# Patient Record
Sex: FEMALE | Race: WHITE | NOT HISPANIC OR LATINO | Employment: OTHER | URBAN - METROPOLITAN AREA
[De-identification: names, ages, dates, MRNs, and addresses within clinical notes are randomized per-mention and may not be internally consistent; named-entity substitution may affect disease eponyms.]

---

## 2018-01-04 ENCOUNTER — HOSPITAL ENCOUNTER (INPATIENT)
Facility: HOSPITAL | Age: 63
LOS: 1 days | Discharge: HOME | DRG: 375 | End: 2018-01-05
Attending: EMERGENCY MEDICINE | Admitting: FAMILY MEDICINE
Payer: COMMERCIAL

## 2018-01-04 ENCOUNTER — APPOINTMENT (EMERGENCY)
Dept: CT IMAGING | Facility: HOSPITAL | Age: 63
DRG: 375 | End: 2018-01-04
Payer: COMMERCIAL

## 2018-01-04 ENCOUNTER — APPOINTMENT (INPATIENT)
Dept: CT IMAGING | Facility: HOSPITAL | Age: 63
DRG: 375 | End: 2018-01-04
Payer: COMMERCIAL

## 2018-01-04 ENCOUNTER — APPOINTMENT (EMERGENCY)
Dept: INTERVENTIONAL RADIOLOGY/VASCULAR | Facility: HOSPITAL | Age: 63
DRG: 375 | End: 2018-01-04
Payer: COMMERCIAL

## 2018-01-04 DIAGNOSIS — C79.9 METASTATIC DISEASE (HCC): ICD-10-CM

## 2018-01-04 DIAGNOSIS — R18.8 ASCITES: Primary | ICD-10-CM

## 2018-01-04 DIAGNOSIS — R10.84 GENERALIZED ABDOMINAL PAIN: ICD-10-CM

## 2018-01-04 PROBLEM — C80.1 MALIGNANCY (HCC): Status: ACTIVE | Noted: 2018-01-04

## 2018-01-04 PROBLEM — E87.1 HYPONATREMIA: Status: ACTIVE | Noted: 2018-01-04

## 2018-01-04 PROBLEM — R10.9 ABDOMINAL PAIN: Status: ACTIVE | Noted: 2018-01-04

## 2018-01-04 PROBLEM — I10 HYPERTENSION: Status: ACTIVE | Noted: 2018-01-04

## 2018-01-04 PROBLEM — E87.6 HYPOKALEMIA: Status: ACTIVE | Noted: 2018-01-04

## 2018-01-04 LAB
AFP-TM SERPL-MCNC: 1.4 NG/ML (ref 0.5–8)
ALBUMIN FLD-MCNC: 2.3 G/DL
ALBUMIN SERPL BCP-MCNC: 3 G/DL (ref 3.5–5)
ALP SERPL-CCNC: 53 U/L (ref 46–116)
ALT SERPL W P-5'-P-CCNC: 25 U/L (ref 12–78)
ANION GAP SERPL CALCULATED.3IONS-SCNC: 14 MMOL/L (ref 4–13)
ANION GAP SERPL CALCULATED.3IONS-SCNC: 9 MMOL/L (ref 4–13)
APPEARANCE FLD: NORMAL
APTT PPP: 30 SECONDS (ref 23–35)
AST SERPL W P-5'-P-CCNC: 44 U/L (ref 5–45)
ATRIAL RATE: 105 BPM
BASOPHILS # BLD AUTO: 0.06 THOUSANDS/ΜL (ref 0–0.1)
BASOPHILS NFR BLD AUTO: 1 % (ref 0–1)
BILIRUB SERPL-MCNC: 0.4 MG/DL (ref 0.2–1)
BUN SERPL-MCNC: 7 MG/DL (ref 5–25)
BUN SERPL-MCNC: 7 MG/DL (ref 5–25)
CALCIUM SERPL-MCNC: 8.4 MG/DL (ref 8.3–10.1)
CALCIUM SERPL-MCNC: 9.3 MG/DL (ref 8.3–10.1)
CHLORIDE SERPL-SCNC: 86 MMOL/L (ref 100–108)
CHLORIDE SERPL-SCNC: 90 MMOL/L (ref 100–108)
CO2 SERPL-SCNC: 32 MMOL/L (ref 21–32)
CO2 SERPL-SCNC: 33 MMOL/L (ref 21–32)
COLOR FLD: YELLOW
CREAT SERPL-MCNC: 0.77 MG/DL (ref 0.6–1.3)
CREAT SERPL-MCNC: 0.8 MG/DL (ref 0.6–1.3)
EOSINOPHIL # BLD AUTO: 0.06 THOUSAND/ΜL (ref 0–0.61)
EOSINOPHIL NFR BLD AUTO: 1 % (ref 0–6)
ERYTHROCYTE [DISTWIDTH] IN BLOOD BY AUTOMATED COUNT: 11.9 % (ref 11.6–15.1)
EXT BILIRUBIN, UA: ABNORMAL
EXT BLOOD URINE: NEGATIVE
EXT GLUCOSE, UA: NEGATIVE
EXT KETONES: ABNORMAL
EXT NITRITE, UA: NEGATIVE
EXT PH, UA: 8.5
EXT PROTEIN, UA: ABNORMAL
EXT SPECIFIC GRAVITY, UA: 1.01
EXT UROBILINOGEN: 0.2
GFR SERPL CREATININE-BSD FRML MDRD: 78 ML/MIN/1.73SQ M
GFR SERPL CREATININE-BSD FRML MDRD: 82 ML/MIN/1.73SQ M
GLUCOSE SERPL-MCNC: 80 MG/DL (ref 65–140)
GLUCOSE SERPL-MCNC: 84 MG/DL (ref 65–140)
HCT VFR BLD AUTO: 41.4 % (ref 34.8–46.1)
HGB BLD-MCNC: 13.7 G/DL (ref 11.5–15.4)
INR PPP: 0.94 (ref 0.86–1.16)
LACTATE SERPL-SCNC: 1.5 MMOL/L (ref 0.5–2)
LDH FLD L TO P-CCNC: 555 U/L
LIPASE SERPL-CCNC: 72 U/L (ref 73–393)
LYMPHOCYTES # BLD AUTO: 2.16 THOUSANDS/ΜL (ref 0.6–4.47)
LYMPHOCYTES NFR BLD AUTO: 22 % (ref 14–44)
LYMPHOCYTES NFR BLD AUTO: 76 %
MCH RBC QN AUTO: 29.7 PG (ref 26.8–34.3)
MCHC RBC AUTO-ENTMCNC: 33.1 G/DL (ref 31.4–37.4)
MCV RBC AUTO: 90 FL (ref 82–98)
MONOCYTES # BLD AUTO: 0.8 THOUSAND/ΜL (ref 0.17–1.22)
MONOCYTES NFR BLD AUTO: 15 %
MONOCYTES NFR BLD AUTO: 8 % (ref 4–12)
NEUTROPHILS # BLD AUTO: 6.62 THOUSANDS/ΜL (ref 1.85–7.62)
NEUTS SEG NFR BLD AUTO: 68 % (ref 43–75)
NEUTS SEG NFR BLD AUTO: 9 %
NRBC BLD AUTO-RTO: 0 /100 WBCS
P AXIS: -79 DEGREES
PLATELET # BLD AUTO: 468 THOUSANDS/UL (ref 149–390)
PLATELET # BLD AUTO: 604 THOUSANDS/UL (ref 149–390)
PMV BLD AUTO: 8.5 FL (ref 8.9–12.7)
PMV BLD AUTO: 9.3 FL (ref 8.9–12.7)
POTASSIUM SERPL-SCNC: 3 MMOL/L (ref 3.5–5.3)
POTASSIUM SERPL-SCNC: 3.2 MMOL/L (ref 3.5–5.3)
PR INTERVAL: 136 MS
PROT FLD-MCNC: 5.2 G/DL
PROT SERPL-MCNC: 8.2 G/DL (ref 6.4–8.2)
PROTHROMBIN TIME: 12.8 SECONDS (ref 12.1–14.4)
QRS AXIS: 70 DEGREES
QRSD INTERVAL: 76 MS
QT INTERVAL: 314 MS
QTC INTERVAL: 415 MS
RBC # BLD AUTO: 4.61 MILLION/UL (ref 3.81–5.12)
SITE: NORMAL
SODIUM SERPL-SCNC: 132 MMOL/L (ref 136–145)
SODIUM SERPL-SCNC: 132 MMOL/L (ref 136–145)
SPECIMEN SOURCE: NORMAL
T WAVE AXIS: -48 DEGREES
TOTAL CELLS COUNTED SPEC: 100
TROPONIN I BLD-MCNC: 0 NG/ML (ref 0–0.08)
TSH SERPL DL<=0.05 MIU/L-ACNC: 3.04 UIU/ML (ref 0.36–3.74)
VENTRICULAR RATE: 105 BPM
WBC # BLD AUTO: 9.75 THOUSAND/UL (ref 4.31–10.16)
WBC # BLD EST: NEGATIVE 10*3/UL
WBC # FLD MANUAL: 1257 /UL

## 2018-01-04 PROCEDURE — 88112 CYTOPATH CELL ENHANCE TECH: CPT | Performed by: EMERGENCY MEDICINE

## 2018-01-04 PROCEDURE — 85025 COMPLETE CBC W/AUTO DIFF WBC: CPT | Performed by: EMERGENCY MEDICINE

## 2018-01-04 PROCEDURE — 0W9G3ZZ DRAINAGE OF PERITONEAL CAVITY, PERCUTANEOUS APPROACH: ICD-10-PCS | Performed by: RADIOLOGY

## 2018-01-04 PROCEDURE — 96374 THER/PROPH/DIAG INJ IV PUSH: CPT

## 2018-01-04 PROCEDURE — 80053 COMPREHEN METABOLIC PANEL: CPT | Performed by: EMERGENCY MEDICINE

## 2018-01-04 PROCEDURE — 83690 ASSAY OF LIPASE: CPT | Performed by: EMERGENCY MEDICINE

## 2018-01-04 PROCEDURE — 74177 CT ABD & PELVIS W/CONTRAST: CPT

## 2018-01-04 PROCEDURE — 82105 ALPHA-FETOPROTEIN SERUM: CPT | Performed by: PHYSICIAN ASSISTANT

## 2018-01-04 PROCEDURE — 81002 URINALYSIS NONAUTO W/O SCOPE: CPT | Performed by: EMERGENCY MEDICINE

## 2018-01-04 PROCEDURE — 89051 BODY FLUID CELL COUNT: CPT | Performed by: EMERGENCY MEDICINE

## 2018-01-04 PROCEDURE — 87040 BLOOD CULTURE FOR BACTERIA: CPT | Performed by: EMERGENCY MEDICINE

## 2018-01-04 PROCEDURE — 83615 LACTATE (LD) (LDH) ENZYME: CPT | Performed by: EMERGENCY MEDICINE

## 2018-01-04 PROCEDURE — 88342 IMHCHEM/IMCYTCHM 1ST ANTB: CPT | Performed by: EMERGENCY MEDICINE

## 2018-01-04 PROCEDURE — 99285 EMERGENCY DEPT VISIT HI MDM: CPT

## 2018-01-04 PROCEDURE — 93005 ELECTROCARDIOGRAM TRACING: CPT | Performed by: EMERGENCY MEDICINE

## 2018-01-04 PROCEDURE — 83605 ASSAY OF LACTIC ACID: CPT | Performed by: EMERGENCY MEDICINE

## 2018-01-04 PROCEDURE — 84157 ASSAY OF PROTEIN OTHER: CPT | Performed by: EMERGENCY MEDICINE

## 2018-01-04 PROCEDURE — 82378 CARCINOEMBRYONIC ANTIGEN: CPT | Performed by: PHYSICIAN ASSISTANT

## 2018-01-04 PROCEDURE — 85610 PROTHROMBIN TIME: CPT | Performed by: EMERGENCY MEDICINE

## 2018-01-04 PROCEDURE — 96375 TX/PRO/DX INJ NEW DRUG ADDON: CPT

## 2018-01-04 PROCEDURE — 85730 THROMBOPLASTIN TIME PARTIAL: CPT | Performed by: EMERGENCY MEDICINE

## 2018-01-04 PROCEDURE — 49083 ABD PARACENTESIS W/IMAGING: CPT

## 2018-01-04 PROCEDURE — 84443 ASSAY THYROID STIM HORMONE: CPT | Performed by: PHYSICIAN ASSISTANT

## 2018-01-04 PROCEDURE — 88341 IMHCHEM/IMCYTCHM EA ADD ANTB: CPT | Performed by: EMERGENCY MEDICINE

## 2018-01-04 PROCEDURE — 80048 BASIC METABOLIC PNL TOTAL CA: CPT | Performed by: PHYSICIAN ASSISTANT

## 2018-01-04 PROCEDURE — 36415 COLL VENOUS BLD VENIPUNCTURE: CPT | Performed by: EMERGENCY MEDICINE

## 2018-01-04 PROCEDURE — 85049 AUTOMATED PLATELET COUNT: CPT | Performed by: PHYSICIAN ASSISTANT

## 2018-01-04 PROCEDURE — 88305 TISSUE EXAM BY PATHOLOGIST: CPT | Performed by: EMERGENCY MEDICINE

## 2018-01-04 PROCEDURE — 82042 OTHER SOURCE ALBUMIN QUAN EA: CPT | Performed by: EMERGENCY MEDICINE

## 2018-01-04 PROCEDURE — 86304 IMMUNOASSAY TUMOR CA 125: CPT | Performed by: PHYSICIAN ASSISTANT

## 2018-01-04 PROCEDURE — 84484 ASSAY OF TROPONIN QUANT: CPT

## 2018-01-04 PROCEDURE — 87070 CULTURE OTHR SPECIMN AEROBIC: CPT | Performed by: EMERGENCY MEDICINE

## 2018-01-04 PROCEDURE — 87205 SMEAR GRAM STAIN: CPT | Performed by: EMERGENCY MEDICINE

## 2018-01-04 PROCEDURE — 93005 ELECTROCARDIOGRAM TRACING: CPT

## 2018-01-04 PROCEDURE — 86301 IMMUNOASSAY TUMOR CA 19-9: CPT | Performed by: PHYSICIAN ASSISTANT

## 2018-01-04 PROCEDURE — 71260 CT THORAX DX C+: CPT

## 2018-01-04 RX ORDER — POTASSIUM CHLORIDE 29.8 MG/ML
40 INJECTION INTRAVENOUS ONCE
Status: DISCONTINUED | OUTPATIENT
Start: 2018-01-04 | End: 2018-01-04

## 2018-01-04 RX ORDER — ACETAMINOPHEN 325 MG/1
650 TABLET ORAL EVERY 6 HOURS PRN
Status: DISCONTINUED | OUTPATIENT
Start: 2018-01-04 | End: 2018-01-05 | Stop reason: HOSPADM

## 2018-01-04 RX ORDER — MORPHINE SULFATE 2 MG/ML
0.5 INJECTION, SOLUTION INTRAMUSCULAR; INTRAVENOUS EVERY 4 HOURS PRN
Status: DISCONTINUED | OUTPATIENT
Start: 2018-01-04 | End: 2018-01-05 | Stop reason: HOSPADM

## 2018-01-04 RX ORDER — LIDOCAINE HYDROCHLORIDE 10 MG/ML
INJECTION, SOLUTION INFILTRATION; PERINEURAL CODE/TRAUMA/SEDATION MEDICATION
Status: COMPLETED | OUTPATIENT
Start: 2018-01-04 | End: 2018-01-04

## 2018-01-04 RX ORDER — PANTOPRAZOLE SODIUM 40 MG/1
40 TABLET, DELAYED RELEASE ORAL
Status: DISCONTINUED | OUTPATIENT
Start: 2018-01-05 | End: 2018-01-05 | Stop reason: HOSPADM

## 2018-01-04 RX ORDER — HYDRALAZINE HYDROCHLORIDE 20 MG/ML
5 INJECTION INTRAMUSCULAR; INTRAVENOUS EVERY 6 HOURS PRN
Status: DISCONTINUED | OUTPATIENT
Start: 2018-01-04 | End: 2018-01-05 | Stop reason: HOSPADM

## 2018-01-04 RX ORDER — ONDANSETRON 2 MG/ML
4 INJECTION INTRAMUSCULAR; INTRAVENOUS ONCE
Status: COMPLETED | OUTPATIENT
Start: 2018-01-04 | End: 2018-01-04

## 2018-01-04 RX ORDER — MORPHINE SULFATE 4 MG/ML
4 INJECTION, SOLUTION INTRAMUSCULAR; INTRAVENOUS ONCE
Status: COMPLETED | OUTPATIENT
Start: 2018-01-04 | End: 2018-01-04

## 2018-01-04 RX ADMIN — FAMOTIDINE 20 MG: 10 INJECTION, SOLUTION INTRAVENOUS at 13:24

## 2018-01-04 RX ADMIN — IOHEXOL 80 ML: 350 INJECTION, SOLUTION INTRAVENOUS at 20:40

## 2018-01-04 RX ADMIN — ACETAMINOPHEN 650 MG: 325 TABLET ORAL at 20:56

## 2018-01-04 RX ADMIN — IOHEXOL 100 ML: 350 INJECTION, SOLUTION INTRAVENOUS at 14:02

## 2018-01-04 RX ADMIN — LIDOCAINE HYDROCHLORIDE 5 ML: 10 INJECTION, SOLUTION INFILTRATION; PERINEURAL at 15:38

## 2018-01-04 RX ADMIN — Medication 20 MEQ: at 18:22

## 2018-01-04 RX ADMIN — Medication 20 MEQ: at 16:17

## 2018-01-04 RX ADMIN — ONDANSETRON 4 MG: 2 INJECTION INTRAMUSCULAR; INTRAVENOUS at 13:24

## 2018-01-04 RX ADMIN — MORPHINE SULFATE 4 MG: 4 INJECTION, SOLUTION INTRAMUSCULAR; INTRAVENOUS at 13:24

## 2018-01-04 NOTE — H&P
H&P- Bijan Ours 1955, 57 y o  female MRN: [id number]    Unit/Bed#: -[facility] Encounter: [id number]    Primary Care Provider: No primary care provider on file  Date and time admitted to hospital: 1/4/2018 12:30 PM       DOS: 1/4/2018      * Abdominal pain   Assessment & Plan    · With distension and ascites   · Pt had vomiting this AM and abdominal pain, likely due to ascites, pain improved with paracentesis, last episode of vomiting this morning, likely related to increased abdominal pressure, will start patient on regular diet, if not able to tolerate will decrease to clear liquids   · IR paracentesis performed in Ed, removed 4,400 cc taj fluid, sent for additional testing   · Blood cultures pending  · CT scan revealing Extensive abdominal ascites and diffuse peritoneal/omental caking has a result of diffuse metastatic disease  Findings most likely represent either GYN or gastrointestinal primary malignancy with discrete primary not clearly visualized    · Oncology/GI consult  · Tumor markers CA-125, AFP, CEA, CA 19-9    · Pt with strong family history of GYN cancers   · TSH to evaluate for tachycardia  · PT/OT eval  · Lovenox DVT prophylaxis   · No previous history of alcohol abuse or cirrhosis on CT scan, LFTs within normal limits   · Start protonix 40 mg daily due to patient complaining of reflux symptoms at home        Hypertension   Assessment & Plan    · Pt with elevations of BP on admission  · Does not take any anti hypertensives at home   · PRN hydralazine, consider addition of medication if continued elevations  · Telemetry monitoring  · Pt with tachycardia, improved after paracentesis, monitor, pt may benefit from low dose beta blocker if continued hypertension and increased HR  · It is likely patient has not followed up with a doctor in some time           Hyponatremia   Assessment & Plan    · Pts Na+ 132 on admission  · 4400 cc fluid removed by paracentesis, re-evaluate BMP in the AM Hypokalemia   Assessment & Plan    · K+ 3 0 on admission  · Pt received potassium chloride 20 mEq IV in the ED  · Will re evaluate BMP and replete if necessary   · Will check mag        Malignancy [facility])   Assessment & Plan    · CT revealing metastatic disease to the omentum   · Strong family history of GYN cancers, no previous genetic testing   · Lovenox DVT prophylaxis  · Morphine PRN for pain management  · Telemetry monitoring   · Patient also with pleural effusions on Ct, encourage incentive spirometry and hold off on IVF for now, patient on regular diet, assess toleration and nutrition consult           VTE Prophylaxis: Enoxaparin (Lovenox)  / sequential compression device   Code Status: Level I - Full code, discussed with patient and family members at bedside  POLST: There is no POLST form on file for this patient (pre-hospital)  Discussion with family: Discussed plan with patient and patient's daughter and son-in-law at bedside  Discussed with family that CT findings indicative of underlying cancer, likely GI or GYN in origin  Explained that the cancer had metastasized to the omentum  Discussed that we will be getting oncology and GI to see the patient for treatment and prognosis recommendations  Will be obtaining cancer markers  Patient and family in agreement and verbalized understanding  Patient does not seem to fully grasp the severity of her findings, is in a very positive mood upon entire discussion  Anticipated Length of Stay:  Patient will be admitted on an Inpatient basis with an anticipated length of stay of  > 2 midnights  Justification for Hospital Stay: Gi/oncology recommendations and improvement of electrolytes     Total Time for Visit, including Counseling / Coordination of Care: 45 minutes  Greater than 50% of this total time spent on direct patient counseling and coordination of care      Chief Complaint:   "I had a big belly, like I was pregnant for the last 2 and a half weeks"    History of Present Illness:    Enrike Gomez is a 57 y o  female with a significant past medical history of stroke over 20 years ago with no deficits who presents with abdominal pain, vomiting and abdominal distension x 2 and a half weeks  No previous episodes of this in the past  Patient states that her appetite has been non-existent  She reports that the abdominal distension became noticeable about 2 and a half weeks ago  She states that over the last 3 days she has not had much to eat at all  She reports that she was having some mild diarrhea as well, however her last bowel movement was yesterday and it was more firm  She takes herbal supplements for this  She states that she has been drinking plenty of water  She states that she was experiencing abdominal pain due to the distension and had taken ibuprofen without relief and a pain medication she had previously from a surgery that provided some relief  She states that she has been belching often and the last 2 days has had 2 episodes of vomiting  Once yesterday in the morning and today in the morning  That was her last episode of vomiting today  Patient states that she feels much better since the paracentesis and has minimal abdominal discomfort now  She denies nausea currently and states that she is hungry  Patient states she has a family history of breast and ovarian cancer  She has never had genetic testing done prior  Patient reports that she does not take any medications at home, she uses some herbal medications  Review of Systems:    Review of Systems   Constitutional: Negative for chills, diaphoresis and fever  HENT: Negative for congestion, ear pain, hearing loss, rhinorrhea, sinus pressure, sore throat and tinnitus  Eyes: Negative for pain, redness and visual disturbance  Respiratory: Negative for cough, chest tightness and shortness of breath      Cardiovascular: Positive for palpitations (felt rapid heart rate prior to paracentesis)  Negative for chest pain and leg swelling  Gastrointestinal: Positive for abdominal distention, abdominal pain and vomiting (twice, once yesterday and once this AM)  Negative for blood in stool, constipation, diarrhea and nausea  Genitourinary: Negative for difficulty urinating, dysuria and hematuria  Musculoskeletal: Positive for myalgias (muscle cramping of the lower extremities x 1 month)  Negative for joint swelling  Skin: Negative for color change, pallor and rash  Neurological: Negative for dizziness, light-headedness and headaches  Past Medical and Surgical History:     Past Medical History:   Diagnosis Date    Arthritis     Hypertension 1/4/2018    Stroke ([facility] [address] ) 1995       Past Surgical History:   Procedure Laterality Date    JOINT REPLACEMENT      bilateral knee replacement        Meds/Allergies:    Prior to Admission medications    Not on File     I have reviewed home medications with patient personally  Allergies:    Allergies   Allergen Reactions    Black Pepper [Piper] Rash       Social History:     Marital Status: /Civil Union   Occupation:   Patient Pre-hospital Living Situation: Patient lives at home with her  and son   Patient Pre-hospital Level of Mobility: Full mobility   Patient Pre-hospital Diet Restrictions: None   Substance Use History:   History   Alcohol Use No     History   Smoking Status    Never Smoker   Smokeless Tobacco    Never Used     History   Drug Use No       Family History:    patient's mother with history of breast and ovarian cancer, no genetic testing done    Physical Exam:     Vitals:   Blood Pressure: 166/77 (01/04/18 1703)  Pulse: 99 (01/04/18 1703)  Temperature: 97 9 °F (36 6 °C) (01/04/18 1703)  Temp Source: Oral (01/04/18 1703)  Respirations: 18 (01/04/18 1703)  Height: 5' 4" (162 6 cm) (01/04/18 1703)  Weight - Scale: 59 4 kg (130 lb 15 3 oz) (01/04/18 1703)  SpO2: 95 % (01/04/18 1703)    Physical Exam Constitutional: No distress  Pt is a thin female who is in no acute distress sitting up in her hospital bed accompanied by her daughter, son-in-law and grandson    HENT:   Head: Normocephalic and atraumatic  Eyes: Conjunctivae are normal  No scleral icterus  Cardiovascular: Normal rate, regular rhythm and intact distal pulses  No murmur heard  Pulmonary/Chest: Effort normal and breath sounds normal  No respiratory distress  She has no wheezes  She has no rales  Abdominal: Soft  Bowel sounds are normal  She exhibits no distension  There is tenderness (mild generalized tenderness )  There is no rebound  Musculoskeletal: She exhibits no edema  Neurological: She is alert  Skin: Skin is warm and dry  She is not diaphoretic  No erythema  Psychiatric:   Pt appears to be very happy and bright despite being told of her metastatic malignancy    Vitals reviewed  Additional Data:     Lab Results: I have personally reviewed pertinent reports  Results from last 7 days  Lab Units 01/04/18  1322   WBC Thousand/uL 9 75   HEMOGLOBIN g/dL 13 7   HEMATOCRIT % 41 4   PLATELETS Thousands/uL 604*   NEUTROS PCT % 68   LYMPHS PCT % 22   MONOS PCT % 8   EOS PCT % 1       Results from last 7 days  Lab Units 01/04/18  1322   SODIUM mmol/L 132*   POTASSIUM mmol/L 3 0*   CHLORIDE mmol/L 86*   CO2 mmol/L 32   BUN mg/dL 7   CREATININE mg/dL 0 80   CALCIUM mg/dL 9 3   TOTAL PROTEIN g/dL 8 2   BILIRUBIN TOTAL mg/dL 0 40   ALK PHOS U/L 53   ALT U/L 25   AST U/L 44   GLUCOSE RANDOM mg/dL 84       Results from last 7 days  Lab Units 01/04/18  1322   INR  0 94       Imaging: I have personally reviewed pertinent reports  CT abdomen pelvis with contrast   Final Result by Bj Rebollar DO (01/04 5937)      Extensive abdominal ascites and diffuse peritoneal/omental caking has a result of diffuse metastatic disease   Findings most likely represent either GYN or gastrointestinal primary malignancy with discrete primary not clearly visualized  Trace effusions within the lung bases, right slightly greater than left with mild adjacent atelectatic change  Workstation performed: [id number]         IR paracentesis    (Results Pending)       EKG, Pathology, and Other Studies Reviewed on Admission:   · CT abdomen pelvis results reviewed     Allscripts / Epic Records Reviewed: Yes     ** Please Note: This note has been constructed using a voice recognition system   **

## 2018-01-04 NOTE — ASSESSMENT & PLAN NOTE
· K+ 3 0 on admission  · Pt received potassium chloride 20 mEq IV in the ED  · Will re evaluate BMP and replete if necessary   · Will check mag

## 2018-01-04 NOTE — ED PROVIDER NOTES
History  Chief Complaint   Patient presents with    Abdominal Pain     Pt with abdominal pain and distention for about 2 weeks  Pt states she vomits first thing in the morning then not again throughout the day  Pt still having some normal bowel movements and passing gas  C/o abd  Pain distention for 2 & 1/2 weeks  Pt  Gained 5 lbs  In that time and has had no appetite  Her pants aren't fitting anymore  +nausea, vomiting in the morning for 2 days  No fevers  No constipation, +intermittent diarrhea but has been taking herbal laxatives when needed  No abd  Surgeries in the past   Pt  Drinks alcohol on the weekends - but hasn't in awhile  +heartburn - pt  Is taking tums            None       Past Medical History:   Diagnosis Date    Arthritis     Hypertension 1/4/2018    Stroke [facility]) 1995       Past Surgical History:   Procedure Laterality Date    JOINT REPLACEMENT      bilateral knee replacement        Family History   Problem Relation Age of Onset    Cancer Mother      I have reviewed and agree with the history as documented  Social History   Substance Use Topics    Smoking status: Never Smoker    Smokeless tobacco: Never Used    Alcohol use No        Review of Systems   Constitutional: Negative for appetite change, fatigue and fever  HENT: Negative for rhinorrhea and sore throat  Respiratory: Negative for cough, shortness of breath and wheezing  Cardiovascular: Negative for chest pain and leg swelling  Gastrointestinal: Positive for abdominal pain, diarrhea, nausea and vomiting  Genitourinary: Negative for dysuria and flank pain  Musculoskeletal: Negative for back pain and neck pain  Skin: Negative for rash  Neurological: Negative for syncope and headaches     Psychiatric/Behavioral:        Mood normal       Physical Exam  ED Triage Vitals [01/04/18 1237]   Temperature Pulse Respirations Blood Pressure SpO2   98 7 °F (37 1 °C) (!) 107 22 146/90 94 %      Temp Source Heart Rate Source Patient Position - Orthostatic VS BP Location FiO2 (%)   Oral Monitor Sitting Right arm --      Pain Score       6           Orthostatic Vital Signs  Vitals:    01/04/18 1237 01/04/18 1353 01/04/18 1703   BP: 146/90 (!) 178/85 166/77   Pulse: (!) 107 104 99   Patient Position - Orthostatic VS: Sitting         Physical Exam   Constitutional: She is oriented to person, place, and time  She appears well-developed and well-nourished  HENT:   Head: Normocephalic and atraumatic  Neck: Normal range of motion  Neck supple  Cardiovascular: Normal rate and regular rhythm  Pulmonary/Chest: Effort normal and breath sounds normal    Abdominal: Soft  She exhibits distension  Mid abd  Tenderness, no r/g       Musculoskeletal: Normal range of motion  Neurological: She is alert and oriented to person, place, and time  Skin: Skin is warm and dry  Nursing note and vitals reviewed  ED Medications  Medications   potassium chloride 20 mEq in D5W 100 mL (20 mEq Intravenous Given 1/4/18 1617)   enoxaparin (LOVENOX) subcutaneous injection 40 mg (not administered)   acetaminophen (TYLENOL) tablet 650 mg (not administered)   hydrALAZINE (APRESOLINE) injection 5 mg (not administered)   morphine injection 0 5 mg (not administered)   pantoprazole (PROTONIX) EC tablet 40 mg (not administered)   ondansetron (ZOFRAN) injection 4 mg (4 mg Intravenous Given 1/4/18 1324)   morphine (PF) 4 mg/mL injection 4 mg (4 mg Intravenous Given 1/4/18 1324)   famotidine (PEPCID) injection 20 mg (20 mg Intravenous Given 1/4/18 1324)   iohexol (OMNIPAQUE) 350 MG/ML injection (MULTI-DOSE) 100 mL (100 mL Intravenous Given 1/4/18 1402)   lidocaine (XYLOCAINE) 1 % injection (5 mL Infiltration Given 1/4/18 1538)       Diagnostic Studies  Results Reviewed     Procedure Component Value Units Date/Time    Body Fluid Diff [[id number]] Collected:  01/04/18 1542    Lab Status:  Final result Specimen:   Body Fluid from Paracentesis Updated: 01/04/18 1653     Total Counted 100     Neutrophils % (Fluid) 9 %      Lymphs % (Fluid) 76 %      Monocytes % (Fluid) 15 %     Body fluid white cell count with differential [[id number]] Collected:  01/04/18 1542    Lab Status:  Final result Specimen: Body Fluid from Paracentesis Updated:  01/04/18 1620     Site Paracentesis     Color, Fluid Yellow     Clarity, Fluid Slightly Cloudy     WBC, Fluid 1,257 /ul     Albumin, fluid [[id number]] Collected:  01/04/18 1542    Lab Status: In process Specimen: Body Fluid from Paracentesis Updated:  01/04/18 1558    Total Protein, Fluid [[id number]] Collected:  01/04/18 1542    Lab Status: In process Specimen: Body Fluid from Paracentesis Updated:  01/04/18 1558    LD (LDH), Body Fluid [[id number]] Collected:  01/04/18 1542    Lab Status: In process Specimen: Body Fluid from Paracentesis Updated:  01/04/18 1558    Body fluid culture and Gram stain [[id number]] Collected:  01/04/18 1542    Lab Status: In process Specimen: Body Fluid from Paracentesis Updated:  01/04/18 1557    POCT urinalysis dipstick [[id number]]  (Abnormal) Resulted:  01/04/18 1357    Lab Status:  Final result Specimen:  Urine Updated:  01/04/18 1357     EXT Glucose, UA negative     EXT Bilirubin, UA (Ref: Negative) moderate     EXT Ketones, UA (Ref: Negative) large     EXT Spec Grav, UA 1 010     EXT Blood, UA (Ref: Negative) negative     EXT pH, UA 8 5     EXT Protein, UA (Ref: Negative) 100++     EXT Urobilinogen, UA (Ref: 0 2- 1 0) 0 2     EXT Leukocytes, UA (Ref: Negative) negative     EXT Nitrite, UA (Ref: Negative) negative    Lactic acid, plasma [[id number]]  (Normal) Collected:  01/04/18 1322    Lab Status:  Final result Specimen:  Blood from Arm, Right Updated:  01/04/18 1349     LACTIC ACID 1 5 mmol/L     Narrative:         Result may be elevated if tourniquet was used during collection      POCT troponin [[id number]]  (Normal) Collected:  01/04/18 1331    Lab Status:  Final result Updated:  01/04/18 1344 POC Troponin I 0 00 ng/ml      Specimen Type VENOUS    Narrative:         [employer] i-Stat handheld analyzer 99% cutoff is > 0 08ng/mL in network Emergency Departments    o cTnI 99% cutoff is useful only when applied to patients in the clinical setting of myocardial ischemia  o cTnI 99% cutoff should be interpreted in the context of clinical history, ECG findings and possibly cardiac imaging to establish correct diagnosis  o cTnI 99% cutoff may be suggestive but clearly not indicative of a coronary event without the clinical setting of myocardial ischemia  Comprehensive metabolic panel [[id number]]  (Abnormal) Collected:  01/04/18 1322    Lab Status:  Final result Specimen:  Blood from Arm, Right Updated:  01/04/18 1344     Sodium 132 (L) mmol/L      Potassium 3 0 (L) mmol/L      Chloride 86 (L) mmol/L      CO2 32 mmol/L      Anion Gap 14 (H) mmol/L      BUN 7 mg/dL      Creatinine 0 80 mg/dL      Glucose 84 mg/dL      Calcium 9 3 mg/dL      AST 44 U/L      ALT 25 U/L      Alkaline Phosphatase 53 U/L      Total Protein 8 2 g/dL      Albumin 3 0 (L) g/dL      Total Bilirubin 0 40 mg/dL      eGFR 78 ml/min/1 73sq m     Narrative:         National Kidney Disease Education Program recommendations are as follows:  GFR calculation is accurate only with a steady state creatinine  Chronic Kidney disease less than 60 ml/min/1 73 sq  meters  Kidney failure less than 15 ml/min/1 73 sq  meters  Lipase [[id number]]  (Abnormal) Collected:  01/04/18 1322    Lab Status:  Final result Specimen:  Blood from Arm, Right Updated:  01/04/18 1344     Lipase 72 (L) u/L     Blood culture #1 [[id number]] Collected:  01/04/18 1338    Lab Status:   In process Specimen:  Blood from Hand, Right Updated:  01/04/18 1341    Protime-INR [[id number]]  (Normal) Collected:  01/04/18 1322    Lab Status:  Final result Specimen:  Blood from Arm, Right Updated:  01/04/18 1339     Protime 12 8 seconds      INR 0 94    APTT [[id number]]  (Normal) Collected: 01/04/18 1322    Lab Status:  Final result Specimen:  Blood from Arm, Right Updated:  01/04/18 1339     PTT 30 seconds     Narrative: Therapeutic Heparin Range = 60-90 seconds    CBC and differential [[id number]]  (Abnormal) Collected:  01/04/18 1322    Lab Status:  Final result Specimen:  Blood from Arm, Right Updated:  01/04/18 1328     WBC 9 75 Thousand/uL      RBC 4 61 Million/uL      Hemoglobin 13 7 g/dL      Hematocrit 41 4 %      MCV 90 fL      MCH 29 7 pg      MCHC 33 1 g/dL      RDW 11 9 %      MPV 9 3 fL      Platelets 097 (H) Thousands/uL      nRBC 0 /100 WBCs      Neutrophils Relative 68 %      Lymphocytes Relative 22 %      Monocytes Relative 8 %      Eosinophils Relative 1 %      Basophils Relative 1 %      Neutrophils Absolute 6 62 Thousands/µL      Lymphocytes Absolute 2 16 Thousands/µL      Monocytes Absolute 0 80 Thousand/µL      Eosinophils Absolute 0 06 Thousand/µL      Basophils Absolute 0 06 Thousands/µL     Blood culture #2 [[id number]] Collected:  01/04/18 1322    Lab Status: In process Specimen:  Blood from Arm, Right Updated:  01/04/18 1325                 CT abdomen pelvis with contrast   Final Result by Georgina Cordon DO (01/04 1449)      Extensive abdominal ascites and diffuse peritoneal/omental caking has a result of diffuse metastatic disease  Findings most likely represent either GYN or gastrointestinal primary malignancy with discrete primary not clearly visualized  Trace effusions within the lung bases, right slightly greater than left with mild adjacent atelectatic change           Workstation performed: [id number]         IR paracentesis    (Results Pending)              Procedures  ECG 12 Lead Documentation  Date/Time: 1/4/2018 1:34 PM  Performed by: KATHE Woods  Authorized by: KATHE Woods     Rate:     ECG rate:  105    ECG rate assessment: tachycardic    Rhythm:     Rhythm: sinus tachycardia    ST segments:     ST segments: Non-specific           Phone Contacts  ED Phone Contact    ED Course  ED Course                                MDM  Number of Diagnoses or Management Options  Ascites:   Metastatic disease ([facility] [address] ):      Amount and/or Complexity of Data Reviewed  Clinical lab tests: ordered and reviewed  Tests in the radiology section of CPT®: ordered and reviewed    Risk of Complications, Morbidity, and/or Mortality  Presenting problems: moderate  General comments: I told pt  And her family of the CT results  I called IR to try and get paracentesis today  She is admitted for further work up      [facility] Time    Disposition  Final diagnoses:   Ascites   [facility]     Time reflects when diagnosis was documented in both MDM as applicable and the Disposition within this note     Time User Action Codes Description Comment    1/4/2018  3:19 PM Jasso Milks R Add [R18 8] Ascites     1/4/2018  3:19 PM Lizy Tanner Sera R Add [C79 9] Metastatic disease ([facility] [address] )     1/4/2018  4:51 PM Lovena Caras Modify [R18 8] Ascites     1/4/2018  4:51 PM Irena Mei Add [R10 84] Generalized abdominal pain     1/4/2018  4:51 PM Lovena Caras Modify [R10 84] Generalized abdominal pain       ED Disposition     ED Disposition Condition Comment    Admit  Case was discussed with ELIGIO and the patient's admission status was agreed to be inpt /med/surg      Follow-up Information    None       There are no discharge medications for this patient  No discharge procedures on file      ED Provider  Electronically Signed by           Barbra Litten, MD  01/04/18 9957

## 2018-01-04 NOTE — ASSESSMENT & PLAN NOTE
· Pt with elevations of BP on admission  · Does not take any anti hypertensives at home   · PRN hydralazine, consider addition of medication if continued elevations  · Telemetry monitoring  · Pt with tachycardia, improved after paracentesis, monitor, pt may benefit from low dose beta blocker if continued hypertension and increased HR  · It is likely patient has not followed up with a doctor in some time

## 2018-01-04 NOTE — CONSULTS
Patient: Kaykay Burton  Patient MRN: [id number]  Service date: 1/4/2018  Attending Physician:       CHIEF COMPLAIN  Chief Complaint   Patient presents with    Abdominal Pain     Pt with abdominal pain and distention for about 2 weeks  Pt states she vomits first thing in the morning then not again throughout the day  Pt still having some normal bowel movements and passing gas  HISTORY OF PRESENT ILLNESS:  Kaykay Burton is a 57 y o  female who has below mentioned medical history, no cancer history, strong family history of oncology cancers, presented with abdominal distention, abdominal pain  Status post paracentesis, with 1 6 L fluid removed  Oncology was consulted for comanagement  Patient was seen at bedside with family  She reported having a big relief after the paracentesis  She has no headache, vision changes, chest pain, cough, hemoptysis, abdominal pain, nausea vomiting, bleeding, easy bruise, leg swelling  Blood smoker, confirmed mother had ovarian cancer in the past in 62s  PAST MEDICAL HISTORY:   has a past medical history of Arthritis; Hypertension (1/4/2018); and Stroke ([facility] [address] ) (1995)  PAST SURGICAL HISTORY:   has a past surgical history that includes Joint replacement  CURRENT MEDICATIONS  Scheduled Meds:  [START ON 1/5/2018] enoxaparin 40 mg Subcutaneous Daily   [START ON 1/5/2018] pantoprazole 40 mg Oral Early Morning   potassium chloride 20 mEq Intravenous Q2H While awake     Continuous Infusions:   PRN Meds:   acetaminophen    hydrALAZINE    morphine injection    SOCIAL HISTORY:   reports that she has never smoked  She has never used smokeless tobacco  She reports that she does not drink alcohol or use drugs  FAMILY HISTORY:  family history includes Cancer in her mother  ALLERGIES:  is allergic to black pepper [piper]      REVIEW OF SYSTEMS:  Please note that a 14-point review of systems was performed to include Constitutional, HEENT, Respiratory, CVS, GI, , Musculoskeletal, Integumentary, Neurologic, Rheumatologic, Endocrinologic, Psychiatric, Lymphatic, and Hematologic/Oncologic systems were reviewed and are negative unless otherwise stated in HPI  Positive and negative findings pertinent to this evaluation are incorporated into the history of present illness  PHYSICAL EXAMINATION:  Vital Signs: Temp:  [97 9 °F (36 6 °C)-98 7 °F (37 1 °C)] 97 9 °F (36 6 °C)  HR:  [] 99  Resp:  [18-22] 18  BP: (146-178)/(77-90) 166/77  Body mass index is 22 48 kg/m²  Body surface area is 1 63 meters squared  Constitutional: Alert and oriented  HEENT: Anicteric, PERRLA  Chest: Decreased breathing sound bilaterally, No wheezes/rales/rhonchi  CVS: Regular rhythm  Normal rate  Abdomen: Soft, nontender, nondistended  Bowel sounds positive X 4  No palpable organomegaly  Extremities: No cyanosis/clubbing/edema  Integumentary: No obvious rashes or bruises  Musculoskeletal: No obvious bony or joint deformities  Psychiatric: Appropriate affect and mood  Lymph Node Survey: No palpable preauricular, submandibular, cervical, supraclavicular, axillary, epitrochlear or inguinal lymphadenopathy      LABS:    Results from last 7 days  Lab Units 01/04/18  1759 01/04/18  1322   WBC Thousand/uL  --  9 75   HEMATOCRIT %  --  41 4   PLATELETS Thousands/uL 468* 604*   NEUTROS PCT %  --  68   MONOS PCT %  --  8       Results from last 7 days  Lab Units 01/04/18  1759 01/04/18  1322   SODIUM mmol/L 132* 132*   POTASSIUM mmol/L 3 2* 3 0*   CHLORIDE mmol/L 90* 86*   CO2 mmol/L 33* 32   BUN mg/dL 7 7       Results from last 7 days  Lab Units 01/04/18  1322   BILIRUBIN TOTAL mg/dL 0 40   ALK PHOS U/L 53   ALT U/L 25   AST U/L 44       Results from last 7 days  Lab Units 01/04/18  1322   INR  0 94   PTT seconds 30           Invalid input(s): TNI,  PCT              IMAGING:  CT abdomen pelvis with contrast   Final Result      Extensive abdominal ascites and diffuse peritoneal/omental caking has a result of diffuse metastatic disease  Findings most likely represent either GYN or gastrointestinal primary malignancy with discrete primary not clearly visualized  Trace effusions within the lung bases, right slightly greater than left with mild adjacent atelectatic change  Workstation performed: [id number]         IR paracentesis    (Results Pending)       PROBLEM LIST:  Patient Active Problem List   Diagnosis    Abdominal pain    Malignancy ([address] )    Hypokalemia    Hyponatremia    Hypertension       ASSESSMENT/PLAN:  Maged Mejia is a 57 y o  female with:    peritoneum mass:  With the presentation of ascites, peritoneal mass / cancer seeding, highly suspicious for GYN cancer, GI cancer is also possible  -- agree with checking tumor markers  --  Cytology is pending  Will follow the final result  -- if this is a GYN cancer, will recommend to consult GYN Oncology  -- if the final path suggesting a GI primary, this is then a stage IV disease  Patient will need chemotherapy  We will follow patient as outpatient  Recommendation  -- check CT chest to complete staging   -- consult GYN Oncology if pathology suggest so                 Azalia Mohs MD PhD  Hematology / Oncology

## 2018-01-04 NOTE — RESPIRATORY THERAPY NOTE
RT Protocol Note  Sandra Ramirez 57 y o  female MRN: [id number]  Unit/Bed#: -01 Encounter: [id number]    Assessment    Principal Problem:    Abdominal pain  Active Problems:    Malignancy ([address] )    Hypokalemia    Hyponatremia    Hypertension      Home Pulmonary Medications:  none    Past Medical History:   Diagnosis Date    Arthritis     Hypertension 1/4/2018    Stroke [facility]) 1995     Social History     Social History    Marital status: /Civil Union     Spouse name: N/A    Number of children: N/A    Years of education: N/A     Social History Main Topics    Smoking status: Never Smoker    Smokeless tobacco: Never Used    Alcohol use No    Drug use: No    Sexual activity: Not Asked     Other Topics Concern    None     Social History Narrative    None       Subjective         Objective    Physical Exam:   Assessment Type: Assess only  General Appearance: Alert, Awake  Respiratory Pattern: Normal  Chest Assessment: Chest expansion symmetrical  Bilateral Breath Sounds: Clear    Vitals:  Blood pressure 166/77, pulse 99, temperature 97 9 °F (36 6 °C), temperature source Oral, resp  rate 18, height 5' 4" (1 626 m), weight 59 4 kg (130 lb 15 3 oz), SpO2 95 %  Imaging and other studies: I have personally reviewed pertinent reports  Plan    Respiratory Plan: Discontinue Protocol        Resp Comments: no resp hx, no home meds, no distress, non smoker, does not wear O2 @ home  Had sob due to abdominal pain which has since subsided    Protocol discontinued

## 2018-01-04 NOTE — PLAN OF CARE
DISCHARGE PLANNING     Discharge to home or other facility with appropriate resources Progressing        GASTROINTESTINAL - ADULT     Maintains or returns to baseline bowel function Progressing        INFECTION - ADULT     Absence or prevention of progression during hospitalization Progressing     Absence of fever/infection during neutropenic period Progressing        Knowledge Deficit     Patient/family/caregiver demonstrates understanding of disease process, treatment plan, medications, and discharge instructions Progressing        PAIN - ADULT     Verbalizes/displays adequate comfort level or baseline comfort level Progressing        SAFETY ADULT     Patient will remain free of falls Progressing     Maintain or return to baseline ADL function Progressing     Maintain or return mobility status to optimal level Progressing

## 2018-01-04 NOTE — ASSESSMENT & PLAN NOTE
· With distension and ascites   · Pt had vomiting this AM and abdominal pain, likely due to ascites, pain improved with paracentesis, last episode of vomiting this morning, likely related to increased abdominal pressure, will start patient on regular diet, if not able to tolerate will decrease to clear liquids   · IR paracentesis performed in Ed, removed 4,400 cc taj fluid, sent for additional testing   · Blood cultures pending  · CT scan revealing Extensive abdominal ascites and diffuse peritoneal/omental caking has a result of diffuse metastatic disease  Findings most likely represent either GYN or gastrointestinal primary malignancy with discrete primary not clearly visualized    · Oncology/GI consult  · Tumor markers CA-125, AFP, CEA, CA 19-9    · Pt with strong family history of GYN cancers   · TSH to evaluate for tachycardia  · PT/OT eval  · Lovenox DVT prophylaxis   · No previous history of alcohol abuse or cirrhosis on CT scan, LFTs within normal limits

## 2018-01-04 NOTE — ASSESSMENT & PLAN NOTE
· CT revealing metastatic disease to the omentum   · Strong family history of GYN cancers, no previous genetic testing   · Lovenox DVT prophylaxis  · Morphine PRN for pain management  · Telemetry monitoring   · Patient also with pleural effusions on Ct, encourage incentive spirometry and hold off on IVF for now, patient on regular diet, assess toleration and nutrition consult

## 2018-01-05 ENCOUNTER — APPOINTMENT (INPATIENT)
Dept: RADIOLOGY | Facility: HOSPITAL | Age: 63
DRG: 375 | End: 2018-01-05
Payer: COMMERCIAL

## 2018-01-05 VITALS
RESPIRATION RATE: 18 BRPM | HEART RATE: 96 BPM | DIASTOLIC BLOOD PRESSURE: 87 MMHG | WEIGHT: 130.95 LBS | TEMPERATURE: 97.8 F | HEIGHT: 64 IN | OXYGEN SATURATION: 95 % | BODY MASS INDEX: 22.36 KG/M2 | SYSTOLIC BLOOD PRESSURE: 137 MMHG

## 2018-01-05 LAB
ALBUMIN SERPL BCP-MCNC: 2 G/DL (ref 3.5–5)
ALP SERPL-CCNC: 36 U/L (ref 46–116)
ALT SERPL W P-5'-P-CCNC: 16 U/L (ref 12–78)
ANION GAP SERPL CALCULATED.3IONS-SCNC: 3 MMOL/L (ref 4–13)
AST SERPL W P-5'-P-CCNC: 33 U/L (ref 5–45)
BILIRUB SERPL-MCNC: 0.3 MG/DL (ref 0.2–1)
BUN SERPL-MCNC: 6 MG/DL (ref 5–25)
CALCIUM SERPL-MCNC: 7.6 MG/DL (ref 8.3–10.1)
CANCER AG125 SERPL-ACNC: 534.6 U/ML (ref 0–30)
CEA SERPL-MCNC: 1.2 NG/ML (ref 0–3)
CHLORIDE SERPL-SCNC: 96 MMOL/L (ref 100–108)
CO2 SERPL-SCNC: 35 MMOL/L (ref 21–32)
CREAT SERPL-MCNC: 0.71 MG/DL (ref 0.6–1.3)
ERYTHROCYTE [DISTWIDTH] IN BLOOD BY AUTOMATED COUNT: 11.9 % (ref 11.6–15.1)
GFR SERPL CREATININE-BSD FRML MDRD: 90 ML/MIN/1.73SQ M
GLUCOSE SERPL-MCNC: 94 MG/DL (ref 65–140)
HCT VFR BLD AUTO: 34.2 % (ref 34.8–46.1)
HGB BLD-MCNC: 11.5 G/DL (ref 11.5–15.4)
MAGNESIUM SERPL-MCNC: 1.6 MG/DL (ref 1.6–2.6)
MCH RBC QN AUTO: 30.3 PG (ref 26.8–34.3)
MCHC RBC AUTO-ENTMCNC: 33.6 G/DL (ref 31.4–37.4)
MCV RBC AUTO: 90 FL (ref 82–98)
PLATELET # BLD AUTO: 465 THOUSANDS/UL (ref 149–390)
PMV BLD AUTO: 9 FL (ref 8.9–12.7)
POTASSIUM SERPL-SCNC: 3.2 MMOL/L (ref 3.5–5.3)
PROT SERPL-MCNC: 6 G/DL (ref 6.4–8.2)
RBC # BLD AUTO: 3.8 MILLION/UL (ref 3.81–5.12)
SODIUM SERPL-SCNC: 134 MMOL/L (ref 136–145)
WBC # BLD AUTO: 7.69 THOUSAND/UL (ref 4.31–10.16)

## 2018-01-05 PROCEDURE — 83735 ASSAY OF MAGNESIUM: CPT | Performed by: PHYSICIAN ASSISTANT

## 2018-01-05 PROCEDURE — 74018 RADEX ABDOMEN 1 VIEW: CPT

## 2018-01-05 PROCEDURE — 85027 COMPLETE CBC AUTOMATED: CPT | Performed by: PHYSICIAN ASSISTANT

## 2018-01-05 PROCEDURE — 80053 COMPREHEN METABOLIC PANEL: CPT | Performed by: PHYSICIAN ASSISTANT

## 2018-01-05 RX ORDER — MAGNESIUM SULFATE HEPTAHYDRATE 40 MG/ML
2 INJECTION, SOLUTION INTRAVENOUS ONCE
Status: COMPLETED | OUTPATIENT
Start: 2018-01-05 | End: 2018-01-05

## 2018-01-05 RX ORDER — POTASSIUM CHLORIDE 20 MEQ/1
40 TABLET, EXTENDED RELEASE ORAL ONCE
Status: COMPLETED | OUTPATIENT
Start: 2018-01-05 | End: 2018-01-05

## 2018-01-05 RX ORDER — PANTOPRAZOLE SODIUM 40 MG/1
40 TABLET, DELAYED RELEASE ORAL
Qty: 30 TABLET | Refills: 0 | Status: SHIPPED | OUTPATIENT
Start: 2018-01-06 | End: 2018-10-23 | Stop reason: ALTCHOICE

## 2018-01-05 RX ADMIN — MAGNESIUM SULFATE HEPTAHYDRATE 2 G: 40 INJECTION, SOLUTION INTRAVENOUS at 10:38

## 2018-01-05 RX ADMIN — PANTOPRAZOLE SODIUM 40 MG: 40 TABLET, DELAYED RELEASE ORAL at 05:44

## 2018-01-05 RX ADMIN — ENOXAPARIN SODIUM 40 MG: 40 INJECTION SUBCUTANEOUS at 08:43

## 2018-01-05 RX ADMIN — POTASSIUM CHLORIDE 40 MEQ: 1500 TABLET, EXTENDED RELEASE ORAL at 10:38

## 2018-01-05 NOTE — CASE MANAGEMENT
Initial Clinical Review    Admission: Date/Time/Statement: 1/4/18 @ 1525     Orders Placed This Encounter   Procedures    Inpatient Admission (expected length of stay for this patient is greater than two midnights)     Standing Status:   Standing     Number of Occurrences:   1     Order Specific Question:   Admitting Physician     Answer:   Kailey Escobar [[id number]]     Order Specific Question:   Level of Care     Answer:   Med Surg [[id number]]     Order Specific Question:   Estimated length of stay     Answer:   More than 2 Midnights     Order Specific Question:   Certification     Answer:   I certify that inpatient services are medically necessary for this patient for a duration of greater than two midnights  See H&P and MD Progress Notes for additional information about the patient's course of treatment  ED: Date/Time/Mode of Arrival:   ED Arrival Information     Expected Arrival Acuity Means of Arrival Escorted By Service Admission Type    - 1/4/2018 12:22 Emergent Walk-In Family Member General Medicine Emergency    Arrival Complaint    ABDOMINAL PAIN          Chief Complaint:   Chief Complaint   Patient presents with    Abdominal Pain     Pt with abdominal pain and distention for about 2 weeks  Pt states she vomits first thing in the morning then not again throughout the day  Pt still having some normal bowel movements and passing gas  History of Illness: Bijan Wakefield is a 57 y o  female with a significant past medical history of stroke over 20 years ago with no deficits who presents with abdominal pain, vomiting and abdominal distension x 2 and a half weeks  No previous episodes of this in the past  Patient states that her appetite has been non-existent  She reports that the abdominal distension became noticeable about 2 and a half weeks ago  She states that over the last 3 days she has not had much to eat at all   She reports that she was having some mild diarrhea as well, however her last bowel movement was yesterday and it was more firm  She takes herbal supplements for this  She states that she has been drinking plenty of water  She states that she was experiencing abdominal pain due to the distension and had taken ibuprofen without relief and a pain medication she had previously from a surgery that provided some relief  She states that she has been belching often and the last 2 days has had 2 episodes of vomiting  Once yesterday in the morning and today in the morning  That was her last episode of vomiting today  Patient states that she feels much better since the paracentesis and has minimal abdominal discomfort now  She denies nausea currently and states that she is hungry  Patient states she has a family history of breast and ovarian cancer  She has never had genetic testing done prior  Patient reports that she does not take any medications at home, she uses some herbal medications  ED Vital Signs:   ED Triage Vitals [01/04/18 1237]   Temperature Pulse Respirations Blood Pressure SpO2   98 7 °F (37 1 °C) (!) 107 22 146/90 94 %      Temp Source Heart Rate Source Patient Position - Orthostatic VS BP Location FiO2 (%)   Oral Monitor Sitting Right arm --      Pain Score       6        Wt Readings from Last 1 Encounters:   01/04/18 59 4 kg (130 lb 15 3 oz)       Vital Signs (abnormal):   01/04/18 1703  97 9 °F (36 6 °C)  99  18  166/77  --  95 %  None (Room air)  --   01/04/18 1353  --  104  18   178/85  --  94 %  --  --   01/04/18 1237  98 7 °F (37 1 °C)   107  22  146/90  --  94 %  None (Room air)  Sitting         Abnormal Labs/Diagnostic Test Results:    Sodium 132 (L) 136 - 145 mmol/L     Potassium 3 0 (L) 3 5 - 5 3 mmol/L     Chloride 86 (L) 100 - 108 mmol/L     CO2 32 21 - 32 mmol/L     Anion Gap 14 (H) 4 - 13 mmol/L    Alb 3 0, [id number]  plt  604  CT abd - Extensive abdominal ascites and diffuse peritoneal/omental caking has a result of diffuse metastatic disease   Findings most likely represent either GYN or gastrointestinal primary malignancy with discrete primary not clearly visualized  Trace effusions within the lung bases, right slightly greater than left with mild adjacent atelectatic change  EKG- Unusual P axis and short GA, probable junctional tachycardia    ED Treatment:   Medication Administration from 01/04/2018 1222 to 01/04/2018 1650       Date/Time Order Dose Route Action Action by Comments     01/04/2018 1324 ondansetron (ZOFRAN) injection 4 mg 4 mg Intravenous Given Anamika Knight RN      01/04/2018 1324 morphine (PF) 4 mg/mL injection 4 mg 4 mg Intravenous Given Anamika Knight RN      01/04/2018 1324 famotidine (PEPCID) injection 20 mg 20 mg Intravenous Given Anamika Knight RN      01/04/2018 1402 iohexol (OMNIPAQUE) 350 MG/ML injection (MULTI-DOSE) 100 mL 100 mL Intravenous Given Aniyah Hughes      01/04/2018 1616 potassium chloride 40 mEq IVPB (premix)   Intravenous Canceled Entry Leonette Libman, RN      01/04/2018 1617 potassium chloride 20 mEq in D5W 100 mL 20 mEq Intravenous Given Leonette Libman, RN      01/04/2018 1538 lidocaine (XYLOCAINE) 1 % injection 5 mL Infiltration Given Gabbi Yoder MD           Past Medical/Surgical History: Active Ambulatory Problems     Diagnosis Date Noted    No Active Ambulatory Problems     Resolved Ambulatory Problems     Diagnosis Date Noted    No Resolved Ambulatory Problems     Past Medical History:   Diagnosis Date    Arthritis     Hypertension 1/4/2018    [facility]roke [facility]) 1995       Admitting Diagnosis: Abdominal pain [R10 9]  Metastatic disease ([facility] [address] ) [C79 9]  Ascites [R18 8]    Age/Sex: 57 y o  female    Assessment/Plan:   * Abdominal pain   Assessment & Plan     · With distension and ascites   ?  Pt had vomiting this AM and abdominal pain, likely due to ascites, pain improved with paracentesis, last episode of vomiting this morning, likely related to increased abdominal pressure, will start patient on regular diet, if not able to tolerate will decrease to clear liquids   · IR paracentesis performed in Ed, removed 4,400 cc taj fluid, sent for additional testing   · Blood cultures pending  · CT scan revealing Extensive abdominal ascites and diffuse peritoneal/omental caking has a result of diffuse metastatic disease  Findings most likely represent either GYN or gastrointestinal primary malignancy with discrete primary not clearly visualized    · Oncology/GI consult  · Tumor markers CA-125, AFP, CEA, CA 19-9    · Pt with strong family history of GYN cancers   · TSH to evaluate for tachycardia  · PT/OT eval  · Lovenox DVT prophylaxis   · No previous history of alcohol abuse or cirrhosis on CT scan, LFTs within normal limits   · Start protonix 40 mg daily due to patient complaining of reflux symptoms at home       Hypertension   Assessment & Plan     · Pt with elevations of BP on admission  · Does not take any anti hypertensives at home   · PRN hydralazine, consider addition of medication if continued elevations  · Telemetry monitoring  · Pt with tachycardia, improved after paracentesis, monitor, pt may benefit from low dose beta blocker if continued hypertension and increased HR  · It is likely patient has not followed up with a doctor in some time           Hyponatremia   Assessment & Plan     · Pts Na+ 132 on admission  · 4400 cc fluid removed by paracentesis, re-evaluate BMP in the AM          Hypokalemia   Assessment & Plan     · K+ 3 0 on admission  · Pt received potassium chloride 20 mEq IV in the ED  · Will re evaluate BMP and replete if necessary   · Will check mag       Malignancy ([facility] [address] )   Assessment & Plan     · CT revealing metastatic disease to the omentum   · Strong family history of GYN cancers, no previous genetic testing   · Lovenox DVT prophylaxis  · Morphine PRN for pain management  · Telemetry monitoring   · Patient also with pleural effusions on Ct, encourage incentive spirometry and hold off on IVF for now, patient on regular diet, assess toleration and nutrition consult           VTE Prophylaxis: Enoxaparin (Lovenox)  / sequential compression device   Code Status: Level I - Full code, discussed with patient and family members at bedside  POLST: There is no POLST form on file for this patient (pre-hospital)  Discussion with family: Discussed plan with patient and patient's daughter and son-in-law at bedside  Discussed with family that CT findings indicative of underlying cancer, likely GI or GYN in origin  Explained that the cancer had metastasized to the omentum  Discussed that we will be getting oncology and GI to see the patient for treatment and prognosis recommendations  Will be obtaining cancer markers  Patient and family in agreement and verbalized understanding  Patient does not seem to fully grasp the severity of her findings, is in a very positive mood upon entire discussion       Anticipated Length of Stay:  Patient will be admitted on an Inpatient basis with an anticipated length of stay of  > 2 midnights  Justification for Hospital Stay: Gi/oncology recommendations and improvement of electrolytes          Admission Orders:  Scheduled Meds:   enoxaparin 40 mg Subcutaneous Daily   pantoprazole 40 mg Oral Early Morning     Continuous Infusions:    PRN Meds:   acetaminophen    hydrALAZINE    morphine injection    Reg diet   Act as hayde   Inc spirom   Up and oOB as hayde   Tele   Daily weight   OT PT eval   GI consult   SCD  Oncology consult  iR paracentesis       Oncology consult 1/4  ASSESSMENT/PLAN:  Roxana Monahan is a 57 y o  female with:  peritoneum mass:  With the presentation of ascites, peritoneal mass / cancer seeding, highly suspicious for GYN cancer, GI cancer is also possible  -- agree with checking tumor markers  --  Cytology is pending  Will follow the final result  -- if this is a GYN cancer, will recommend to consult GYN Oncology    -- if the final path suggesting a GI primary, this is then a stage IV disease  Patient will need chemotherapy  We will follow patient as outpatient      Recommendation  -- check CT chest to complete staging   -- consult GYN Oncology if pathology suggest so  Internal med progress note 1/5  * Abdominal pain   Assessment & Plan     · With distension and ascites, status post paracentesis 1/4 draining 4400 cc taj fluid, await further testing  · Blood cultures 1/4 pending   · CT scan 1/4 revealing extensive abdominal ascites and diffuse peritoneal/omental caking has a result of diffuse metastatic disease  Findings most likely represent either GYN or gastrointestinal primary malignancy with discrete primary not clearly visualized  · Oncology/GI consult  · Tumor markers CA-125, AFP (normal), CEA, CA 19-9:  Pending  · Pt with strong family history of GYN cancers   · TSH normal  · PT/OT evals pending  · Lovenox DVT prophylaxis   · No previous history of alcohol abuse or cirrhosis on CT scan, LFTs within normal limits        Malignancy ([facility] [address] )   Assessment & Plan     · CT revealing metastatic disease to the omentum   · Strong family history of GYN cancers, no previous genetic testing   · Lovenox DVT prophylaxis - high risk  · Morphine PRN for pain management  · Telemetry monitoring   · Patient also with pleural effusions on CT, encourage incentive spirometry and agree with holding off on IVF for now  · Patient on regular diet       Hypertension   Assessment & Plan     · Elevations of BP on admission, hypotensive post paracentesis  Pressures in the 756 systolic at this time  Will continue to monitor, agree with considering low-dose beta-blocker given the tachycardia on admission  · Does not take any anti hypertensives at home   · PRN hydralazine >  · Telemetry monitoring       Hypokalemia   Assessment & Plan     · K+ 3 0 on admission - currently 3 2, magnesium low 1 6    Will give 2 mg IV Mag sulfate, 40 mEq oral potassium  · Repeat BMP tomorrow if remains in the hospital, otherwise can have repeat BMP in 3-5 days through PCP  · Telemetry without acute events, no muscle cramps       Hyponatremia   Assessment & Plan     · Na+ 132 on admission, sodium improved to 134 with no intervention  Can monitor with next BMP             VTE Pharmacologic Prophylaxis:   Pharmacologic: Enoxaparin (Lovenox)  Mechanical VTE Prophylaxis in Place: Yes     Patient Centered Rounds: I have performed bedside rounds with nursing staff today  Cathryn Nation     Discussions with Specialists or Other Care Team Provider:  Oncology note reviewed     Education and Discussions with Family / Patient:  Discussed with the patient, as well as over the phone with her daughter while in the room regarding likelihood of intra-abdominal malignancy     Time Spent for Care: 30 minutes  More than 50% of total time spent on counseling and coordination of care as described above      Current Length of Stay: 1 day(s)     Current Patient Status: Inpatient   Certification Statement: The patient will continue to require additional inpatient hospital stay due to Need to monitor patient for oral intake and bowel function, await abdominal x-ray     Discharge Plan:   To home, likely today versus tomorrow     GI consult 1/5  ASSESSMENT and PLAN:    Principal Problem:    Abdominal pain  Active Problems:    Malignancy ([address] )    Hypokalemia    Hyponatremia    Hypertension     Ascites  - Suspect malignancy as etiology due to findings of diffuse peritoneal/omental caking, omental mass, and multiple other enhancing masses in the abdomen  - Agree with oncology that this could be 2/2 GYN cancer given her family history of possible GI related  - Fluid analysis is negative for SBP  - Cytology pending  - SAAG is <1 1 which is consistent with no portal HTN and likely related to peritoneal mets  - Due to change in bowel habits over the past 6 months as well as unknown primary malignancy, patient/family would prefer to do EGD/colonoscopy to evaluate for colon cancer though this is less likely given her CEA is normal and CA-125 is elevated  - Plan for outpatient EGD/colonoscopy next Wednesday with Dr Ruben Lynn; we will arrange this with our office  - Pt stable for discharge from GI standpoint     Nodular Liver  - She has no history of liver disease; suspect this could be from possible metastatic disease and not cirrhosis  - LFTs normal on admission with normal platelets and INR  - Consider checking hepatitis panel  - AFP normal     Mild Pancreatic Duct Dilatation  - Unclear significance; this could again be related to metastatic disease  - No pancreatic mass noted  - Can consider MRI/MRCP to evaluate liver and pancreatic duct further     Esophagitis  - Noted on dedicated CT chest  - Recommend protonix daily for 8 weeks  - Plan for outpatient EGD next Wednesday     Thank you,  [address] [facility] in the [facility] by Luis Antonio Lozada for 2017  Network Utilization Review Department  Phone: [phone]; Fax [phone]  ATTENTION: The Network Utilization Review Department is now centralized for our 7 Facilities  Make a note that we have a new phone and fax numbers for our Department  Please call with any questions or concerns to [phone] and carefully follow the prompts so that you are directed to the right person  All voicemails are confidential  Fax any determinations, approvals, denials, and requests for initial or continue stay review clinical to [phone]  Due to HIGH CALL volume, it would be easier if you could please send faxed requests to expedite your requests and in part, help us provide discharge notifications faster

## 2018-01-05 NOTE — ASSESSMENT & PLAN NOTE
· Na+ 132 on admission, sodium improved to 134 with no intervention    · Follow up on BMP in 3 days outpatient

## 2018-01-05 NOTE — ASSESSMENT & PLAN NOTE
· Elevations of BP on admission, hypotensive post paracentesis  Pressures in the 343 systolic at this time    Will continue to monitor, agree with considering low-dose beta-blocker given the tachycardia on admission  · Does not take any anti hypertensives at home   · PRN hydralazine >  · Telemetry monitoring

## 2018-01-05 NOTE — PROGRESS NOTES
Progress Note - Oneil Nicolas 1955, 57 y o  female MRN: [id number]    Unit/Bed#: -01 Encounter: [id number]    Primary Care Provider: No primary care provider on file  Date and time admitted to hospital: 1/4/2018 12:30 PM        * Abdominal pain   Assessment & Plan    · With distension and ascites, status post paracentesis 1/4 draining 4400 cc taj fluid, await further testing  · Blood cultures 1/4 pending   · CT scan 1/4 revealing extensive abdominal ascites and diffuse peritoneal/omental caking has a result of diffuse metastatic disease  Findings most likely represent either GYN or gastrointestinal primary malignancy with discrete primary not clearly visualized  · Oncology/GI consult  · Tumor markers CA-125, AFP (normal), CEA, CA 19-9:  Pending  · Pt with strong family history of GYN cancers   · TSH normal  · PT/OT evals pending  · Lovenox DVT prophylaxis   · No previous history of alcohol abuse or cirrhosis on CT scan, LFTs within normal limits         Malignancy ([facility] [address] )   Assessment & Plan    · CT revealing metastatic disease to the omentum   · Strong family history of GYN cancers, no previous genetic testing   · Lovenox DVT prophylaxis - high risk  · Morphine PRN for pain management  · Telemetry monitoring   · Patient also with pleural effusions on CT, encourage incentive spirometry and agree with holding off on IVF for now  · Patient on regular diet        Hypertension   Assessment & Plan    · Elevations of BP on admission, hypotensive post paracentesis  Pressures in the 792 systolic at this time  Will continue to monitor, agree with considering low-dose beta-blocker given the tachycardia on admission  · Does not take any anti hypertensives at home   · PRN hydralazine >  · Telemetry monitoring        Hypokalemia   Assessment & Plan    · K+ 3 0 on admission - currently 3 2, magnesium low 1 6    Will give 2 mg IV Mag sulfate, 40 mEq oral potassium  · Repeat BMP tomorrow if remains in the hospital, otherwise can have repeat BMP in 3-5 days through PCP  · Telemetry without acute events, no muscle cramps        Hyponatremia   Assessment & Plan    · Na+ 132 on admission, sodium improved to 134 with no intervention  Can monitor with next BMP            VTE Pharmacologic Prophylaxis:   Pharmacologic: Enoxaparin (Lovenox)  Mechanical VTE Prophylaxis in Place: Yes    Patient Centered Rounds: I have performed bedside rounds with nursing staff today  Gonzalez Simmering    Discussions with Specialists or Other Care Team Provider:  Oncology note reviewed    Education and Discussions with Family / Patient:  Discussed with the patient, as well as over the phone with her daughter while in the room regarding likelihood of intra-abdominal malignancy    Time Spent for Care: 30 minutes  More than 50% of total time spent on counseling and coordination of care as described above  Current Length of Stay: 1 day(s)    Current Patient Status: Inpatient   Certification Statement: The patient will continue to require additional inpatient hospital stay due to Need to monitor patient for oral intake and bowel function, await abdominal x-ray    Discharge Plan: To home, likely today versus tomorrow    Code Status: Level 1 - Full Code    CC:  Nausea  Subjective:   Patient seen sitting up in bed, since the paracentesis yesterday she has less abdominal distension and fullness  However today she feels that she needs to burp, but can't  She is passing minimal gas, has not had bowel movement yet since admission  Denies subjective fevers or chills, had been having night sweats since the development of the ascites, no significant weight loss though  Patient was going to the gym and lost 5 lb intentionally  Denies chest pain or shortness of breath  No palpitations    Has not yet eaten breakfast     Objective:     Vitals:   Temp (24hrs), Av 2 °F (36 8 °C), Min:97 9 °F (36 6 °C), Max:98 7 °F (37 1 °C)    HR:  [] 89  Resp:  [16-22] 18  BP: (106-178)/(69-90) 148/82  SpO2:  [92 %-96 %] 92 %  Body mass index is 22 48 kg/m²  Input and Output Summary (last 24 hours):     No intake or output data in the 24 hours ending 01/05/18 1046    Physical Exam:     Physical Exam   Constitutional: She appears well-developed and well-nourished  No distress  Cardiovascular: Normal rate, regular rhythm, S1 normal, S2 normal, normal heart sounds and intact distal pulses  No murmur heard  Pulmonary/Chest: Effort normal and breath sounds normal  No respiratory distress  She has no wheezes  She has no rales  Abdominal: Soft  She exhibits no distension  Bowel sounds are decreased  There is no tenderness  Very minimal BS in the RUQ, remainder of quadrants NABS   Musculoskeletal: She exhibits no edema  Nursing note and vitals reviewed  Additional Data:     Labs:      Results from last 7 days  Lab Units 01/05/18  0436  01/04/18  1322   WBC Thousand/uL 7 69  --  9 75   HEMOGLOBIN g/dL 11 5  --  13 7   HEMATOCRIT % 34 2*  --  41 4   PLATELETS Thousands/uL 465*  < > 604*   NEUTROS PCT %  --   --  68   LYMPHS PCT %  --   --  22   MONOS PCT %  --   --  8   EOS PCT %  --   --  1   < > = values in this interval not displayed  Results from last 7 days  Lab Units 01/05/18  0436   SODIUM mmol/L 134*   POTASSIUM mmol/L 3 2*   CHLORIDE mmol/L 96*   CO2 mmol/L 35*   BUN mg/dL 6   CREATININE mg/dL 0 71   CALCIUM mg/dL 7 6*   TOTAL PROTEIN g/dL 6 0*   BILIRUBIN TOTAL mg/dL 0 30   ALK PHOS U/L 36*   ALT U/L 16   AST U/L 33   GLUCOSE RANDOM mg/dL 94       Results from last 7 days  Lab Units 01/04/18  1322   INR  0 94       * I Have Reviewed All Lab Data Listed Above  * Additional Pertinent Lab Tests Reviewed:  Ethan 66 Admission Reviewed    Imaging:    Imaging Reports Reviewed Today Include:  CT abdomen/pelvis and chest  Imaging Personally Reviewed by Myself Includes:  None    Recent Cultures (last 7 days):       Results from last 7 days  Lab Units 01/04/18  1542   GRAM STAIN RESULT  1+ Polys  No bacteria seen       Last 24 Hours Medication List:     enoxaparin 40 mg Subcutaneous Daily   magnesium sulfate 2 g Intravenous Once   pantoprazole 40 mg Oral Early Morning   potassium chloride 40 mEq Oral Once        Today, Patient Was Seen By: Jose Rao PA-C    ** Please Note: Dictation voice to text software may have been used in the creation of this document   **

## 2018-01-05 NOTE — ASSESSMENT & PLAN NOTE
· With distension and ascites on presentation which accumulated over approximately 2 weeks  · Status post paracentesis 1/4 draining 4400 cc taj fluid, cytology is pending, prelim not consistent with SBP  · Tumor markers Ca-125 (markedly elevated >550), AFP (normal), CEA (normal), CA 19-9 (Pending)  · Blood cultures 1/4 pending   · CT scan 1/4 revealing extensive abdominal ascites and diffuse peritoneal/omental caking has a result of diffuse metastatic disease  Findings most likely represent either GYN or gastrointestinal primary malignancy with discrete primary not clearly visualized      · Oncology consulted - referral to GYN onc due to elevated Ca- 125, follow up on cytology results  · Gastroenterology consulted - plan for outpatient EGD next week with Dr Elene Soulier  · Pt with strong family history of GYN cancers   · TSH normal  · CBC with elevated plt likely in the setting of active cancer

## 2018-01-05 NOTE — ASSESSMENT & PLAN NOTE
· Elevations of BP on admission, hypotensive post paracentesis  Slightly better in the 130s at this time, will hold off on medication and follow up BP next week at follow up    Consider initiation of medication if remains elevated

## 2018-01-05 NOTE — ASSESSMENT & PLAN NOTE
· CT revealing metastatic disease to the omentum   · Strong family history of GYN cancers, no previous genetic testing   · Lovenox DVT prophylaxis - high risk  · Morphine PRN for pain management  · Telemetry monitoring   · Patient also with pleural effusions on CT, encourage incentive spirometry and agree with holding off on IVF for now  · Patient on regular diet

## 2018-01-05 NOTE — ASSESSMENT & PLAN NOTE
· K+ 3 0 on admission - improved to 3 2 with magnesium low 1 6  · Received 2 mg IV Mag sulfate, 40 mEq oral potassium 1/5  · Repeat BMP in 3 days to be followed up at PCP appointment next week

## 2018-01-05 NOTE — PLAN OF CARE
Problem: Nutrition/Hydration-ADULT  Goal: Nutrient/Hydration intake appropriate for improving, restoring or maintaining nutritional needs  Monitor and assess patient's nutrition/hydration status for malnutrition (ex- brittle hair, bruises, dry skin, pale skin and conjunctiva, muscle wasting, smooth red tongue, and disorientation)  Collaborate with interdisciplinary team and initiate plan and interventions as ordered  Monitor patient's weight and dietary intake as ordered or per policy  Utilize nutrition screening tool and intervene per policy  Determine patient's food preferences and provide high-protein, high-caloric foods as appropriate       INTERVENTIONS:  - Monitor oral intake, urinary output, labs, and treatment plans  - Assess nutrition and hydration status and recommend course of action  - Evaluate amount of meals eaten  - Assist patient with eating if necessary   - Allow adequate time for meals  - Recommend/ encourage appropriate diets, oral nutritional supplements, and vitamin/mineral supplements  - Order, calculate, and assess calorie counts as needed  - Assess need for intravenous fluids  - Provide specific nutrition/hydration education as appropriate  - Include patient/family/caregiver in decisions related to nutrition  Outcome: Progressing

## 2018-01-05 NOTE — ASSESSMENT & PLAN NOTE
· K+ 3 0 on admission - currently 3 2, magnesium low 1 6    Will give 2 mg IV Mag sulfate, 40 mEq oral potassium  · Repeat BMP tomorrow if remains in the hospital, otherwise can have repeat BMP in 3-5 days through PCP  · Telemetry without acute events, no muscle cramps

## 2018-01-05 NOTE — DISCHARGE SUMMARY
Discharge- Daniel Alford 1955, 57 y o  female MRN: [id number]    Unit/Bed#: -01 Encounter: [id number]    Primary Care Provider: No primary care provider on file  Date and time admitted to hospital: 1/4/2018 12:30 PM    * Abdominal pain   Assessment & Plan    · With distension and ascites on presentation which accumulated over approximately 2 5 weeks  · Status post paracentesis 1/4 draining 4400 cc taj fluid, cytology is pending, prelim not consistent with SBP  · Tumor markers Ca-125 (markedly elevated >530), AFP (normal), CEA (normal), CA 19-9 (Pending)  · Blood cultures 1/4 pending   · CT scan 1/4 revealing extensive abdominal ascites and diffuse peritoneal/omental caking has a result of diffuse metastatic disease  Findings most likely represent either GYN or gastrointestinal primary malignancy with discrete primary not clearly visualized  · Oncology consulted - referral to GYN onc due to elevated Ca- 125, follow up on cytology results  · Gastroenterology consulted - plan for outpatient EGD/cscope next week with Dr Zach Pate, continue Protonix daily for reflux symptoms  · Pt with strong family history of GYN cancers   · TSH normal  · CBC with elevated plt likely in the setting of active cancer        Malignancy [facility])   Assessment & Plan    · CT revealing metastatic disease to the omentum   · Strong family history of GYN cancers, no previous genetic testing   · Plan as above        Hypertension   Assessment & Plan    · Elevations of BP on admission, hypotensive post paracentesis  Slightly better in the 130s at this time, will hold off on medication and follow up BP next week at follow up    Consider initiation of medication if remains elevated        Hypokalemia   Assessment & Plan    · K+ 3 0 on admission - improved to 3 2 with magnesium low 1 6  · Received 2 mg IV Mag sulfate, 40 mEq oral potassium 1/5  · Repeat BMP in 3 days to be followed up at PCP appointment next week        Hyponatremia Assessment & Plan    · Na+ 132 on admission, sodium improved to 134 with no intervention  · Follow up on BMP in 3 days outpatient            Consultations During Hospital Stay:  · Oncology:  Dr Fermin Edwards  · Gastroenterology: Dr John Gray    Procedures Performed:     · CT abdomen and pelvis with contrast 1/4:  Extensive abdominal ascites and diffuse peritoneal/omental caking as a result of diffuse metastatic disease  Findings most likely represent either gyn her GI primary malignancy with discrete primary not clearly visualized  Trace effusions within the lung bases, right slightly > left with mild adjacent atelectatic changes  · CT chest with contrast 1/4:  Residual small right pleural effusion, bibasilar atelectasis, no acute cardiopulmonary process  Stable large amount of ascites in the upper abdomen  · XR abdomen 1/5:  No evidence of obstruction  · Paracentesis 1/4:  4 4 L taj fluid, albumin 2 3, , protein 5 2    Significant Findings / Test Results:     · CBC with elevated platelets, otherwise within normal limits  · Hyponatremia, mild improved from 132-134 with no intervention  · Hypokalemia, 3 0 on admission improved to 3 2 will need to follow up with BMP in 3 days  · Hypo magnesemia, 1 6 will need to follow up with BMP in 3 days with magnesium level  · Troponin (-)  · TSH normal 3 036  · AFP normal 1 4  · CE a normal 1 2  · CA-125 elevated 534 6  · CA 19-9 pending  · Body fluid culture pending  · Blood culture pending  · Cytology pending    Incidental Findings:   · As above     Test Results Pending at Discharge (will require follow up):    · Body fluid, blood cultures  · Cytology  · CA 19-9     Outpatient Tests Requested:  · Repeat BMP with magnesium in 3 days  · Establish with a new PCP ASAP - Dr Christina Goncalves 1/8  · Follow-up Gyn-Onc - Dr Cecil Villatoro 1/9  · EGD/cscope is planned for next Wednesday - Dr John Gray 9/81    Complications:  None    Reason for Admission:  Ascites    Hospital Course:     Elida Spencer Grace Pickens is a 57 y o  female patient who originally presented to the hospital on 1/4/2018 due to ascites and abdominal pain times 2 to 2 5 weeks  Patient's past medical history significant for stroke with no deficits approximately 20 years ago, and strong family history of ovarian cancer  She presented with the above complaints, stating that she felt pregnant for the last 2 and half weeks  Patient was admitted for further workup, paracentesis as above  CT scan as above, very concerning for malignancy with metastasis to the omentum  Patient will need very close follow-up with gynecology oncology, will need to establish with a primary care provider she has not followed with doctors in quite some time, and will need to follow up with Gastroenterology for outpatient EGD secondary to reflux symptoms  Please see above list of diagnoses and related plan for additional information  Patient initially admitted as inpatient status, discharge sooner than expected due to resolution of symptoms status post paracentesis with close follow-up outpatient  Condition at Discharge: fair     Discharge Day Visit / Exam:     * Please refer to separate progress note for these details *    Discussion with Family:  Multiple family members at the bedside, including her  and daughter  Another daughter also available by phone  We discussed the need for genetic screening for other family members, close follow-up with gynecology oncology, and need to establish with a primary care provider  We also discussed ability to obtain outpatient paracentesis if fluid re accumulates  Discharge instructions/Information to patient and family:   See after visit summary for information provided to patient and family  Provisions for Follow-Up Care:  See after visit summary for information related to follow-up care and any pertinent home health orders        Disposition:     Home    For Discharges to [facility] SNF:   · Not Applicable to this Patient - Not Applicable to this Patient    Planned Readmission:  None, though highly likely given malignant process     Discharge Statement:  I spent 45 minutes discharging the patient  This time was spent on the day of discharge  I had direct contact with the patient on the day of discharge  Greater than 50% of the total time was spent examining patient, answering all patient questions, arranging and discussing plan of care with patient as well as directly providing post-discharge instructions  Additional time then spent on discharge activities  Discharge Medications:  See after visit summary for reconciled discharge medications provided to patient and family        ** Please Note: This note has been constructed using a voice recognition system **

## 2018-01-05 NOTE — ASSESSMENT & PLAN NOTE
· With distension and ascites, status post paracentesis 1/4 draining 4400 cc taj fluid, await further testing  · Blood cultures 1/4 pending   · CT scan 1/4 revealing extensive abdominal ascites and diffuse peritoneal/omental caking has a result of diffuse metastatic disease  Findings most likely represent either GYN or gastrointestinal primary malignancy with discrete primary not clearly visualized    · Oncology/GI consult  · Tumor markers CA-125, AFP (normal), CEA, CA 19-9:  Pending  · Pt with strong family history of GYN cancers   · TSH normal  · PT/OT evals pending  · Lovenox DVT prophylaxis   · No previous history of alcohol abuse or cirrhosis on CT scan, LFTs within normal limits

## 2018-01-06 LAB — CANCER AG19-9 SERPL-ACNC: 7 U/ML (ref 0–35)

## 2018-01-07 LAB
BACTERIA SPEC BFLD CULT: NO GROWTH
GRAM STN SPEC: NORMAL
GRAM STN SPEC: NORMAL

## 2018-01-08 ENCOUNTER — GENERIC CONVERSION - ENCOUNTER (OUTPATIENT)
Dept: OTHER | Facility: OTHER | Age: 63
End: 2018-01-08

## 2018-01-08 ENCOUNTER — APPOINTMENT (OUTPATIENT)
Dept: LAB | Facility: HOSPITAL | Age: 63
End: 2018-01-08
Attending: INTERNAL MEDICINE
Payer: OTHER GOVERNMENT

## 2018-01-08 ENCOUNTER — HOSPITAL ENCOUNTER (EMERGENCY)
Facility: HOSPITAL | Age: 63
Discharge: HOME | End: 2018-01-08
Attending: EMERGENCY MEDICINE | Admitting: EMERGENCY MEDICINE
Payer: COMMERCIAL

## 2018-01-08 ENCOUNTER — TRANSCRIBE ORDERS (OUTPATIENT)
Dept: ADMINISTRATIVE | Facility: HOSPITAL | Age: 63
End: 2018-01-08

## 2018-01-08 VITALS
SYSTOLIC BLOOD PRESSURE: 164 MMHG | HEART RATE: 103 BPM | TEMPERATURE: 98.1 F | RESPIRATION RATE: 18 BRPM | DIASTOLIC BLOOD PRESSURE: 85 MMHG | BODY MASS INDEX: 22.2 KG/M2 | WEIGHT: 130 LBS | HEIGHT: 64 IN | OXYGEN SATURATION: 100 %

## 2018-01-08 DIAGNOSIS — E83.42 HYPOMAGNESEMIA: ICD-10-CM

## 2018-01-08 DIAGNOSIS — E83.42 HYPOMAGNESEMIA: Primary | ICD-10-CM

## 2018-01-08 DIAGNOSIS — C56.9 MALIGNANT NEOPLASM OF OVARY (HCC): ICD-10-CM

## 2018-01-08 DIAGNOSIS — R18.8 ASCITES: Primary | ICD-10-CM

## 2018-01-08 LAB
ALBUMIN SERPL BCP-MCNC: 2.4 G/DL (ref 3.5–5)
ALP SERPL-CCNC: 55 U/L (ref 46–116)
ALT SERPL W P-5'-P-CCNC: 48 U/L (ref 12–78)
AMMONIA PLAS-SCNC: <10 UMOL/L (ref 11–35)
ANION GAP SERPL CALCULATED.3IONS-SCNC: 5 MMOL/L (ref 4–13)
ANION GAP SERPL CALCULATED.3IONS-SCNC: 8 MMOL/L (ref 4–13)
AST SERPL W P-5'-P-CCNC: 68 U/L (ref 5–45)
BASOPHILS # BLD AUTO: 0.05 THOUSANDS/ΜL (ref 0–0.1)
BASOPHILS NFR BLD AUTO: 1 % (ref 0–1)
BILIRUB SERPL-MCNC: 0.2 MG/DL (ref 0.2–1)
BUN SERPL-MCNC: 11 MG/DL (ref 5–25)
BUN SERPL-MCNC: 13 MG/DL (ref 5–25)
CALCIUM SERPL-MCNC: 8.1 MG/DL (ref 8.3–10.1)
CALCIUM SERPL-MCNC: 8.4 MG/DL (ref 8.3–10.1)
CHLORIDE SERPL-SCNC: 98 MMOL/L (ref 100–108)
CHLORIDE SERPL-SCNC: 99 MMOL/L (ref 100–108)
CO2 SERPL-SCNC: 28 MMOL/L (ref 21–32)
CO2 SERPL-SCNC: 30 MMOL/L (ref 21–32)
CREAT SERPL-MCNC: 0.74 MG/DL (ref 0.6–1.3)
CREAT SERPL-MCNC: 0.74 MG/DL (ref 0.6–1.3)
EOSINOPHIL # BLD AUTO: 0.1 THOUSAND/ΜL (ref 0–0.61)
EOSINOPHIL NFR BLD AUTO: 1 % (ref 0–6)
ERYTHROCYTE [DISTWIDTH] IN BLOOD BY AUTOMATED COUNT: 12.1 % (ref 11.6–15.1)
GFR SERPL CREATININE-BSD FRML MDRD: 86 ML/MIN/1.73SQ M
GFR SERPL CREATININE-BSD FRML MDRD: 86 ML/MIN/1.73SQ M
GLUCOSE SERPL-MCNC: 88 MG/DL (ref 65–140)
GLUCOSE SERPL-MCNC: 99 MG/DL (ref 65–140)
HCT VFR BLD AUTO: 38.1 % (ref 34.8–46.1)
HGB BLD-MCNC: 12.4 G/DL (ref 11.5–15.4)
INR PPP: 0.97 (ref 0.86–1.16)
LIPASE SERPL-CCNC: 155 U/L (ref 73–393)
LYMPHOCYTES # BLD AUTO: 1.84 THOUSANDS/ΜL (ref 0.6–4.47)
LYMPHOCYTES NFR BLD AUTO: 24 % (ref 14–44)
MAGNESIUM SERPL-MCNC: 2 MG/DL (ref 1.6–2.6)
MCH RBC QN AUTO: 29.3 PG (ref 26.8–34.3)
MCHC RBC AUTO-ENTMCNC: 32.5 G/DL (ref 31.4–37.4)
MCV RBC AUTO: 90 FL (ref 82–98)
MONOCYTES # BLD AUTO: 0.75 THOUSAND/ΜL (ref 0.17–1.22)
MONOCYTES NFR BLD AUTO: 10 % (ref 4–12)
NEUTROPHILS # BLD AUTO: 4.84 THOUSANDS/ΜL (ref 1.85–7.62)
NEUTS SEG NFR BLD AUTO: 64 % (ref 43–75)
NRBC BLD AUTO-RTO: 0 /100 WBCS
PLATELET # BLD AUTO: 566 THOUSANDS/UL (ref 149–390)
PMV BLD AUTO: 8.4 FL (ref 8.9–12.7)
POTASSIUM SERPL-SCNC: 3.6 MMOL/L (ref 3.5–5.3)
POTASSIUM SERPL-SCNC: 3.7 MMOL/L (ref 3.5–5.3)
PROT SERPL-MCNC: 7.1 G/DL (ref 6.4–8.2)
PROTHROMBIN TIME: 13 SECONDS (ref 12.1–14.4)
RBC # BLD AUTO: 4.23 MILLION/UL (ref 3.81–5.12)
SODIUM SERPL-SCNC: 134 MMOL/L (ref 136–145)
SODIUM SERPL-SCNC: 134 MMOL/L (ref 136–145)
WBC # BLD AUTO: 7.62 THOUSAND/UL (ref 4.31–10.16)

## 2018-01-08 PROCEDURE — 80053 COMPREHEN METABOLIC PANEL: CPT | Performed by: EMERGENCY MEDICINE

## 2018-01-08 PROCEDURE — 85610 PROTHROMBIN TIME: CPT | Performed by: EMERGENCY MEDICINE

## 2018-01-08 PROCEDURE — 83735 ASSAY OF MAGNESIUM: CPT

## 2018-01-08 PROCEDURE — 80048 BASIC METABOLIC PNL TOTAL CA: CPT

## 2018-01-08 PROCEDURE — 99284 EMERGENCY DEPT VISIT MOD MDM: CPT

## 2018-01-08 PROCEDURE — 36415 COLL VENOUS BLD VENIPUNCTURE: CPT

## 2018-01-08 PROCEDURE — 85025 COMPLETE CBC W/AUTO DIFF WBC: CPT | Performed by: EMERGENCY MEDICINE

## 2018-01-08 PROCEDURE — 83690 ASSAY OF LIPASE: CPT | Performed by: EMERGENCY MEDICINE

## 2018-01-08 PROCEDURE — 82140 ASSAY OF AMMONIA: CPT | Performed by: EMERGENCY MEDICINE

## 2018-01-08 PROCEDURE — 96374 THER/PROPH/DIAG INJ IV PUSH: CPT

## 2018-01-08 RX ORDER — ALBUMIN (HUMAN) 12.5 G/50ML
25 SOLUTION INTRAVENOUS AS NEEDED
Qty: 50 ML | Refills: 0
Start: 2018-01-08 | End: 2018-01-08 | Stop reason: CLARIF

## 2018-01-08 RX ORDER — MORPHINE SULFATE 4 MG/ML
4 INJECTION, SOLUTION INTRAMUSCULAR; INTRAVENOUS ONCE
Status: COMPLETED | OUTPATIENT
Start: 2018-01-08 | End: 2018-01-08

## 2018-01-08 RX ORDER — MORPHINE SULFATE 15 MG/1
7.5 TABLET ORAL EVERY 6 HOURS PRN
Qty: 6 TABLET | Refills: 0 | Status: SHIPPED | OUTPATIENT
Start: 2018-01-08 | End: 2018-01-18

## 2018-01-08 RX ADMIN — MORPHINE SULFATE 4 MG: 4 INJECTION, SOLUTION INTRAMUSCULAR; INTRAVENOUS at 19:59

## 2018-01-09 ENCOUNTER — GENERIC CONVERSION - ENCOUNTER (OUTPATIENT)
Dept: OTHER | Facility: OTHER | Age: 63
End: 2018-01-09

## 2018-01-09 ENCOUNTER — HOSPITAL ENCOUNTER (OUTPATIENT)
Dept: INTERVENTIONAL RADIOLOGY/VASCULAR | Facility: HOSPITAL | Age: 63
Discharge: HOME | End: 2018-01-12
Attending: INTERNAL MEDICINE
Payer: OTHER GOVERNMENT

## 2018-01-09 ENCOUNTER — ALLSCRIPTS OFFICE VISIT (OUTPATIENT)
Dept: OTHER | Facility: OTHER | Age: 63
End: 2018-01-09

## 2018-01-09 VITALS
RESPIRATION RATE: 20 BRPM | OXYGEN SATURATION: 98 % | DIASTOLIC BLOOD PRESSURE: 84 MMHG | SYSTOLIC BLOOD PRESSURE: 142 MMHG | HEART RATE: 79 BPM

## 2018-01-09 DIAGNOSIS — R18.8 ASCITES: ICD-10-CM

## 2018-01-09 LAB
BACTERIA BLD CULT: NORMAL
BACTERIA BLD CULT: NORMAL

## 2018-01-09 PROCEDURE — 49083 ABD PARACENTESIS W/IMAGING: CPT

## 2018-01-09 RX ORDER — LIDOCAINE HYDROCHLORIDE 10 MG/ML
INJECTION, SOLUTION INFILTRATION; PERINEURAL CODE/TRAUMA/SEDATION MEDICATION
Status: COMPLETED | OUTPATIENT
Start: 2018-01-09 | End: 2018-01-09

## 2018-01-09 RX ADMIN — LIDOCAINE HYDROCHLORIDE 10 ML: 10 INJECTION, SOLUTION INFILTRATION; PERINEURAL at 09:22

## 2018-01-09 NOTE — DISCHARGE INSTRUCTIONS
Ascites   WHAT YOU NEED TO KNOW:   Ascites is excess fluid in the lower abdomen  The fluid causes swelling in the abdomen  DISCHARGE INSTRUCTIONS:   Medicines:   · Diuretics: This medicine helps decrease the fluid in your abdomen  You may urinate more often when you take diuretics  You will lose weight as the fluid decreases  · Take your medicine as directed  Contact your healthcare provider if you think your medicine is not helping or if you have side effects  Tell him of her if you are allergic to any medicine  Keep a list of the medicines, vitamins, and herbs you take  Include the amounts, and when and why you take them  Bring the list or the pill bottles to follow-up visits  Carry your medicine list with you in case of an emergency  Follow up with your healthcare provider as directed: Your healthcare provider will want to see you again in 1 to 2 weeks to measure your weight and electrolyte (body chemical) levels  He will check how your body has responded to treatment  Write down your questions so you remember to ask them during your visits  Self-care:   · Do not drink alcohol:  Alcohol worsens the damage to your liver  Your ascites may improve or even go away after you stop drinking  You must also avoid medicines that contain alcohol  Ask your healthcare provider for information if you need help to stop drinking  · Follow your low-sodium diet:  A dietitian can help you create a low-sodium diet  He may suggest lemon juice or herbs to flavor your food  Avoid salted butter or margarine, milk, cheese, and canned or frozen foods that are not made for low-salt diets  Ask your healthcare provider before you use salt substitutes  · Write down your daily weight:  Your healthcare provider will need you to track your weight at home to see if the diet changes and medicines are working  Weigh yourself each day  Ask how much weight you should be losing   He will want to know if you are losing too much or too little weight  · Ask about NSAIDs:  NSAIDs are over-the-counter pain and fever medicines  You may not urinate enough to take NSAIDs safely  Ask your healthcare provider if NSAIDs are safe for you  Follow directions carefully  These medicines can cause stomach bleeding or kidney problems if they are not taken correctly  · Limit activity as directed: Too much physical activity may make your ascites worse  Ask your healthcare provider if you have any limits to your normal daily activities  Rarely, bedrest is needed if other health problems develop with ascites  For more information:   · [employer] [facility]  [address] [place] , [address]  Phone: [phone]  Web Address: Sp[employer]  Contact your healthcare provider if:   · You are losing more or less weight than expected  · You are urinating less than usual      · You feel dizzy or lightheaded  · You develop tiredness, dry mouth, nausea, or vomiting  · You have muscle cramps or twitches  · You have questions or concerns about your condition or care  Return to the emergency department if:   · You have a fever  · You feel pain in your abdomen  · You have trouble breathing  · You feel confused, faint, or lose consciousness  · You vomit blood or see blood in your bowel movement  © 2017 [address] Information is for End User's use only and may not be sold, redistributed or otherwise used for commercial purposes  All illustrations and images included in CareNotes® are the copyrighted property of [employer] D [employer] M [employer] Inc  or Lui sAntonio Lozada  The above information is an  only  It is not intended as medical advice for individual conditions or treatments  Talk to your doctor, nurse or pharmacist before following any medical regimen to see if it is safe and effective for you    Abdominal Paracentesis     WHAT YOU NEED TO KNOW:   Abdominal paracentesis is a procedure to remove abnormal fluid buildup in your abdomen  Fluid builds up because of liver problems, such as swelling and scarring  Heart failure, kidney disease, a mass, or problems with your pancreas may also cause fluid buildup  DISCHARGE INSTRUCTIONS:     Follow up with your healthcare provider as directed: Write down your questions so you remember to ask them during your visits  Wound care: Remove dressing after 24 hours  Leave glue in place  Return to your normal activities    Contact Interventional Radiology at [phone] Himanshu PATIENTS: Contact Interventional Radiology at [phone] Dumont PATIENTS: Contact Interventional Radiology at [phone]) if:  · You have a fever and your wound is red and swollen  · You have yellow, green, or bad-smelling discharge coming from your wound  · You have pain or swelling in your abdomen  · You have an upset stomach or you vomit  · You have sudden, sharp pain in your abdomen  · You urinate very little or not at all  · You feel confused and more tired than usual    · Your arm or leg feels warm, tender, and painful  It may look swollen and red  · You suddenly feel lightheaded and have trouble breathing

## 2018-01-09 NOTE — DISCHARGE INSTRUCTIONS
Ascites   WHAT YOU NEED TO KNOW:   Ascites is excess fluid in the lower abdomen  The fluid causes swelling in the abdomen  DISCHARGE INSTRUCTIONS:   Medicines:   · Diuretics: This medicine helps decrease the fluid in your abdomen  You may urinate more often when you take diuretics  You will lose weight as the fluid decreases  · Take your medicine as directed  Contact your healthcare provider if you think your medicine is not helping or if you have side effects  Tell him of her if you are allergic to any medicine  Keep a list of the medicines, vitamins, and herbs you take  Include the amounts, and when and why you take them  Bring the list or the pill bottles to follow-up visits  Carry your medicine list with you in case of an emergency  Follow up with your healthcare provider as directed: Your healthcare provider will want to see you again in 1 to 2 weeks to measure your weight and electrolyte (body chemical) levels  He will check how your body has responded to treatment  Write down your questions so you remember to ask them during your visits  Self-care:   · Do not drink alcohol:  Alcohol worsens the damage to your liver  Your ascites may improve or even go away after you stop drinking  You must also avoid medicines that contain alcohol  Ask your healthcare provider for information if you need help to stop drinking  · Follow your low-sodium diet:  A dietitian can help you create a low-sodium diet  He may suggest lemon juice or herbs to flavor your food  Avoid salted butter or margarine, milk, cheese, and canned or frozen foods that are not made for low-salt diets  Ask your healthcare provider before you use salt substitutes  · Write down your daily weight:  Your healthcare provider will need you to track your weight at home to see if the diet changes and medicines are working  Weigh yourself each day  Ask how much weight you should be losing   He will want to know if you are losing too much or too little weight  · Ask about NSAIDs:  NSAIDs are over-the-counter pain and fever medicines  You may not urinate enough to take NSAIDs safely  Ask your healthcare provider if NSAIDs are safe for you  Follow directions carefully  These medicines can cause stomach bleeding or kidney problems if they are not taken correctly  · Limit activity as directed: Too much physical activity may make your ascites worse  Ask your healthcare provider if you have any limits to your normal daily activities  Rarely, bedrest is needed if other health problems develop with ascites  For more information:   · [employer] [facility]  [address] , [address]  Phone: [phone]  Web Address: [email or web address]  Contact your healthcare provider if:   · You are losing more or less weight than expected  · You are urinating less than usual      · You feel dizzy or lightheaded  · You develop tiredness, dry mouth, nausea, or vomiting  · You have muscle cramps or twitches  · You have questions or concerns about your condition or care  Return to the emergency department if:   · You have a fever  · You feel pain in your abdomen  · You have trouble breathing  · You feel confused, faint, or lose consciousness  · You vomit blood or see blood in your bowel movement  © 2017 [address] Information is for End User's use only and may not be sold, redistributed or otherwise used for commercial purposes  All illustrations and images included in CareNotes® are the copyrighted property of A D A M , Inc  or Luis Antonio Lozada  The above information is an  only  It is not intended as medical advice for individual conditions or treatments  Talk to your doctor, nurse or pharmacist before following any medical regimen to see if it is safe and effective for you

## 2018-01-10 NOTE — CONSULTS
Assessment   1  Ovarian cancer (183 0) (C649)      57year-old with stage IIIC ovarian versus fallopian tube versus primary peritoneal cancer  Plan   Ovarian cancer    · Follow Up As Per Chemo Calendar Evaluation and Treatment  Follow-up  Status: Hold    For - Scheduling  Requested for: [id number]   Ordered; For: Ovarian cancer; Ordered By: Kelsey Gomes Performed:  Due: [id number]   · IR PORT PLACEMENT; Status:Active; Requested HFX:[id number]; Perform:[facility]; ZBO:[id number];DZGUXUK;UVP:[facility] cancer; Ordered By:Hugo Bonilla; The patient came in today accompanied by her   They had many intelligent questions all of which were answered  The patient will undergo at least 3 cycles of neoadjuvant chemotherapy  She will undergo carboplatin AUC 6 and paclitaxel 175 milligrams/meters squared Q 21 days  As patient's pretreatment  is elevated this will be monitored during her course of chemotherapy  If patient has a good biochemical response, we will check a CT scan after the 3rd cycle in schedule her interval debulking surgery  The patient will have a MediPort placed to make chemotherapy access easier  Chief Complaint   Chief Complaint Free Text Note Form: I have ovarian cancer  History of Present Illness   Problem [facility]:    1) stage IIIC ovarian versus primary peritoneal versus fallopian tube cancer elevated  (534 6) malignant ascites  Previous Therapy:    Paracentesis diagnostic for malignant adenocarcinoma   initiate neoadjuvant chemotherapy: AUC 6 175mg/m2    Free Text HPI: The patient is a delightful 49-year-old  with presumptive stage IIIC primary peritoneal versus ovary versus fallopian tube cancer  She presents for evaluation and treatment  The patient presented to the hospital on  complaining of abdominal distention and ascites  Tumor markers were drawn showing a  elevated at 534 6   CEA and CA 19 9 were both normal  A CT scan of the chest abdomen and pelvis performed on January 4, 2018 showed large volume ascites as well as omental caking  A paracentesis was performed which took off 4 4 L of fluid  The cytology was positive for adenocarcinoma likely coming from a gyn primary  Review of Systems   Complete-Female Gyn Onc:      Constitutional: no fever,-- not feeling poorly,-- no recent weight gain,-- no chills,-- not feeling tired-- and-- no recent weight loss  Eyes: no eye pain,-- no eyesight problems,-- no dryness of the eyes,-- eyes not red,-- no purulent discharge from the eyes-- and-- no itching of the eyes  ENT: no earache,-- no nosebleeds,-- no sore throat,-- no hearing loss,-- no nasal discharge-- and-- no hoarseness      Cardiovascular: the heart rate was not slow,-- no chest pain,-- no intermittent leg claudication,-- the heart rate was not fast,-- no palpitations-- and-- no lower extremity edema  Respiratory: no shortness of breath,-- no cough,-- no orthopnea,-- no wheezing,-- no shortness of breath during exertion-- and-- no PND  Gastrointestinal: no abdominal pain,-- no nausea,-- no vomiting,-- no constipation,-- no diarrhea-- and-- no blood in stools  Genitourinary: no dysuria,-- no pelvic pain,-- no vaginal discharge,-- no incontinence,-- no dysmenorrhea,-- no unexplained vaginal bleeding,-- no pelvic pressure,-- no vulvar itching-- and-- no hematuria  Musculoskeletal: no arthralgias,-- no joint swelling,-- no limb pain,-- no myalgias,-- no joint stiffness-- and-- no limb swelling  Integumentary: no rashes,-- no itching,-- no breast pain,-- no skin lesions,-- no skin wound-- and-- no breast lump  Neurological: no headache,-- no numbness,-- no tingling,-- no confusion,-- no dizziness,-- no convulsions,-- no fainting-- and-- no difficulty walking        Psychiatric: not suicidal,-- no anxiety,-- no personality change,-- no sleep disturbances,-- no depression-- and-- no emotional problems  Endocrine: no proptosis,-- no muscle weakness,-- no hot flashes-- and-- no deepening of the voice  no feelings of weakness      Hematologic/Lymphatic: no swollen glands,-- no tendency for easy bleeding,-- no tendency for easy bruising-- and-- no swollen glands in the neck  Active Problems   1  Arthritis (716 90) (M19 90)   2  Ascites (789 59) (R18 8)   3  H/O: CVA (cerebrovascular accident) (V12 54) (Z86 73)   4  Heartburn (787 1) (R12)   5  Hypokalemia (276 8) (E87 6)   6  Metastasis of unknown primary (199 1) (C80 1)   7  Protein calorie malnutrition (263 9) (E46)   8  Thrombocytosis (238 71) (D47 3)    Past Medical History   1  Denied: History of mental disorder   2  Denied: History of substance abuse  GYN Onc Past GYN History [facility]:      Patient GYN History: First period was age 13,--  1,-- Para 1-- and-- menopause at age 48, but-- no abnormal pap smears in the past-- and-- no history of STD(s)  Surgical History   1  History of Total Knee Replacement Left   2  History of Total Knee Replacement Right  Surgical History Reviewed: The surgical history was reviewed and updated today  Family History   Mother    1  Family history of malignant neoplasm (V16 9) (Z80 9)   2  Family history of malignant neoplasm of uterus (V16 49) (Z80 49)   3  Denied: Family history of mental disorder   4  Denied: Family history of substance abuse  Father    5  Denied: Family history of mental disorder   6  Denied: Family history of substance abuse  Family History Reviewed: The family history was reviewed and updated today  Social History    · Denied: History of Alcohol use   · Denied: History of Drug use   · Never a smoker   · Non-tobacco user (V49 89) (Z78 9)   · Occasional alcohol use  Social History Reviewed: The social history was reviewed and is unchanged  Current Meds    1  Calcium 250 MG Oral Capsule; Therapy: [id number] to Recorded   2   [employer] Probiotic Acidophilus 10 MG Oral Capsule; Therapy: 48OUZ9211 to Recorded   3  Magnesium 200 MG Oral Tablet; Therapy: 19PAA7799 to Recorded   4  Pantoprazole Sodium 40 MG Oral Tablet Delayed Release; Therapy: (97 [id number]) to Recorded  Medication List Reviewed: The medication list was reviewed and updated today  Allergies   1  No Known Drug Allergies  2  Other    Vitals   Vital Signs    Recorded: 65AHD2270 02:34PM   Temperature 99 F, Oral   Heart Rate 90, L Radial   Pulse Quality Normal, L Radial   Systolic 714, LUE, Sitting   Diastolic 64, LUE, Sitting   Height 5 ft 3 in   Weight 113 lb    BMI Calculated 20 02   BSA Calculated 1 52   O2 Saturation 99     Physical Exam        Constitutional      General appearance: No acute distress, well appearing and well nourished  Neck      Neck: Normal, supple, trachea midline, no masses  Thyroid: Normal, no thyromegaly  Pulmonary      Respiratory effort: No increased work of breathing or signs of respiratory distress  Auscultation of lungs: Clear to auscultation  Cardiovascular      Auscultation of heart: Normal rate and rhythm, normal S1 and S2, no murmurs  Peripheral vascular exam: Normal pulses throughout  No edema noted in lower extremities  Chest      Breasts: Normal and no dimpling or skin changes noted  Abdomen      Abdomen: Normal, soft, non-tender, and no organomegaly noted  Liver and spleen: No hepatomegaly or splenomegaly  Examination for hernias: No hernias appreciated  Lymphatic      Palpation of lymph nodes in neck, axillae, groin and/or other locations: No lymphadenopathy or masses noted  Skin      Skin and subcutaneous tissue: Normal skin turgor and no rashes         Palpation of skin and subcutaneous tissue: Normal        Psychiatric      Orientation to person, place, and time: Normal        Mood and affect: Normal        GOG performance status is: 0      Results/Data   IR PARACENTESIS 68TST1530 09:00AM Rossana Syriac      Test Name Result Flag Reference   IR PARACENTESIS (Report)     Ultrasound-guided paracentesis           Clinical History: [Recurrent Ascites]           Procedure: After explaining the risks and benefits of the procedure to the patient, informed consent was obtained  Ultrasound used to localize the left lower quadrant ascites  The overlying skin was prepped and draped in usual sterile fashion and local       anesthesia was obtained with the 1% lidocaine solution  A 5 [place] Rosanna Yueh needle was advanced until fluid was aspirated  Approximately 3500 cc' s of taj fluid fluid was aspirated  No labs were sent  The patient tolerated the procedure well and left the department in stable condition  IMPRESSION:      Impression:           Successful ultrasound-guided paracentesis                  Workstation performed: [id number]           Signed by:      Gracy Ferreira MD      1/9/18      (1)  [id number] 12:00AM Stephen Santillan      Test Name Result Flag Reference    534 6 H 0 0-30 0 U/mL      Signatures    Electronically signed by : Rosalind Sotelo MD; Jan 9 2018  3:44PM EST                       (Author)

## 2018-01-15 DIAGNOSIS — R18.0 MALIGNANT ASCITES: Primary | ICD-10-CM

## 2018-01-16 ENCOUNTER — HOSPITAL ENCOUNTER (OUTPATIENT)
Dept: INTERVENTIONAL RADIOLOGY/VASCULAR | Facility: HOSPITAL | Age: 63
Discharge: HOME | End: 2018-01-16
Attending: INTERNAL MEDICINE | Admitting: RADIOLOGY
Payer: COMMERCIAL

## 2018-01-16 ENCOUNTER — GENERIC CONVERSION - ENCOUNTER (OUTPATIENT)
Dept: OTHER | Facility: OTHER | Age: 63
End: 2018-01-16

## 2018-01-16 DIAGNOSIS — R18.0 MALIGNANT ASCITES: ICD-10-CM

## 2018-01-16 PROCEDURE — 49083 ABD PARACENTESIS W/IMAGING: CPT

## 2018-01-16 RX ORDER — LIDOCAINE HYDROCHLORIDE 10 MG/ML
INJECTION, SOLUTION INFILTRATION; PERINEURAL CODE/TRAUMA/SEDATION MEDICATION
Status: COMPLETED | OUTPATIENT
Start: 2018-01-16 | End: 2018-01-16

## 2018-01-16 RX ADMIN — LIDOCAINE HYDROCHLORIDE 10 ML: 10 INJECTION, SOLUTION INFILTRATION; PERINEURAL at 11:24

## 2018-01-18 ENCOUNTER — TELEPHONE (OUTPATIENT)
Dept: NON INVASIVE DIAGNOSTICS | Facility: HOSPITAL | Age: 63
End: 2018-01-18

## 2018-01-18 DIAGNOSIS — R18.8 OTHER ASCITES: ICD-10-CM

## 2018-01-18 DIAGNOSIS — C56.9 MALIGNANT NEOPLASM OF OVARY (HCC): ICD-10-CM

## 2018-01-18 RX ORDER — SODIUM CHLORIDE 9 MG/ML
75 INJECTION, SOLUTION INTRAVENOUS CONTINUOUS
Status: CANCELLED | OUTPATIENT
Start: 2018-01-18

## 2018-01-19 ENCOUNTER — GENERIC CONVERSION - ENCOUNTER (OUTPATIENT)
Dept: OTHER | Facility: OTHER | Age: 63
End: 2018-01-19

## 2018-01-19 ENCOUNTER — HOSPITAL ENCOUNTER (OUTPATIENT)
Dept: RADIOLOGY | Facility: HOSPITAL | Age: 63
Discharge: HOME | End: 2018-01-19
Attending: OBSTETRICS & GYNECOLOGY | Admitting: RADIOLOGY
Payer: COMMERCIAL

## 2018-01-19 VITALS — OXYGEN SATURATION: 97 % | SYSTOLIC BLOOD PRESSURE: 134 MMHG | HEART RATE: 84 BPM | DIASTOLIC BLOOD PRESSURE: 84 MMHG

## 2018-01-19 DIAGNOSIS — R18.8 ASCITES: ICD-10-CM

## 2018-01-19 PROCEDURE — 49083 ABD PARACENTESIS W/IMAGING: CPT

## 2018-01-19 NOTE — SEDATION DOCUMENTATION
2,000ml dark taj ascites removed from RLQ  Patient tolerated procedure and left department without any complications

## 2018-01-22 ENCOUNTER — GENERIC CONVERSION - ENCOUNTER (OUTPATIENT)
Dept: OTHER | Facility: OTHER | Age: 63
End: 2018-01-22

## 2018-01-22 ENCOUNTER — HOSPITAL ENCOUNTER (OUTPATIENT)
Dept: INTERVENTIONAL RADIOLOGY/VASCULAR | Facility: HOSPITAL | Age: 63
Discharge: HOME | End: 2018-01-22
Payer: COMMERCIAL

## 2018-01-22 ENCOUNTER — HOSPITAL ENCOUNTER (OUTPATIENT)
Dept: INTERVENTIONAL RADIOLOGY/VASCULAR | Facility: HOSPITAL | Age: 63
Discharge: HOME | End: 2018-01-22
Attending: OBSTETRICS & GYNECOLOGY
Payer: COMMERCIAL

## 2018-01-22 VITALS
DIASTOLIC BLOOD PRESSURE: 64 MMHG | BODY MASS INDEX: 20.02 KG/M2 | SYSTOLIC BLOOD PRESSURE: 112 MMHG | HEART RATE: 90 BPM | WEIGHT: 113 LBS | HEIGHT: 63 IN | TEMPERATURE: 99 F | OXYGEN SATURATION: 99 %

## 2018-01-22 VITALS
HEIGHT: 63 IN | DIASTOLIC BLOOD PRESSURE: 87 MMHG | BODY MASS INDEX: 19.49 KG/M2 | TEMPERATURE: 97.8 F | HEART RATE: 102 BPM | SYSTOLIC BLOOD PRESSURE: 138 MMHG | RESPIRATION RATE: 20 BRPM | OXYGEN SATURATION: 96 % | WEIGHT: 110 LBS

## 2018-01-22 DIAGNOSIS — C56.9 MALIGNANT NEOPLASM OF OVARY (HCC): ICD-10-CM

## 2018-01-22 DIAGNOSIS — R18.0 MALIGNANT ASCITES: ICD-10-CM

## 2018-01-22 LAB
ALBUMIN SERPL BCP-MCNC: 1.5 G/DL (ref 3.5–5)
ALP SERPL-CCNC: 51 U/L (ref 46–116)
ALT SERPL W P-5'-P-CCNC: 24 U/L (ref 12–78)
ANION GAP SERPL CALCULATED.3IONS-SCNC: 8 MMOL/L (ref 4–13)
AST SERPL W P-5'-P-CCNC: 43 U/L (ref 5–45)
BASOPHILS # BLD AUTO: 0.05 THOUSANDS/ΜL (ref 0–0.1)
BASOPHILS NFR BLD AUTO: 0 % (ref 0–1)
BILIRUB SERPL-MCNC: 0.3 MG/DL (ref 0.2–1)
BUN SERPL-MCNC: 8 MG/DL (ref 5–25)
CALCIUM SERPL-MCNC: 8.5 MG/DL (ref 8.3–10.1)
CHLORIDE SERPL-SCNC: 97 MMOL/L (ref 100–108)
CO2 SERPL-SCNC: 30 MMOL/L (ref 21–32)
CREAT SERPL-MCNC: 0.86 MG/DL (ref 0.6–1.3)
EOSINOPHIL # BLD AUTO: 0.02 THOUSAND/ΜL (ref 0–0.61)
EOSINOPHIL NFR BLD AUTO: 0 % (ref 0–6)
ERYTHROCYTE [DISTWIDTH] IN BLOOD BY AUTOMATED COUNT: 12.9 % (ref 11.6–15.1)
GFR SERPL CREATININE-BSD FRML MDRD: 72 ML/MIN/1.73SQ M
GLUCOSE P FAST SERPL-MCNC: 119 MG/DL (ref 65–99)
GLUCOSE SERPL-MCNC: 119 MG/DL (ref 65–140)
HCT VFR BLD AUTO: 34.8 % (ref 34.8–46.1)
HGB BLD-MCNC: 11.7 G/DL (ref 11.5–15.4)
LYMPHOCYTES # BLD AUTO: 3.38 THOUSANDS/ΜL (ref 0.6–4.47)
LYMPHOCYTES NFR BLD AUTO: 20 % (ref 14–44)
MAGNESIUM SERPL-MCNC: 1.7 MG/DL (ref 1.6–2.6)
MCH RBC QN AUTO: 29.2 PG (ref 26.8–34.3)
MCHC RBC AUTO-ENTMCNC: 33.6 G/DL (ref 31.4–37.4)
MCV RBC AUTO: 87 FL (ref 82–98)
MONOCYTES # BLD AUTO: 1.26 THOUSAND/ΜL (ref 0.17–1.22)
MONOCYTES NFR BLD AUTO: 8 % (ref 4–12)
NEUTROPHILS # BLD AUTO: 11.78 THOUSANDS/ΜL (ref 1.85–7.62)
NEUTS SEG NFR BLD AUTO: 70 % (ref 43–75)
NRBC BLD AUTO-RTO: 0 /100 WBCS
PLATELET # BLD AUTO: 575 THOUSANDS/UL (ref 149–390)
PMV BLD AUTO: 8.1 FL (ref 8.9–12.7)
POTASSIUM SERPL-SCNC: 3.1 MMOL/L (ref 3.5–5.3)
PROT SERPL-MCNC: 6 G/DL (ref 6.4–8.2)
RBC # BLD AUTO: 4.01 MILLION/UL (ref 3.81–5.12)
SODIUM SERPL-SCNC: 135 MMOL/L (ref 136–145)
WBC # BLD AUTO: 16.75 THOUSAND/UL (ref 4.31–10.16)

## 2018-01-22 PROCEDURE — 76937 US GUIDE VASCULAR ACCESS: CPT

## 2018-01-22 PROCEDURE — 77001 FLUOROGUIDE FOR VEIN DEVICE: CPT

## 2018-01-22 PROCEDURE — 99153 MOD SED SAME PHYS/QHP EA: CPT

## 2018-01-22 PROCEDURE — C1788 PORT, INDWELLING, IMP: HCPCS

## 2018-01-22 PROCEDURE — 85025 COMPLETE CBC W/AUTO DIFF WBC: CPT | Performed by: PHYSICIAN ASSISTANT

## 2018-01-22 PROCEDURE — 49083 ABD PARACENTESIS W/IMAGING: CPT

## 2018-01-22 PROCEDURE — 83735 ASSAY OF MAGNESIUM: CPT | Performed by: PHYSICIAN ASSISTANT

## 2018-01-22 PROCEDURE — 36561 INSERT TUNNELED CV CATH: CPT

## 2018-01-22 PROCEDURE — 80053 COMPREHEN METABOLIC PANEL: CPT | Performed by: PHYSICIAN ASSISTANT

## 2018-01-22 PROCEDURE — 99152 MOD SED SAME PHYS/QHP 5/>YRS: CPT

## 2018-01-22 RX ORDER — MIDAZOLAM HYDROCHLORIDE 1 MG/ML
INJECTION INTRAMUSCULAR; INTRAVENOUS CODE/TRAUMA/SEDATION MEDICATION
Status: DISCONTINUED | OUTPATIENT
Start: 2018-01-22 | End: 2018-01-26 | Stop reason: HOSPADM

## 2018-01-22 RX ORDER — LIDOCAINE HYDROCHLORIDE AND EPINEPHRINE 10; 10 MG/ML; UG/ML
INJECTION, SOLUTION INFILTRATION; PERINEURAL CODE/TRAUMA/SEDATION MEDICATION
Status: DISCONTINUED | OUTPATIENT
Start: 2018-01-22 | End: 2018-01-26 | Stop reason: HOSPADM

## 2018-01-22 RX ORDER — SODIUM CHLORIDE 9 MG/ML
75 INJECTION, SOLUTION INTRAVENOUS CONTINUOUS
Status: DISCONTINUED | OUTPATIENT
Start: 2018-01-22 | End: 2018-01-26 | Stop reason: HOSPADM

## 2018-01-22 RX ORDER — ONDANSETRON HYDROCHLORIDE 8 MG/1
TABLET, FILM COATED ORAL EVERY 8 HOURS PRN
COMMUNITY
End: 2018-10-23 | Stop reason: ALTCHOICE

## 2018-01-22 RX ORDER — LIDOCAINE HYDROCHLORIDE 10 MG/ML
INJECTION, SOLUTION INFILTRATION; PERINEURAL CODE/TRAUMA/SEDATION MEDICATION
Status: COMPLETED | OUTPATIENT
Start: 2018-01-22 | End: 2018-01-22

## 2018-01-22 RX ORDER — FENTANYL CITRATE 50 UG/ML
INJECTION, SOLUTION INTRAMUSCULAR; INTRAVENOUS CODE/TRAUMA/SEDATION MEDICATION
Status: DISCONTINUED | OUTPATIENT
Start: 2018-01-22 | End: 2018-01-26 | Stop reason: HOSPADM

## 2018-01-22 RX ADMIN — SODIUM CHLORIDE 75 ML/HR: 0.9 INJECTION, SOLUTION INTRAVENOUS at 12:21

## 2018-01-22 RX ADMIN — LIDOCAINE HYDROCHLORIDE 10 ML: 10 INJECTION, SOLUTION INFILTRATION; PERINEURAL at 13:01

## 2018-01-22 RX ADMIN — LIDOCAINE HYDROCHLORIDE,EPINEPHRINE BITARTRATE 10 ML: 10; .01 INJECTION, SOLUTION INFILTRATION; PERINEURAL at 13:33

## 2018-01-22 RX ADMIN — MIDAZOLAM HYDROCHLORIDE 1 MG: 1 INJECTION, SOLUTION INTRAMUSCULAR; INTRAVENOUS at 13:33

## 2018-01-22 RX ADMIN — FENTANYL CITRATE 50 MCG: 50 INJECTION, SOLUTION INTRAMUSCULAR; INTRAVENOUS at 13:19

## 2018-01-22 RX ADMIN — MIDAZOLAM HYDROCHLORIDE 1 MG: 1 INJECTION, SOLUTION INTRAMUSCULAR; INTRAVENOUS at 13:19

## 2018-01-22 RX ADMIN — FENTANYL CITRATE 50 MCG: 50 INJECTION, SOLUTION INTRAMUSCULAR; INTRAVENOUS at 13:33

## 2018-01-22 NOTE — DISCHARGE INSTRUCTIONS
Implanted Venous Access Port     WHAT YOU NEED TO KNOW:   An implanted venous access port is a device used to give treatments and take blood  It may also be called a central venous access device (CVAD)  The port is a small container that is placed under your skin, usually in your upper chest  The port is attached to a catheter that enters a large vein  DISCHARGE INSTRUCTIONS:   Resume your normal diet  Small sips of flat soda will help with mild nausea  Prevent an infection:   · Wash your hands often  Use soap and water  Clean your hands before and after you care for your port  Remind everyone who cares for your port to wash their hands  · Check your skin for infection every day  Look for redness, swelling, or fluid oozing from the port site  Care for your port:   1  You may shower beginning 48 hours after procedure  2  Change dressing if it becomes wet  3  Remove dressing after 24 hours  Leave glue in place  4  It is normal for some bruising to occur  5  Use Tylenol for pain  6  Limit use of arm on the side that your port was placed  Lift nothing heavier than 5 pounds for 1 week, and then gradually increase activity as tolerated  7  DO NOT apply ointment, lotion or cream to port site until incision is healed  Allow glue to fall off  DO NOT attempt to peel glue from skin even it it begins to flake  8, After the port incision is healed you may swim, bathe  Notify the Interventional Radiologist if you have any of the followin  Fever above 101 F    2  Increased redness or swelling after 1st day  3  Increased pain after 1st day  4  Any sign of infection (drainage from port site, skin separation, hot to touch)  5  Persistent nausea or vomiting  Contact Interventional Radiology at [phone] [facility] PATIENTS: Contact Interventional Radiology at [phone]) ([address] [facility] [facility] [address]: Contact Interventional Radiology at [phone])

## 2018-01-22 NOTE — BRIEF OP NOTE (RAD/CATH)
IR PORT PLACEMENT  Procedure Note    PATIENT NAME: Andreina Richardson  : 1955  MRN: [id number]     Pre-op Diagnosis:   1  Malignant neoplasm of ovary (HCC)      Post-op Diagnosis:   1  Malignant neoplasm of ovary [facility])        Surgeon:   Margi Diallo MD    Estimated Blood Loss:  Minimal  Findings:  Left chest port placed with image guidance  Paracentesis performed with 1700 cc of fluid aspirated      Specimens:  None    Complications:  None    Anesthesia: Conscious sedation    Margi Diallo MD     Date: 2018  Time: 2:41 PM

## 2018-01-24 VITALS
WEIGHT: 121.38 LBS | BODY MASS INDEX: 20.72 KG/M2 | SYSTOLIC BLOOD PRESSURE: 100 MMHG | DIASTOLIC BLOOD PRESSURE: 58 MMHG | OXYGEN SATURATION: 95 % | HEART RATE: 94 BPM | HEIGHT: 64 IN

## 2018-01-24 DIAGNOSIS — C56.9 MALIGNANT NEOPLASM OF OVARY, UNSPECIFIED LATERALITY (HCC): Primary | ICD-10-CM

## 2018-01-24 RX ORDER — PALONOSETRON 0.05 MG/ML
0.25 INJECTION, SOLUTION INTRAVENOUS ONCE
Status: COMPLETED | OUTPATIENT
Start: 2018-01-25 | End: 2018-01-25

## 2018-01-24 RX ORDER — SODIUM CHLORIDE 9 MG/ML
20 INJECTION, SOLUTION INTRAVENOUS CONTINUOUS
Status: DISCONTINUED | OUTPATIENT
Start: 2018-01-25 | End: 2018-01-28 | Stop reason: HOSPADM

## 2018-01-25 ENCOUNTER — HOSPITAL ENCOUNTER (OUTPATIENT)
Dept: INTERVENTIONAL RADIOLOGY/VASCULAR | Facility: HOSPITAL | Age: 63
Discharge: HOME | End: 2018-01-25
Admitting: RADIOLOGY
Payer: COMMERCIAL

## 2018-01-25 ENCOUNTER — HOSPITAL ENCOUNTER (OUTPATIENT)
Dept: INFUSION CENTER | Facility: CLINIC | Age: 63
Discharge: HOME | End: 2018-01-25
Payer: COMMERCIAL

## 2018-01-25 VITALS
SYSTOLIC BLOOD PRESSURE: 112 MMHG | DIASTOLIC BLOOD PRESSURE: 77 MMHG | BODY MASS INDEX: 19.04 KG/M2 | HEIGHT: 64 IN | HEART RATE: 109 BPM | TEMPERATURE: 98.3 F | RESPIRATION RATE: 18 BRPM | WEIGHT: 111.55 LBS

## 2018-01-25 DIAGNOSIS — C56.9 MALIGNANT NEOPLASM OF OVARY, UNSPECIFIED LATERALITY (HCC): ICD-10-CM

## 2018-01-25 PROCEDURE — 96367 TX/PROPH/DG ADDL SEQ IV INF: CPT

## 2018-01-25 PROCEDURE — 96375 TX/PRO/DX INJ NEW DRUG ADDON: CPT

## 2018-01-25 PROCEDURE — 96413 CHEMO IV INFUSION 1 HR: CPT

## 2018-01-25 PROCEDURE — 96417 CHEMO IV INFUS EACH ADDL SEQ: CPT

## 2018-01-25 PROCEDURE — 49083 ABD PARACENTESIS W/IMAGING: CPT

## 2018-01-25 PROCEDURE — 96415 CHEMO IV INFUSION ADDL HR: CPT

## 2018-01-25 PROCEDURE — 49083 ABD PARACENTESIS W/IMAGING: CPT | Performed by: RADIOLOGY

## 2018-01-25 RX ORDER — LIDOCAINE HYDROCHLORIDE 10 MG/ML
INJECTION, SOLUTION INFILTRATION; PERINEURAL CODE/TRAUMA/SEDATION MEDICATION
Status: COMPLETED | OUTPATIENT
Start: 2018-01-25 | End: 2018-01-25

## 2018-01-25 RX ADMIN — FAMOTIDINE 20 MG: 10 INJECTION, SOLUTION INTRAVENOUS at 10:23

## 2018-01-25 RX ADMIN — HEPARIN 300 UNITS: 100 SYRINGE at 16:04

## 2018-01-25 RX ADMIN — PALONOSETRON HYDROCHLORIDE 0.25 MG: 0.25 INJECTION INTRAVENOUS at 11:46

## 2018-01-25 RX ADMIN — PACLITAXEL 266 MG: 6 INJECTION, SOLUTION, CONCENTRATE INTRAVENOUS at 11:52

## 2018-01-25 RX ADMIN — SODIUM CHLORIDE 150 MG: 0.9 INJECTION, SOLUTION INTRAVENOUS at 10:47

## 2018-01-25 RX ADMIN — LIDOCAINE HYDROCHLORIDE 10 ML: 10 INJECTION, SOLUTION INFILTRATION; PERINEURAL at 16:32

## 2018-01-25 RX ADMIN — SODIUM CHLORIDE 20 ML/HR: 0.9 INJECTION, SOLUTION INTRAVENOUS at 09:22

## 2018-01-25 RX ADMIN — DIPHENHYDRAMINE HYDROCHLORIDE 25 MG: 50 INJECTION, SOLUTION INTRAMUSCULAR; INTRAVENOUS at 09:56

## 2018-01-25 RX ADMIN — DEXAMETHASONE SODIUM PHOSPHATE 20 MG: 10 INJECTION, SOLUTION INTRAMUSCULAR; INTRAVENOUS at 09:31

## 2018-01-25 RX ADMIN — CARBOPLATIN 469 MG: 10 INJECTION, SOLUTION INTRAVENOUS at 14:56

## 2018-01-25 NOTE — PROGRESS NOTES
Pt tolerated first infusion well, offered no complaints  All upcoming appts and chemo schedule reviewed with patient  IR notified that pt was on her way over

## 2018-01-25 NOTE — PROGRESS NOTES
Port accessed per protocol, brisk blood return noted  Pt still sore from port placement, pt instructed on how to use lido cream prior to next appt

## 2018-01-25 NOTE — PLAN OF CARE
Problem: Potential for Falls  Goal: Patient will remain free of falls  INTERVENTIONS:  - Assess patient frequently for physical needs  -  Identify cognitive and physical deficits and behaviors that affect risk of falls    -  [place] fall precautions as indicated by assessment   - Educate patient/family on patient safety including physical limitations  - Instruct patient to call for assistance with activity based on assessment  - Modify environment to reduce risk of injury  - Consider OT/PT consult to assist with strengthening/mobility   Outcome: Progressing      Problem: PAIN - ADULT  Goal: Verbalizes/displays adequate comfort level or baseline comfort level  Interventions:  - Encourage patient to monitor pain and request assistance  - Assess pain using appropriate pain scale  - Administer analgesics based on type and severity of pain and evaluate response  - Implement non-pharmacological measures as appropriate and evaluate response  - Consider cultural and social influences on pain and pain management  - Notify physician/advanced practitioner if interventions unsuccessful or patient reports new pain  Outcome: Progressing      Problem: INFECTION - ADULT  Goal: Absence or prevention of progression during hospitalization  INTERVENTIONS:  - Assess and monitor for signs and symptoms of infection  - Monitor lab/diagnostic results  - Monitor all insertion sites, i e  indwelling lines, tubes, and drains  - Monitor endotracheal (as able) and nasal secretions for changes in amount and color  - [place] appropriate cooling/warming therapies per order  - Administer medications as ordered  - Instruct and encourage patient and family to use good hand hygiene technique  - Identify and instruct in appropriate isolation precautions for identified infection/condition  Outcome: Progressing    Goal: Absence of fever/infection during neutropenic period  INTERVENTIONS:  - Monitor WBC  - Implement neutropenic guidelines  Outcome: Progressing

## 2018-01-26 DIAGNOSIS — R18.0 MALIGNANT ASCITES: Primary | ICD-10-CM

## 2018-01-29 ENCOUNTER — HOSPITAL ENCOUNTER (OUTPATIENT)
Dept: INFUSION CENTER | Facility: CLINIC | Age: 63
Discharge: HOME | End: 2018-01-29
Payer: COMMERCIAL

## 2018-01-29 VITALS
SYSTOLIC BLOOD PRESSURE: 118 MMHG | DIASTOLIC BLOOD PRESSURE: 83 MMHG | TEMPERATURE: 98.1 F | HEART RATE: 105 BPM | RESPIRATION RATE: 18 BRPM

## 2018-01-29 DIAGNOSIS — R18.0 MALIGNANT ASCITES: Primary | ICD-10-CM

## 2018-01-29 LAB
ALBUMIN SERPL BCP-MCNC: 1.3 G/DL (ref 3.5–5)
ALP SERPL-CCNC: 49 U/L (ref 46–116)
ALT SERPL W P-5'-P-CCNC: 45 U/L (ref 12–78)
ANION GAP SERPL CALCULATED.3IONS-SCNC: 6 MMOL/L (ref 4–13)
AST SERPL W P-5'-P-CCNC: 68 U/L (ref 5–45)
BASOPHILS # BLD MANUAL: 0 THOUSAND/UL (ref 0–0.1)
BASOPHILS NFR MAR MANUAL: 0 % (ref 0–1)
BILIRUB SERPL-MCNC: 0.1 MG/DL (ref 0.2–1)
BUN SERPL-MCNC: 10 MG/DL (ref 5–25)
CALCIUM SERPL-MCNC: 6.7 MG/DL (ref 8.3–10.1)
CHLORIDE SERPL-SCNC: 103 MMOL/L (ref 100–108)
CO2 SERPL-SCNC: 25 MMOL/L (ref 21–32)
CREAT SERPL-MCNC: 0.6 MG/DL (ref 0.6–1.3)
EOSINOPHIL # BLD MANUAL: 0 THOUSAND/UL (ref 0–0.4)
EOSINOPHIL NFR BLD MANUAL: 0 % (ref 0–6)
ERYTHROCYTE [DISTWIDTH] IN BLOOD BY AUTOMATED COUNT: 13.1 % (ref 11.6–15.1)
GFR SERPL CREATININE-BSD FRML MDRD: 97 ML/MIN/1.73SQ M
GLUCOSE SERPL-MCNC: 102 MG/DL (ref 65–140)
HCT VFR BLD AUTO: 32.2 % (ref 34.8–46.1)
HGB BLD-MCNC: 10.5 G/DL (ref 11.5–15.4)
LYMPHOCYTES # BLD AUTO: 1.19 THOUSAND/UL (ref 0.6–4.47)
LYMPHOCYTES # BLD AUTO: 16 % (ref 14–44)
MAGNESIUM SERPL-MCNC: 1.6 MG/DL (ref 1.6–2.6)
MCH RBC QN AUTO: 29.1 PG (ref 26.8–34.3)
MCHC RBC AUTO-ENTMCNC: 32.6 G/DL (ref 31.4–37.4)
MCV RBC AUTO: 89 FL (ref 82–98)
MONOCYTES # BLD AUTO: 0.07 THOUSAND/UL (ref 0–1.22)
MONOCYTES NFR BLD: 1 % (ref 4–12)
NEUTROPHILS # BLD MANUAL: 6.16 THOUSAND/UL (ref 1.85–7.62)
NEUTS SEG NFR BLD AUTO: 83 % (ref 43–75)
NRBC BLD AUTO-RTO: 0 /100 WBCS
PLATELET # BLD AUTO: 332 THOUSANDS/UL (ref 149–390)
PLATELET BLD QL SMEAR: ADEQUATE
PMV BLD AUTO: 8.7 FL (ref 8.9–12.7)
POTASSIUM SERPL-SCNC: 4.2 MMOL/L (ref 3.5–5.3)
PROT SERPL-MCNC: 5 G/DL (ref 6.4–8.2)
RBC # BLD AUTO: 3.61 MILLION/UL (ref 3.81–5.12)
SODIUM SERPL-SCNC: 134 MMOL/L (ref 136–145)
TOTAL CELLS COUNTED SPEC: 100
WBC # BLD AUTO: 7.42 THOUSAND/UL (ref 4.31–10.16)

## 2018-01-29 PROCEDURE — 83735 ASSAY OF MAGNESIUM: CPT | Performed by: OBSTETRICS & GYNECOLOGY

## 2018-01-29 PROCEDURE — 85007 BL SMEAR W/DIFF WBC COUNT: CPT | Performed by: OBSTETRICS & GYNECOLOGY

## 2018-01-29 PROCEDURE — 80053 COMPREHEN METABOLIC PANEL: CPT | Performed by: OBSTETRICS & GYNECOLOGY

## 2018-01-29 PROCEDURE — 96360 HYDRATION IV INFUSION INIT: CPT

## 2018-01-29 PROCEDURE — 36593 DECLOT VASCULAR DEVICE: CPT

## 2018-01-29 PROCEDURE — 85027 COMPLETE CBC AUTOMATED: CPT | Performed by: OBSTETRICS & GYNECOLOGY

## 2018-01-29 RX ORDER — OXYCODONE AND ACETAMINOPHEN 10; 325 MG/1; MG/1
1 TABLET ORAL AS NEEDED
COMMUNITY
End: 2018-04-25

## 2018-01-29 RX ADMIN — SODIUM CHLORIDE 1000 ML: 0.9 INJECTION, SOLUTION INTRAVENOUS at 11:27

## 2018-01-29 RX ADMIN — HEPARIN 300 UNITS: 100 SYRINGE at 12:35

## 2018-01-29 RX ADMIN — ALTEPLASE 2 MG: 2.2 INJECTION, POWDER, LYOPHILIZED, FOR SOLUTION INTRAVENOUS at 10:49

## 2018-01-29 RX ADMIN — WATER 10 ML: 1 INJECTION INTRAMUSCULAR; INTRAVENOUS; SUBCUTANEOUS at 10:49

## 2018-01-29 NOTE — PROGRESS NOTES
Pt arrived to infusion center for hydration and central labs  Pt states that her first chemotherapy was last Thursday, and she felt "lousy" over the weekend  Pt due for paracentesis tomorrow and offers no additional complaints  Port accessed with no blood return  TPA instilled

## 2018-01-29 NOTE — PLAN OF CARE
Problem: Potential for Falls  Goal: Patient will remain free of falls  INTERVENTIONS:  - Assess patient frequently for physical needs  -  Identify cognitive and physical deficits and behaviors that affect risk of falls    -  [place] fall precautions as indicated by assessment   - Educate patient/family on patient safety including physical limitations  - Instruct patient to call for assistance with activity based on assessment  - Modify environment to reduce risk of injury  - Consider OT/PT consult to assist with strengthening/mobility   Outcome: Progressing

## 2018-01-29 NOTE — PROGRESS NOTES
Central labs drawn via port  Pt received 1L NSS per order  Pt discharged in stable condition and aware to return next Monday for bloodwork   Declined AVS

## 2018-01-30 ENCOUNTER — HOSPITAL ENCOUNTER (OUTPATIENT)
Dept: INTERVENTIONAL RADIOLOGY/VASCULAR | Facility: HOSPITAL | Age: 63
Discharge: HOME | End: 2018-01-30
Admitting: RADIOLOGY
Payer: COMMERCIAL

## 2018-01-30 VITALS
SYSTOLIC BLOOD PRESSURE: 125 MMHG | HEART RATE: 88 BPM | OXYGEN SATURATION: 97 % | RESPIRATION RATE: 20 BRPM | DIASTOLIC BLOOD PRESSURE: 76 MMHG

## 2018-01-30 PROCEDURE — 49083 ABD PARACENTESIS W/IMAGING: CPT | Performed by: RADIOLOGY

## 2018-01-30 PROCEDURE — 49083 ABD PARACENTESIS W/IMAGING: CPT

## 2018-01-30 RX ORDER — LIDOCAINE HYDROCHLORIDE 10 MG/ML
INJECTION, SOLUTION INFILTRATION; PERINEURAL CODE/TRAUMA/SEDATION MEDICATION
Status: COMPLETED | OUTPATIENT
Start: 2018-01-30 | End: 2018-01-30

## 2018-01-30 RX ADMIN — LIDOCAINE HYDROCHLORIDE 10 ML: 10 INJECTION, SOLUTION INFILTRATION; PERINEURAL at 10:46

## 2018-02-02 ENCOUNTER — HOSPITAL ENCOUNTER (OUTPATIENT)
Dept: RADIOLOGY | Facility: HOSPITAL | Age: 63
Discharge: HOME | End: 2018-02-02
Attending: OBSTETRICS & GYNECOLOGY
Payer: MEDICARE

## 2018-02-02 VITALS
HEART RATE: 82 BPM | SYSTOLIC BLOOD PRESSURE: 133 MMHG | OXYGEN SATURATION: 97 % | DIASTOLIC BLOOD PRESSURE: 74 MMHG | RESPIRATION RATE: 12 BRPM

## 2018-02-02 DIAGNOSIS — R18.0 MALIGNANT ASCITES: ICD-10-CM

## 2018-02-02 PROCEDURE — 49083 ABD PARACENTESIS W/IMAGING: CPT

## 2018-02-02 PROCEDURE — 49083 ABD PARACENTESIS W/IMAGING: CPT | Performed by: RADIOLOGY

## 2018-02-02 NOTE — DISCHARGE INSTRUCTIONS
Abdominal Paracentesis     WHAT YOU NEED TO KNOW:   Abdominal paracentesis is a procedure to remove abnormal fluid buildup in your abdomen  Fluid builds up because of liver problems, such as swelling and scarring  Heart failure, kidney disease, a mass, or problems with your pancreas may also cause fluid buildup  DISCHARGE INSTRUCTIONS:     Follow up with your healthcare provider as directed: Write down your questions so you remember to ask them during your visits  Wound care: Remove dressing after 24 hours  Leave glue in place  Return to your normal activities    Contact Interventional Radiology at [phone] [facility] PATIENTS: Contact Interventional Radiology at [phone]) [facility] PATIENTS: Contact Interventional Radiology at [phone]) if:  · You have a fever and your wound is red and swollen  · You have yellow, green, or bad-smelling discharge coming from your wound  · You have pain or swelling in your abdomen  · You have an upset stomach or you vomit  · You have sudden, sharp pain in your abdomen  · You urinate very little or not at all  · You feel confused and more tired than usual    · Your arm or leg feels warm, tender, and painful  It may look swollen and red  · You suddenly feel lightheaded and have trouble breathing

## 2018-02-05 ENCOUNTER — HOSPITAL ENCOUNTER (OUTPATIENT)
Dept: INFUSION CENTER | Facility: CLINIC | Age: 63
Discharge: HOME | End: 2018-02-05
Payer: MEDICARE

## 2018-02-05 PROBLEM — C56.9 OVARIAN CANCER (HCC): Status: ACTIVE | Noted: 2018-02-05

## 2018-02-05 PROBLEM — C80.1 MALIGNANCY (HCC): Status: RESOLVED | Noted: 2018-01-04 | Resolved: 2018-02-05

## 2018-02-05 LAB
ALBUMIN SERPL BCP-MCNC: 1.8 G/DL (ref 3.5–5)
ALP SERPL-CCNC: 53 U/L (ref 46–116)
ALT SERPL W P-5'-P-CCNC: 31 U/L (ref 12–78)
ANION GAP SERPL CALCULATED.3IONS-SCNC: 7 MMOL/L (ref 4–13)
AST SERPL W P-5'-P-CCNC: 37 U/L (ref 5–45)
BASOPHILS # BLD MANUAL: 0 THOUSAND/UL (ref 0–0.1)
BASOPHILS NFR MAR MANUAL: 0 % (ref 0–1)
BILIRUB SERPL-MCNC: 0.1 MG/DL (ref 0.2–1)
BUN SERPL-MCNC: 8 MG/DL (ref 5–25)
CALCIUM SERPL-MCNC: 7.9 MG/DL (ref 8.3–10.1)
CHLORIDE SERPL-SCNC: 105 MMOL/L (ref 100–108)
CO2 SERPL-SCNC: 27 MMOL/L (ref 21–32)
CREAT SERPL-MCNC: 0.51 MG/DL (ref 0.6–1.3)
EOSINOPHIL # BLD MANUAL: 0.06 THOUSAND/UL (ref 0–0.4)
EOSINOPHIL NFR BLD MANUAL: 2 % (ref 0–6)
ERYTHROCYTE [DISTWIDTH] IN BLOOD BY AUTOMATED COUNT: 14 % (ref 11.6–15.1)
GFR SERPL CREATININE-BSD FRML MDRD: 102 ML/MIN/1.73SQ M
GLUCOSE SERPL-MCNC: 84 MG/DL (ref 65–140)
HCT VFR BLD AUTO: 34.9 % (ref 34.8–46.1)
HGB BLD-MCNC: 11.1 G/DL (ref 11.5–15.4)
LYMPHOCYTES # BLD AUTO: 1.01 THOUSAND/UL (ref 0.6–4.47)
LYMPHOCYTES # BLD AUTO: 34 % (ref 14–44)
MAGNESIUM SERPL-MCNC: 1.8 MG/DL (ref 1.6–2.6)
MCH RBC QN AUTO: 28.8 PG (ref 26.8–34.3)
MCHC RBC AUTO-ENTMCNC: 31.8 G/DL (ref 31.4–37.4)
MCV RBC AUTO: 90 FL (ref 82–98)
MONOCYTES # BLD AUTO: 0.24 THOUSAND/UL (ref 0–1.22)
MONOCYTES NFR BLD: 8 % (ref 4–12)
NEUTROPHILS # BLD MANUAL: 1.57 THOUSAND/UL (ref 1.85–7.62)
NEUTS BAND NFR BLD MANUAL: 1 % (ref 0–8)
NEUTS SEG NFR BLD AUTO: 52 % (ref 43–75)
NRBC BLD AUTO-RTO: 0 /100 WBCS
PLATELET # BLD AUTO: 326 THOUSANDS/UL (ref 149–390)
PLATELET BLD QL SMEAR: ADEQUATE
PMV BLD AUTO: 8.1 FL (ref 8.9–12.7)
POTASSIUM SERPL-SCNC: 3.9 MMOL/L (ref 3.5–5.3)
PROT SERPL-MCNC: 6.2 G/DL (ref 6.4–8.2)
RBC # BLD AUTO: 3.86 MILLION/UL (ref 3.81–5.12)
SODIUM SERPL-SCNC: 139 MMOL/L (ref 136–145)
TOTAL CELLS COUNTED SPEC: 100
VARIANT LYMPHS # BLD AUTO: 3 %
WBC # BLD AUTO: 2.96 THOUSAND/UL (ref 4.31–10.16)

## 2018-02-05 PROCEDURE — 83735 ASSAY OF MAGNESIUM: CPT | Performed by: PHYSICIAN ASSISTANT

## 2018-02-05 PROCEDURE — 80053 COMPREHEN METABOLIC PANEL: CPT | Performed by: PHYSICIAN ASSISTANT

## 2018-02-05 PROCEDURE — 85027 COMPLETE CBC AUTOMATED: CPT | Performed by: PHYSICIAN ASSISTANT

## 2018-02-05 PROCEDURE — 85007 BL SMEAR W/DIFF WBC COUNT: CPT | Performed by: PHYSICIAN ASSISTANT

## 2018-02-05 RX ADMIN — HEPARIN 300 UNITS: 100 SYRINGE at 10:53

## 2018-02-06 ENCOUNTER — TELEPHONE (OUTPATIENT)
Dept: GYNECOLOGIC ONCOLOGY | Facility: CLINIC | Age: 63
End: 2018-02-06

## 2018-02-06 NOTE — TELEPHONE ENCOUNTER
Due to inclement weather, pre-chemo visit performed via telephone call  Patient tolerated her first cycle well  She notes minimal neuropathy in her fingertips and toes  This is not affecting her ADLs  The patient denies nausea or vomiting  Her ascites accumulation has decreased  Her most recent weight is 109-110 lbs  Ok to received cycle 2 of treatment after evaluate of pre-chemo labs

## 2018-02-08 ENCOUNTER — HOSPITAL ENCOUNTER (OUTPATIENT)
Dept: INTERVENTIONAL RADIOLOGY/VASCULAR | Facility: HOSPITAL | Age: 63
Discharge: HOME | End: 2018-02-08
Attending: OBSTETRICS & GYNECOLOGY | Admitting: RADIOLOGY
Payer: MEDICARE

## 2018-02-08 DIAGNOSIS — R18.0 MALIGNANT ASCITES: ICD-10-CM

## 2018-02-08 PROCEDURE — 49083 ABD PARACENTESIS W/IMAGING: CPT

## 2018-02-08 PROCEDURE — 49083 ABD PARACENTESIS W/IMAGING: CPT | Performed by: RADIOLOGY

## 2018-02-08 NOTE — SEDATION DOCUMENTATION
Pt's abdomen scanned small amt of ascites fluid noted  No paracentesis needed as per Dr Linda Luciano  Pt d/c home stable AAOx3

## 2018-02-12 ENCOUNTER — HOSPITAL ENCOUNTER (OUTPATIENT)
Dept: INFUSION CENTER | Facility: CLINIC | Age: 63
Discharge: HOME | End: 2018-02-12
Payer: MEDICARE

## 2018-02-12 LAB
ALBUMIN SERPL BCP-MCNC: 2.5 G/DL (ref 3.5–5)
ALP SERPL-CCNC: 58 U/L (ref 46–116)
ALT SERPL W P-5'-P-CCNC: 30 U/L (ref 12–78)
ANION GAP SERPL CALCULATED.3IONS-SCNC: 7 MMOL/L (ref 4–13)
AST SERPL W P-5'-P-CCNC: 34 U/L (ref 5–45)
BASOPHILS # BLD MANUAL: 0 THOUSAND/UL (ref 0–0.1)
BASOPHILS NFR MAR MANUAL: 0 % (ref 0–1)
BILIRUB SERPL-MCNC: 0.2 MG/DL (ref 0.2–1)
BUN SERPL-MCNC: 6 MG/DL (ref 5–25)
CALCIUM SERPL-MCNC: 9.1 MG/DL (ref 8.3–10.1)
CANCER AG125 SERPL-ACNC: 367.7 U/ML (ref 0–30)
CHLORIDE SERPL-SCNC: 104 MMOL/L (ref 100–108)
CO2 SERPL-SCNC: 27 MMOL/L (ref 21–32)
CREAT SERPL-MCNC: 0.61 MG/DL (ref 0.6–1.3)
EOSINOPHIL # BLD MANUAL: 0.04 THOUSAND/UL (ref 0–0.4)
EOSINOPHIL NFR BLD MANUAL: 1 % (ref 0–6)
ERYTHROCYTE [DISTWIDTH] IN BLOOD BY AUTOMATED COUNT: 15.2 % (ref 11.6–15.1)
GFR SERPL CREATININE-BSD FRML MDRD: 96 ML/MIN/1.73SQ M
GLUCOSE SERPL-MCNC: 92 MG/DL (ref 65–140)
HCT VFR BLD AUTO: 36.3 % (ref 34.8–46.1)
HGB BLD-MCNC: 11.6 G/DL (ref 11.5–15.4)
LYMPHOCYTES # BLD AUTO: 1.74 THOUSAND/UL (ref 0.6–4.47)
LYMPHOCYTES # BLD AUTO: 44 % (ref 14–44)
MAGNESIUM SERPL-MCNC: 1.9 MG/DL (ref 1.6–2.6)
MCH RBC QN AUTO: 28.8 PG (ref 26.8–34.3)
MCHC RBC AUTO-ENTMCNC: 32 G/DL (ref 31.4–37.4)
MCV RBC AUTO: 90 FL (ref 82–98)
MONOCYTES # BLD AUTO: 0.4 THOUSAND/UL (ref 0–1.22)
MONOCYTES NFR BLD: 10 % (ref 4–12)
NEUTROPHILS # BLD MANUAL: 1.74 THOUSAND/UL (ref 1.85–7.62)
NEUTS BAND NFR BLD MANUAL: 2 % (ref 0–8)
NEUTS SEG NFR BLD AUTO: 42 % (ref 43–75)
NRBC BLD AUTO-RTO: 0 /100 WBCS
PLATELET # BLD AUTO: 273 THOUSANDS/UL (ref 149–390)
PLATELET BLD QL SMEAR: ADEQUATE
PMV BLD AUTO: 8 FL (ref 8.9–12.7)
POTASSIUM SERPL-SCNC: 3.9 MMOL/L (ref 3.5–5.3)
PROT SERPL-MCNC: 8 G/DL (ref 6.4–8.2)
RBC # BLD AUTO: 4.03 MILLION/UL (ref 3.81–5.12)
SODIUM SERPL-SCNC: 138 MMOL/L (ref 136–145)
TOTAL CELLS COUNTED SPEC: 100
VARIANT LYMPHS # BLD AUTO: 1 %
WBC # BLD AUTO: 3.95 THOUSAND/UL (ref 4.31–10.16)

## 2018-02-12 PROCEDURE — 80053 COMPREHEN METABOLIC PANEL: CPT | Performed by: PHYSICIAN ASSISTANT

## 2018-02-12 PROCEDURE — 85007 BL SMEAR W/DIFF WBC COUNT: CPT | Performed by: PHYSICIAN ASSISTANT

## 2018-02-12 PROCEDURE — 85027 COMPLETE CBC AUTOMATED: CPT | Performed by: PHYSICIAN ASSISTANT

## 2018-02-12 PROCEDURE — 83735 ASSAY OF MAGNESIUM: CPT | Performed by: PHYSICIAN ASSISTANT

## 2018-02-12 PROCEDURE — 86304 IMMUNOASSAY TUMOR CA 125: CPT | Performed by: PHYSICIAN ASSISTANT

## 2018-02-12 RX ADMIN — HEPARIN 300 UNITS: 100 SYRINGE at 10:51

## 2018-02-14 RX ORDER — PALONOSETRON 0.05 MG/ML
0.25 INJECTION, SOLUTION INTRAVENOUS ONCE
Status: COMPLETED | OUTPATIENT
Start: 2018-02-15 | End: 2018-02-15

## 2018-02-14 RX ORDER — SODIUM CHLORIDE 9 MG/ML
20 INJECTION, SOLUTION INTRAVENOUS CONTINUOUS
Status: DISCONTINUED | OUTPATIENT
Start: 2018-02-15 | End: 2018-02-18 | Stop reason: HOSPADM

## 2018-02-15 ENCOUNTER — HOSPITAL ENCOUNTER (OUTPATIENT)
Dept: INFUSION CENTER | Facility: CLINIC | Age: 63
Discharge: HOME | End: 2018-02-15
Payer: MEDICARE

## 2018-02-15 VITALS
HEART RATE: 71 BPM | RESPIRATION RATE: 18 BRPM | SYSTOLIC BLOOD PRESSURE: 137 MMHG | HEIGHT: 63 IN | BODY MASS INDEX: 18.83 KG/M2 | TEMPERATURE: 97.6 F | DIASTOLIC BLOOD PRESSURE: 74 MMHG | WEIGHT: 106.26 LBS

## 2018-02-15 PROCEDURE — 96367 TX/PROPH/DG ADDL SEQ IV INF: CPT

## 2018-02-15 PROCEDURE — 96375 TX/PRO/DX INJ NEW DRUG ADDON: CPT

## 2018-02-15 PROCEDURE — 96417 CHEMO IV INFUS EACH ADDL SEQ: CPT

## 2018-02-15 PROCEDURE — 96415 CHEMO IV INFUSION ADDL HR: CPT

## 2018-02-15 PROCEDURE — 96413 CHEMO IV INFUSION 1 HR: CPT

## 2018-02-15 RX ADMIN — DIPHENHYDRAMINE HYDROCHLORIDE 25 MG: 50 INJECTION, SOLUTION INTRAMUSCULAR; INTRAVENOUS at 09:25

## 2018-02-15 RX ADMIN — PACLITAXEL 262 MG: 6 INJECTION, SOLUTION, CONCENTRATE INTRAVENOUS at 10:42

## 2018-02-15 RX ADMIN — PALONOSETRON HYDROCHLORIDE 0.25 MG: 0.25 INJECTION INTRAVENOUS at 10:37

## 2018-02-15 RX ADMIN — CARBOPLATIN 519 MG: 10 INJECTION, SOLUTION INTRAVENOUS at 13:41

## 2018-02-15 RX ADMIN — SODIUM CHLORIDE 150 MG: 0.9 INJECTION, SOLUTION INTRAVENOUS at 10:05

## 2018-02-15 RX ADMIN — DEXAMETHASONE SODIUM PHOSPHATE 20 MG: 10 INJECTION, SOLUTION INTRAMUSCULAR; INTRAVENOUS at 09:04

## 2018-02-15 RX ADMIN — SODIUM CHLORIDE 20 ML/HR: 0.9 INJECTION, SOLUTION INTRAVENOUS at 08:55

## 2018-02-15 RX ADMIN — FAMOTIDINE 20 MG: 10 INJECTION, SOLUTION INTRAVENOUS at 09:45

## 2018-02-15 RX ADMIN — HEPARIN 300 UNITS: 100 SYRINGE at 14:42

## 2018-02-15 NOTE — PLAN OF CARE
Problem: Potential for Falls  Goal: Patient will remain free of falls  INTERVENTIONS:  - Assess patient frequently for physical needs  -  Identify cognitive and physical deficits and behaviors that affect risk of falls    -  [place] fall precautions as indicated by assessment   - Educate patient/family on patient safety including physical limitations  - Instruct patient to call for assistance with activity based on assessment  - Modify environment to reduce risk of injury  - Consider OT/PT consult to assist with strengthening/mobility   Outcome: Progressing

## 2018-02-15 NOTE — PROGRESS NOTES
Pt tolerated treatment without complications  Discharged in stable condition and aware of next appointment  AVS provided

## 2018-02-19 ENCOUNTER — HOSPITAL ENCOUNTER (OUTPATIENT)
Dept: INFUSION CENTER | Facility: CLINIC | Age: 63
Discharge: HOME | End: 2018-02-19
Payer: MEDICARE

## 2018-02-19 LAB
ALBUMIN SERPL BCP-MCNC: 3 G/DL (ref 3.5–5)
ALP SERPL-CCNC: 57 U/L (ref 46–116)
ALT SERPL W P-5'-P-CCNC: 38 U/L (ref 12–78)
ANION GAP SERPL CALCULATED.3IONS-SCNC: 9 MMOL/L (ref 4–13)
AST SERPL W P-5'-P-CCNC: 34 U/L (ref 5–45)
BASOPHILS # BLD AUTO: 0.01 THOUSANDS/ΜL (ref 0–0.1)
BASOPHILS NFR BLD AUTO: 0 % (ref 0–1)
BILIRUB SERPL-MCNC: 0.3 MG/DL (ref 0.2–1)
BUN SERPL-MCNC: 12 MG/DL (ref 5–25)
CALCIUM SERPL-MCNC: 9 MG/DL (ref 8.3–10.1)
CHLORIDE SERPL-SCNC: 102 MMOL/L (ref 100–108)
CO2 SERPL-SCNC: 27 MMOL/L (ref 21–32)
CREAT SERPL-MCNC: 0.55 MG/DL (ref 0.6–1.3)
EOSINOPHIL # BLD AUTO: 0.05 THOUSAND/ΜL (ref 0–0.61)
EOSINOPHIL NFR BLD AUTO: 1 % (ref 0–6)
ERYTHROCYTE [DISTWIDTH] IN BLOOD BY AUTOMATED COUNT: 15.9 % (ref 11.6–15.1)
GFR SERPL CREATININE-BSD FRML MDRD: 99 ML/MIN/1.73SQ M
GLUCOSE SERPL-MCNC: 94 MG/DL (ref 65–140)
HCT VFR BLD AUTO: 36.7 % (ref 34.8–46.1)
HGB BLD-MCNC: 12 G/DL (ref 11.5–15.4)
LYMPHOCYTES # BLD AUTO: 1.79 THOUSANDS/ΜL (ref 0.6–4.47)
LYMPHOCYTES NFR BLD AUTO: 38 % (ref 14–44)
MAGNESIUM SERPL-MCNC: 1.6 MG/DL (ref 1.6–2.6)
MCH RBC QN AUTO: 29.3 PG (ref 26.8–34.3)
MCHC RBC AUTO-ENTMCNC: 32.7 G/DL (ref 31.4–37.4)
MCV RBC AUTO: 90 FL (ref 82–98)
MONOCYTES # BLD AUTO: 0.16 THOUSAND/ΜL (ref 0.17–1.22)
MONOCYTES NFR BLD AUTO: 3 % (ref 4–12)
NEUTROPHILS # BLD AUTO: 2.65 THOUSANDS/ΜL (ref 1.85–7.62)
NEUTS SEG NFR BLD AUTO: 57 % (ref 43–75)
NRBC BLD AUTO-RTO: 0 /100 WBCS
PLATELET # BLD AUTO: 251 THOUSANDS/UL (ref 149–390)
PMV BLD AUTO: 9 FL (ref 8.9–12.7)
POTASSIUM SERPL-SCNC: 3.7 MMOL/L (ref 3.5–5.3)
PROT SERPL-MCNC: 8.7 G/DL (ref 6.4–8.2)
RBC # BLD AUTO: 4.09 MILLION/UL (ref 3.81–5.12)
SODIUM SERPL-SCNC: 138 MMOL/L (ref 136–145)
WBC # BLD AUTO: 4.68 THOUSAND/UL (ref 4.31–10.16)

## 2018-02-19 PROCEDURE — 85025 COMPLETE CBC W/AUTO DIFF WBC: CPT | Performed by: OBSTETRICS & GYNECOLOGY

## 2018-02-19 PROCEDURE — 83735 ASSAY OF MAGNESIUM: CPT | Performed by: OBSTETRICS & GYNECOLOGY

## 2018-02-19 PROCEDURE — 80053 COMPREHEN METABOLIC PANEL: CPT | Performed by: OBSTETRICS & GYNECOLOGY

## 2018-02-19 RX ADMIN — HEPARIN 300 UNITS: 100 SYRINGE at 13:23

## 2018-02-19 NOTE — PLAN OF CARE
Problem: Potential for Falls  Goal: Patient will remain free of falls  INTERVENTIONS:  - Assess patient frequently for physical needs  -  Identify cognitive and physical deficits and behaviors that affect risk of falls    -  [place] fall precautions as indicated by assessment   - Educate patient/family on patient safety including physical limitations  - Instruct patient to call for assistance with activity based on assessment  - Modify environment to reduce risk of injury  - Consider OT/PT consult to assist with strengthening/mobility   Outcome: Progressing

## 2018-02-19 NOTE — PROGRESS NOTES
Patient comes to infusion center for central labs with port flush  Patient tolerated port access without difficulty or complaint  Patient aware of future appointments  Pt d/twyla in stable condition

## 2018-02-26 ENCOUNTER — HOSPITAL ENCOUNTER (OUTPATIENT)
Dept: INFUSION CENTER | Facility: CLINIC | Age: 63
Discharge: HOME | End: 2018-02-26
Payer: MEDICARE

## 2018-02-26 LAB
ALBUMIN SERPL BCP-MCNC: 3.2 G/DL (ref 3.5–5)
ALP SERPL-CCNC: 52 U/L (ref 46–116)
ALT SERPL W P-5'-P-CCNC: 27 U/L (ref 12–78)
ANION GAP SERPL CALCULATED.3IONS-SCNC: 9 MMOL/L (ref 4–13)
AST SERPL W P-5'-P-CCNC: 20 U/L (ref 5–45)
BASOPHILS # BLD MANUAL: 0 THOUSAND/UL (ref 0–0.1)
BASOPHILS NFR MAR MANUAL: 0 % (ref 0–1)
BILIRUB SERPL-MCNC: 0.3 MG/DL (ref 0.2–1)
BUN SERPL-MCNC: 11 MG/DL (ref 5–25)
CALCIUM SERPL-MCNC: 9.2 MG/DL (ref 8.3–10.1)
CHLORIDE SERPL-SCNC: 103 MMOL/L (ref 100–108)
CO2 SERPL-SCNC: 26 MMOL/L (ref 21–32)
CREAT SERPL-MCNC: 0.54 MG/DL (ref 0.6–1.3)
EOSINOPHIL # BLD MANUAL: 0.21 THOUSAND/UL (ref 0–0.4)
EOSINOPHIL NFR BLD MANUAL: 6 % (ref 0–6)
ERYTHROCYTE [DISTWIDTH] IN BLOOD BY AUTOMATED COUNT: 16 % (ref 11.6–15.1)
GFR SERPL CREATININE-BSD FRML MDRD: 99 ML/MIN/1.73SQ M
GLUCOSE SERPL-MCNC: 84 MG/DL (ref 65–140)
HCT VFR BLD AUTO: 33.3 % (ref 34.8–46.1)
HGB BLD-MCNC: 10.9 G/DL (ref 11.5–15.4)
LYMPHOCYTES # BLD AUTO: 1.7 THOUSAND/UL (ref 0.6–4.47)
LYMPHOCYTES # BLD AUTO: 49 % (ref 14–44)
MAGNESIUM SERPL-MCNC: 1.8 MG/DL (ref 1.6–2.6)
MCH RBC QN AUTO: 29.4 PG (ref 26.8–34.3)
MCHC RBC AUTO-ENTMCNC: 32.7 G/DL (ref 31.4–37.4)
MCV RBC AUTO: 90 FL (ref 82–98)
MONOCYTES # BLD AUTO: 0.1 THOUSAND/UL (ref 0–1.22)
MONOCYTES NFR BLD: 3 % (ref 4–12)
NEUTROPHILS # BLD MANUAL: 0.87 THOUSAND/UL (ref 1.85–7.62)
NEUTS SEG NFR BLD AUTO: 25 % (ref 43–75)
NRBC BLD AUTO-RTO: 0 /100 WBCS
PLATELET # BLD AUTO: 245 THOUSANDS/UL (ref 149–390)
PLATELET BLD QL SMEAR: ADEQUATE
PMV BLD AUTO: 8.5 FL (ref 8.9–12.7)
POTASSIUM SERPL-SCNC: 3.8 MMOL/L (ref 3.5–5.3)
PROT SERPL-MCNC: 8.4 G/DL (ref 6.4–8.2)
RBC # BLD AUTO: 3.71 MILLION/UL (ref 3.81–5.12)
SODIUM SERPL-SCNC: 138 MMOL/L (ref 136–145)
TOTAL CELLS COUNTED SPEC: 100
VARIANT LYMPHS # BLD AUTO: 17 %
WBC # BLD AUTO: 3.46 THOUSAND/UL (ref 4.31–10.16)

## 2018-02-26 PROCEDURE — 85027 COMPLETE CBC AUTOMATED: CPT | Performed by: OBSTETRICS & GYNECOLOGY

## 2018-02-26 PROCEDURE — 80053 COMPREHEN METABOLIC PANEL: CPT | Performed by: OBSTETRICS & GYNECOLOGY

## 2018-02-26 PROCEDURE — 85007 BL SMEAR W/DIFF WBC COUNT: CPT | Performed by: OBSTETRICS & GYNECOLOGY

## 2018-02-26 PROCEDURE — 83735 ASSAY OF MAGNESIUM: CPT | Performed by: OBSTETRICS & GYNECOLOGY

## 2018-02-26 RX ADMIN — HEPARIN 300 UNITS: 100 SYRINGE at 13:32

## 2018-02-26 NOTE — PROGRESS NOTES
Pt comes to infusion center for central labs and port flush  Pt port acces without difficulty or complaint  Pt aware of future appointments  Pt d/twyla in stable condition

## 2018-02-26 NOTE — PLAN OF CARE
Problem: Potential for Falls  Goal: Patient will remain free of falls  INTERVENTIONS:  - Assess patient frequently for physical needs  -  Identify cognitive and physical deficits and behaviors that affect risk of falls    -  [place] fall precautions as indicated by assessment   - Educate patient/family on patient safety including physical limitations  - Instruct patient to call for assistance with activity based on assessment  - Modify environment to reduce risk of injury  - Consider OT/PT consult to assist with strengthening/mobility   Outcome: Progressing

## 2018-02-28 ENCOUNTER — OFFICE VISIT (OUTPATIENT)
Dept: GYNECOLOGIC ONCOLOGY | Facility: CLINIC | Age: 63
End: 2018-02-28
Payer: MEDICARE

## 2018-02-28 VITALS
RESPIRATION RATE: 14 BRPM | HEIGHT: 63 IN | HEART RATE: 72 BPM | SYSTOLIC BLOOD PRESSURE: 116 MMHG | WEIGHT: 106 LBS | DIASTOLIC BLOOD PRESSURE: 68 MMHG | BODY MASS INDEX: 18.78 KG/M2 | TEMPERATURE: 97.9 F

## 2018-02-28 DIAGNOSIS — C56.9 MALIGNANT NEOPLASM OF OVARY, UNSPECIFIED LATERALITY (HCC): Primary | ICD-10-CM

## 2018-02-28 PROCEDURE — 99214 OFFICE O/P EST MOD 30 MIN: CPT | Performed by: OBSTETRICS & GYNECOLOGY

## 2018-02-28 NOTE — PROGRESS NOTES
Sam Zamora  1955  [address] [place]  [address] [address]    Chief Complaint   Patient presents with    Chemotherapy     Pre-chemo #3    Ovarian cancer [facility])    Staging form: Ovary, Fallopian Tube, Primary Peritoneal, AJCC 8th Edition    - Clinical: FIGO Stage IIIC - Signed by Rachael Veliz MD on 2/5/2018  Previous Therapy: see below  History of Present Illness:  68-year-old with presumptive stage IIIC ovarian cancer currently undergoing neoadjuvant chemotherapy  Patient presents today for pre chemo clearance for cycle 3  The patient states that she is doing quite well and has noticed that the ascites has disappeared  The patient no longer has reflux symptoms  Patient also believes she has more energy  Patient states that she is eating everything in sight and has a great appetite  Patient denies any neuropathy associated side effects on chemotherapy  Patient's pretreatment  was elevated at 534 6  Patient's  is decreasing  The last 1 we have on record was 367 7 on February 12, 2018  Ovarian cancer ([facility] [address] )    1/4/2018 Initial Diagnosis     Paracentesis revealing adenocarcinoma of gynecologic origin         1/25/2018 -  Chemotherapy     Carboplatin AUC 6 and Taxol 175 mg/m2 squared Q 3 weeks  Pretreatment Ca 125: 534 6  Review of Systems   Constitutional: Negative for activity change, appetite change, chills, diaphoresis, fatigue, fever and unexpected weight change  HENT: Negative for congestion, dental problem, drooling, ear discharge, ear pain, facial swelling, hearing loss, mouth sores, nosebleeds, postnasal drip, rhinorrhea, sinus pain, sinus pressure, sneezing, sore throat, tinnitus, trouble swallowing and voice change  Eyes: Negative for photophobia, pain, discharge, redness, itching and visual disturbance  Respiratory: Negative for apnea, cough, choking, chest tightness, shortness of breath, wheezing and stridor  Cardiovascular: Negative for chest pain, palpitations and leg swelling  Gastrointestinal: Negative for abdominal distention, abdominal pain, anal bleeding, blood in stool, constipation, diarrhea, nausea, rectal pain and vomiting  Endocrine: Negative for cold intolerance, heat intolerance, polydipsia, polyphagia and polyuria  Genitourinary: Negative for decreased urine volume, difficulty urinating, dyspareunia, dysuria, enuresis, flank pain, frequency, genital sores, hematuria, menstrual problem, pelvic pain, urgency, vaginal bleeding, vaginal discharge and vaginal pain  Musculoskeletal: Negative for arthralgias, back pain, gait problem, joint swelling, myalgias, neck pain and neck stiffness  Skin: Negative for color change, pallor, rash and wound  Allergic/Immunologic: Negative for environmental allergies, food allergies and immunocompromised state  Neurological: Negative for dizziness, tremors, seizures, syncope, facial asymmetry, speech difficulty, weakness, light-headedness, numbness and headaches  Hematological: Negative for adenopathy  Does not bruise/bleed easily  Psychiatric/Behavioral: Negative for agitation, behavioral problems, confusion, decreased concentration, dysphoric mood, hallucinations, self-injury, sleep disturbance and suicidal ideas  The patient is not nervous/anxious and is not hyperactive  Patient Active Problem List   Diagnosis    Abdominal pain    Hypokalemia    Hyponatremia    Hypertension    Malignant ascites    Ovarian cancer [facility])     Social History     Social History    Marital status: /Civil Union     Spouse name: N/A    Number of children: N/A    Years of education: N/A     Occupational History    Not on file       Social History Main Topics    Smoking status: Never Smoker    Smokeless tobacco: Never Used    Alcohol use No    Drug use: No    Sexual activity: Not on file     Other Topics Concern    Not on file     Social History Narrative    No narrative on file     Past Medical History:   Diagnosis Date    Arthritis     Ascites     Cancer ([facility] [place] [address] )     Hypertension 1/4/2018    [facility]       Current Outpatient Prescriptions:     ondansetron (ZOFRAN) 8 mg tablet, Take by mouth every 8 (eight) hours as needed for nausea or vomiting, Disp: , Rfl:     pantoprazole (PROTONIX) 40 mg tablet, Take 1 tablet by mouth daily in the early morning, Disp: 30 tablet, Rfl: 0    oxyCODONE-acetaminophen (PERCOCET)  mg per tablet, Take 1 tablet by mouth as needed for moderate pain, Disp: , Rfl:   No current facility-administered medications for this visit       Facility-Administered Medications Ordered in Other Visits:     alteplase (CATHFLO) injection 2 mg, 2 mg, Intracatheter, PRN, Aristides Goldstein MD    heparin lock flush 100 units/mL injection 300 Units, 300 Units, Intracatheter, PRN, Aristides Goldstein MD, 300 Units at 02/26/18 1332  Allergies   Allergen Reactions    Black Pepper [[place]] Rash     Vitals:    02/28/18 1304   BP: 116/68   Pulse: 72   Resp: 14   Temp: 97 9 °F (36 6 °C)       Labs:  CMP  Lab Results   Component Value Date     02/26/2018    K 3 8 02/26/2018     02/26/2018    CO2 26 02/26/2018    ANIONGAP 9 02/26/2018    BUN 11 02/26/2018    CREATININE 0 54 (L) 02/26/2018    GLUCOSE 84 02/26/2018    GLUF 119 (H) 01/22/2018    CALCIUM 9 2 02/26/2018    AST 20 02/26/2018    ALT 27 02/26/2018    ALKPHOS 52 02/26/2018    PROT 8 4 (H) 02/26/2018    BILITOT 0 30 02/26/2018    EGFR 99 02/26/2018       BMP  Lab Results   Component Value Date    GLUCOSE 84 02/26/2018    CALCIUM 9 2 02/26/2018     02/26/2018    K 3 8 02/26/2018    CO2 26 02/26/2018     02/26/2018    BUN 11 02/26/2018    CREATININE 0 54 (L) 02/26/2018       Lipids  No results found for: CHOL  No results found for: HDL  No results found for: LDLCALC  No results found for: TRIG  No components found for: CHOLHDL    Hemoglobin A1C  No results found for: HGBA1C    Fasting Glucose  Lab Results   Component Value Date    GLUF 119 (H) 01/22/2018       Insulin     Thyroid  No results found for: TSH, O0RDQCL, X0DWWQB, THYROIDAB    Hepatic Function Panel  Lab Results   Component Value Date    ALT 27 02/26/2018    AST 20 02/26/2018    ALKPHOS 52 02/26/2018    BILITOT 0 30 02/26/2018       Celiac Disease Antibody Panel  No results found for: ENDOMYSIAL IGA, GLIADIN IGA, GLIADIN IGG, IGA, TISSUE TRANSGLUT AB, TTG IGA   Iron  No results found for: IRON, TIBC, FERRITIN        Physical Exam   Constitutional: She is oriented to person, place, and time  She appears well-developed and well-nourished  No distress  HENT:   Head: Normocephalic and atraumatic  Right Ear: External ear normal    Left Ear: External ear normal    Nose: Nose normal    Mouth/Throat: No oropharyngeal exudate  Eyes: Pupils are equal, round, and reactive to light  Right eye exhibits no discharge  Left eye exhibits no discharge  No scleral icterus  Neck: Normal range of motion  Neck supple  No JVD present  No tracheal deviation present  No thyromegaly present  Cardiovascular: Normal rate, regular rhythm, normal heart sounds and intact distal pulses  Exam reveals no gallop and no friction rub  No murmur heard  Pulmonary/Chest: Effort normal and breath sounds normal  No stridor  No respiratory distress  She has no wheezes  She has no rales  She exhibits no tenderness  Abdominal: Soft  Bowel sounds are normal  She exhibits no distension and no mass  There is no tenderness  There is no rebound and no guarding  Genitourinary:   Genitourinary Comments: Pelvic exam deferred    Musculoskeletal: Normal range of motion  She exhibits no edema, tenderness or deformity  Lymphadenopathy:     She has no cervical adenopathy  Neurological: She is alert and oriented to person, place, and time  She has normal reflexes  No cranial nerve deficit  She exhibits normal muscle tone   Coordination normal  Skin: Skin is warm and dry  No rash noted  She is not diaphoretic  No erythema  No pallor  Psychiatric: She has a normal mood and affect  Her behavior is normal  Judgment and thought content normal        Assessment  70year-old with stage IIIC ovarian cancer undergoing neoadjuvant chemotherapy with a response  Plan  we will proceed with cycle 3 of chemotherapy on March 18th  Depending upon her upcoming , she will be scheduled for interval cytoreductive surgery after the 3rd or 4th cycle  Patient will need a CT scan of the chest, abdomen and pelvis 2 weeks after cycle 3 or 4

## 2018-03-05 ENCOUNTER — HOSPITAL ENCOUNTER (OUTPATIENT)
Dept: INFUSION CENTER | Facility: CLINIC | Age: 63
Discharge: HOME | End: 2018-03-05
Payer: MEDICARE

## 2018-03-05 LAB
ALBUMIN SERPL BCP-MCNC: 3.3 G/DL (ref 3.5–5)
ALP SERPL-CCNC: 54 U/L (ref 46–116)
ALT SERPL W P-5'-P-CCNC: 26 U/L (ref 12–78)
ANION GAP SERPL CALCULATED.3IONS-SCNC: 9 MMOL/L (ref 4–13)
AST SERPL W P-5'-P-CCNC: 23 U/L (ref 5–45)
BASOPHILS # BLD AUTO: 0.02 THOUSANDS/ΜL (ref 0–0.1)
BASOPHILS NFR BLD AUTO: 1 % (ref 0–1)
BILIRUB SERPL-MCNC: 0.3 MG/DL (ref 0.2–1)
BUN SERPL-MCNC: 9 MG/DL (ref 5–25)
CALCIUM SERPL-MCNC: 8.6 MG/DL (ref 8.3–10.1)
CANCER AG125 SERPL-ACNC: 67.2 U/ML (ref 0–30)
CHLORIDE SERPL-SCNC: 103 MMOL/L (ref 100–108)
CO2 SERPL-SCNC: 26 MMOL/L (ref 21–32)
CREAT SERPL-MCNC: 0.61 MG/DL (ref 0.6–1.3)
EOSINOPHIL # BLD AUTO: 0.08 THOUSAND/ΜL (ref 0–0.61)
EOSINOPHIL NFR BLD AUTO: 2 % (ref 0–6)
ERYTHROCYTE [DISTWIDTH] IN BLOOD BY AUTOMATED COUNT: 17.3 % (ref 11.6–15.1)
GFR SERPL CREATININE-BSD FRML MDRD: 95 ML/MIN/1.73SQ M
GLUCOSE SERPL-MCNC: 87 MG/DL (ref 65–140)
HCT VFR BLD AUTO: 33.9 % (ref 34.8–46.1)
HGB BLD-MCNC: 11 G/DL (ref 11.5–15.4)
LYMPHOCYTES # BLD AUTO: 2.25 THOUSANDS/ΜL (ref 0.6–4.47)
LYMPHOCYTES NFR BLD AUTO: 58 % (ref 14–44)
MAGNESIUM SERPL-MCNC: 1.8 MG/DL (ref 1.6–2.6)
MCH RBC QN AUTO: 29.4 PG (ref 26.8–34.3)
MCHC RBC AUTO-ENTMCNC: 32.4 G/DL (ref 31.4–37.4)
MCV RBC AUTO: 91 FL (ref 82–98)
MONOCYTES # BLD AUTO: 0.45 THOUSAND/ΜL (ref 0.17–1.22)
MONOCYTES NFR BLD AUTO: 12 % (ref 4–12)
NEUTROPHILS # BLD AUTO: 1.04 THOUSANDS/ΜL (ref 1.85–7.62)
NEUTS SEG NFR BLD AUTO: 27 % (ref 43–75)
NRBC BLD AUTO-RTO: 0 /100 WBCS
PLATELET # BLD AUTO: 216 THOUSANDS/UL (ref 149–390)
PMV BLD AUTO: 8.5 FL (ref 8.9–12.7)
POTASSIUM SERPL-SCNC: 3.8 MMOL/L (ref 3.5–5.3)
PROT SERPL-MCNC: 8.3 G/DL (ref 6.4–8.2)
RBC # BLD AUTO: 3.74 MILLION/UL (ref 3.81–5.12)
SODIUM SERPL-SCNC: 138 MMOL/L (ref 136–145)
WBC # BLD AUTO: 3.85 THOUSAND/UL (ref 4.31–10.16)

## 2018-03-05 PROCEDURE — 83735 ASSAY OF MAGNESIUM: CPT | Performed by: PHYSICIAN ASSISTANT

## 2018-03-05 PROCEDURE — 80053 COMPREHEN METABOLIC PANEL: CPT | Performed by: PHYSICIAN ASSISTANT

## 2018-03-05 PROCEDURE — 85025 COMPLETE CBC W/AUTO DIFF WBC: CPT | Performed by: PHYSICIAN ASSISTANT

## 2018-03-05 PROCEDURE — 86304 IMMUNOASSAY TUMOR CA 125: CPT | Performed by: PHYSICIAN ASSISTANT

## 2018-03-05 RX ADMIN — HEPARIN 300 UNITS: 100 SYRINGE at 13:23

## 2018-03-05 NOTE — PROGRESS NOTES
Pt offers no complaints  Labs drawn and sent to the lab  Port flushed and hep-locked  Aware of next appointments   Declines AVS

## 2018-03-06 DIAGNOSIS — C56.9 MALIGNANT NEOPLASM OF OVARY, UNSPECIFIED LATERALITY (HCC): Primary | ICD-10-CM

## 2018-03-06 RX ORDER — SODIUM CHLORIDE 9 MG/ML
20 INJECTION, SOLUTION INTRAVENOUS CONTINUOUS
Status: DISCONTINUED | OUTPATIENT
Start: 2018-03-08 | End: 2018-03-11 | Stop reason: HOSPADM

## 2018-03-06 RX ORDER — PALONOSETRON 0.05 MG/ML
0.25 INJECTION, SOLUTION INTRAVENOUS ONCE
Status: COMPLETED | OUTPATIENT
Start: 2018-03-08 | End: 2018-03-08

## 2018-03-08 ENCOUNTER — HOSPITAL ENCOUNTER (OUTPATIENT)
Dept: INFUSION CENTER | Facility: CLINIC | Age: 63
Discharge: HOME | End: 2018-03-08
Payer: MEDICARE

## 2018-03-08 VITALS
WEIGHT: 107.58 LBS | HEIGHT: 63 IN | SYSTOLIC BLOOD PRESSURE: 120 MMHG | BODY MASS INDEX: 19.06 KG/M2 | DIASTOLIC BLOOD PRESSURE: 80 MMHG | RESPIRATION RATE: 18 BRPM | TEMPERATURE: 97.7 F | HEART RATE: 64 BPM

## 2018-03-08 LAB
BASOPHILS # BLD AUTO: 0.04 THOUSANDS/ΜL (ref 0–0.1)
BASOPHILS NFR BLD AUTO: 1 % (ref 0–1)
EOSINOPHIL # BLD AUTO: 0.06 THOUSAND/ΜL (ref 0–0.61)
EOSINOPHIL NFR BLD AUTO: 2 % (ref 0–6)
ERYTHROCYTE [DISTWIDTH] IN BLOOD BY AUTOMATED COUNT: 17.8 % (ref 11.6–15.1)
HCT VFR BLD AUTO: 35.3 % (ref 34.8–46.1)
HGB BLD-MCNC: 11.4 G/DL (ref 11.5–15.4)
LYMPHOCYTES # BLD AUTO: 1.89 THOUSANDS/ΜL (ref 0.6–4.47)
LYMPHOCYTES NFR BLD AUTO: 47 % (ref 14–44)
MCH RBC QN AUTO: 29.4 PG (ref 26.8–34.3)
MCHC RBC AUTO-ENTMCNC: 32.3 G/DL (ref 31.4–37.4)
MCV RBC AUTO: 91 FL (ref 82–98)
MONOCYTES # BLD AUTO: 0.49 THOUSAND/ΜL (ref 0.17–1.22)
MONOCYTES NFR BLD AUTO: 12 % (ref 4–12)
NEUTROPHILS # BLD AUTO: 1.56 THOUSANDS/ΜL (ref 1.85–7.62)
NEUTS SEG NFR BLD AUTO: 39 % (ref 43–75)
NRBC BLD AUTO-RTO: 0 /100 WBCS
PLATELET # BLD AUTO: 181 THOUSANDS/UL (ref 149–390)
PMV BLD AUTO: 8.3 FL (ref 8.9–12.7)
RBC # BLD AUTO: 3.88 MILLION/UL (ref 3.81–5.12)
WBC # BLD AUTO: 4.05 THOUSAND/UL (ref 4.31–10.16)

## 2018-03-08 PROCEDURE — 96367 TX/PROPH/DG ADDL SEQ IV INF: CPT

## 2018-03-08 PROCEDURE — 96375 TX/PRO/DX INJ NEW DRUG ADDON: CPT

## 2018-03-08 PROCEDURE — 85025 COMPLETE CBC W/AUTO DIFF WBC: CPT | Performed by: PHYSICIAN ASSISTANT

## 2018-03-08 PROCEDURE — 96417 CHEMO IV INFUS EACH ADDL SEQ: CPT

## 2018-03-08 PROCEDURE — 96415 CHEMO IV INFUSION ADDL HR: CPT

## 2018-03-08 PROCEDURE — 96413 CHEMO IV INFUSION 1 HR: CPT

## 2018-03-08 RX ORDER — LORAZEPAM 1 MG/1
TABLET ORAL
Refills: 0 | COMMUNITY
Start: 2018-01-19 | End: 2019-01-09

## 2018-03-08 RX ORDER — LIDOCAINE AND PRILOCAINE 25; 25 MG/G; MG/G
CREAM TOPICAL
Refills: 0 | COMMUNITY
Start: 2018-01-23 | End: 2018-10-23 | Stop reason: ALTCHOICE

## 2018-03-08 RX ADMIN — DIPHENHYDRAMINE HYDROCHLORIDE 25 MG: 50 INJECTION, SOLUTION INTRAMUSCULAR; INTRAVENOUS at 11:38

## 2018-03-08 RX ADMIN — DEXAMETHASONE SODIUM PHOSPHATE 20 MG: 10 INJECTION, SOLUTION INTRAMUSCULAR; INTRAVENOUS at 11:15

## 2018-03-08 RX ADMIN — FAMOTIDINE 20 MG: 10 INJECTION, SOLUTION INTRAVENOUS at 11:59

## 2018-03-08 RX ADMIN — HEPARIN 300 UNITS: 100 SYRINGE at 17:13

## 2018-03-08 RX ADMIN — SODIUM CHLORIDE 150 MG: 9 INJECTION, SOLUTION INTRAVENOUS at 12:21

## 2018-03-08 RX ADMIN — SODIUM CHLORIDE 20 ML/HR: 0.9 INJECTION, SOLUTION INTRAVENOUS at 10:18

## 2018-03-08 RX ADMIN — PACLITAXEL 257 MG: 300 INJECTION, SOLUTION INTRAVENOUS at 13:00

## 2018-03-08 RX ADMIN — PALONOSETRON HYDROCHLORIDE 0.25 MG: 0.25 INJECTION INTRAVENOUS at 12:52

## 2018-03-08 RX ADMIN — CARBOPLATIN 514 MG: 10 INJECTION, SOLUTION INTRAVENOUS at 16:10

## 2018-03-08 NOTE — PROGRESS NOTES
Pt offers no complaints, resting comfortably  Vitals stable  Labs reviewed  STAT CBC to be drawn as per orders  Will call [facility] with results prior to initiating treatment  Call bell in reach, will continue to monitor

## 2018-03-08 NOTE — PROGRESS NOTES
Spoke with Clarissa Keating PA-C regarding patients new CBC drawn today  OK to proceed with treatment

## 2018-03-12 ENCOUNTER — HOSPITAL ENCOUNTER (OUTPATIENT)
Dept: INFUSION CENTER | Facility: CLINIC | Age: 63
Discharge: HOME | End: 2018-03-12
Payer: MEDICARE

## 2018-03-12 LAB
ALBUMIN SERPL BCP-MCNC: 3.4 G/DL (ref 3.5–5)
ALP SERPL-CCNC: 49 U/L (ref 46–116)
ALT SERPL W P-5'-P-CCNC: 31 U/L (ref 12–78)
ANION GAP SERPL CALCULATED.3IONS-SCNC: 7 MMOL/L (ref 4–13)
AST SERPL W P-5'-P-CCNC: 24 U/L (ref 5–45)
BASOPHILS # BLD AUTO: 0.01 THOUSANDS/ΜL (ref 0–0.1)
BASOPHILS NFR BLD AUTO: 0 % (ref 0–1)
BILIRUB SERPL-MCNC: 0.3 MG/DL (ref 0.2–1)
BUN SERPL-MCNC: 10 MG/DL (ref 5–25)
CALCIUM SERPL-MCNC: 8.8 MG/DL (ref 8.3–10.1)
CHLORIDE SERPL-SCNC: 102 MMOL/L (ref 100–108)
CO2 SERPL-SCNC: 29 MMOL/L (ref 21–32)
CREAT SERPL-MCNC: 0.55 MG/DL (ref 0.6–1.3)
EOSINOPHIL # BLD AUTO: 0.03 THOUSAND/ΜL (ref 0–0.61)
EOSINOPHIL NFR BLD AUTO: 1 % (ref 0–6)
ERYTHROCYTE [DISTWIDTH] IN BLOOD BY AUTOMATED COUNT: 17.9 % (ref 11.6–15.1)
GFR SERPL CREATININE-BSD FRML MDRD: 99 ML/MIN/1.73SQ M
GLUCOSE SERPL-MCNC: 90 MG/DL (ref 65–140)
HCT VFR BLD AUTO: 34.1 % (ref 34.8–46.1)
HGB BLD-MCNC: 11.1 G/DL (ref 11.5–15.4)
LYMPHOCYTES # BLD AUTO: 1.62 THOUSANDS/ΜL (ref 0.6–4.47)
LYMPHOCYTES NFR BLD AUTO: 45 % (ref 14–44)
MAGNESIUM SERPL-MCNC: 1.8 MG/DL (ref 1.6–2.6)
MCH RBC QN AUTO: 29.6 PG (ref 26.8–34.3)
MCHC RBC AUTO-ENTMCNC: 32.6 G/DL (ref 31.4–37.4)
MCV RBC AUTO: 91 FL (ref 82–98)
MONOCYTES # BLD AUTO: 0.13 THOUSAND/ΜL (ref 0.17–1.22)
MONOCYTES NFR BLD AUTO: 4 % (ref 4–12)
NEUTROPHILS # BLD AUTO: 1.79 THOUSANDS/ΜL (ref 1.85–7.62)
NEUTS SEG NFR BLD AUTO: 50 % (ref 43–75)
NRBC BLD AUTO-RTO: 0 /100 WBCS
PLATELET # BLD AUTO: 161 THOUSANDS/UL (ref 149–390)
PMV BLD AUTO: 9.2 FL (ref 8.9–12.7)
POTASSIUM SERPL-SCNC: 3.9 MMOL/L (ref 3.5–5.3)
PROT SERPL-MCNC: 8.1 G/DL (ref 6.4–8.2)
RBC # BLD AUTO: 3.75 MILLION/UL (ref 3.81–5.12)
SODIUM SERPL-SCNC: 138 MMOL/L (ref 136–145)
WBC # BLD AUTO: 3.59 THOUSAND/UL (ref 4.31–10.16)

## 2018-03-12 PROCEDURE — 80053 COMPREHEN METABOLIC PANEL: CPT | Performed by: OBSTETRICS & GYNECOLOGY

## 2018-03-12 PROCEDURE — 83735 ASSAY OF MAGNESIUM: CPT | Performed by: OBSTETRICS & GYNECOLOGY

## 2018-03-12 PROCEDURE — 85025 COMPLETE CBC W/AUTO DIFF WBC: CPT | Performed by: OBSTETRICS & GYNECOLOGY

## 2018-03-12 RX ADMIN — HEPARIN 300 UNITS: 100 SYRINGE at 13:25

## 2018-03-12 NOTE — PROGRESS NOTES
Pt to infusion center for lab draw off port  Labs drawn off port as ordered Pt tolerated well   Pt has f/u appt given copy of AVS

## 2018-03-19 ENCOUNTER — HOSPITAL ENCOUNTER (OUTPATIENT)
Dept: INFUSION CENTER | Facility: CLINIC | Age: 63
Discharge: HOME | End: 2018-03-19
Payer: MEDICARE

## 2018-03-19 LAB
ALBUMIN SERPL BCP-MCNC: 3.4 G/DL (ref 3.5–5)
ALP SERPL-CCNC: 48 U/L (ref 46–116)
ALT SERPL W P-5'-P-CCNC: 29 U/L (ref 12–78)
ANION GAP SERPL CALCULATED.3IONS-SCNC: 9 MMOL/L (ref 4–13)
AST SERPL W P-5'-P-CCNC: 20 U/L (ref 5–45)
BASOPHILS # BLD MANUAL: 0 THOUSAND/UL (ref 0–0.1)
BASOPHILS NFR MAR MANUAL: 0 % (ref 0–1)
BILIRUB SERPL-MCNC: 0.2 MG/DL (ref 0.2–1)
BUN SERPL-MCNC: 10 MG/DL (ref 5–25)
CALCIUM SERPL-MCNC: 9 MG/DL (ref 8.3–10.1)
CHLORIDE SERPL-SCNC: 106 MMOL/L (ref 100–108)
CO2 SERPL-SCNC: 26 MMOL/L (ref 21–32)
CREAT SERPL-MCNC: 0.56 MG/DL (ref 0.6–1.3)
EOSINOPHIL # BLD MANUAL: 0 THOUSAND/UL (ref 0–0.4)
EOSINOPHIL NFR BLD MANUAL: 0 % (ref 0–6)
ERYTHROCYTE [DISTWIDTH] IN BLOOD BY AUTOMATED COUNT: 18.3 % (ref 11.6–15.1)
GFR SERPL CREATININE-BSD FRML MDRD: 98 ML/MIN/1.73SQ M
GLUCOSE SERPL-MCNC: 90 MG/DL (ref 65–140)
HCT VFR BLD AUTO: 31.6 % (ref 34.8–46.1)
HGB BLD-MCNC: 10.5 G/DL (ref 11.5–15.4)
HYPERCHROMIA BLD QL SMEAR: PRESENT
LYMPHOCYTES # BLD AUTO: 1.36 THOUSAND/UL (ref 0.6–4.47)
LYMPHOCYTES # BLD AUTO: 47 % (ref 14–44)
MAGNESIUM SERPL-MCNC: 1.8 MG/DL (ref 1.6–2.6)
MCH RBC QN AUTO: 30.3 PG (ref 26.8–34.3)
MCHC RBC AUTO-ENTMCNC: 33.2 G/DL (ref 31.4–37.4)
MCV RBC AUTO: 91 FL (ref 82–98)
MONOCYTES # BLD AUTO: 0.09 THOUSAND/UL (ref 0–1.22)
MONOCYTES NFR BLD: 3 % (ref 4–12)
NEUTROPHILS # BLD MANUAL: 1.36 THOUSAND/UL (ref 1.85–7.62)
NEUTS SEG NFR BLD AUTO: 47 % (ref 43–75)
NRBC BLD AUTO-RTO: 0 /100 WBCS
PLATELET # BLD AUTO: 189 THOUSANDS/UL (ref 149–390)
PLATELET BLD QL SMEAR: ADEQUATE
PMV BLD AUTO: 8.5 FL (ref 8.9–12.7)
POTASSIUM SERPL-SCNC: 3.8 MMOL/L (ref 3.5–5.3)
PROT SERPL-MCNC: 8 G/DL (ref 6.4–8.2)
RBC # BLD AUTO: 3.47 MILLION/UL (ref 3.81–5.12)
SODIUM SERPL-SCNC: 141 MMOL/L (ref 136–145)
TOTAL CELLS COUNTED SPEC: 100
VARIANT LYMPHS # BLD AUTO: 3 %
WBC # BLD AUTO: 2.89 THOUSAND/UL (ref 4.31–10.16)

## 2018-03-19 PROCEDURE — 85027 COMPLETE CBC AUTOMATED: CPT | Performed by: OBSTETRICS & GYNECOLOGY

## 2018-03-19 PROCEDURE — 80053 COMPREHEN METABOLIC PANEL: CPT | Performed by: OBSTETRICS & GYNECOLOGY

## 2018-03-19 PROCEDURE — 83735 ASSAY OF MAGNESIUM: CPT | Performed by: OBSTETRICS & GYNECOLOGY

## 2018-03-19 PROCEDURE — 85007 BL SMEAR W/DIFF WBC COUNT: CPT | Performed by: OBSTETRICS & GYNECOLOGY

## 2018-03-19 RX ADMIN — HEPARIN 300 UNITS: 100 SYRINGE at 11:28

## 2018-03-19 NOTE — PLAN OF CARE
Problem: Potential for Falls  Goal: Patient will remain free of falls  INTERVENTIONS:  - Assess patient frequently for physical needs  -  Identify cognitive and physical deficits and behaviors that affect risk of falls    -  [place] fall precautions as indicated by assessment   - Educate patient/family on patient safety including physical limitations  - Instruct patient to call for assistance with activity based on assessment  - Modify environment to reduce risk of injury  - Consider OT/PT consult to assist with strengthening/mobility   Outcome: Progressing

## 2018-03-19 NOTE — PLAN OF CARE
Problem: Potential for Falls  Goal: Patient will remain free of falls  INTERVENTIONS:  - Assess patient frequently for physical needs  -  Identify cognitive and physical deficits and behaviors that affect risk of falls    -  [place] fall precautions as indicated by assessment   - Educate patient/family on patient safety including physical limitations  - Instruct patient to call for assistance with activity based on assessment  - Modify environment to reduce risk of injury  - Consider OT/PT consult to assist with strengthening/mobility   Outcome: Progressing

## 2018-03-19 NOTE — PROGRESS NOTES
Pt comes to infusion center for port flush with central labs  Pt port accessed without difficulty or complaint  Pt aware of future appointments   Pt d/twyla in stable condition

## 2018-03-20 ENCOUNTER — HOSPITAL ENCOUNTER (OUTPATIENT)
Dept: CT IMAGING | Facility: HOSPITAL | Age: 63
Discharge: HOME | End: 2018-03-20
Payer: MEDICARE

## 2018-03-20 DIAGNOSIS — C56.9 MALIGNANT NEOPLASM OF OVARY, UNSPECIFIED LATERALITY (HCC): ICD-10-CM

## 2018-03-20 PROCEDURE — 71260 CT THORAX DX C+: CPT

## 2018-03-20 PROCEDURE — 74177 CT ABD & PELVIS W/CONTRAST: CPT

## 2018-03-20 RX ADMIN — Medication 300 UNITS: at 09:55

## 2018-03-20 RX ADMIN — IOHEXOL 100 ML: 350 INJECTION, SOLUTION INTRAVENOUS at 09:52

## 2018-03-20 NOTE — CASE MANAGEMENT
Initial Clinical Review     Admission: Date/Time/Statement: 1/4/18 @ 1525            Orders Placed This Encounter   Procedures    Inpatient Admission (expected length of stay for this patient is greater than two midnights)       Standing Status:   Standing       Number of Occurrences:   1       Order Specific Question:   Admitting Physician       Answer:   Cindy Medina [[id number]]       Order Specific Question:   Level of Care       Answer:   Med Surg [16]       Order Specific Question:   Estimated length of stay       Answer:   More than 2 Midnights       Order Specific Question:   Certification       Answer:   I certify that inpatient services are medically necessary for this patient for a duration of greater than two midnights  See H&P and MD Progress Notes for additional information about the patient's course of treatment             ED: Date/Time/Mode of Arrival:             ED Arrival Information      Expected Arrival Acuity Means of Arrival Escorted By Service Admission Type     - 1/4/2018 12:22 Emergent Walk-In Family Member General Medicine Emergency     Arrival Complaint     ABDOMINAL PAIN             Chief Complaint:        Chief Complaint   Patient presents with    Abdominal Pain       Pt with abdominal pain and distention for about 2 weeks  Pt states she vomits first thing in the morning then not again throughout the day  Pt still having some normal bowel movements and passing gas           History of Illness: Corye Pop a 57 y  o  female with a significant past medical history of stroke over 20 years ago with no deficits who presents with abdominal pain, vomiting and abdominal distension x 2 and a half weeks  No previous episodes of this in the past  Patient states that her appetite has been non-existent  She reports that the abdominal distension became noticeable about 2 and a half weeks ago  She states that over the last 3 days she has not had much to eat at all   She reports that she was having some mild diarrhea as well, however her last bowel movement was yesterday and it was more firm  She takes herbal supplements for this  She states that she has been drinking plenty of water  She states that she was experiencing abdominal pain due to the distension and had taken ibuprofen without relief and a pain medication she had previously from a surgery that provided some relief  She states that she has been belching often and the last 2 days has had 2 episodes of vomiting  Once yesterday in the morning and today in the morning  That was her last episode of vomiting today  Patient states that she feels much better since the paracentesis and has minimal abdominal discomfort now  She denies nausea currently and states that she is hungry  Patient states she has a family history of breast and ovarian cancer  She has never had genetic testing done prior   Patient reports that she does not take any medications at home, she uses some herbal medications          ED Vital Signs:            ED Triage Vitals [01/04/18 1237]   Temperature Pulse Respirations Blood Pressure SpO2   98 7 °F (37 1 °C) (!) 107 22 146/90 94 %       Temp Source Heart Rate Source Patient Position - Orthostatic VS BP Location FiO2 (%)   Oral Monitor Sitting Right arm --       Pain Score           6                Wt Readings from Last 1 Encounters:   01/04/18 59 4 kg (130 lb 15 3 oz)         Vital Signs (abnormal):   01/04/18 1703   97 9 °F (36 6 °C)   99   18   166/77   --   95 %   None (Room air)   --   01/04/18 1353   --   104   18    178/85   --   94 %   --   --   01/04/18 1237   98 7 °F (37 1 °C)    107   22   146/90   --   94 %   None (Room air)   Sitting            Abnormal Labs/Diagnostic Test Results:     Sodium 132 (L) 136 - 145 mmol/L       Potassium 3 0 (L) 3 5 - 5 3 mmol/L       Chloride 86 (L) 100 - 108 mmol/L       CO2 32 21 - 32 mmol/L       Anion Gap 14 (H) 4 - 13 mmol/L     Alb 3 0, [id number]  plt  604  CT abd - Extensive abdominal ascites and diffuse peritoneal/omental caking has a result of diffuse metastatic disease  Findings most likely represent either GYN or gastrointestinal primary malignancy with discrete primary not clearly visualized  Trace effusions within the lung bases, right slightly greater than left with mild adjacent atelectatic change  EKG- Unusual P axis and short SD, probable junctional tachycardia     ED Treatment:              Medication Administration from 01/04/2018 1222 to 01/04/2018 1650        Date/Time Order Dose Route Action Action by Comments       01/04/2018 1324 ondansetron (ZOFRAN) injection 4 mg 4 mg Intravenous Given Mracus Coppola RN         01/04/2018 1324 morphine (PF) 4 mg/mL injection 4 mg 4 mg Intravenous Given Marcus Coppola RN         01/04/2018 1324 famotidine (PEPCID) injection 20 mg 20 mg Intravenous Given Marcus Coppola RN         01/04/2018 1402 iohexol (OMNIPAQUE) 350 MG/ML injection (MULTI-DOSE) 100 mL 100 mL Intravenous Given Parth Zhou         01/04/2018 1616 potassium chloride 40 mEq IVPB (premix)   Intravenous Canceled Entry Timoteo River RN         01/04/2018 1617 potassium chloride 20 mEq in D5W 100 mL 20 mEq Intravenous Given Timoteo River RN         01/04/2018 1538 lidocaine (XYLOCAINE) 1 % injection 5 mL Infiltration Given Vianey Lutz MD               Past Medical/Surgical History: Active Ambulatory Problems     Diagnosis Date Noted    No Active Ambulatory Problems           Resolved Ambulatory Problems     Diagnosis Date Noted    No Resolved Ambulatory Problems           Past Medical History:   Diagnosis Date    Arthritis      Hypertension 1/4/2018    Stroke [facility]) 1995         Admitting Diagnosis: Abdominal pain [R10 9]  Metastatic disease ([facility] [address] ) [C79 9]  Ascites [R18 8]     Age/Sex: 57 y o  female     Assessment/Plan:       * Abdominal pain   Assessment & Plan     · With distension and ascites   ?  Pt had vomiting this AM and abdominal pain, likely due to ascites, pain improved with paracentesis, last episode of vomiting this morning, likely related to increased abdominal pressure, will start patient on regular diet, if not able to tolerate will decrease to clear liquids   · IR paracentesis performed in Ed, removed 4,400 cc taj fluid, sent for additional testing   · Blood cultures pending  · CT scan revealing Extensive abdominal ascites and diffuse peritoneal/omental caking has a result of diffuse metastatic disease  Findings most likely represent either GYN or gastrointestinal primary malignancy with discrete primary not clearly visualized    · Oncology/GI consult  · Tumor markers CA-125, AFP, CEA, CA 19-9    · Pt with strong family history of GYN cancers   · TSH to evaluate for tachycardia  · PT/OT eval  · Lovenox DVT prophylaxis   · No previous history of alcohol abuse or cirrhosis on CT scan, LFTs within normal limits   · Start protonix 40 mg daily due to patient complaining of reflux symptoms at home       Hypertension   Assessment & Plan     · Pt with elevations of BP on admission  · Does not take any anti hypertensives at home   · PRN hydralazine, consider addition of medication if continued elevations  · Telemetry monitoring  · Pt with tachycardia, improved after paracentesis, monitor, pt may benefit from low dose beta blocker if continued hypertension and increased HR  · It is likely patient has not followed up with a doctor in some time           Hyponatremia   Assessment & Plan     · Pts Na+ 132 on admission  · 4400 cc fluid removed by paracentesis, re-evaluate BMP in the AM          Hypokalemia   Assessment & Plan     · K+ 3 0 on admission  · Pt received potassium chloride 20 mEq IV in the ED  · Will re evaluate BMP and replete if necessary   · Will check mag       Malignancy [facility])   Assessment & Plan     · CT revealing metastatic disease to the omentum   · Strong family history of GYN cancers, no previous genetic testing   · Lovenox DVT prophylaxis  · Morphine PRN for pain management  · Telemetry monitoring   · Patient also with pleural effusions on Ct, encourage incentive spirometry and hold off on IVF for now, patient on regular diet, assess toleration and nutrition consult           VTE Prophylaxis: Enoxaparin (Lovenox)  / sequential compression device   Code Status: Level I - Full code, discussed with patient and family members at bedside  POLST: There is no POLST form on file for this patient (pre-hospital)  Discussion with family: Discussed plan with patient and patient's daughter and son-in-law at bedside  Discussed with family that CT findings indicative of underlying cancer, likely GI or GYN in origin  Explained that the cancer had metastasized to the omentum  Discussed that we will be getting oncology and GI to see the patient for treatment and prognosis recommendations  Will be obtaining cancer markers  Patient and family in agreement and verbalized understanding  Patient does not seem to fully grasp the severity of her findings, is in a very positive mood upon entire discussion       Anticipated Length of Stay: Abi Briseno will be admitted on an Inpatient basis with an anticipated length of stay of  > 2 midnights    Justification for Hospital Stay: Gi/oncology recommendations and improvement of electrolytes            Admission Orders:  Scheduled Meds:   enoxaparin 40 mg Subcutaneous Daily   pantoprazole 40 mg Oral Early Morning      Continuous Infusions:    PRN Meds:   acetaminophen    hydrALAZINE    morphine injection     Reg diet   Act as hayde   Inc spirom   Up and oOB as hayde   Tele   Daily weight   OT PT eval   GI consult   SCD  Oncology consult  iR paracentesis         Oncology consult 1/4  ASSESSMENT/PLAN:  Jennie Lacie a 57 y  o  female with:  peritoneum mass:  With the presentation of ascites, peritoneal mass / cancer seeding, highly suspicious for GYN cancer, GI cancer is also possible    -- agree with checking tumor markers  --  Cytology is pending   Will follow the final result  -- if this is a GYN cancer, will recommend to consult GYN Oncology  -- if the final path suggesting a GI primary, this is then a stage IV disease   Patient will need chemotherapy   We will follow patient as outpatient      Recommendation  -- check CT chest to complete staging   -- consult GYN Oncology if pathology suggest so       Internal med progress note 1/5       * Abdominal pain   Assessment & Plan     · With distension and ascites, status post paracentesis 1/4 draining 4400 cc taj fluid, await further testing  · Blood cultures 1/4 pending   · CT scan 1/4 revealing extensive abdominal ascites and diffuse peritoneal/omental caking has a result of diffuse metastatic disease  Findings most likely represent either GYN or gastrointestinal primary malignancy with discrete primary not clearly visualized    · Oncology/GI consult  · Tumor markers CA-125, AFP (normal), CEA, CA 19-9:  Pending  · Pt with strong family history of GYN cancers   · TSH normal  · PT/OT evals pending  · Lovenox DVT prophylaxis   · No previous history of alcohol abuse or cirrhosis on CT scan, LFTs within normal limits        Malignancy ([facility] [address] )   Assessment & Plan     · CT revealing metastatic disease to the omentum   · Strong family history of GYN cancers, no previous genetic testing   · Lovenox DVT prophylaxis - high risk  · Morphine PRN for pain management  · Telemetry monitoring   · Patient also with pleural effusions on CT, encourage incentive spirometry and agree with holding off on IVF for now  · Patient on regular diet       Hypertension   Assessment & Plan     · Elevations of BP on admission, hypotensive post paracentesis   Pressures in the 920 systolic at this time   Will continue to monitor, agree with considering low-dose beta-blocker given the tachycardia on admission  · Does not take any anti hypertensives at home   · PRN hydralazine >  · Telemetry monitoring     Hypokalemia   Assessment & Plan     · K+ 3 0 on admission - currently 3 2, magnesium low 1 6   Will give 2 mg IV Mag sulfate, 40 mEq oral potassium  · Repeat BMP tomorrow if remains in the hospital, otherwise can have repeat BMP in 3-5 days through PCP  · Telemetry without acute events, no muscle cramps       Hyponatremia   Assessment & Plan     · Na+ 132 on admission, sodium improved to 134 with no intervention   Can monitor with next BMP           VTE Pharmacologic Prophylaxis:   Pharmacologic: Enoxaparin (Lovenox)  Mechanical VTE Prophylaxis in Place: Yes   Patient entered Rounds: I have performed bedside rounds with nursing staff today  Zee   Discussions with Specialists or Other Care Team Provider:  Oncology note reviewed   Education and Discussions with Family / Patient:  Discussed with the patient, as well as over the phone with her daughter while in the room regarding likelihood of intra-abdominal malignancy   Time Spent for Care: 30 minutes   More than 50% of total time spent on counseling and coordination of care as described above    Current Length of Stay: 1 day(s)   Current Patient Status: Inpatient   Certification Statement: The patient will continue to require additional inpatient hospital stay due to Need to monitor patient for oral intake and bowel function, await abdominal x-ray   Discharge Plan:  To home, likely today versus tomorrow      GI consult 1/5  ASSESSMENT and PLAN:    Principal Problem:    Abdominal pain  Active Problems:    Malignancy ([address] )    Hypokalemia    Hyponatremia    Hypertension   Ascites  - Suspect malignancy as etiology due to findings of diffuse peritoneal/omental caking, omental mass, and multiple other enhancing masses in the abdomen  - Agree with oncology that this could be 2/2 GYN cancer given her family history of possible GI related  - Fluid analysis is negative for SBP  - Cytology pending  - SAAG is <1 1 which is consistent with no portal HTN and likely related to peritoneal mets  - Due to change in bowel habits over the past 6 months as well as unknown primary malignancy, patient/family would prefer to do EGD/colonoscopy to evaluate for colon cancer though this is less likely given her CEA is normal and CA-125 is elevated  - Plan for outpatient EGD/colonoscopy next Wednesday with Dr Cecil Duenas; we will arrange this with our office  - Pt stable for discharge from GI standpoint   Nodular Liver  - She has no history of liver disease; suspect this could be from possible metastatic disease and not cirrhosis  - LFTs normal on admission with normal platelets and INR  - Consider checking hepatitis panel  - AFP normal   Mild Pancreatic Duct Dilatation  - Unclear significance; this could again be related to metastatic disease  - No pancreatic mass noted  - Can consider MRI/MRCP to evaluate liver and pancreatic duct further   Esophagitis  - Noted on dedicated CT chest  - Recommend protonix daily for 8 weeks  - Plan for outpatient EGD next Wednesday    IM note  1/5       * Abdominal pain   Assessment & Plan     · With distension and ascites, status post paracentesis 1/4 draining 4400 cc taj fluid, await further testing  · Blood cultures 1/4 pending   · CT scan 1/4 revealing extensive abdominal ascites and diffuse peritoneal/omental caking has a result of diffuse metastatic disease  Findings most likely represent either GYN or gastrointestinal primary malignancy with discrete primary not clearly visualized    · Oncology/GI consult  · Tumor markers CA-125, AFP (normal), CEA, CA 19-9:  Pending  · Pt with strong family history of GYN cancers   · TSH normal  · PT/OT evals pending  · Lovenox DVT prophylaxis   · No previous history of alcohol abuse or cirrhosis on CT scan, LFTs within normal limits           Malignancy ([address] )   Assessment & Plan     · CT revealing metastatic disease to the omentum   · Strong family history of GYN cancers, no previous genetic testing   · Lovenox DVT prophylaxis - high risk  · Morphine PRN for pain management  · Telemetry monitoring   · Patient also with pleural effusions on CT, encourage incentive spirometry and agree with holding off on IVF for now  · Patient on regular diet          Hypertension   Assessment & Plan     · Elevations of BP on admission, hypotensive post paracentesis  Pressures in the 028 systolic at this time  Will continue to monitor, agree with considering low-dose beta-blocker given the tachycardia on admission  · Does not take any anti hypertensives at home   · PRN hydralazine >  · Telemetry monitoring          Hypokalemia   Assessment & Plan     · K+ 3 0 on admission - currently 3 2, magnesium low 1 6  Will give 2 mg IV Mag sulfate, 40 mEq oral potassium  · Repeat BMP tomorrow if remains in the hospital, otherwise can have repeat BMP in 3-5 days through PCP  · Telemetry without acute events, no muscle cramps          Hyponatremia   Assessment & Plan     · Na+ 132 on admission, sodium improved to 134 with no intervention  Can monitor with next BMP                VTE Pharmacologic Prophylaxis:   Pharmacologic: Enoxaparin (Lovenox)  Mechanical VTE Prophylaxis in Place: Yes   Patient Centered Rounds: I have performed bedside rounds with nursing staff today  Carol Wilson  Discussions with Specialists or Other Care Team Provider:  Oncology note reviewed   Education and Discussions with Family / Patient:  Discussed with the patient, as well as over the phone with her daughter while in the room regarding likelihood of intra-abdominal malignancy   Time Spent for Care: 30 minutes    More than 50% of total time spent on counseling and coordination of care as described above    Current Length of Stay: 1 day(s)   Current Patient Status: Inpatient   Certification Statement: The patient will continue to require additional inpatient hospital stay due to Need to monitor patient for oral intake and bowel function, await abdominal x-ray   Discharge Plan:  To home, likely today versus tomorrow     GI consult  1/5   ASSESSMENT and PLAN:    Principal Problem:    Abdominal pain  Active Problems:    Malignancy ([address] )    Hypokalemia    Hyponatremia    Hypertension   Ascites  - Suspect malignancy as etiology due to findings of diffuse peritoneal/omental caking, omental mass, and multiple other enhancing masses in the abdomen  - Agree with oncology that this could be 2/2 GYN cancer given her family history of possible GI related  - Fluid analysis is negative for SBP  - Cytology pending  - SAAG is <1 1 which is consistent with no portal HTN and likely related to peritoneal mets  - Due to change in bowel habits over the past 6 months as well as unknown primary malignancy, patient/family would prefer to do EGD/colonoscopy to evaluate for colon cancer though this is less likely given her CEA is normal and CA-125 is elevated  - Plan for outpatient EGD/colonoscopy next Wednesday with Dr Miguel Garduno; we will arrange this with our office  - Pt stable for discharge from GI standpoint   Nodular Liver  - She has no history of liver disease; suspect this could be from possible metastatic disease and not cirrhosis  - LFTs normal on admission with normal platelets and INR  - Consider checking hepatitis panel  - AFP normal   Mild Pancreatic Duct Dilatation  - Unclear significance; this could again be related to metastatic disease  - No pancreatic mass noted  - Can consider MRI/MRCP to evaluate liver and pancreatic duct further   Esophagitis  - Noted on dedicated CT chest  - Recommend protonix daily for 8 weeks  - Plan for outpatient EGD next Wednesday    DC Summary   1/5  Erica Mcintyre is a 57 y o  female patient who originally presented to the hospital on 1/4/2018 due to ascites and abdominal pain times 2 to 2 5 weeks  Patient's past medical history significant for stroke with no deficits approximately 20 years ago, and strong family history of ovarian cancer    She presented with the above complaints, stating that she felt pregnant for the last 2 and half weeks    Patient was admitted for further workup, paracentesis as above  CT scan as above, very concerning for malignancy with metastasis to the omentum    Patient will need very close follow-up with gynecology oncology, will need to establish with a primary care provider she has not followed with doctors in quite some time, and will need to follow up with Gastroenterology for outpatient EGD secondary to reflux symptoms    Please see above list of diagnoses and related plan for additional information  Patient initially admitted as inpatient status, discharge sooner than expected due to resolution of symptoms status post paracentesis with close follow-up outpatient   [address] to home on 1/5     Thank you,  [address] [place] [address] in the [facility] by Luis Antonio Lozada for 2017  Network Utilization Review Department  Phone: [phone]; Fax [phone]  ATTENTION: The Network Utilization Review Department is now centralized for our 7 Facilities  Make a note that we have a new phone and fax numbers for our Department  Please call with any questions or concerns to [phone] and carefully follow the prompts so that you are directed to the right person  All voicemails are confidential  Fax any determinations, approvals, denials, and requests for initial or continue stay review clinical to [phone]   Due to HIGH CALL volume, it would be easier if you could please send faxed requests to expedite your requests and in part, help us provide discharge notifications faster

## 2018-03-22 ENCOUNTER — OFFICE VISIT (OUTPATIENT)
Dept: GYNECOLOGIC ONCOLOGY | Facility: CLINIC | Age: 63
End: 2018-03-22
Payer: MEDICARE

## 2018-03-22 VITALS
HEIGHT: 63 IN | RESPIRATION RATE: 14 BRPM | SYSTOLIC BLOOD PRESSURE: 136 MMHG | WEIGHT: 107.8 LBS | HEART RATE: 68 BPM | DIASTOLIC BLOOD PRESSURE: 72 MMHG | TEMPERATURE: 97.7 F | BODY MASS INDEX: 19.1 KG/M2

## 2018-03-22 DIAGNOSIS — C56.9 MALIGNANT NEOPLASM OF OVARY, UNSPECIFIED LATERALITY (HCC): Primary | ICD-10-CM

## 2018-03-22 PROCEDURE — 99214 OFFICE O/P EST MOD 30 MIN: CPT | Performed by: OBSTETRICS & GYNECOLOGY

## 2018-03-22 RX ORDER — POTASSIUM CHLORIDE 20 MEQ/1
40 TABLET, EXTENDED RELEASE ORAL 2 TIMES DAILY
Refills: 0 | COMMUNITY
Start: 2018-01-23 | End: 2018-03-28

## 2018-03-22 RX ORDER — MAGNESIUM 200 MG
TABLET ORAL DAILY
COMMUNITY
Start: 2018-01-08 | End: 2018-10-23 | Stop reason: ALTCHOICE

## 2018-03-22 NOTE — PROGRESS NOTES
Marcello Fernandez  1955  [address] [place]  [address] [address]    Chief Complaint   Patient presents with    Follow-up     Schedule surgery    Ovarian cancer [facility])    Staging form: Ovary, Fallopian Tube, Primary Peritoneal, AJCC 8th Edition    - Clinical: FIGO Stage IIIC - Signed by Josué De La O MD on 2/5/2018    Previous Therapy: see below    History of Present Illness:  68-year-old with presumptive stage IIIC ovarian cancer currently undergoing neoadjuvant chemotherapy  Patient is status post 3 cycles of neoadjuvant chemotherapy  Patient's 3rd cycle was on March 8, 2018  Patient recently had a CT scan which showed a good response to therapy  Her most recent  on March 5, 2018 is 67 2  The patient states that she is doing quite well and has noticed that the ascites has disappeared  The patient no longer has reflux symptoms  Patient also believes she has more energy  Patient states that she is eating everything in sight and has a great appetite  Patient denies any neuropathy associated side effects on chemotherapy  Patient's pretreatment  was elevated at 534 6  Patient's  is decreasing  The last 1 we have on record was 67 2 on March 5, 2018  Ovarian cancer ([facility] [address] )    1/4/2018 Initial Diagnosis     Paracentesis revealing adenocarcinoma of gynecologic origin         1/25/2018 - 3/8/2018 Chemotherapy     3 cycles of carboplatin AUC 6 and taxol 175 mg/m2 squared Q 3 weeks  Pretreatment Ca 125: 534 6  Review of Systems   Constitutional: Negative for activity change, appetite change, chills, diaphoresis, fatigue, fever and unexpected weight change  HENT: Negative for congestion, dental problem, drooling, ear discharge, ear pain, facial swelling, hearing loss, mouth sores, nosebleeds, postnasal drip, rhinorrhea, sinus pain, sinus pressure, sneezing, sore throat, tinnitus, trouble swallowing and voice change      Eyes: Negative for photophobia, pain, discharge, redness, itching and visual disturbance  Respiratory: Negative for apnea, cough, choking, chest tightness, shortness of breath, wheezing and stridor  Cardiovascular: Negative for chest pain, palpitations and leg swelling  Gastrointestinal: Negative for abdominal distention, abdominal pain, anal bleeding, blood in stool, constipation, diarrhea, nausea, rectal pain and vomiting  Endocrine: Negative for cold intolerance, heat intolerance, polydipsia, polyphagia and polyuria  Genitourinary: Negative for decreased urine volume, difficulty urinating, dyspareunia, dysuria, enuresis, flank pain, frequency, genital sores, hematuria, menstrual problem, pelvic pain, urgency, vaginal bleeding, vaginal discharge and vaginal pain  Musculoskeletal: Negative for arthralgias, back pain, gait problem, joint swelling, myalgias, neck pain and neck stiffness  Skin: Negative for color change, pallor, rash and wound  Allergic/Immunologic: Negative for environmental allergies, food allergies and immunocompromised state  Neurological: Negative for dizziness, tremors, seizures, syncope, facial asymmetry, speech difficulty, weakness, light-headedness, numbness and headaches  Hematological: Negative for adenopathy  Does not bruise/bleed easily  Psychiatric/Behavioral: Negative for agitation, behavioral problems, confusion, decreased concentration, dysphoric mood, hallucinations, self-injury, sleep disturbance and suicidal ideas  The patient is not nervous/anxious and is not hyperactive          Patient Active Problem List   Diagnosis    Abdominal pain    Hypokalemia    Hyponatremia    Hypertension    Malignant ascites    Ovarian cancer ([facility] [address] )    Malignant neoplasm of ovary ([facility] [address] )    Preoperative cardiovascular examination     Social History     Social History    Marital status: /Civil Union     Spouse name: N/A    Number of children: N/A    Years of education: N/A Occupational History    Not on file  Social History Main Topics    Smoking status: Never Smoker    Smokeless tobacco: Never Used    Alcohol use No    Drug use: No    Sexual activity: Not on file     Other Topics Concern    Not on file     Social History Narrative    No narrative on file     Past Medical History:   Diagnosis Date    Arthritis     Ascites     Cancer ([facility] [address] )     Hypertension 1/4/2018    [facility] ) 1995       Current Outpatient Prescriptions:     Lactobacillus ([employer] PROBIOTIC ACIDOPHILUS PO), Take 1 capsule by mouth daily  , Disp: , Rfl:     lidocaine-prilocaine (EMLA) cream, APPLY 1 INCH TO IV SITE PRIOR TO CHEMO TREATMENT, Disp: , Rfl: 0    Magnesium 200 MG TABS, Take by mouth daily  , Disp: , Rfl:     ondansetron (ZOFRAN) 8 mg tablet, Take by mouth every 8 (eight) hours as needed for nausea or vomiting, Disp: , Rfl:     oxyCODONE-acetaminophen (PERCOCET)  mg per tablet, Take 1 tablet by mouth as needed for moderate pain, Disp: , Rfl:     pantoprazole (PROTONIX) 40 mg tablet, Take 1 tablet by mouth daily in the early morning, Disp: 30 tablet, Rfl: 0    ascorbic acid (VITAMIN C) 250 mg tablet, Take 250 mg by mouth daily, Disp: , Rfl:     Calcium Carbonate (CALCIUM 600 PO), Take by mouth, Disp: , Rfl:     Cholecalciferol (VITAMIN D3) 07125 units TABS, Take by mouth, Disp: , Rfl:     LORazepam (ATIVAN) 1 mg tablet, take 1 tablet by mouth every 6 hours if needed for nausea or anxiety, Disp: , Rfl: 0  No current facility-administered medications for this visit     Allergies   Allergen Reactions    Black Pepper [Piper] Rash     ROSE THE INSIDE OF THE MOUTH    Hexachlorophene Other (See Comments)    Yeast-Glenroy Pov-Iodine Med  [Povidone Iodine]      PHYSOHEX CLEANSER     Vitals:    03/22/18 1306   BP: 136/72   Pulse: 68   Resp: 14   Temp: 97 7 °F (36 5 °C)       Labs:  CMP  Lab Results   Component Value Date     03/26/2018    K 3 8 03/26/2018     03/26/2018 CO2 24 03/26/2018    ANIONGAP 12 03/26/2018    BUN 13 03/26/2018    CREATININE 0 56 (L) 03/26/2018    GLUCOSE 80 03/26/2018    GLUF 119 (H) 01/22/2018    CALCIUM 8 9 03/26/2018    AST 19 03/26/2018    ALT 28 03/26/2018    ALKPHOS 51 03/26/2018    PROT 8 0 03/26/2018    BILITOT 0 30 03/26/2018    EGFR 98 03/26/2018       BMP  Lab Results   Component Value Date    GLUCOSE 80 03/26/2018    CALCIUM 8 9 03/26/2018     03/26/2018    K 3 8 03/26/2018    CO2 24 03/26/2018     03/26/2018    BUN 13 03/26/2018    CREATININE 0 56 (L) 03/26/2018       Lipids  No results found for: CHOL  No results found for: HDL  No results found for: LDLCALC  No results found for: TRIG  No components found for: CHOLHDL    Hemoglobin A1C  Lab Results   Component Value Date    HGBA1C 5 1 03/26/2018       Fasting Glucose  Lab Results   Component Value Date    GLUF 119 (H) 01/22/2018       Insulin     Thyroid  No results found for: TSH, E0OOJLY, S4CNKJA, THYROIDAB    Hepatic Function Panel  Lab Results   Component Value Date    ALT 28 03/26/2018    AST 19 03/26/2018    ALKPHOS 51 03/26/2018    BILITOT 0 30 03/26/2018       Celiac Disease Antibody Panel  No results found for: ENDOMYSIAL IGA, GLIADIN IGA, GLIADIN IGG, IGA, TISSUE TRANSGLUT AB, TTG IGA   Iron  No results found for: IRON, TIBC, FERRITIN        CT scan chest, abdomen and pelvic 3/20/2018: IMPRESSION:     1  Two indeterminate, small pulmonary nodules, as described above, unchanged since 1/4/2018  Consider follow-up CT in 6-12 months  2   1 5 x 2 5 cm region of decreased attenuation in the caudate lobe of the liver, with an appearance more consistent with focal fatty infiltration, rather than hepatic mass, also unchanged  3   Resolution of ascites since the earlier CT  4   Residual omental metastases, less extensive now than on the prior exam   5   2 7 cm omental mass now appears to be at least partially necrotic    6   Decreased in attenuation of left periaortic lymph node since the earlier exam, suggesting treated, necrotic nodes  7   No evidence of new disease in the chest, abdomen or pelvis  Physical Exam   Constitutional: She is oriented to person, place, and time  She appears well-developed and well-nourished  No distress  HENT:   Head: Normocephalic and atraumatic  Right Ear: External ear normal    Left Ear: External ear normal    Nose: Nose normal    Mouth/Throat: No oropharyngeal exudate  Eyes: Pupils are equal, round, and reactive to light  Right eye exhibits no discharge  Left eye exhibits no discharge  No scleral icterus  Neck: Normal range of motion  Neck supple  No JVD present  No tracheal deviation present  No thyromegaly present  Cardiovascular: Normal rate, regular rhythm, normal heart sounds and intact distal pulses  Exam reveals no gallop and no friction rub  No murmur heard  Pulmonary/Chest: Effort normal and breath sounds normal  No stridor  No respiratory distress  She has no wheezes  She has no rales  She exhibits no tenderness  Abdominal: Soft  Bowel sounds are normal  She exhibits no distension and no mass  There is no tenderness  There is no rebound and no guarding  Genitourinary:   Genitourinary Comments: Pelvic exam deferred    Musculoskeletal: Normal range of motion  She exhibits no edema, tenderness or deformity  Lymphadenopathy:     She has no cervical adenopathy  Neurological: She is alert and oriented to person, place, and time  She has normal reflexes  No cranial nerve deficit  She exhibits normal muscle tone  Coordination normal    Skin: Skin is warm and dry  No rash noted  She is not diaphoretic  No erythema  No pallor  Psychiatric: She has a normal mood and affect  Her behavior is normal  Judgment and thought content normal        Assessment  70year-old with stage IIIC ovarian cancer status post 3 cycles of neoadjuvant chemotherapy with a response      Plan  To operating room for interval cytoreductive surgery  The patient has signed an informed consent for exploratory laparotomy, total abdominal hysterectomy, bilateral salpingo-oophorectomy, omentectomy, appendectomy, tumor debulking, any other procedure indicated  Patient understands the risks, benefits and alternative treatments and agrees to proceed  The patient understands the risks associated with surgery which include, but are not limited to: infection, deep vein thrombosis, blood clots to the lungs, wound healing problems, complications with extensive blood loss, blood transfusion, damage to nearby organs (ureters, bladder, bowel, major nerves and blood vessels), anesthesia and death  Chris Rich MD, PhD, [address]  Gynecologic Oncologist

## 2018-03-23 ENCOUNTER — TELEPHONE (OUTPATIENT)
Dept: PALLIATIVE MEDICINE | Facility: HOSPITAL | Age: 63
End: 2018-03-23

## 2018-03-25 ENCOUNTER — ANESTHESIA EVENT (OUTPATIENT)
Dept: PERIOP | Facility: HOSPITAL | Age: 63
DRG: 737 | End: 2018-03-25
Payer: MEDICARE

## 2018-03-26 ENCOUNTER — HOSPITAL ENCOUNTER (OUTPATIENT)
Dept: INFUSION CENTER | Facility: CLINIC | Age: 63
Discharge: HOME | End: 2018-03-26
Payer: MEDICARE

## 2018-03-26 DIAGNOSIS — R18.8 OTHER ASCITES: ICD-10-CM

## 2018-03-26 DIAGNOSIS — C56.9 MALIGNANT NEOPLASM OF OVARY (HCC): ICD-10-CM

## 2018-03-26 DIAGNOSIS — C56.9 MALIGNANT NEOPLASM OF OVARY, UNSPECIFIED LATERALITY (HCC): ICD-10-CM

## 2018-03-26 DIAGNOSIS — C56.9 MALIGNANT NEOPLASM OF OVARY, UNSPECIFIED LATERALITY (HCC): Primary | ICD-10-CM

## 2018-03-26 LAB
ALBUMIN SERPL BCP-MCNC: 3.4 G/DL (ref 3.5–5)
ALP SERPL-CCNC: 51 U/L (ref 46–116)
ALT SERPL W P-5'-P-CCNC: 28 U/L (ref 12–78)
ANION GAP SERPL CALCULATED.3IONS-SCNC: 12 MMOL/L (ref 4–13)
AST SERPL W P-5'-P-CCNC: 19 U/L (ref 5–45)
BASOPHILS # BLD AUTO: 0.01 THOUSANDS/ΜL (ref 0–0.1)
BASOPHILS NFR BLD AUTO: 0 % (ref 0–1)
BILIRUB SERPL-MCNC: 0.3 MG/DL (ref 0.2–1)
BUN SERPL-MCNC: 13 MG/DL (ref 5–25)
CALCIUM SERPL-MCNC: 8.9 MG/DL (ref 8.3–10.1)
CHLORIDE SERPL-SCNC: 103 MMOL/L (ref 100–108)
CO2 SERPL-SCNC: 24 MMOL/L (ref 21–32)
CREAT SERPL-MCNC: 0.56 MG/DL (ref 0.6–1.3)
EOSINOPHIL # BLD AUTO: 0.05 THOUSAND/ΜL (ref 0–0.61)
EOSINOPHIL NFR BLD AUTO: 1 % (ref 0–6)
ERYTHROCYTE [DISTWIDTH] IN BLOOD BY AUTOMATED COUNT: 19.3 % (ref 11.6–15.1)
EST. AVERAGE GLUCOSE BLD GHB EST-MCNC: 100 MG/DL
GFR SERPL CREATININE-BSD FRML MDRD: 98 ML/MIN/1.73SQ M
GLUCOSE SERPL-MCNC: 80 MG/DL (ref 65–140)
HBA1C MFR BLD: 5.1 % (ref 4.2–6.3)
HCT VFR BLD AUTO: 33.2 % (ref 34.8–46.1)
HGB BLD-MCNC: 10.9 G/DL (ref 11.5–15.4)
LYMPHOCYTES # BLD AUTO: 2.24 THOUSANDS/ΜL (ref 0.6–4.47)
LYMPHOCYTES NFR BLD AUTO: 62 % (ref 14–44)
MAGNESIUM SERPL-MCNC: 1.9 MG/DL (ref 1.6–2.6)
MCH RBC QN AUTO: 30.4 PG (ref 26.8–34.3)
MCHC RBC AUTO-ENTMCNC: 32.8 G/DL (ref 31.4–37.4)
MCV RBC AUTO: 93 FL (ref 82–98)
MONOCYTES # BLD AUTO: 0.54 THOUSAND/ΜL (ref 0.17–1.22)
MONOCYTES NFR BLD AUTO: 15 % (ref 4–12)
NEUTROPHILS # BLD AUTO: 0.74 THOUSANDS/ΜL (ref 1.85–7.62)
NEUTS SEG NFR BLD AUTO: 21 % (ref 43–75)
NRBC BLD AUTO-RTO: 0 /100 WBCS
PLATELET # BLD AUTO: 235 THOUSANDS/UL (ref 149–390)
PMV BLD AUTO: 8.6 FL (ref 8.9–12.7)
POTASSIUM SERPL-SCNC: 3.8 MMOL/L (ref 3.5–5.3)
PROT SERPL-MCNC: 8 G/DL (ref 6.4–8.2)
RBC # BLD AUTO: 3.58 MILLION/UL (ref 3.81–5.12)
SODIUM SERPL-SCNC: 139 MMOL/L (ref 136–145)
WBC # BLD AUTO: 3.59 THOUSAND/UL (ref 4.31–10.16)

## 2018-03-26 PROCEDURE — 86304 IMMUNOASSAY TUMOR CA 125: CPT

## 2018-03-26 PROCEDURE — 85025 COMPLETE CBC W/AUTO DIFF WBC: CPT

## 2018-03-26 PROCEDURE — 83036 HEMOGLOBIN GLYCOSYLATED A1C: CPT

## 2018-03-26 PROCEDURE — 80053 COMPREHEN METABOLIC PANEL: CPT

## 2018-03-26 PROCEDURE — 83735 ASSAY OF MAGNESIUM: CPT

## 2018-03-26 RX ADMIN — HEPARIN 300 UNITS: 100 SYRINGE at 13:23

## 2018-03-26 NOTE — PROGRESS NOTES
Central labs drawn via port  Catheter maintenance performed per protocol without complications  Pt aware to have type and cross done in [facility] on Saturday, prior to her surgery date on Monday  Confirmed with [facility] that pt does not have any further infusion appointments until after her post-op follow-up appointment  Pt made aware and was discharged in stable condition   Declined AVS

## 2018-03-26 NOTE — TELEPHONE ENCOUNTER
Record reviewed  Please contact Gwen Rodriguez and schedule an appt with Dr Nadia Beavers  Please inform she may not receive certification on first visit  She should become familiar with state website and also register prior to visit  Please giver her this information  We believe as she is early in her dx, we may be of benefit to her in many ways and look forward to working with her

## 2018-03-27 LAB — CANCER AG125 SERPL-ACNC: 22.2 U/ML (ref 0–30)

## 2018-03-27 NOTE — TELEPHONE ENCOUNTER
Patient is scd for 4/25/18 with Dr Linda Reyna  I did go over telephone note documentation with patient and she will register

## 2018-03-27 NOTE — TELEPHONE ENCOUNTER
I spoke to her daughter informed her that patient should call the office to schedule an appointment with Dr Mariela Hickey per nurse's task  Please be sure to read task to patient per nurse while giving her appointment

## 2018-03-28 ENCOUNTER — OFFICE VISIT (OUTPATIENT)
Dept: CARDIOLOGY CLINIC | Facility: CLINIC | Age: 63
End: 2018-03-28
Payer: MEDICARE

## 2018-03-28 VITALS
HEIGHT: 64 IN | HEART RATE: 69 BPM | WEIGHT: 110 LBS | BODY MASS INDEX: 18.78 KG/M2 | SYSTOLIC BLOOD PRESSURE: 110 MMHG | DIASTOLIC BLOOD PRESSURE: 62 MMHG

## 2018-03-28 DIAGNOSIS — Z01.818 PREOP EXAMINATION: Primary | ICD-10-CM

## 2018-03-28 DIAGNOSIS — Z01.810 PREOPERATIVE CARDIOVASCULAR EXAMINATION: ICD-10-CM

## 2018-03-28 PROCEDURE — 99204 OFFICE O/P NEW MOD 45 MIN: CPT | Performed by: INTERNAL MEDICINE

## 2018-03-28 PROCEDURE — 93000 ELECTROCARDIOGRAM COMPLETE: CPT | Performed by: INTERNAL MEDICINE

## 2018-03-28 NOTE — PROGRESS NOTES
Cardiology Consultation     Bárbara Jimenes  [id number]  1955  HEART & VASCULAR [facility]  [facility] [address]    1  Preop examination  POCT ECG   2  Preoperative cardiovascular examination         HPI:    Mrs Cris Cuevas comes in for a consultation regarding her preoperative cardiovascular risk for upcoming hysterectomy and oophorectomy secondary to ovarian cancer  This was found in January  She presented with ascites and a protuberant abdomen  She required several paracenteses  She was found to have ovarian cancer with metastases to her omentum  She has already started chemotherapy and she is clinically improving  She is scheduled for this surgery on Monday  Shirin Doyle has no cardiac history  She carries no risk factors for cardiovascular disease  Up until her diagnosis she was very active  She exercised regularly which included running and she was able to do this without limitations  She has not had any chest pain or shortness of breath  She denies any anginal equivalents  No palpitations, lightheadedness or any history of syncope  No signs or symptoms of CHF  Patient Active Problem List   Diagnosis    Abdominal pain    Hypokalemia    Hyponatremia    Hypertension    Malignant ascites    [facility] [address] )    Malignant neoplasm of ovary ([facility] [address] )    Preoperative cardiovascular examination     Past Medical History:   Diagnosis Date    Arthritis     Ascites     Cancer ([facility] [address] )     Hypertension 1/4/2018    Stroke [facility]) 1995     Social History     Social History    Marital status: /Civil Union     Spouse name: N/A    Number of children: N/A    Years of education: N/A     Occupational History    Not on file       Social History Main Topics    Smoking status: Never Smoker    Smokeless tobacco: Never Used    Alcohol use No    Drug use: No    Sexual activity: Not on file     Other Topics Concern    Not on file     Social History Narrative    No narrative on file      Family History   Problem Relation Age of Onset    Cancer Mother      Past Surgical History:   Procedure Laterality Date    HARDWARE REMOVAL      bilat knees    KNEE SURGERY         Current Outpatient Prescriptions:     Lactobacillus (CVS PROBIOTIC ACIDOPHILUS PO), Take 1 capsule by mouth daily  , Disp: , Rfl:     lidocaine-prilocaine (EMLA) cream, APPLY 1 INCH TO IV SITE PRIOR TO CHEMO TREATMENT, Disp: , Rfl: 0    LORazepam (ATIVAN) 1 mg tablet, take 1 tablet by mouth every 6 hours if needed for nausea or anxiety, Disp: , Rfl: 0    Magnesium 200 MG TABS, Take by mouth daily  , Disp: , Rfl:     ondansetron (ZOFRAN) 8 mg tablet, Take by mouth every 8 (eight) hours as needed for nausea or vomiting, Disp: , Rfl:     oxyCODONE-acetaminophen (PERCOCET)  mg per tablet, Take 1 tablet by mouth as needed for moderate pain, Disp: , Rfl:     pantoprazole (PROTONIX) 40 mg tablet, Take 1 tablet by mouth daily in the early morning, Disp: 30 tablet, Rfl: 0  No current facility-administered medications for this visit       Facility-Administered Medications Ordered in Other Visits:     alteplase (CATHFLO) injection 2 mg, 2 mg, Intracatheter, PRN, Sherlyn Lopez MD    heparin lock flush 100 units/mL injection 300 Units, 300 Units, Intracatheter, PRN, Sherlyn Lopez MD, 300 Units at 03/26/18 1323  Allergies   Allergen Reactions    Black Pepper [Piper] Rash     ROSE THE INSIDE OF THE MOUTH    Hexachlorophene Other (See Comments)    Yeast-Glenroy Pov-Iodine Med  [Povidone Iodine]      PHYSOHEX CLEANSER     Vitals:    03/28/18 1530   BP: 110/62   BP Location: Right arm   Patient Position: Sitting   Cuff Size: Standard   Pulse: 69   Weight: 49 9 kg (110 lb)   Height: 5' 3 5" (1 613 m)       Labs:  Lab Results   Component Value Date     03/26/2018    K 3 8 03/26/2018     03/26/2018    CO2 24 03/26/2018    BUN 13 03/26/2018 CREATININE 0 56 (L) 03/26/2018    GLUCOSE 80 03/26/2018    CALCIUM 8 9 03/26/2018     Lab Results   Component Value Date    WBC 3 59 (L) 03/26/2018    HGB 10 9 (L) 03/26/2018    HCT 33 2 (L) 03/26/2018    MCV 93 03/26/2018     03/26/2018     No results found for: CHOL, TRIG, HDL, LDLDIRECT  Imaging:  ECG obtained today demonstrates sinus rhythm with a nonspecific T-wave change  Review of Systems:  Review of Systems   Constitutional: Positive for fatigue  HENT: Negative  Eyes: Negative  Respiratory: Negative  Cardiovascular: Negative  Gastrointestinal: Negative  Musculoskeletal: Negative  Skin: Negative  Allergic/Immunologic: Negative  Neurological: Negative  Hematological: Negative  Psychiatric/Behavioral: Negative  All other systems reviewed and are negative  Physical Exam:  Physical Exam   Constitutional: She is oriented to person, place, and time  She appears well-developed and well-nourished  HENT:   Head: Normocephalic and atraumatic  Eyes: EOM are normal  Pupils are equal, round, and reactive to light  Right eye exhibits no discharge  Left eye exhibits no discharge  No scleral icterus  Neck: Normal range of motion  Neck supple  No JVD present  No thyromegaly present  Cardiovascular: Normal rate, regular rhythm, S1 normal, S2 normal, normal heart sounds, intact distal pulses and normal pulses  No extrasystoles are present  Exam reveals no gallop and no friction rub  No murmur heard  Pulmonary/Chest: Effort normal and breath sounds normal  No respiratory distress  She has no wheezes  She has no rales  Abdominal: Soft  Bowel sounds are normal  She exhibits no distension  There is no tenderness  Musculoskeletal: Normal range of motion  She exhibits no edema, tenderness or deformity  Neurological: She is alert and oriented to person, place, and time  No cranial nerve deficit  Skin: Skin is warm and dry  No rash noted     Psychiatric: She has a normal mood and affect  Judgment and thought content normal    Nursing note and vitals reviewed  Discussion/Summary:    1  Preoperative cardiovascular risk assessment - Chantel's overall risk for hysterectomy (LAZARO-BSO) is low  She is without a cardiac history, active and has no cardiac symptoms  She can proceed without further testing  No changes in medical therapy were made  She only needs to see us back if needed  Counseling / Coordination of Care  Total floor / unit time spent today 40 minutes  Greater than 50% of total time was spent with the patient and / or family counseling and / or coordination of care

## 2018-03-29 RX ORDER — MELATONIN 10 MG
TABLET, SUBLINGUAL SUBLINGUAL
COMMUNITY
End: 2021-11-29 | Stop reason: HOSPADM

## 2018-03-29 RX ORDER — MULTIVIT WITH MINERALS/LUTEIN
250 TABLET ORAL DAILY
COMMUNITY
End: 2021-11-29 | Stop reason: HOSPADM

## 2018-03-30 DIAGNOSIS — C56.9 MALIGNANT NEOPLASM OF OVARY, UNSPECIFIED LATERALITY (HCC): Primary | ICD-10-CM

## 2018-03-31 ENCOUNTER — TRANSCRIBE ORDERS (OUTPATIENT)
Dept: ADMINISTRATIVE | Facility: HOSPITAL | Age: 63
End: 2018-03-31

## 2018-04-02 ENCOUNTER — ANESTHESIA (OUTPATIENT)
Dept: PERIOP | Facility: HOSPITAL | Age: 63
DRG: 737 | End: 2018-04-02
Payer: MEDICARE

## 2018-04-02 ENCOUNTER — HOSPITAL ENCOUNTER (INPATIENT)
Facility: HOSPITAL | Age: 63
LOS: 2 days | Discharge: HOME | DRG: 737 | End: 2018-04-04
Attending: OBSTETRICS & GYNECOLOGY | Admitting: OBSTETRICS & GYNECOLOGY
Payer: MEDICARE

## 2018-04-02 DIAGNOSIS — C56.9 MALIGNANT NEOPLASM OF OVARY, UNSPECIFIED LATERALITY (HCC): ICD-10-CM

## 2018-04-02 LAB
ABO GROUP BLD: NORMAL
BLD GP AB SCN SERPL QL: NEGATIVE
GLUCOSE SERPL-MCNC: 183 MG/DL (ref 65–140)
HCT VFR BLD AUTO: 27.9 % (ref 34.8–46.1)
HGB BLD-MCNC: 9.1 G/DL (ref 11.5–15.4)
RH BLD: POSITIVE
SPECIMEN EXPIRATION DATE: NORMAL

## 2018-04-02 PROCEDURE — 88305 TISSUE EXAM BY PATHOLOGIST: CPT | Performed by: PATHOLOGY

## 2018-04-02 PROCEDURE — 86850 RBC ANTIBODY SCREEN: CPT | Performed by: OBSTETRICS & GYNECOLOGY

## 2018-04-02 PROCEDURE — 86900 BLOOD TYPING SEROLOGIC ABO: CPT | Performed by: OBSTETRICS & GYNECOLOGY

## 2018-04-02 PROCEDURE — 58956 BSO OMENTECTOMY W/TAH: CPT | Performed by: OBSTETRICS & GYNECOLOGY

## 2018-04-02 PROCEDURE — 88309 TISSUE EXAM BY PATHOLOGIST: CPT | Performed by: PATHOLOGY

## 2018-04-02 PROCEDURE — 88112 CYTOPATH CELL ENHANCE TECH: CPT | Performed by: PATHOLOGY

## 2018-04-02 PROCEDURE — 0DBU0ZZ EXCISION OF OMENTUM, OPEN APPROACH: ICD-10-PCS | Performed by: OBSTETRICS & GYNECOLOGY

## 2018-04-02 PROCEDURE — 0UT90ZZ RESECTION OF UTERUS, OPEN APPROACH: ICD-10-PCS | Performed by: OBSTETRICS & GYNECOLOGY

## 2018-04-02 PROCEDURE — 88307 TISSUE EXAM BY PATHOLOGIST: CPT | Performed by: PATHOLOGY

## 2018-04-02 PROCEDURE — 88363 XM ARCHIVE TISSUE MOLEC ANAL: CPT | Performed by: PATHOLOGY

## 2018-04-02 PROCEDURE — 0UT20ZZ RESECTION OF BILATERAL OVARIES, OPEN APPROACH: ICD-10-PCS | Performed by: OBSTETRICS & GYNECOLOGY

## 2018-04-02 PROCEDURE — 94762 N-INVAS EAR/PLS OXIMTRY CONT: CPT

## 2018-04-02 PROCEDURE — 86901 BLOOD TYPING SEROLOGIC RH(D): CPT | Performed by: OBSTETRICS & GYNECOLOGY

## 2018-04-02 PROCEDURE — 88304 TISSUE EXAM BY PATHOLOGIST: CPT | Performed by: PATHOLOGY

## 2018-04-02 PROCEDURE — 85018 HEMOGLOBIN: CPT | Performed by: NURSE ANESTHETIST, CERTIFIED REGISTERED

## 2018-04-02 PROCEDURE — 0UT70ZZ RESECTION OF BILATERAL FALLOPIAN TUBES, OPEN APPROACH: ICD-10-PCS | Performed by: OBSTETRICS & GYNECOLOGY

## 2018-04-02 PROCEDURE — 0DTJ0ZZ RESECTION OF APPENDIX, OPEN APPROACH: ICD-10-PCS | Performed by: OBSTETRICS & GYNECOLOGY

## 2018-04-02 PROCEDURE — 85014 HEMATOCRIT: CPT | Performed by: NURSE ANESTHETIST, CERTIFIED REGISTERED

## 2018-04-02 PROCEDURE — 82948 REAGENT STRIP/BLOOD GLUCOSE: CPT

## 2018-04-02 PROCEDURE — 44955 APPENDECTOMY ADD-ON: CPT | Performed by: OBSTETRICS & GYNECOLOGY

## 2018-04-02 RX ORDER — FENTANYL CITRATE/PF 50 MCG/ML
25 SYRINGE (ML) INJECTION
Status: COMPLETED | OUTPATIENT
Start: 2018-04-02 | End: 2018-04-02

## 2018-04-02 RX ORDER — PANTOPRAZOLE SODIUM 20 MG/1
20 TABLET, DELAYED RELEASE ORAL
Status: DISCONTINUED | OUTPATIENT
Start: 2018-04-03 | End: 2018-04-04 | Stop reason: HOSPADM

## 2018-04-02 RX ORDER — SODIUM CHLORIDE, SODIUM LACTATE, POTASSIUM CHLORIDE, CALCIUM CHLORIDE 600; 310; 30; 20 MG/100ML; MG/100ML; MG/100ML; MG/100ML
125 INJECTION, SOLUTION INTRAVENOUS CONTINUOUS
Status: DISCONTINUED | OUTPATIENT
Start: 2018-04-02 | End: 2018-04-02

## 2018-04-02 RX ORDER — ROCURONIUM BROMIDE 10 MG/ML
INJECTION, SOLUTION INTRAVENOUS AS NEEDED
Status: DISCONTINUED | OUTPATIENT
Start: 2018-04-02 | End: 2018-04-02 | Stop reason: SURG

## 2018-04-02 RX ORDER — FENTANYL CITRATE/PF 50 MCG/ML
25 SYRINGE (ML) INJECTION
Status: DISCONTINUED | OUTPATIENT
Start: 2018-04-02 | End: 2018-04-02 | Stop reason: HOSPADM

## 2018-04-02 RX ORDER — HYDROMORPHONE HYDROCHLORIDE 2 MG/ML
INJECTION, SOLUTION INTRAMUSCULAR; INTRAVENOUS; SUBCUTANEOUS AS NEEDED
Status: DISCONTINUED | OUTPATIENT
Start: 2018-04-02 | End: 2018-04-02 | Stop reason: SURG

## 2018-04-02 RX ORDER — MIDAZOLAM HYDROCHLORIDE 1 MG/ML
INJECTION INTRAMUSCULAR; INTRAVENOUS AS NEEDED
Status: DISCONTINUED | OUTPATIENT
Start: 2018-04-02 | End: 2018-04-02 | Stop reason: SURG

## 2018-04-02 RX ORDER — LIDOCAINE HYDROCHLORIDE 10 MG/ML
INJECTION, SOLUTION INFILTRATION; PERINEURAL AS NEEDED
Status: DISCONTINUED | OUTPATIENT
Start: 2018-04-02 | End: 2018-04-02 | Stop reason: SURG

## 2018-04-02 RX ORDER — ONDANSETRON 2 MG/ML
INJECTION INTRAMUSCULAR; INTRAVENOUS AS NEEDED
Status: DISCONTINUED | OUTPATIENT
Start: 2018-04-02 | End: 2018-04-02 | Stop reason: SURG

## 2018-04-02 RX ORDER — DOCUSATE SODIUM 100 MG/1
100 CAPSULE, LIQUID FILLED ORAL 2 TIMES DAILY
Status: DISCONTINUED | OUTPATIENT
Start: 2018-04-02 | End: 2018-04-04 | Stop reason: HOSPADM

## 2018-04-02 RX ORDER — LORAZEPAM 1 MG/1
1 TABLET ORAL EVERY 8 HOURS PRN
Status: DISCONTINUED | OUTPATIENT
Start: 2018-04-02 | End: 2018-04-04 | Stop reason: HOSPADM

## 2018-04-02 RX ORDER — SODIUM CHLORIDE, SODIUM LACTATE, POTASSIUM CHLORIDE, CALCIUM CHLORIDE 600; 310; 30; 20 MG/100ML; MG/100ML; MG/100ML; MG/100ML
20 INJECTION, SOLUTION INTRAVENOUS CONTINUOUS
Status: DISCONTINUED | OUTPATIENT
Start: 2018-04-02 | End: 2018-04-02

## 2018-04-02 RX ORDER — ALBUMIN, HUMAN INJ 5% 5 %
SOLUTION INTRAVENOUS CONTINUOUS PRN
Status: DISCONTINUED | OUTPATIENT
Start: 2018-04-02 | End: 2018-04-02 | Stop reason: SURG

## 2018-04-02 RX ORDER — PROPOFOL 10 MG/ML
INJECTION, EMULSION INTRAVENOUS AS NEEDED
Status: DISCONTINUED | OUTPATIENT
Start: 2018-04-02 | End: 2018-04-02 | Stop reason: SURG

## 2018-04-02 RX ORDER — GLYCOPYRROLATE 0.2 MG/ML
INJECTION INTRAMUSCULAR; INTRAVENOUS AS NEEDED
Status: DISCONTINUED | OUTPATIENT
Start: 2018-04-02 | End: 2018-04-02 | Stop reason: SURG

## 2018-04-02 RX ORDER — FENTANYL CITRATE 50 UG/ML
INJECTION, SOLUTION INTRAMUSCULAR; INTRAVENOUS AS NEEDED
Status: DISCONTINUED | OUTPATIENT
Start: 2018-04-02 | End: 2018-04-02 | Stop reason: SURG

## 2018-04-02 RX ORDER — LABETALOL HYDROCHLORIDE 5 MG/ML
INJECTION, SOLUTION INTRAVENOUS AS NEEDED
Status: DISCONTINUED | OUTPATIENT
Start: 2018-04-02 | End: 2018-04-02 | Stop reason: SURG

## 2018-04-02 RX ORDER — ONDANSETRON 2 MG/ML
4 INJECTION INTRAMUSCULAR; INTRAVENOUS ONCE AS NEEDED
Status: DISCONTINUED | OUTPATIENT
Start: 2018-04-02 | End: 2018-04-02 | Stop reason: HOSPADM

## 2018-04-02 RX ORDER — DEXTROSE, SODIUM CHLORIDE, AND POTASSIUM CHLORIDE 5; .45; .3 G/100ML; G/100ML; G/100ML
90 INJECTION INTRAVENOUS CONTINUOUS
Status: DISCONTINUED | OUTPATIENT
Start: 2018-04-02 | End: 2018-04-03

## 2018-04-02 RX ORDER — SODIUM CHLORIDE 9 MG/ML
INJECTION, SOLUTION INTRAVENOUS CONTINUOUS PRN
Status: DISCONTINUED | OUTPATIENT
Start: 2018-04-02 | End: 2018-04-02 | Stop reason: SURG

## 2018-04-02 RX ORDER — ONDANSETRON 2 MG/ML
4 INJECTION INTRAMUSCULAR; INTRAVENOUS EVERY 6 HOURS PRN
Status: DISCONTINUED | OUTPATIENT
Start: 2018-04-02 | End: 2018-04-04 | Stop reason: HOSPADM

## 2018-04-02 RX ADMIN — FENTANYL CITRATE 25 MCG: 50 INJECTION, SOLUTION INTRAMUSCULAR; INTRAVENOUS at 12:07

## 2018-04-02 RX ADMIN — HYDROMORPHONE HYDROCHLORIDE 0.2 MG: 1 INJECTION, SOLUTION INTRAMUSCULAR; INTRAVENOUS; SUBCUTANEOUS at 12:48

## 2018-04-02 RX ADMIN — ALBUMIN HUMAN: 0.05 INJECTION, SOLUTION INTRAVENOUS at 08:28

## 2018-04-02 RX ADMIN — DEXAMETHASONE SODIUM PHOSPHATE 10 MG: 10 INJECTION INTRAMUSCULAR; INTRAVENOUS at 08:14

## 2018-04-02 RX ADMIN — FENTANYL CITRATE 50 MCG: 50 INJECTION, SOLUTION INTRAMUSCULAR; INTRAVENOUS at 07:39

## 2018-04-02 RX ADMIN — ROCURONIUM BROMIDE 20 MG: 10 INJECTION INTRAVENOUS at 09:24

## 2018-04-02 RX ADMIN — PROPOFOL 200 MG: 10 INJECTION, EMULSION INTRAVENOUS at 07:39

## 2018-04-02 RX ADMIN — LABETALOL HYDROCHLORIDE 10 MG: 5 INJECTION, SOLUTION INTRAVENOUS at 09:47

## 2018-04-02 RX ADMIN — MIDAZOLAM HYDROCHLORIDE 2 MG: 1 INJECTION, SOLUTION INTRAMUSCULAR; INTRAVENOUS at 07:31

## 2018-04-02 RX ADMIN — HYDROMORPHONE HYDROCHLORIDE 1 MG: 2 INJECTION, SOLUTION INTRAMUSCULAR; INTRAVENOUS; SUBCUTANEOUS at 09:26

## 2018-04-02 RX ADMIN — FENTANYL CITRATE 50 MCG: 50 INJECTION, SOLUTION INTRAMUSCULAR; INTRAVENOUS at 07:36

## 2018-04-02 RX ADMIN — SODIUM CHLORIDE, SODIUM LACTATE, POTASSIUM CHLORIDE, AND CALCIUM CHLORIDE 20 ML/HR: .6; .31; .03; .02 INJECTION, SOLUTION INTRAVENOUS at 12:35

## 2018-04-02 RX ADMIN — ONDANSETRON 4 MG: 2 INJECTION INTRAMUSCULAR; INTRAVENOUS at 07:30

## 2018-04-02 RX ADMIN — DOCUSATE SODIUM 100 MG: 100 CAPSULE, LIQUID FILLED ORAL at 17:20

## 2018-04-02 RX ADMIN — NEOSTIGMINE METHYLSULFATE 3 MG: 1 INJECTION, SOLUTION INTRAMUSCULAR; INTRAVENOUS; SUBCUTANEOUS at 11:23

## 2018-04-02 RX ADMIN — HYDROMORPHONE HYDROCHLORIDE 0.2 MG: 1 INJECTION, SOLUTION INTRAMUSCULAR; INTRAVENOUS; SUBCUTANEOUS at 12:43

## 2018-04-02 RX ADMIN — ONDANSETRON 4 MG: 2 INJECTION INTRAMUSCULAR; INTRAVENOUS at 11:23

## 2018-04-02 RX ADMIN — CEFAZOLIN SODIUM 1000 MG: 1 SOLUTION INTRAVENOUS at 07:31

## 2018-04-02 RX ADMIN — MIDAZOLAM HYDROCHLORIDE 2 MG: 1 INJECTION, SOLUTION INTRAMUSCULAR; INTRAVENOUS at 07:30

## 2018-04-02 RX ADMIN — HYDROMORPHONE HYDROCHLORIDE 0.2 MG: 1 INJECTION, SOLUTION INTRAMUSCULAR; INTRAVENOUS; SUBCUTANEOUS at 12:26

## 2018-04-02 RX ADMIN — SODIUM CHLORIDE: 0.9 INJECTION, SOLUTION INTRAVENOUS at 07:44

## 2018-04-02 RX ADMIN — ROCURONIUM BROMIDE 50 MG: 10 INJECTION INTRAVENOUS at 07:39

## 2018-04-02 RX ADMIN — GLYCOPYRROLATE 0.1 MG: 0.2 INJECTION, SOLUTION INTRAMUSCULAR; INTRAVENOUS at 07:30

## 2018-04-02 RX ADMIN — FENTANYL CITRATE 25 MCG: 50 INJECTION, SOLUTION INTRAMUSCULAR; INTRAVENOUS at 12:22

## 2018-04-02 RX ADMIN — HYDROMORPHONE HYDROCHLORIDE 0.2 MG: 1 INJECTION, SOLUTION INTRAMUSCULAR; INTRAVENOUS; SUBCUTANEOUS at 12:58

## 2018-04-02 RX ADMIN — ALBUMIN HUMAN: 0.05 INJECTION, SOLUTION INTRAVENOUS at 08:55

## 2018-04-02 RX ADMIN — ONDANSETRON 4 MG: 2 INJECTION INTRAMUSCULAR; INTRAVENOUS at 20:00

## 2018-04-02 RX ADMIN — HYDROMORPHONE HYDROCHLORIDE 0.2 MG: 1 INJECTION, SOLUTION INTRAMUSCULAR; INTRAVENOUS; SUBCUTANEOUS at 12:53

## 2018-04-02 RX ADMIN — POTASSIUM CHLORIDE, DEXTROSE MONOHYDRATE AND SODIUM CHLORIDE 90 ML/HR: 300; 5; 450 INJECTION, SOLUTION INTRAVENOUS at 17:11

## 2018-04-02 RX ADMIN — FENTANYL CITRATE 25 MCG: 50 INJECTION, SOLUTION INTRAMUSCULAR; INTRAVENOUS at 12:15

## 2018-04-02 RX ADMIN — SODIUM CHLORIDE, SODIUM LACTATE, POTASSIUM CHLORIDE, AND CALCIUM CHLORIDE: .6; .31; .03; .02 INJECTION, SOLUTION INTRAVENOUS at 07:20

## 2018-04-02 RX ADMIN — ROCURONIUM BROMIDE 10 MG: 10 INJECTION INTRAVENOUS at 10:40

## 2018-04-02 RX ADMIN — LIDOCAINE HYDROCHLORIDE 100 MG: 10 INJECTION, SOLUTION INFILTRATION; PERINEURAL at 07:39

## 2018-04-02 RX ADMIN — ALBUMIN HUMAN: 0.05 INJECTION, SOLUTION INTRAVENOUS at 09:14

## 2018-04-02 RX ADMIN — HYDROMORPHONE HYDROCHLORIDE: 10 INJECTION INTRAMUSCULAR; INTRAVENOUS; SUBCUTANEOUS at 12:15

## 2018-04-02 RX ADMIN — FENTANYL CITRATE 25 MCG: 50 INJECTION, SOLUTION INTRAMUSCULAR; INTRAVENOUS at 11:59

## 2018-04-02 RX ADMIN — GLYCOPYRROLATE 0.4 MG: 0.2 INJECTION, SOLUTION INTRAMUSCULAR; INTRAVENOUS at 11:23

## 2018-04-02 RX ADMIN — ROCURONIUM BROMIDE 20 MG: 10 INJECTION INTRAVENOUS at 08:50

## 2018-04-02 RX ADMIN — Medication 1 MCG/KG/HR: at 07:50

## 2018-04-02 RX ADMIN — CEFAZOLIN SODIUM 1000 MG: 1 SOLUTION INTRAVENOUS at 11:11

## 2018-04-02 NOTE — PROGRESS NOTES
Physician Progress Note - GYN Oncology           Post-Op Physician Note   Ronnie Velazquez 70 y o  female MRN: [id number] 4/2/2018  Unit/Bed#: [facility] 607-01 Encounter: [id number]          ASSESSMENT:  71 yo p/w stage IIIc ovarian cancer w/ascites s/p LAZARO, BSO, omentectomy, appe now POD #0  and doing well  PLAN:   Anxiety: home ativan  Resected mass: f/u final pathology as out-pt; Post-operative care: Atelectasis / pneumia ppx: cont  incentive spirometry   Ortega: d/c in am then f/u 1st void    Pain: dilaudid PCA   GI PPx: colace, protonix   FEN: no fizz clears, d5 ½ NS + 40K   DVT ppx: SCDs + lovenox in am         SUBJECTIVE:    Pain: minimal (PCA - 5 doses on 6 attempts of 0 1 mg dilaudid since 15:00)  Tolerating Oral Intake: is tolerating no-fizz PO liquids;   Voiding:  Ortega still in (150 clear yellow in bag)  Flatus: no  Bowel Movement: no  Ambulating: Has not attempted to ambulate yet  Chest Pain: no  Shortness of Breath: no  Leg Pain/Discomfort: no  Vaginal Bleeding: denies  Other:       OBJECTIVE:     Vitals:   Patient Vitals for the past 24 hrs:   BP Temp Temp src Pulse Resp SpO2 Height Weight   04/02/18 1545 145/96 97 5 °F (36 4 °C) Oral 70 16 98 % - -   04/02/18 1445 162/77 97 5 °F (36 4 °C) Oral 70 16 99 % - -   04/02/18 1420 - - - - - 99 % - -   04/02/18 1351 147/74 - - (!) 51 16 98 % - -   04/02/18 1315 148/72 - - 58 12 99 % - -   04/02/18 1300 137/80 - - 80 (!) 23 99 % - -   04/02/18 1245 143/72 - - 64 13 99 % - -   04/02/18 1230 150/74 - - 78 19 99 % - -   04/02/18 1215 152/85 - - 80 17 99 % - -   04/02/18 1200 140/89 99 2 °F (37 3 °C) - 84 15 97 % - -   04/02/18 1152 117/76 - - 86 16 99 % - -   04/02/18 0624 - - - - - - 5' 3 5" (1 613 m) 49 4 kg (109 lb)   04/02/18 0601 132/75 98 3 °F (36 8 °C) Tymp Core 59 16 98 % - -     Oxygen Therapy  SpO2: 98 %  O2 Flow Rate (L/min): 2 L/min      I/O       03/31 0701 - 04/01 0700 04/01 0701 - 04/02 0700 04/02 0701 - 04/03 0700    I V  (mL/kg)   2001 1 (40 5)    IV Piggyback   750    Total Intake(mL/kg)   2751 1 (55 7)    Urine (mL/kg/hr)   1110 (2 3)    Emesis/NG output   0 (0)    Blood   500 (1 1)    Total Output     1610    Net     +1141 1                 Intake/Output Summary (Last 24 hours) at 04/02/18 1639  Last data filed at 04/02/18 1447   Gross per 24 hour   Intake           2751 1 ml   Output             1610 ml   Net           1141 1 ml         Physical Exam:  Physical exam:   General: No Acute Distress   Cardiovascular: Regular Rate and Rhythm   Lungs: Clear to Auscultation Bilaterally, no wheezing, rhonchi or rales   Abdomen: non-tender, no rebound or guarding;      Incision: sutures: clean, dry, and intact   Lower Extremities: negative Kali's sign bilaterally   Neuro: Alert, cooperative          Laboratory Studies:      Results from last 7 days  Lab Units 04/02/18  1112   HEMOGLOBIN g/dL 9 1*           Endocrine:                Results from last 7 days  Lab Units 04/02/18  1159   POC GLUCOSE mg/dl 183*       Medications:  Continuous Infusions:    dextrose 5 % and sodium chloride 0 45 % with KCl 40 mEq/L 90 mL/hr   HYDROmorphone        Scheduled Meds:    Current Facility-Administered Medications:  dextrose 5 % and sodium chloride 0 45 % with KCl 40 mEq/L 90 mL/hr Intravenous Continuous Samuel Wise   docusate sodium 100 mg Oral BID Samuel Wise   heparin 5000 units in sodium chloride 0 9% 500 mL irrigation  Irrigation Once Mesha Hopper MD   HYDROmorphone  Intravenous Continuous Samuel Wise   LORazepam 1 mg Oral Q8H PRN Samuel Wise   ondansetron 4 mg Intravenous Q6H PRN Samuel Wise   [START ON 4/3/2018] pantoprazole 20 mg Oral Early Morning Samuel Wise       PRN Meds:  LORazepam    ondansetron    Medication Administration - last 24 hours from 04/01/2018 1639 to 04/02/2018 1639       Date/Time Order Dose Route Action Action by     04/02/2018 1557 heparin (porcine) 5,000 Units in sodium chloride 0 9 % 500 mL irrigation   Irrigation Canceled Entry [facility] Lyric Rivers, CHEL     04/02/2018 1235 lactated ringers infusion 20 mL/hr Intravenous New Bag Anahi Steven, RN     04/02/2018 1232 lactated ringers infusion 0 mL/hr Intravenous Stopped Anahi Najera, CHEL     04/02/2018 1200 lactated ringers infusion 20 mL/hr Intravenous Continued from [address] [place] [address], RN     04/02/2018 1258 HYDROmorphone (DILAUDID) injection 0 2 mg 0 2 mg Intravenous Given Veralberto Mon, RN     04/02/2018 1253 HYDROmorphone (DILAUDID) injection 0 2 mg 0 2 mg Intravenous Given Maryamnetjan Mon, RN     04/02/2018 1248 HYDROmorphone (DILAUDID) injection 0 2 mg 0 2 mg Intravenous Given Stephane Mon, RN     04/02/2018 1243 HYDROmorphone (DILAUDID) injection 0 2 mg 0 2 mg Intravenous Given Stephane Mon, RN     04/02/2018 1226 HYDROmorphone (DILAUDID) injection 0 2 mg 0 2 mg Intravenous Given Anahi Najera, CHEL     04/02/2018 1222 fentaNYL (SUBLIMAZE) injection 25 mcg 25 mcg Intravenous Given Stephane Mon, RN     04/02/2018 1215 fentaNYL (SUBLIMAZE) injection 25 mcg 25 mcg Intravenous Given Stephane Mon, RN     04/02/2018 1207 fentaNYL (SUBLIMAZE) injection 25 mcg 25 mcg Intravenous Given Stephane Mon, RN     04/02/2018 1159 fentaNYL (SUBLIMAZE) injection 25 mcg 25 mcg Intravenous Given Tammy Barnett, RN     04/02/2018 1215 HYDROmorphone (DILAUDID) 1 mg/mL 50 mL PCA   Intravenous New Bag Anahi Steven RN          Invasive  Devices:   Invasive Devices     Central Venous Catheter Line            Port A Cath Left Chest -- days          Peripheral Intravenous Line            Peripheral IV 04/02/18 Left Wrist less than 1 day    Peripheral IV 04/02/18 Right Hand less than 1 day          Drain            Urethral Catheter Double-lumen;Non-latex 16 Fr  less than 1 day                    Additional Vitals:    Temp  Min: 97 5 °F (36 4 °C)  Max: 99 2 °F (37 3 °C)    IBW: 53 55 kg            Invasive/non-invasive ventilation settings:  Respiratory    Lab Data (Last 4 hours)    None         O2/Vent Data (Last 4 hours)    None                    Code Status: Level 1 - Full Code  Advance Directive and Living Will:      Power of :    POLST:      Past Medical History:   Diagnosis Date    Arthritis     Ascites     Cancer [facility] [address]     Hypertension 1/4/2018    Stroke ([facility] [address] ) 1995     Past Surgical History:   Procedure Laterality Date    HARDWARE REMOVAL      bilat knees    KNEE SURGERY      PARACENTESIS             Signature / Title: Julia Cazares MD, Resident - Ob/Gyn

## 2018-04-02 NOTE — CASE MANAGEMENT
Initial Clinical Review    Age/Sex: 70 y o  female    Surgery Date: 4/2/2018    Procedure: TOTAL ABDOMINAL HYSTERECTOMY, BILATERAL SALPINGOOPHORECTOMY, OMENTECTOMY (N/A)  EXPLORATORY LAPAROTOMY, APPENDECTOMY (      Operative Findings:  1) evidence of residual disease greater than 2 cm in the omentum, full resected  2) cervix, uterus, bilateral fallopian tubes and ovaries with no gross residual disease but evidence of prior treatment affect  3) normal, retrocecal appearing appendix  4) optimal cytoreductive surgery with no gross residual disease at the conclusion of the procedure      Anesthesia: General     Admission Orders: Date/Time/Statement: 4/2/18 @ 0720     Orders Placed This Encounter   Procedures    Inpatient Admission     Standing Status:   Standing     Number of Occurrences:   1     Order Specific Question:   Admitting Physician     Answer:   Young Rousseau     Order Specific Question:   Level of Care     Answer:   Med Surg [16]     Order Specific Question:   Estimated length of stay     Answer:   Inpatient Only Surgery       Vital Signs: /77   Pulse 70   Temp 97 5 °F (36 4 °C) (Oral)   Resp 16   Ht 5' 3 5" (1 613 m)   Wt 49 4 kg (109 lb)   SpO2 99%   BMI 19 01 kg/m²     Scheduled Meds:  Current Facility-Administered Medications:  docusate sodium 100 mg Oral BID    LORazepam 1 mg Oral Q8H PRN    ondansetron 4 mg Intravenous Q6H PRN    [START ON 4/3/2018] pantoprazole 20 mg Oral Early Morning      Continuous Infusions:  dextrose 5 % and sodium chloride 0 45 % with KCl 40 mEq/L 90 mL/hr    HYDROmorphone     lactated ringers 125 mL/hr    lactated ringers 20 mL/hr Last Rate: 20 mL/hr (04/02/18 1235)     Nursing orders - VS q 4 - Continuous Pulse ox monitoring - I & O q shift - Abd binder - incentive spirometry - Up with assistance -  D/anmol whitfield on 4/3 am - diet clear Liquids

## 2018-04-02 NOTE — ANESTHESIA POSTPROCEDURE EVALUATION
Post-Op Assessment Note      CV Status:  Stable    Mental Status:  Alert and awake    Hydration Status:  Stable    PONV Controlled:  None    Airway Patency:  Patent    Post Op Vitals Reviewed: Yes          Staff: CRNA       Comments: Pt  to Pacu  Report given  Course uneventful  VSS throughout  Fully Awake  Neuro  intact            /76 (04/02/18 1152)    Temp      Pulse 86 (04/02/18 1152)   Resp 16 (04/02/18 1152)    SpO2 99 % (04/02/18 1152)

## 2018-04-02 NOTE — ANESTHESIA PREPROCEDURE EVALUATION
Review of Systems/Medical History  Patient summary reviewed  Chart reviewed  No history of anesthetic complications     Cardiovascular  Exercise tolerance: good,  Hypertension , No CAD, , No cardiac stents     Pulmonary  No asthma: , No recent URI , No sleep apnea ,        GI/Hepatic  Negative GI/hepatic ROS               Endo/Other  Negative endo/other ROS      GYN       Hematology   Musculoskeletal    Arthritis     Neurology    CVA (full recovery, no residual) ,    Psychology           Physical Exam    Airway    Mallampati score: I  TM Distance: >3 FB       Dental   No notable dental hx     Cardiovascular      Pulmonary      Other Findings  NPO > 8 HRS      Anesthesia Plan  ASA Score- 2     Anesthesia Type- general with ASA Monitors  Additional Monitors:   Airway Plan: ETT  Comment:  BERTHA Merritt , have personally seen and evaluated the patient prior to anesthetic care  I have reviewed the pre-anesthetic record, and other medical records if appropriate to the anesthetic care  If a CRNA is involved in the case, I have reviewed the CRNA assessment, if present, and agree  Risks/benefits and alternatives discussed with patient including possible PONV, sore throat, and possibility of rare anesthetic and surgical emergencies        Plan Factors- Patient instructed to abstain from smoking on day of procedure  Patient did not smoke on day of surgery  Induction- intravenous  Postoperative Plan- Plan for postoperative opioid use  Planned trial extubation    Informed Consent- Anesthetic plan and risks discussed with patient  I personally reviewed this patient with the CRNA  Discussed and agreed on the Anesthesia Plan with the CRNA  Robert Crowder

## 2018-04-02 NOTE — OP NOTE
OPERATIVE REPORT  PATIENT NAME: Nisa Ramirez    :  1955  MRN: [id number]  Pt Location: [facility] OR ROOM 14    SURGERY DATE: 2018    Surgeon(s) and Role:     * Rachel Christina MD - Primary     * Jeremie Alicea MD - Assisting     * Robin Gee - Assisting    Preop Diagnosis:  Malignant neoplasm of ovary, unspecified laterality ([address] ) [C56 9]    Post-Op Diagnosis Codes:     * Malignant neoplasm of ovary, unspecified laterality ([address] ) [C56 9]    Procedure(s) (LRB):  TOTAL ABDOMINAL HYSTERECTOMY, BILATERAL SALPINGOOPHORECTOMY, OMENTECTOMY (N/A)  EXPLORATORY LAPAROTOMY, APPENDECTOMY (N/A)    Specimen(s):  ID Type Source Tests Collected by Time Destination   1 :  Tissue Omentum TISSUE EXAM Rachel Christina MD 2018 8162    2 :  Washing Pelvic Washing NON-GYNECOLOGIC CYTOLOGY Rachel Christina MD 2018 0850    3 : and fallopian tube Tissue Ovary, Left TISSUE EXAM Rachel Christina MD 2018 0902    4 : cervix, right fallopian  tube and ovary Tissue Uterus TISSUE EXAM Rachel Christina MD 2018 4133    5 :  Tissue Appendix TISSUE EXAM Rachel Christina MD 2018 0361        Estimated Blood Loss:   400 mL    Drains:  Ortega catheter to gravity       Anesthesia Type:   General    Operative Indications:  Malignant neoplasm of ovary, unspecified laterality ([address] ) [C56 9]  The patient is a delightful 15-year-old with presumptive widely metastatic ovarian cancer  The patient has had both a radiographic and biochemical response to 3 cycles of neoadjuvant chemotherapy  The patient was taken to the operating room for an interval cytoreductive surgery      Operative Findings:  1) evidence of residual disease greater than 2 cm in the omentum, full resected  2) cervix, uterus, bilateral fallopian tubes and ovaries with no gross residual disease but evidence of prior treatment affect  3) normal, retrocecal appearing appendix  4) optimal cytoreductive surgery with no gross residual disease at the conclusion of the procedure    Complications:   None    Procedure and Technique:  After informed consent was obtained, the patient was taken to the operating room where epidural anesthesia was administered without incident  Genearl endotracheal anesthesia was then administered without incident  She was prepped and draped in normal sterile fashion in the low dorsolithotomy position  A Ortega catheter was inserted  A midline vertical incision was then made using cutting current cautery  This was taken down to the underlying layer of fascia with cautery  The fascia was opened the midline  The fascial incision extended superiorly and anteriorly  The peritoneum was identified and entered  The peritoneal incision extended superiorly and inferiorly as well  A Bookwalter retractor was placed  Attention was 1st turned to the upper abdomen  A gastrocolic omentectomy was performed with the use of sharp dissection and an EnSeal device  Next, the bowel was packed away with moist laparotomy sponges  The cornua of the uterus was grasped with 2 Rocío clamps  The retro-peritoneal space on the right side was opened using cautery  The round ligament was transected  The ureter was identified coursing normally within the medial leaf of the broad ligament  The infundibulum pelvic ligament on the right side was skeletonized, clamped with Zeppelin clamps transected and secured with 2 free ties of 0 Vicryl suture  Hemostasis was excellent  This procedure was then repeated on the left side  The retro-peritoneal space on the left side was opened using cautery  The round ligament was transected  The ureter was identified coursing normally within the medial leaf of the broad ligament  The infundibulum pelvic ligament on the left side was skeletonized, clamped with Zeppelin clamps transected and secured with 2 free ties of 0 Vicryl suture  Hemostasis was excellent  The vesicovaginal space was then opened using cautery   The bladder was taken down below the level of the external os of the cervix  The uterine vessels were skeletonized bilaterally  They were clamped with Zeppelin clamps transected and secured with 0 Vicryl suture  The cardinal ligaments were no similar fashion clamped with Zeppelin clamps transected and secured with 0 Vicryl suture until the level of the external os of the cervix was reached  Right angle Zeppelin clamps were used to come across the upper portion of the vagina  The specimen was then removed  The vaginal apex was then secured using interrupted figure-of-eight 0 Vicryl suture with excellent hemostasis noted  The pelvis was irrigated  There is no evidence of bleeding noted  Next, the bowel was unpacked and the appendix was visualized  An appendectomy was performed with a combination of sharp dissection  The appendiceal artery was ligated with cautery  The appendiceal stump was doubly clamped cut and doubly suture ligated with 2 0 silk ties  The appendiceal stump was buried with 2 0 silk utilizing a Z stitch technique  The pelvis and upper abdomen were once again inspected and excellent hemostasis was visualized  Gowns and gloves were then exchanged  The closure tray was opened  The fascia was closed using #1 looped PDS in a running fashion  The subcutaneous space was irrigated  The skin was closed in 2 layers  The adipose tissue was closed using 2-0 plain running suture  The skin was then closed using 3-0 stratafix in a subcuticular fashion  Dermabond was applied  A sterile dressing was applied  The patient was then awakened and transferred to the recovery room in stable condition  All instrument counts were correct X 2 for the procedure  No complications  I was present for the entire procedure  I was present for the entire procedure    Patient Disposition:   To the PACU, extubated in stable condition    SIGNATURE: Lay Silva MD  DATE: April 2, 2018  TIME: 10:15 AM

## 2018-04-02 NOTE — H&P (VIEW-ONLY)
Pranav Goldberg  1955  [address] [place]  [address] [address]    Chief Complaint   Patient presents with    Follow-up     Schedule surgery    Ovarian cancer [facility])    Staging form: Ovary, Fallopian Tube, Primary Peritoneal, AJCC 8th Edition    - Clinical: FIGO Stage IIIC - Signed by Kenya Mei MD on 2/5/2018    Previous Therapy: see below    History of Present Illness:  68-year-old with presumptive stage IIIC ovarian cancer currently undergoing neoadjuvant chemotherapy  Patient is status post 3 cycles of neoadjuvant chemotherapy  Patient's 3rd cycle was on March 8, 2018  Patient recently had a CT scan which showed a good response to therapy  Her most recent  on March 5, 2018 is 67 2  The patient states that she is doing quite well and has noticed that the ascites has disappeared  The patient no longer has reflux symptoms  Patient also believes she has more energy  Patient states that she is eating everything in sight and has a great appetite  Patient denies any neuropathy associated side effects on chemotherapy  Patient's pretreatment  was elevated at 534 6  Patient's  is decreasing  The last 1 we have on record was 67 2 on March 5, 2018  Ovarian cancer ([facility] [address] )    1/4/2018 Initial Diagnosis     Paracentesis revealing adenocarcinoma of gynecologic origin         1/25/2018 - 3/8/2018 Chemotherapy     3 cycles of carboplatin AUC 6 and taxol 175 mg/m2 squared Q 3 weeks  Pretreatment Ca 125: 534 6  Review of Systems   Constitutional: Negative for activity change, appetite change, chills, diaphoresis, fatigue, fever and unexpected weight change  HENT: Negative for congestion, dental problem, drooling, ear discharge, ear pain, facial swelling, hearing loss, mouth sores, nosebleeds, postnasal drip, rhinorrhea, sinus pain, sinus pressure, sneezing, sore throat, tinnitus, trouble swallowing and voice change      Eyes: Negative for photophobia, pain, discharge, redness, itching and visual disturbance  Respiratory: Negative for apnea, cough, choking, chest tightness, shortness of breath, wheezing and stridor  Cardiovascular: Negative for chest pain, palpitations and leg swelling  Gastrointestinal: Negative for abdominal distention, abdominal pain, anal bleeding, blood in stool, constipation, diarrhea, nausea, rectal pain and vomiting  Endocrine: Negative for cold intolerance, heat intolerance, polydipsia, polyphagia and polyuria  Genitourinary: Negative for decreased urine volume, difficulty urinating, dyspareunia, dysuria, enuresis, flank pain, frequency, genital sores, hematuria, menstrual problem, pelvic pain, urgency, vaginal bleeding, vaginal discharge and vaginal pain  Musculoskeletal: Negative for arthralgias, back pain, gait problem, joint swelling, myalgias, neck pain and neck stiffness  Skin: Negative for color change, pallor, rash and wound  Allergic/Immunologic: Negative for environmental allergies, food allergies and immunocompromised state  Neurological: Negative for dizziness, tremors, seizures, syncope, facial asymmetry, speech difficulty, weakness, light-headedness, numbness and headaches  Hematological: Negative for adenopathy  Does not bruise/bleed easily  Psychiatric/Behavioral: Negative for agitation, behavioral problems, confusion, decreased concentration, dysphoric mood, hallucinations, self-injury, sleep disturbance and suicidal ideas  The patient is not nervous/anxious and is not hyperactive          Patient Active Problem List   Diagnosis    Abdominal pain    Hypokalemia    Hyponatremia    Hypertension    Malignant ascites    Ovarian cancer ([facility] [address] )    Malignant neoplasm of ovary ([facility] [address] )    Preoperative cardiovascular examination     Social History     Social History    Marital status: /Civil Union     Spouse name: N/A    Number of children: N/A    Years of education: N/A Occupational History    Not on file  Social History Main Topics    Smoking status: Never Smoker    Smokeless tobacco: Never Used    Alcohol use No    Drug use: No    Sexual activity: Not on file     Other Topics Concern    Not on file     Social History Narrative    No narrative on file     Past Medical History:   Diagnosis Date    Arthritis     Ascites     Cancer ([facility] [address] )     Hypertension 1/4/2018    Stroke ([facility] ) 1995       Current Outpatient Prescriptions:     Lactobacillus ([employer] PROBIOTIC ACIDOPHILUS PO), Take 1 capsule by mouth daily  , Disp: , Rfl:     lidocaine-prilocaine (EMLA) cream, APPLY 1 INCH TO IV SITE PRIOR TO CHEMO TREATMENT, Disp: , Rfl: 0    Magnesium 200 MG TABS, Take by mouth daily  , Disp: , Rfl:     ondansetron (ZOFRAN) 8 mg tablet, Take by mouth every 8 (eight) hours as needed for nausea or vomiting, Disp: , Rfl:     oxyCODONE-acetaminophen (PERCOCET)  mg per tablet, Take 1 tablet by mouth as needed for moderate pain, Disp: , Rfl:     pantoprazole (PROTONIX) 40 mg tablet, Take 1 tablet by mouth daily in the early morning, Disp: 30 tablet, Rfl: 0    ascorbic acid (VITAMIN C) 250 mg tablet, Take 250 mg by mouth daily, Disp: , Rfl:     Calcium Carbonate (CALCIUM 600 PO), Take by mouth, Disp: , Rfl:     Cholecalciferol (VITAMIN D3) 11038 units TABS, Take by mouth, Disp: , Rfl:     LORazepam (ATIVAN) 1 mg tablet, take 1 tablet by mouth every 6 hours if needed for nausea or anxiety, Disp: , Rfl: 0  No current facility-administered medications for this visit     Allergies   Allergen Reactions    Black Pepper [Piper] Rash     ROSE THE INSIDE OF THE MOUTH    Hexachlorophene Other (See Comments)    Yeast-Glenroy Pov-Iodine Med  [Povidone Iodine]      PHYSOHEX CLEANSER     Vitals:    03/22/18 1306   BP: 136/72   Pulse: 68   Resp: 14   Temp: 97 7 °F (36 5 °C)       Labs:  CMP  Lab Results   Component Value Date     03/26/2018    K 3 8 03/26/2018     03/26/2018 CO2 24 03/26/2018    ANIONGAP 12 03/26/2018    BUN 13 03/26/2018    CREATININE 0 56 (L) 03/26/2018    GLUCOSE 80 03/26/2018    GLUF 119 (H) 01/22/2018    CALCIUM 8 9 03/26/2018    AST 19 03/26/2018    ALT 28 03/26/2018    ALKPHOS 51 03/26/2018    PROT 8 0 03/26/2018    BILITOT 0 30 03/26/2018    EGFR 98 03/26/2018       BMP  Lab Results   Component Value Date    GLUCOSE 80 03/26/2018    CALCIUM 8 9 03/26/2018     03/26/2018    K 3 8 03/26/2018    CO2 24 03/26/2018     03/26/2018    BUN 13 03/26/2018    CREATININE 0 56 (L) 03/26/2018       Lipids  No results found for: CHOL  No results found for: HDL  No results found for: LDLCALC  No results found for: TRIG  No components found for: CHOLHDL    Hemoglobin A1C  Lab Results   Component Value Date    HGBA1C 5 1 03/26/2018       Fasting Glucose  Lab Results   Component Value Date    GLUF 119 (H) 01/22/2018       Insulin     Thyroid  No results found for: TSH, O3EFIMG, O2PGHDF, THYROIDAB    Hepatic Function Panel  Lab Results   Component Value Date    ALT 28 03/26/2018    AST 19 03/26/2018    ALKPHOS 51 03/26/2018    BILITOT 0 30 03/26/2018       Celiac Disease Antibody Panel  No results found for: ENDOMYSIAL IGA, GLIADIN IGA, GLIADIN IGG, IGA, TISSUE TRANSGLUT AB, TTG IGA   Iron  No results found for: IRON, TIBC, FERRITIN        CT scan chest, abdomen and pelvic 3/20/2018: IMPRESSION:     1  Two indeterminate, small pulmonary nodules, as described above, unchanged since 1/4/2018  Consider follow-up CT in 6-12 months  2   1 5 x 2 5 cm region of decreased attenuation in the caudate lobe of the liver, with an appearance more consistent with focal fatty infiltration, rather than hepatic mass, also unchanged  3   Resolution of ascites since the earlier CT  4   Residual omental metastases, less extensive now than on the prior exam   5   2 7 cm omental mass now appears to be at least partially necrotic    6   Decreased in attenuation of left periaortic lymph node since the earlier exam, suggesting treated, necrotic nodes  7   No evidence of new disease in the chest, abdomen or pelvis  Physical Exam   Constitutional: She is oriented to person, place, and time  She appears well-developed and well-nourished  No distress  HENT:   Head: Normocephalic and atraumatic  Right Ear: External ear normal    Left Ear: External ear normal    Nose: Nose normal    Mouth/Throat: No oropharyngeal exudate  Eyes: Pupils are equal, round, and reactive to light  Right eye exhibits no discharge  Left eye exhibits no discharge  No scleral icterus  Neck: Normal range of motion  Neck supple  No JVD present  No tracheal deviation present  No thyromegaly present  Cardiovascular: Normal rate, regular rhythm, normal heart sounds and intact distal pulses  Exam reveals no gallop and no friction rub  No murmur heard  Pulmonary/Chest: Effort normal and breath sounds normal  No stridor  No respiratory distress  She has no wheezes  She has no rales  She exhibits no tenderness  Abdominal: Soft  Bowel sounds are normal  She exhibits no distension and no mass  There is no tenderness  There is no rebound and no guarding  Genitourinary:   Genitourinary Comments: Pelvic exam deferred    Musculoskeletal: Normal range of motion  She exhibits no edema, tenderness or deformity  Lymphadenopathy:     She has no cervical adenopathy  Neurological: She is alert and oriented to person, place, and time  She has normal reflexes  No cranial nerve deficit  She exhibits normal muscle tone  Coordination normal    Skin: Skin is warm and dry  No rash noted  She is not diaphoretic  No erythema  No pallor  Psychiatric: She has a normal mood and affect  Her behavior is normal  Judgment and thought content normal        Assessment  70year-old with stage IIIC ovarian cancer status post 3 cycles of neoadjuvant chemotherapy with a response      Plan  To operating room for interval cytoreductive surgery  The patient has signed an informed consent for exploratory laparotomy, total abdominal hysterectomy, bilateral salpingo-oophorectomy, omentectomy, appendectomy, tumor debulking, any other procedure indicated  Patient understands the risks, benefits and alternative treatments and agrees to proceed  The patient understands the risks associated with surgery which include, but are not limited to: infection, deep vein thrombosis, blood clots to the lungs, wound healing problems, complications with extensive blood loss, blood transfusion, damage to nearby organs (ureters, bladder, bowel, major nerves and blood vessels), anesthesia and death  Chris Scott MD, PhD, [address]  Gynecologic Oncologist

## 2018-04-02 NOTE — DISCHARGE INSTRUCTIONS
Post-Gynecologic Surgery Discharge Instructions:  1  No heavy lifting more than one full gallon of milk (about 8 lbs) for 1 week  2  Nothing in the vagina for 6 weeks  3  You may take stairs one at a time, touching each step with both feet for the first few days, then as tolerated  4  Call the office for fever greater than 100  4'F, heavy vaginal bleeding, or increasing pain  5  Activity as tolerated  6  Avoid driving if taking narcotic pain medications (Percocet)  Post Operative Pain Management:  If you have cramping or mild pain you may take 600 mg Ibuprofen every 6 hours to relieve  If you continue to have residual mild pain not entirely relieved by Ibuprofen then you may take 650 mg of tylenol every 6 hours  If you have moderate pain then please take 1 tab of Percocet every 4 hours for relief  Do not combine with tylenol and please wait at least 4 hours after your last dose of Tylenol  If you have severe pain then please take 2 tabs of Percocet every 6 hours for relief  Do not combine with tylenol and please wait at least 4 hours after your last dose of Tylenol  If you have any questions regarding your prescriptions please call your doctor  Abdominal Hysterectomy   WHAT YOU SHOULD KNOW:   An abdominal hysterectomy (AH) is surgery to remove your uterus  Your uterus will be removed through an incision in your abdomen  You may need an AH if you have a tumor in your uterus or other reproductive organs  You may also need an AH if you have an infection, pain, or bleeding  AFTER YOU LEAVE:   Medicines:   · Pain medicine: You may be given medicine to take away or decrease pain  Do not wait until the pain is severe before you take your medicine  · Antinausea medicine: This medicine may be given to calm your stomach and prevent vomiting  · Blood thinners  help prevent blood clots  Blood thinners may be given before, during, and after a surgery or procedure   Blood thinners make it more likely for you to bleed or bruise  · Take your medicine as directed  Call your healthcare provider if you think your medicine is not helping or if you have side effects  Tell him if you are allergic to any medicine  Keep a list of the medicines, vitamins, and herbs you take  Include the amounts, and when and why you take them  Bring the list or the pill bottles to follow-up visits  Carry your medicine list with you in case of an emergency  Follow up with your primary healthcare provider or gynecologist as directed:  Ask how to care for your wound  You may need blood tests, x-rays, or ultrasounds at your follow-up visits  Write down your questions so you remember to ask them during your visits  Limit activity until you have fully recovered from surgery:   · Ask when it is safe for you to drive, walk up stairs, lift heavy objects, and have sex  · Ask when it is okay to exercise, and what types of exercise to do  Start slowly and do more as you get stronger  Contact your primary healthcare provider or gynecologist if:   · You have heavy vaginal bleeding that fills 1 or more sanitary pads in 1 hour  · Your wound opens  · You have a fever, and your wound is red and swollen  · You have yellow, green, or bad-smelling discharge coming from your vagina  · You feel new pain or fullness in your vagina  · You have questions or concerns about your surgery, medicine, or care  Seek care immediately or call 911 if:   · You have new or more blood from your vagina or your wound  · Your arm or leg feels warm, tender, and painful  It may look swollen and red  · You suddenly feel lightheaded and have trouble breathing  · You have chest pain  You may have more pain when you take a deep breath or cough  You may cough up blood  © 2014 [address] is for End User's use only and may not be sold, redistributed or otherwise used for commercial purposes   All illustrations and images included in CareNotes® are the copyrighted property of A D A M , Inc  or Luis Antonio Lozada  The above information is an  only  It is not intended as medical advice for individual conditions or treatments  Talk to your doctor, nurse or pharmacist before following any medical regimen to see if it is safe and effective for you  Salpingo-oophorectomy   WHAT YOU NEED TO KNOW:   A salpingo-oophorectomy is surgery to remove one or both fallopian tubes and ovaries  The ovaries store and release eggs  The fallopian tubes carry the eggs from the ovaries to the uterus  A salpingo-oophorectomy may be done to treat an ectopic pregnancy, infection, or cancer  It may also be done to prevent pregnancy or some types of cancer  DISCHARGE INSTRUCTIONS:   Call 911 for any of the following:   · You feel lightheaded, short of breath, and have chest pain  · You cough up blood  Seek care immediately if:   · Your arm or leg feels warm, tender, and painful  It may look swollen and red  · Blood soaks through your bandage  · Your stitches come apart  Contact your healthcare provider if:   · You have a fever or chills  · Your wound is red, swollen, or draining pus  · You have pus or a foul-smelling odor coming from your vagina  · You have nausea or are vomiting  · Your skin is itchy, swollen, or you have a rash  · You have trouble urinating or having a bowel movement  · You have questions or concerns about your condition or care  Medicines: You may need any of the following:  · Prescription pain medicine  may be given  Ask your healthcare provider how to take this medicine safely  · Hormone medicine  may be given if both ovaries are removed  This medicine will replace hormones in your body that your ovaries produced  You may not be started on this medicine until 2 to 3 months after surgery  · NSAIDs , such as ibuprofen, help decrease swelling, pain, and fever  NSAIDs can cause stomach bleeding or kidney problems in certain people  If you take blood thinner medicine, always ask your healthcare provider if NSAIDs are safe for you  Always read the medicine label and follow directions  · Take your medicine as directed  Contact your healthcare provider if you think your medicine is not helping or if you have side effects  Tell him or her if you are allergic to any medicine  Keep a list of the medicines, vitamins, and herbs you take  Include the amounts, and when and why you take them  Bring the list or the pill bottles to follow-up visits  Carry your medicine list with you in case of an emergency  Care for your wound as directed:  Ask your healthcare provider when your incision can get wet  Carefully wash around the wound with soap and water  Let soap and water run over your incision  Do not  scrub your incision  Dry the area and put on new, clean bandages as directed  Change your bandages when they get wet or dirty  If you have strips of medical tape, let them fall off on their own  Activity:  Ask your healthcare provider when you can return to your normal activities  Do not have sex, douche, or use tampons until your healthcare provider says it is okay  These actions may cause an infection  Do not exercise or lift anything heavy until your healthcare provider says it is okay  This may put too much stress on your incision  Get support: This surgery may be life-changing for you and your family  You will no longer be able to get pregnant if both of your ovaries and fallopian tubes are removed  Sudden changes in the levels of your hormones may occur and cause mood swings and depression  You may feel angry, sad, or frightened, or cry frequently and unexpectedly  These feelings are normal  Talk to your healthcare provider about where you can get support     Follow up with your healthcare provider as directed:  Write down your questions so you remember to ask them during your visits  © 2017 [address] Information is for End User's use only and may not be sold, redistributed or otherwise used for commercial purposes  All illustrations and images included in CareNotes® are the copyrighted property of A D A M , Inc  or Luis Antonio Lozada  The above information is an  only  It is not intended as medical advice for individual conditions or treatments  Talk to your doctor, nurse or pharmacist before following any medical regimen to see if it is safe and effective for you  Open Appendectomy   WHAT YOU SHOULD KNOW:   An open appendectomy is surgery to remove your appendix through an incision in your lower abdomen  AFTER YOU LEAVE:   Medicines:   · Pain medicine: You may need medicine to take away or decrease pain  ¨ Learn how to take your medicine  Ask what medicine and how much you should take  Be sure you know how, when, and how often to take it  ¨ Do not wait until the pain is severe before you take your medicine  Tell caregivers if your pain does not decrease  ¨ Pain medicine can make you dizzy or sleepy  Prevent falls by calling someone when you get out of bed or if you need help  · Antibiotics: This medicine is given to fight or prevent an infection caused by bacteria  Always take your antibiotics exactly as ordered by your healthcare provider  Do not stop taking your medicine unless directed by your healthcare provider  Never save antibiotics or take leftover antibiotics that were given to you for another illness  · Take your medicine as directed  Call your healthcare provider if you think your medicine is not helping or if you have side effects  Tell him if you are allergic to any medicine  Keep a list of the medicines, vitamins, and herbs you take  Include the amounts, and when and why you take them  Bring the list or the pill bottles to follow-up visits   Carry your medicine list with you in case of an emergency  Follow up with your healthcare provider as directed: You may need to return to have your stitches removed  Your healthcare provider will check your incision for signs of infection  If you have a drain, he will remove it  Write down your questions so you remember to ask them during your visits  Contact your healthcare provider if:   · You have a fever  · You are nauseated  · You have questions or concerns about your condition, surgery, or care  Seek care immediately or call 911 if:   · Blood soaks through your bandage  · Your incision is red, swollen, or has pus coming from it  · Your stitches come apart  · You have severe pain in your abdomen and it is swollen  · You cannot stop vomiting  · Your arm or leg feels warm, tender, and painful  It may look swollen and red  · You feel lightheaded and have shortness of breath all of a sudden  · You have chest pain  You may have more pain when you take a deep breath or cough  You may cough up blood  © 2014 [address] is for End User's use only and may not be sold, redistributed or otherwise used for commercial purposes  All illustrations and images included in CareNotes® are the copyrighted property of [email or web address] Inc  or Luis Antonio Lozada  The above information is an  only  It is not intended as medical advice for individual conditions or treatments  Talk to your doctor, nurse or pharmacist before following any medical regimen to see if it is safe and effective for you

## 2018-04-02 NOTE — INTERVAL H&P NOTE
H&P reviewed  After examining the patient I find no changes in the patients condition since the H&P had been written  Chris Whitmore MD, PhD, Korin Bowers  Attending Physician, [address] [place] [address]

## 2018-04-03 LAB
ANION GAP SERPL CALCULATED.3IONS-SCNC: 5 MMOL/L (ref 4–13)
BUN SERPL-MCNC: 7 MG/DL (ref 5–25)
CALCIUM SERPL-MCNC: 8.1 MG/DL (ref 8.3–10.1)
CHLORIDE SERPL-SCNC: 103 MMOL/L (ref 100–108)
CO2 SERPL-SCNC: 28 MMOL/L (ref 21–32)
CREAT SERPL-MCNC: 0.7 MG/DL (ref 0.6–1.3)
ERYTHROCYTE [DISTWIDTH] IN BLOOD BY AUTOMATED COUNT: 19.3 % (ref 11.6–15.1)
GFR SERPL CREATININE-BSD FRML MDRD: 91 ML/MIN/1.73SQ M
GLUCOSE SERPL-MCNC: 121 MG/DL (ref 65–140)
HCT VFR BLD AUTO: 29.2 % (ref 34.8–46.1)
HGB BLD-MCNC: 9.5 G/DL (ref 11.5–15.4)
MCH RBC QN AUTO: 30.6 PG (ref 26.8–34.3)
MCHC RBC AUTO-ENTMCNC: 32.5 G/DL (ref 31.4–37.4)
MCV RBC AUTO: 94 FL (ref 82–98)
PLATELET # BLD AUTO: 178 THOUSANDS/UL (ref 149–390)
PMV BLD AUTO: 8.7 FL (ref 8.9–12.7)
POTASSIUM SERPL-SCNC: 4.2 MMOL/L (ref 3.5–5.3)
RBC # BLD AUTO: 3.1 MILLION/UL (ref 3.81–5.12)
SODIUM SERPL-SCNC: 136 MMOL/L (ref 136–145)
WBC # BLD AUTO: 8.36 THOUSAND/UL (ref 4.31–10.16)

## 2018-04-03 PROCEDURE — 80048 BASIC METABOLIC PNL TOTAL CA: CPT | Performed by: OBSTETRICS & GYNECOLOGY

## 2018-04-03 PROCEDURE — 94762 N-INVAS EAR/PLS OXIMTRY CONT: CPT

## 2018-04-03 PROCEDURE — 85027 COMPLETE CBC AUTOMATED: CPT | Performed by: OBSTETRICS & GYNECOLOGY

## 2018-04-03 RX ORDER — OXYCODONE HYDROCHLORIDE AND ACETAMINOPHEN 5; 325 MG/1; MG/1
1 TABLET ORAL EVERY 4 HOURS PRN
Status: DISCONTINUED | OUTPATIENT
Start: 2018-04-03 | End: 2018-04-04 | Stop reason: HOSPADM

## 2018-04-03 RX ORDER — ACETAMINOPHEN 325 MG/1
650 TABLET ORAL EVERY 6 HOURS PRN
Status: DISCONTINUED | OUTPATIENT
Start: 2018-04-03 | End: 2018-04-04 | Stop reason: HOSPADM

## 2018-04-03 RX ORDER — OXYCODONE HYDROCHLORIDE AND ACETAMINOPHEN 5; 325 MG/1; MG/1
2 TABLET ORAL EVERY 4 HOURS PRN
Status: DISCONTINUED | OUTPATIENT
Start: 2018-04-03 | End: 2018-04-04 | Stop reason: HOSPADM

## 2018-04-03 RX ORDER — HEPARIN SODIUM (PORCINE) LOCK FLUSH IV SOLN 100 UNIT/ML 100 UNIT/ML
300 SOLUTION INTRAVENOUS ONCE
Status: DISCONTINUED | OUTPATIENT
Start: 2018-04-03 | End: 2018-04-04 | Stop reason: HOSPADM

## 2018-04-03 RX ORDER — CALCIUM CARBONATE 200(500)MG
1000 TABLET,CHEWABLE ORAL 3 TIMES DAILY PRN
Status: DISCONTINUED | OUTPATIENT
Start: 2018-04-03 | End: 2018-04-04 | Stop reason: HOSPADM

## 2018-04-03 RX ADMIN — DOCUSATE SODIUM 100 MG: 100 CAPSULE, LIQUID FILLED ORAL at 10:02

## 2018-04-03 RX ADMIN — DOCUSATE SODIUM 100 MG: 100 CAPSULE, LIQUID FILLED ORAL at 18:13

## 2018-04-03 RX ADMIN — Medication 1000 MG: at 13:21

## 2018-04-03 RX ADMIN — OXYCODONE HYDROCHLORIDE AND ACETAMINOPHEN 2 TABLET: 5; 325 TABLET ORAL at 11:10

## 2018-04-03 RX ADMIN — ENOXAPARIN SODIUM 40 MG: 40 INJECTION SUBCUTANEOUS at 10:02

## 2018-04-03 RX ADMIN — OXYCODONE HYDROCHLORIDE AND ACETAMINOPHEN 2 TABLET: 5; 325 TABLET ORAL at 19:28

## 2018-04-03 RX ADMIN — OXYCODONE HYDROCHLORIDE AND ACETAMINOPHEN 2 TABLET: 5; 325 TABLET ORAL at 14:44

## 2018-04-03 RX ADMIN — PANTOPRAZOLE SODIUM 20 MG: 20 TABLET, DELAYED RELEASE ORAL at 05:44

## 2018-04-03 RX ADMIN — OXYCODONE HYDROCHLORIDE AND ACETAMINOPHEN 2 TABLET: 5; 325 TABLET ORAL at 23:34

## 2018-04-03 RX ADMIN — POTASSIUM CHLORIDE, DEXTROSE MONOHYDRATE AND SODIUM CHLORIDE 90 ML/HR: 300; 5; 450 INJECTION, SOLUTION INTRAVENOUS at 04:09

## 2018-04-03 NOTE — PROGRESS NOTES
Physician Progress Note - GYN Oncology     Juan Jose Mead 70 y o  female MRN: [id number] 4/3/2018  Unit/Bed#: [facility] 607-01 Encounter: [id number]          ASSESSMENT:  69 yo p/w stage IIIc ovarian cancer w/ascites s/p LAZARO, BSO, omentectomy, appe now POD #1 and doing well  PLAN:   Anxiety: home ativan  Resected mass: f/u final pathology as out-pt; Post-operative care: Atelectasis / pneumia ppx: cont  incentive spirometry              Ortega: d/c'd; f/u 1st void               Pain: dilaudid PCA; 26 pushes on 27 attempts since 4/2/18 @ 1500; consider advancing to PO pain meds later today              GI PPx: colace, protonix              FEN: no fizz clears, d5 ½ NS + 40K              DVT ppx: SCDs + start lovenox           SUBJECTIVE:    Overnight: no acute events   Pain: 7/10  Tolerating Oral Intake: is tolerating PO liquids;   Voiding:  Ortega just removed this am; f/u 1st void  Flatus: no  Bowel Movement: no  Chest Pain: no  Shortness of Breath: no  Leg Pain/Discomfort: no  Vaginal Bleeding: denies  Other:       OBJECTIVE:     Vitals:   Patient Vitals for the past 24 hrs:   BP Temp Temp src Pulse Resp SpO2 Height Weight   04/03/18 0257 162/72 98 3 °F (36 8 °C) Oral 72 20 99 % - -   04/03/18 0130 155/78 - - - - - - -   04/02/18 2300 161/87 98 6 °F (37 °C) Oral 80 20 100 % - -   04/02/18 1915 - - - - - 99 % - -   04/02/18 1900 146/85 97 7 °F (36 5 °C) Oral 84 20 98 % - -   04/02/18 1645 151/90 97 5 °F (36 4 °C) Oral 74 16 99 % - -   04/02/18 1545 145/96 97 5 °F (36 4 °C) Oral 70 16 98 % - -   04/02/18 1445 162/77 97 5 °F (36 4 °C) Oral 70 16 99 % - -   04/02/18 1420 - - - - - 99 % - -   04/02/18 1351 147/74 - - (!) 51 16 98 % - -   04/02/18 1315 148/72 - - 58 12 99 % - -   04/02/18 1300 137/80 - - 80 (!) 23 99 % - -   04/02/18 1245 143/72 - - 64 13 99 % - -   04/02/18 1230 150/74 - - 78 19 99 % - -   04/02/18 1215 152/85 - - 80 17 99 % - -   04/02/18 1200 140/89 99 2 °F (37 3 °C) - 84 15 97 % - -   04/02/18 1152 117/76 - - 86 16 99 % - -   04/02/18 0624 - - - - - - 5' 3 5" (1 613 m) 49 4 kg (109 lb)     Oxygen Therapy  SpO2: 99 %  O2 Flow Rate (L/min): 2 L/min      I/O       04/01 0701 - 04/02 0700 04/02 0701 - 04/03 0700    I V  (mL/kg)  2003 4 (40 6)    IV Piggyback  750    Total Intake(mL/kg)  2753 4 (55 7)    Urine (mL/kg/hr)  2710 (2 3)    Emesis/NG output  0 (0)    Blood  500 (0 4)    Total Output   3210    Net   -456 6                Intake/Output Summary (Last 24 hours) at 04/03/18 1896  Last data filed at 04/03/18 0548   Gross per 24 hour   Intake           2753 4 ml   Output             3210 ml   Net           -456 6 ml         Physical Exam:  Physical exam:   General: No Acute Distress   Cardiovascular: Regular Rate and Rhythm   Lungs: Clear to Auscultation Bilaterally, no wheezing, rhonchi or rales   Abdomen: non-tender, no rebound or guarding;      Incision: sutures: clean, dry, and intact   Lower Extremities: negative Kali's sign bilaterally   Neuro: Alert, cooperative          Laboratory Studies:      Results from last 7 days  Lab Units 04/03/18  0609 04/02/18  1112   WBC Thousand/uL 8 36  --    HEMOGLOBIN g/dL 9 5* 9 1*   MCV fL 94  --    PLATELETS Thousands/uL 178  --            Chemistry & Renal Function:    Results from last 7 days  Lab Units 04/03/18  0449   SODIUM mmol/L 136   POTASSIUM mmol/L 4 2   CHLORIDE mmol/L 103   CO2 mmol/L 28   ANION GAP mmol/L 5   BUN mg/dL 7   CREATININE mg/dL 0 70   EGFR ml/min/1 73sq m 91       Endocrine:                Results from last 7 days  Lab Units 04/02/18  1159   POC GLUCOSE mg/dl 183*     Medications:  Continuous Infusions:    dextrose 5 % and sodium chloride 0 45 % with KCl 40 mEq/L 90 mL/hr Last Rate: 90 mL/hr (04/03/18 0409)   HYDROmorphone         Scheduled Meds:    Current Facility-Administered Medications:  dextrose 5 % and sodium chloride 0 45 % with KCl 40 mEq/L 90 mL/hr Intravenous Continuous Samuel Vigh Last Rate: 90 mL/hr (04/03/18 3043) docusate sodium 100 mg Oral BID Samuel Wise    heparin 5000 units in sodium chloride 0 9% 500 mL irrigation  Irrigation Once Ed Aponte MD    HYDROmorphone  Intravenous Continuous Samuel Wise    LORazepam 1 mg Oral Q8H PRN Samuel Wise    ondansetron 4 mg Intravenous Q6H PRN Samuel Wise    pantoprazole 20 mg Oral Early Morning Samuel Wise        PRN Meds:  LORazepam    ondansetron    Medication Administration - last 24 hours from 04/02/2018 0620 to 04/03/2018 0797       Date/Time Order Dose Route Action Action by     04/02/2018 1712 lactated ringers infusion 0 mL/hr Intravenous [facility] , RN     04/02/2018 1125 lactated ringers infusion   Intravenous Anesthesia Volume Adjustment D  Charmayne Sly, CRNA     04/02/2018 1008 lactated ringers infusion   Intravenous Anesthesia Volume Adjustment D  Charmayne Sly, CRNA     04/02/2018 0720 lactated ringers infusion   Intravenous New Bag D   Charmayne Sly, CRNA     04/02/2018 1557 heparin (porcine) 5,000 Units in sodium chloride 0 9 % 500 mL irrigation   Irrigation Canceled Entry Jesse Newsome, CHEL     04/02/2018 1711 lactated ringers infusion 0 mL/hr Intravenous Stopped Jesse Newsome RN     04/02/2018 1235 lactated ringers infusion 20 mL/hr Intravenous New Bag Anahi Najera RN     04/02/2018 1232 lactated ringers infusion 0 mL/hr Intravenous Stopped Anahi Najera RN     04/02/2018 1200 lactated ringers infusion 20 mL/hr Intravenous Continued from [facility], RN     04/02/2018 1258 HYDROmorphone (DILAUDID) injection 0 2 mg 0 2 mg Intravenous Given Oniel Ngo RN     04/02/2018 1253 HYDROmorphone (DILAUDID) injection 0 2 mg 0 2 mg Intravenous Given Oniel Ngo RN     04/02/2018 1248 HYDROmorphone (DILAUDID) injection 0 2 mg 0 2 mg Intravenous Given Oniel Ngo RN     04/02/2018 1243 HYDROmorphone (DILAUDID) injection 0 2 mg 0 2 mg Intravenous Given Oniel Ngo RN     04/02/2018 1226 HYDROmorphone (DILAUDID) injection 0 2 mg 0 2 mg Intravenous Given Oskar Murray RN     04/02/2018 1222 fentaNYL (SUBLIMAZE) injection 25 mcg 25 mcg Intravenous Given Oskar Murray RN     04/02/2018 1215 fentaNYL (SUBLIMAZE) injection 25 mcg 25 mcg Intravenous Given Oskar Murray RN     04/02/2018 1207 fentaNYL (SUBLIMAZE) injection 25 mcg 25 mcg Intravenous Given Oskar Murray RN     04/02/2018 1159 fentaNYL (SUBLIMAZE) injection 25 mcg 25 mcg Intravenous Given Pawan Judd RN     04/02/2018 1720 docusate sodium (COLACE) capsule 100 mg 100 mg Oral Given Colten Brunson RN     04/02/2018 2000 ondansetron (ZOFRAN) injection 4 mg 4 mg Intravenous Given Colten Brunson RN     04/03/2018 0544 pantoprazole (PROTONIX) EC tablet 20 mg 20 mg Oral Given Stephanie Krause RN     04/02/2018 1215 HYDROmorphone (DILAUDID) 1 mg/mL 50 mL PCA   Intravenous New Bag Anahi Najera RN     04/03/2018 0409 dextrose 5 % and sodium chloride 0 45 % with KCl 40 mEq/L infusion (premix) 90 mL/hr Intravenous [address] Stephanie Krause RN     04/02/2018 1711 dextrose 5 % and sodium chloride 0 45 % with KCl 40 mEq/L infusion (premix) 90 mL/hr Intravenous New Bag Colten Brunson RN          Invasive  Devices:   Invasive Devices     Peripheral Intravenous Line            Peripheral IV 04/02/18 Left Wrist less than 1 day    Peripheral IV 04/02/18 Right Hand less than 1 day                    Additional Vitals:    Temp  Min: 97 5 °F (36 4 °C)  Max: 99 2 °F (37 3 °C)    IBW: 53 55 kg            Invasive/non-invasive ventilation settings:  Respiratory    Lab Data (Last 4 hours)    None         O2/Vent Data (Last 4 hours)    None                Code Status: Level 1 - Full Code  Advance Directive and Living Will:      Power of :    POLST:      Past Medical History:   Diagnosis Date    Arthritis     Ascites     Cancer [address] [address]     Hypertension 1/4/2018    Stroke ([facility] [address] ) 1995     Past Surgical History:   Procedure Laterality Date    HARDWARE REMOVAL      bilat knees    KNEE SURGERY      PARACENTESIS             Signature / Title: Obey Shipman MD, Resident - Ob/Gyn

## 2018-04-03 NOTE — RESTORATIVE TECHNICIAN NOTE
Restorative Specialist Mobility Note       Activity: Ambulate in cowart, Ambulate in room, Bathroom privileges, Chair, Dangle, Stand at bedside (Educated/encouraged pt to ambulate with assistance 3-4 x's/day   Pt callbell, phone/tray within reach)     Assistive Device: None          Kayley MURILLO, Restorative Technician, [employer]

## 2018-04-04 VITALS
HEIGHT: 64 IN | DIASTOLIC BLOOD PRESSURE: 71 MMHG | HEART RATE: 80 BPM | WEIGHT: 109 LBS | BODY MASS INDEX: 18.61 KG/M2 | RESPIRATION RATE: 18 BRPM | SYSTOLIC BLOOD PRESSURE: 137 MMHG | TEMPERATURE: 97.7 F | OXYGEN SATURATION: 96 %

## 2018-04-04 PROCEDURE — 99024 POSTOP FOLLOW-UP VISIT: CPT | Performed by: OBSTETRICS & GYNECOLOGY

## 2018-04-04 RX ADMIN — DOCUSATE SODIUM 100 MG: 100 CAPSULE, LIQUID FILLED ORAL at 08:55

## 2018-04-04 RX ADMIN — ENOXAPARIN SODIUM 40 MG: 40 INJECTION SUBCUTANEOUS at 08:55

## 2018-04-04 RX ADMIN — PANTOPRAZOLE SODIUM 20 MG: 20 TABLET, DELAYED RELEASE ORAL at 05:19

## 2018-04-04 RX ADMIN — OXYCODONE HYDROCHLORIDE AND ACETAMINOPHEN 2 TABLET: 5; 325 TABLET ORAL at 04:04

## 2018-04-04 RX ADMIN — OXYCODONE HYDROCHLORIDE AND ACETAMINOPHEN 2 TABLET: 5; 325 TABLET ORAL at 12:29

## 2018-04-04 RX ADMIN — OXYCODONE HYDROCHLORIDE AND ACETAMINOPHEN 2 TABLET: 5; 325 TABLET ORAL at 08:53

## 2018-04-04 NOTE — SOCIAL WORK
CM met w pt today & explained CM role  Pt lives w , dtr & her fiance, son & 2 grandchildren in 2 sty home  Was [employer], retired & does drive, but isn't driving right now  Family transporting  No dme  No h/o vna  H/o OPPT  Denies any MH, D&A tx  Uses [facility]  No POA  Emergency contacts,  Julius Lopez (h) [phone] or dtr James Benson [phone]  For discharge today  Family to take home  CM reviewed d/c planning process including the following: identifying help at home, patient preference for d/c planning needs, Discharge Lounge, [facility] Meds to Bed program, availability of treatment team to discuss questions or concerns patient and/or family may have regarding understanding medications and recognizing signs and symptoms once discharged  CM also encouraged patient to follow up with all recommended appointments after discharge  Patient advised of importance for patient and family to participate in managing patients medical well being

## 2018-04-04 NOTE — PROGRESS NOTES
Physician Progress Note - GYN Oncology     Frankie Cunningham 70 y o  female MRN: [id number] 4/4/2018  Unit/Bed#: [facility] 607-01 Encounter: [id number]          ASSESSMENT:  71 yo p/w stage IIIc ovarian cancer w/ascites s/p LAZARO, BSO, omentectomy, appe now POD #2 and doing well  PLAN:   Anxiety: home ativan  Resected mass: f/u final pathology as out-pt; Post-operative care:              Atelectasis / pneumia ppx: cont  incentive spirometry              Pain: tylenol, percocet, percocet, dilaudid              GI PPx: colace, protonix              QXB: regular, heplock              DVT ppx: SCDs + lovenox started 4/3/18; home lovenox administration instruction with nurse completed  Dispo: anticipate d/c home today      SUBJECTIVE:    Overnight: no acute events   Pain: 0/10  Tolerating Oral Intake: is tolerating PO liquids and solids  Voiding: yes - spontaneously  Flatus: no  Bowel Movement: no  Ambulating: yes  Chest Pain: no  Shortness of Breath: no  Leg Pain/Discomfort: no  Vaginal Bleeding: denies      OBJECTIVE:     Vitals:   Patient Vitals for the past 24 hrs:   BP Temp Temp src Pulse Resp SpO2   04/03/18 2300 159/86 98 2 °F (36 8 °C) - 82 17 97 %   04/03/18 1535 147/78 98 5 °F (36 9 °C) Oral 77 16 99 %   04/03/18 0822 - - - - - 96 %   04/03/18 0700 141/72 98 2 °F (36 8 °C) Oral 91 16 96 %     Oxygen Therapy  SpO2: 97 %  O2 Flow Rate (L/min): 2 L/min      I/O       04/02 0701 - 04/03 0700 04/03 0701 - 04/04 0700    P  O   840    I V  (mL/kg) 2003 4 (40 6) 1516 5 (30 7)    IV Piggyback 750     Total Intake(mL/kg) 2753 4 (55 7) 2356 5 (47 7)    Urine (mL/kg/hr) 2710 (2 3) 2650 (2 2)    Emesis/NG output 0 (0)     Blood 500 (0 4)     Total Output 3210 2650    Net -456 6 -293 5                Intake/Output Summary (Last 24 hours) at 04/04/18 0559  Last data filed at 04/04/18 0410   Gross per 24 hour   Intake           2356 5 ml   Output             2650 ml   Net           -293 5 ml           Physical exam:   General: No Acute Distress   Cardiovascular: Regular Rate and Rhythm   Lungs: Clear to Auscultation Bilaterally, no wheezing, rhonchi or rales   Abdomen: non-tender, no rebound or guarding;      Incision: sutures: clean, dry, and intact   Lower Extremities: negative Kali's sign bilaterally   Neuro: Alert, cooperative          Laboratory Studies:      Results from last 7 days  Lab Units 04/03/18  0609 04/02/18  1112   WBC Thousand/uL 8 36  --    HEMOGLOBIN g/dL 9 5* 9 1*   MCV fL 94  --    PLATELETS Thousands/uL 178  --            Chemistry & Renal Function:    Results from last 7 days  Lab Units 04/03/18  0449   SODIUM mmol/L 136   POTASSIUM mmol/L 4 2   CHLORIDE mmol/L 103   CO2 mmol/L 28   ANION GAP mmol/L 5   BUN mg/dL 7   CREATININE mg/dL 0 70   EGFR ml/min/1 73sq m 91       Medications:  Continuous Infusions:       Scheduled Meds:    Current Facility-Administered Medications:  acetaminophen 650 mg Oral Q6H PRN Samuel Wise   calcium carbonate 1,000 mg Oral TID PRN Colten Mesa MD   docusate sodium 100 mg Oral BID Samuel Wise   enoxaparin 40 mg Subcutaneous Q24H [address] Samuel Wise   heparin 5000 units in sodium chloride 0 9% 500 mL irrigation  Irrigation Once Jack Valentin MD   heparin flush (porcine) 300 Units Intracatheter Once [employer]   HYDROmorphone 1 mg Intravenous Q3H PRN Samuel Wise   LORazepam 1 mg Oral Q8H PRN Samuel Wise   ondansetron 4 mg Intravenous Q6H PRN Samuel Wise   oxyCODONE-acetaminophen 1 tablet Oral Q4H PRN Samuel Wise   oxyCODONE-acetaminophen 2 tablet Oral Q4H PRN Samuel Wise   pantoprazole 20 mg Oral Early Morning Samuel Wise       PRN Meds:    acetaminophen    calcium carbonate    HYDROmorphone    LORazepam    ondansetron    oxyCODONE-acetaminophen    oxyCODONE-acetaminophen    Medication Doses in Trailing 24 hours:  Medication Administration - last 24 hours from 04/03/2018 0559 to 04/04/2018 0559       Date/Time Order Dose Route Action Action by     04/03/2018 1813 docusate sodium (COLACE) capsule 100 mg 100 mg Oral Given Dottie Noel, RN     04/03/2018 1002 docusate sodium (COLACE) capsule 100 mg 100 mg Oral Given Nino Portillo RN     04/04/2018 0519 pantoprazole (PROTONIX) EC tablet 20 mg 20 mg Oral Given Satnam Duran, RN     04/03/2018 1002 HYDROmorphone (DILAUDID) 1 mg/mL 50 mL PCA   Intravenous Stopped Nino Portillo RN     04/03/2018 1002 dextrose 5 % and sodium chloride 0 45 % with KCl 40 mEq/L infusion (premix) 0 mL/hr Intravenous Stopped Nino Portillo, CHEL     04/03/2018 1002 enoxaparin (LOVENOX) subcutaneous injection 40 mg 40 mg Subcutaneous Given Nino Portillo RN     04/04/2018 0404 oxyCODONE-acetaminophen (PERCOCET) 5-325 mg per tablet 2 tablet 2 tablet Oral Given Satnam Duran, RN     04/03/2018 2334 oxyCODONE-acetaminophen (PERCOCET) 5-325 mg per tablet 2 tablet 2 tablet Oral Given Hakeem Méndez, CHEL     04/03/2018 1928 oxyCODONE-acetaminophen (PERCOCET) 5-325 mg per tablet 2 tablet 2 tablet Oral Given Nino Portillo RN     04/03/2018 1444 oxyCODONE-acetaminophen (PERCOCET) 5-325 mg per tablet 2 tablet 2 tablet Oral Given Nino Portillo RN     04/03/2018 1110 oxyCODONE-acetaminophen (PERCOCET) 5-325 mg per tablet 2 tablet 2 tablet Oral Given Nino Portillo RN     04/03/2018 1321 calcium carbonate (TUMS) chewable tablet 1,000 mg 1,000 mg Oral Given Nino Portillo RN          Invasive  Devices:   Invasive Devices     Peripheral Intravenous Line            Peripheral IV 04/02/18 Left Wrist 1 day    Peripheral IV 04/02/18 Right Hand 1 day                    Additional Vitals:    Temp  Min: 97 5 °F (36 4 °C)  Max: 99 2 °F (37 3 °C)    IBW: 53 55 kg            Invasive/non-invasive ventilation settings:  Respiratory    Lab Data (Last 4 hours)    None         O2/Vent Data (Last 4 hours)    None                  Code Status: Level 1 - Full Code  Advance Directive and Living Will:      Power of :    POLST:      Past Medical History: Diagnosis Date    Arthritis     Ascites     Cancer ([facility] [address]     Hypertension 1/4/2018    Stroke ([facility] [address] ) 1995     Past Surgical History:   Procedure Laterality Date    HARDWARE REMOVAL      bilat knees    KNEE SURGERY      PARACENTESIS      CT KRYSTINA SALP-OOPH W/OMENTECT,LAZARO,RAD DISSECT N/A 4/2/2018    Procedure: TOTAL ABDOMINAL HYSTERECTOMY, BILATERAL SALPINGOOPHORECTOMY, OMENTECTOMY;  Surgeon: Ed Aponte MD;  Location: [facility];  Service: Gynecology Oncology    CT EXPLORATORY OF ABDOMEN N/A 4/2/2018    Procedure: Osiris Arroyo;  Surgeon: Ed Aponte MD;  Location: [facility];  Service: Gynecology Oncology           Signature / Title: Ezequiel Henderson MD, Resident - Ob/Gyn

## 2018-04-04 NOTE — PLAN OF CARE
Problem: DISCHARGE PLANNING - CARE MANAGEMENT  Goal: Discharge to post-acute care or home with appropriate resources  INTERVENTIONS:  - Conduct assessment to determine patient/family and health care team treatment goals, and need for post-acute services based on payer coverage, community resources, and patient preferences, and barriers to discharge  - Address psychosocial, clinical, and financial barriers to discharge as identified in assessment in conjunction with the patient/family and health care team  - Arrange appropriate level of post-acute services according to patient's   needs and preference and payer coverage in collaboration with the physician and health care team  - Communicate with and update the patient/family, physician, and health care team regarding progress on the discharge plan  - Arrange appropriate transportation to post-acute venues  - Home with family support  Outcome: Adequate for Discharge

## 2018-04-04 NOTE — PROGRESS NOTES
Lovenox teaching done today with pt  Pt gave herself today's dose  Answered all pts questions regarding administering lovenox at home

## 2018-04-04 NOTE — RESTORATIVE TECHNICIAN NOTE
Restorative Specialist Mobility Note       Activity: Ambulate in cowart, Ambulate in room, Bathroom privileges, Dangle, Stand at bedside (Educated/encouraged pt to ambulate with assistance 3-4 x's/day   Pt callbell, phone/tray within reach )     Assistive Device: None       Gordon MURILLO, Restorative Technician, [employer]

## 2018-04-04 NOTE — DISCHARGE SUMMARY
Discharge Summary   Emy Billingsley MRN: [id number]  Unit/Bed#: [facility] 467-32 Encounter: [id number]      Admission Date: 4/2/2018     Discharge Date: 04/04/18    Attending on Day of Discharge: Dr Bonita Zhu MD    Principal Diagnosis: Malignant neoplasm of ovary, unspecified laterality ([address] ) [C56 9]     Procedures: total abdominal hysterectomy, bilateral salpingo-oopherectomy, omentectomy, appendectomy    Hospital course: Patient underwent the above described procedure for the above principal diagnosis  She was admitted for post-operative care  Her whitfield catheter was discontinued POD# 1 and she was able to void spontaneously  Her pre-operative Hb was 10 9 g/dL and post-operative was 9 5 g/dL  She was discharged on POD 2   At this time she was tolerating PO intake, her pain was well controlled, she was ambulating, voiding, and had appropriate bowel function  She will f/u in the office for post-operative care  She was discharged home with 14 days of lovenox  Lab Results:     Results from last 7 days  Lab Units 04/03/18  0609 04/02/18  1112   WBC Thousand/uL 8 36  --    HEMOGLOBIN g/dL 9 5* 9 1*   MCV fL 94  --    PLATELETS Thousands/uL 178  --            Results from last 7 days  Lab Units 04/03/18  0449   SODIUM mmol/L 136   POTASSIUM mmol/L 4 2   CHLORIDE mmol/L 103   CO2 mmol/L 28   ANION GAP mmol/L 5   GLUCOSE RANDOM mg/dL 121   BUN mg/dL 7   CREATININE mg/dL 0 70   EGFR ml/min/1 73sq m 91       Results from last 7 days  Lab Units 04/03/18  0609   PLATELETS Thousands/uL 225         Complications: none apparent    Condition at discharge: stable     Discharge instructions/Information to patient and family:   See After Visit Summary for information provided to patient and family  Provisions for Follow-Up Care:  See After Visit Summary for information related to follow-up care and any pertinent home health orders  Disposition: See After Visit Summary for discharge disposition information      Planned Readmission: No    Discharge Medications: For a complete list of the patient's medications, please refer to her med rec      Levi Suero

## 2018-04-05 ENCOUNTER — TRANSITIONAL CARE MANAGEMENT (OUTPATIENT)
Dept: INTERNAL MEDICINE CLINIC | Facility: CLINIC | Age: 63
End: 2018-04-05

## 2018-04-06 ENCOUNTER — TRANSITIONAL CARE MANAGEMENT (OUTPATIENT)
Dept: INTERNAL MEDICINE CLINIC | Facility: CLINIC | Age: 63
End: 2018-04-06

## 2018-04-16 PROBLEM — C56.9 MALIGNANT NEOPLASM OF OVARY (HCC): Status: RESOLVED | Noted: 2018-03-22 | Resolved: 2018-04-16

## 2018-04-16 PROBLEM — C57.00 FALLOPIAN TUBE CARCINOMA (HCC): Status: ACTIVE | Noted: 2018-02-05

## 2018-04-18 ENCOUNTER — OFFICE VISIT (OUTPATIENT)
Dept: GYNECOLOGIC ONCOLOGY | Facility: CLINIC | Age: 63
End: 2018-04-18

## 2018-04-18 VITALS
WEIGHT: 106.5 LBS | DIASTOLIC BLOOD PRESSURE: 68 MMHG | TEMPERATURE: 98.1 F | SYSTOLIC BLOOD PRESSURE: 118 MMHG | RESPIRATION RATE: 14 BRPM | HEIGHT: 64 IN | BODY MASS INDEX: 18.18 KG/M2 | HEART RATE: 68 BPM

## 2018-04-18 DIAGNOSIS — C57.00 CARCINOMA OF FALLOPIAN TUBE, UNSPECIFIED LATERALITY (HCC): Primary | ICD-10-CM

## 2018-04-18 PROCEDURE — 99024 POSTOP FOLLOW-UP VISIT: CPT | Performed by: OBSTETRICS & GYNECOLOGY

## 2018-04-18 NOTE — PROGRESS NOTES
Elizabeth Kanwal  1955  [address] [place]  [address] [address]      Chief Complaint   Patient presents with    Post-op         Previous Therapy: see below    History of Present Illness: The patient is a delightful 24-year-old with stage IIIC fallopian tube cancer  Patient has completed 3 cycles of neoadjuvant chemotherapy and underwent an interval cytoreductive surgery  Patient comes in today for 1st postoperative appointment and has no complaints  Patient feels like she has well healed from her surgery  Fallopian tube carcinoma ([facility] [address] )    1/4/2018 Initial Diagnosis     Paracentesis revealing adenocarcinoma of gynecologic origin         1/25/2018 - 3/8/2018 Chemotherapy     3 cycles of carboplatin AUC 6 and taxol 175 mg/m2 squared Q 3 weeks  Pretreatment Ca 125: 534 6          4/2/2018 Surgery     Exploratory laparotomy, total abdominal hysterectomy, bilateral salpingo-oophorectomy, omentectomy, appendectomy  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  No gross residual disease  Chemotherapy     To resume 3 cycles of carboplatin AUC 6 and Taxol 175 mg/m2 Q 3 weeks            Review of Systems    Patient Active Problem List   Diagnosis    Abdominal pain    Hypokalemia    Hyponatremia    Hypertension    Malignant ascites    Fallopian tube carcinoma (HCC)    Preoperative cardiovascular examination     Social History     Social History    Marital status: /Civil Union     Spouse name: N/A    Number of children: N/A    Years of education: N/A     Occupational History    Not on file       Social History Main Topics    Smoking status: Never Smoker    Smokeless tobacco: Never Used    Alcohol use No    Drug use: No    Sexual activity: Not on file     Other Topics Concern    Not on file     Social History Narrative    No narrative on file     Past Medical History:   Diagnosis Date    Arthritis     Ascites     Cancer ([facility] [address] )     Hypertension 1/4/2018    Stroke ([facility] [address] ) 1995       Current Outpatient Prescriptions:     ascorbic acid (VITAMIN C) 250 mg tablet, Take 250 mg by mouth daily, Disp: , Rfl:     Calcium Carbonate (CALCIUM 600 PO), Take by mouth, Disp: , Rfl:     Cholecalciferol (VITAMIN D3) 13392 units TABS, Take by mouth, Disp: , Rfl:     Lactobacillus (CVS PROBIOTIC ACIDOPHILUS PO), Take 1 capsule by mouth daily  , Disp: , Rfl:     lidocaine-prilocaine (EMLA) cream, APPLY 1 INCH TO IV SITE PRIOR TO CHEMO TREATMENT, Disp: , Rfl: 0    Magnesium 200 MG TABS, Take by mouth daily  , Disp: , Rfl:     ondansetron (ZOFRAN) 8 mg tablet, Take by mouth every 8 (eight) hours as needed for nausea or vomiting, Disp: , Rfl:     oxyCODONE-acetaminophen (PERCOCET)  mg per tablet, Take 1 tablet by mouth as needed for moderate pain, Disp: , Rfl:     pantoprazole (PROTONIX) 40 mg tablet, Take 1 tablet by mouth daily in the early morning, Disp: 30 tablet, Rfl: 0    enoxaparin (LOVENOX) 40 mg/0 4 mL, Inject 0 4 mL (40 mg total) under the skin daily for 14 days, Disp: 5 6 mL, Rfl: 0    LORazepam (ATIVAN) 1 mg tablet, take 1 tablet by mouth every 6 hours if needed for nausea or anxiety, Disp: , Rfl: 0  Allergies   Allergen Reactions    Black Pepper [Piper] Rash     ROSE THE INSIDE OF THE MOUTH    Hexachlorophene Other (See Comments)     "Burns my skin"    Yeast-[place] Pov-Iodine Med [Povidone Iodine]      PHYSOHEX CLEANSER- "burns my skin"     Vitals:    04/18/18 1044   BP: 118/68   Pulse: 68   Resp: 14   Temp: 98 1 °F (36 7 °C)       Labs:  CMP  Lab Results   Component Value Date     04/03/2018    K 4 2 04/03/2018     04/03/2018    CO2 28 04/03/2018    ANIONGAP 5 04/03/2018    BUN 7 04/03/2018    CREATININE 0 70 04/03/2018    GLUCOSE 121 04/03/2018    GLUF 119 (H) 01/22/2018    CALCIUM 8 1 (L) 04/03/2018    AST 19 03/26/2018    ALT 28 03/26/2018    ALKPHOS 51 03/26/2018    PROT 8 0 03/26/2018    BILITOT 0 30 03/26/2018    EGFR 91 04/03/2018       BMP  Lab Results   Component Value Date    GLUCOSE 121 04/03/2018    CALCIUM 8 1 (L) 04/03/2018     04/03/2018    K 4 2 04/03/2018    CO2 28 04/03/2018     04/03/2018    BUN 7 04/03/2018    CREATININE 0 70 04/03/2018       Lipids  No results found for: CHOL  No results found for: HDL  No results found for: LDLCALC  No results found for: TRIG  No components found for: CHOLHDL    Hemoglobin A1C  Lab Results   Component Value Date    HGBA1C 5 1 03/26/2018       Fasting Glucose  Lab Results   Component Value Date    GLUF 119 (H) 01/22/2018       Insulin     Thyroid  No results found for: TSH, M1NLIZV, Y9ALSPY, THYROIDAB    Hepatic Function Panel  Lab Results   Component Value Date    ALT 28 03/26/2018    AST 19 03/26/2018    ALKPHOS 51 03/26/2018    BILITOT 0 30 03/26/2018       Celiac Disease Antibody Panel  No results found for: ENDOMYSIAL IGA, GLIADIN IGA, GLIADIN IGG, IGA, TISSUE TRANSGLUT AB, TTG IGA   Iron  No results found for: IRON, TIBC, FERRITIN    Other Labs:     Final Diagnosis   Primary Tumor of the Ovary/Fallopian Tube/Peritoneum (Includes specimens A to D, Pelvic washings [id number] and prior Ascitic fluid [id number])  - Procedure:  Total hysterectomy, bilateral salpingo-oophorectomy, omentectomy and appendectomy  - Hysterectomy type: Abdominal  - Specimen integrity:      - Right ovary (if applicable): intact      - Left ovary (if applicable):intact      - Right fallopian tube (if applicable): intact      - Left fallopian tube (if applicable):intact      - Morcellated specimen (specify organ): N/A  - Tumor site: Bilateral fallopian tubes (See comment)  - Ovarian surface involvement:Present, bilateral  - Fallopian tube surface involvement: Present, bilateral  - Tumor size: largest tumor focus noted in omentum, 2 6 cm  - Histologic type: Serous carcinoma   - Histologic grade: High grade  - Implants (required for advanced stage serous/seromucinous borderline tumors only): N/A  - Other tissue/organ involvement: Cervix focally involved with carcinoma at deep margin  Appendix with tumor deposits on serosal surface  - Largest extrapelvic peritoneal focus (required only if applicable): 2 6 cm macroscopic    - Specify site: Omentum  - Peritoneal/ascetic fluid: Negative for carcinoma ([id number])  - Pleural fluid: Not submitted  - Treatment effect: Minimal to moderate response (CRS 1 to 2)  - Regional lymph nodes:    - No lymph nodes submitted or found  - Pathologic stage classification (pTNM, AJCC 8th edition): ypT3c, pNx, G2 (high)  - FIGO stage (2015 FIGO cancer report): IIIC  - Additional pathologic findings: Leiomyoma of uterus  - Ancillary studies: Not performed  - Clinical history: malignant ascites, post chemotherapy ([id number])  - Comments: Foci of invasive mucosal carcinoma are noted in bilateral fallopian tubes, hence the primary site for serous carcinoma is presumed to be tubal   Ref: Arely bryan  Assessment of new system for primary site assignment in high grade serous carcinoma of fallopian tube, ovary and peritoneum  Histopathology 2015 [phone]     FINAL DIAGNOSIS ON EACH SPECIMEN ON THIS CASE:  A  Omentum:  - Serous carcinoma, high grade   - Largest tumor deposit, 2 6 cm       B  Ovary and Fallopian Tube, Left:  - Serous carcinoma, high grade  - Invasive carcinoma involves fallopian tube (B10) and ovary  - Surface involvement of ovary is present       C  Uterus, cervix, right fallopian  tube and ovary:  - Serous carcinoma high grade, involving focally involving cervix at deep margin   - Inactive endometrium   - Leiomyoma  - Serous carcinoma involves fallopian tube and ovary      D  Appendix:  - Serous carcinoma, high grade involving periappendiceal serosa  - Appendix with fibrous obliteration of tip, no transmural involvement with tumor  Physical Exam   Constitutional: She is oriented to person, place, and time  No distress     Pulmonary/Chest: Effort normal and breath sounds normal  No respiratory distress  She has no wheezes  She has no rales  She exhibits no tenderness  Abdominal: Soft  Bowel sounds are normal  She exhibits no distension and no mass  There is no tenderness  There is no rebound and no guarding  Well healed incision(s)   Neurological: She is alert and oriented to person, place, and time  Skin: She is not diaphoretic  Assessment  stage IIIC fallopian tube cancer undergoing treatment    Plan  the patient will resume 3 additional cycles of chemotherapy  Patient will undergo carboplatin AUC 5 and paclitaxel 175 milligrams/meters squared Q 21 days  Carboplatin dose dropped from AUC 6 to 5 for neutropenia with cycle 3  Patient will follow up per chemo tanner Nguyen MD, PhD, Olu Chavarria  Attending Physician, [facility]

## 2018-04-24 ENCOUNTER — HOSPITAL ENCOUNTER (OUTPATIENT)
Dept: INFUSION CENTER | Facility: CLINIC | Age: 63
Discharge: HOME | End: 2018-04-24
Payer: MEDICARE

## 2018-04-24 LAB
ALBUMIN SERPL BCP-MCNC: 3.6 G/DL (ref 3.5–5)
ALP SERPL-CCNC: 45 U/L (ref 46–116)
ALT SERPL W P-5'-P-CCNC: 22 U/L (ref 12–78)
ANION GAP SERPL CALCULATED.3IONS-SCNC: 8 MMOL/L (ref 4–13)
AST SERPL W P-5'-P-CCNC: 17 U/L (ref 5–45)
BASOPHILS # BLD AUTO: 0.03 THOUSANDS/ΜL (ref 0–0.1)
BASOPHILS NFR BLD AUTO: 1 % (ref 0–1)
BILIRUB SERPL-MCNC: 0.3 MG/DL (ref 0.2–1)
BUN SERPL-MCNC: 10 MG/DL (ref 5–25)
CALCIUM SERPL-MCNC: 9.2 MG/DL (ref 8.3–10.1)
CANCER AG125 SERPL-ACNC: 14.7 U/ML (ref 0–30)
CHLORIDE SERPL-SCNC: 102 MMOL/L (ref 100–108)
CO2 SERPL-SCNC: 27 MMOL/L (ref 21–32)
CREAT SERPL-MCNC: 0.65 MG/DL (ref 0.6–1.3)
EOSINOPHIL # BLD AUTO: 0.22 THOUSAND/ΜL (ref 0–0.61)
EOSINOPHIL NFR BLD AUTO: 4 % (ref 0–6)
ERYTHROCYTE [DISTWIDTH] IN BLOOD BY AUTOMATED COUNT: 16.7 % (ref 11.6–15.1)
GFR SERPL CREATININE-BSD FRML MDRD: 93 ML/MIN/1.73SQ M
GLUCOSE SERPL-MCNC: 85 MG/DL (ref 65–140)
HCT VFR BLD AUTO: 31.7 % (ref 34.8–46.1)
HGB BLD-MCNC: 10.3 G/DL (ref 11.5–15.4)
LYMPHOCYTES # BLD AUTO: 2.22 THOUSANDS/ΜL (ref 0.6–4.47)
LYMPHOCYTES NFR BLD AUTO: 37 % (ref 14–44)
MAGNESIUM SERPL-MCNC: 1.9 MG/DL (ref 1.6–2.6)
MCH RBC QN AUTO: 31.5 PG (ref 26.8–34.3)
MCHC RBC AUTO-ENTMCNC: 32.5 G/DL (ref 31.4–37.4)
MCV RBC AUTO: 97 FL (ref 82–98)
MONOCYTES # BLD AUTO: 0.52 THOUSAND/ΜL (ref 0.17–1.22)
MONOCYTES NFR BLD AUTO: 9 % (ref 4–12)
NEUTROPHILS # BLD AUTO: 3.02 THOUSANDS/ΜL (ref 1.85–7.62)
NEUTS SEG NFR BLD AUTO: 50 % (ref 43–75)
NRBC BLD AUTO-RTO: 0 /100 WBCS
PLATELET # BLD AUTO: 272 THOUSANDS/UL (ref 149–390)
PMV BLD AUTO: 8.5 FL (ref 8.9–12.7)
POTASSIUM SERPL-SCNC: 3.6 MMOL/L (ref 3.5–5.3)
PROT SERPL-MCNC: 7.8 G/DL (ref 6.4–8.2)
RBC # BLD AUTO: 3.27 MILLION/UL (ref 3.81–5.12)
SODIUM SERPL-SCNC: 137 MMOL/L (ref 136–145)
WBC # BLD AUTO: 6.03 THOUSAND/UL (ref 4.31–10.16)

## 2018-04-24 PROCEDURE — 86304 IMMUNOASSAY TUMOR CA 125: CPT | Performed by: OBSTETRICS & GYNECOLOGY

## 2018-04-24 PROCEDURE — 85025 COMPLETE CBC W/AUTO DIFF WBC: CPT | Performed by: OBSTETRICS & GYNECOLOGY

## 2018-04-24 PROCEDURE — 83735 ASSAY OF MAGNESIUM: CPT | Performed by: OBSTETRICS & GYNECOLOGY

## 2018-04-24 PROCEDURE — 80053 COMPREHEN METABOLIC PANEL: CPT | Performed by: OBSTETRICS & GYNECOLOGY

## 2018-04-24 RX ADMIN — HEPARIN 300 UNITS: 100 SYRINGE at 14:01

## 2018-04-25 ENCOUNTER — OFFICE VISIT (OUTPATIENT)
Dept: PALLIATIVE MEDICINE | Facility: CLINIC | Age: 63
End: 2018-04-25
Payer: MEDICARE

## 2018-04-25 VITALS
BODY MASS INDEX: 18.74 KG/M2 | HEART RATE: 76 BPM | WEIGHT: 107.5 LBS | TEMPERATURE: 98.8 F | SYSTOLIC BLOOD PRESSURE: 120 MMHG | DIASTOLIC BLOOD PRESSURE: 86 MMHG

## 2018-04-25 DIAGNOSIS — R11.0 CHEMOTHERAPY-INDUCED NAUSEA: ICD-10-CM

## 2018-04-25 DIAGNOSIS — R10.84 GENERALIZED ABDOMINAL PAIN: ICD-10-CM

## 2018-04-25 DIAGNOSIS — R18.0 MALIGNANT ASCITES: ICD-10-CM

## 2018-04-25 DIAGNOSIS — G89.3 CANCER RELATED PAIN: ICD-10-CM

## 2018-04-25 DIAGNOSIS — T45.1X5A CHEMOTHERAPY-INDUCED NAUSEA: ICD-10-CM

## 2018-04-25 DIAGNOSIS — T45.1X5A CHEMOTHERAPY-INDUCED NEUROPATHY (HCC): ICD-10-CM

## 2018-04-25 DIAGNOSIS — C57.00 CARCINOMA OF FALLOPIAN TUBE, UNSPECIFIED LATERALITY (HCC): Primary | ICD-10-CM

## 2018-04-25 DIAGNOSIS — G62.0 CHEMOTHERAPY-INDUCED NEUROPATHY (HCC): ICD-10-CM

## 2018-04-25 DIAGNOSIS — Z79.899 MEDICAL MARIJUANA USE: ICD-10-CM

## 2018-04-25 PROBLEM — Z01.810 PREOPERATIVE CARDIOVASCULAR EXAMINATION: Status: RESOLVED | Noted: 2018-03-28 | Resolved: 2018-04-25

## 2018-04-25 PROCEDURE — 99204 OFFICE O/P NEW MOD 45 MIN: CPT | Performed by: FAMILY MEDICINE

## 2018-04-25 RX ORDER — SODIUM CHLORIDE 9 MG/ML
20 INJECTION, SOLUTION INTRAVENOUS CONTINUOUS
Status: DISCONTINUED | OUTPATIENT
Start: 2018-04-26 | End: 2018-04-29 | Stop reason: HOSPADM

## 2018-04-25 RX ORDER — OXYCODONE HYDROCHLORIDE AND ACETAMINOPHEN 5; 325 MG/1; MG/1
1 TABLET ORAL EVERY 4 HOURS PRN
Qty: 30 TABLET | Refills: 0 | Status: SHIPPED | OUTPATIENT
Start: 2018-04-25 | End: 2018-10-23 | Stop reason: ALTCHOICE

## 2018-04-25 RX ORDER — PALONOSETRON 0.05 MG/ML
0.25 INJECTION, SOLUTION INTRAVENOUS ONCE
Status: COMPLETED | OUTPATIENT
Start: 2018-04-26 | End: 2018-04-26

## 2018-04-25 NOTE — PROGRESS NOTES
Palliative and Supportive Care   [facility] 70 y o  female [phone]    Assessment/Plan:  1  Carcinoma of fallopian tube, unspecified laterality ([facility] [address] )    2  Malignant ascites    3  Generalized abdominal pain    4  Cancer related pain    5  Chemotherapy-induced neuropathy ([facility] [address] )    6  Chemotherapy-induced nausea    7  Medical marijuana use      Requested Prescriptions     Signed Prescriptions Disp Refills    oxyCODONE-acetaminophen (PERCOCET) 5-325 mg per tablet 30 tablet 0     Sig: Take 1 tablet by mouth every 4 (four) hours as needed for moderate pain Max Daily Amount: 6 tablets   The patient qualifies for use of MMJ in the [facility] by having the following medical condition - cancer, more specifically fallopian tube cancer  From a palliative care stand point she is suffering with pain, nausea and neuropathy  These symptoms and side effects may be amendable to use of MMJ  The patient registered online  The patient read and I reviewed the informed consent document with the patient  I answered all questions related to it before the patient signed it and she was given a signed copy  The patient's medical certification was completed on this date and she was given a copy  I issued a certification for MMJ use with palliative intent -  To help alleviate cancer related symptoms and cancer treatment related side effects  I do not endorse the belief that MMJ can treat cancer and strongly encouraged the patient to continue treatments and surveillance as recommend by cancer specialists  She and her  were made aware that palliative medicine has a variety of ways to alleviate symptoms in an effort to improve quality of life  Should she not get the relief she is hoping for from [facility], we will work together to find effective alternatives      Representatives have queried the patient's controlled substance dispensing history in the Prescription Drug Monitoring Program in compliance with regulations before I have prescribed any controlled substances  The prescription history is consistent with prescribed therapy and our practice policies  40 minutes were spent face to face with Stacey Moya and her spouse with greater than 50% of the time spent in counseling or coordination of care including discussions of risks and benefits of treatment, treatment instructions and follow up requirements   All of the patient's questions were answered during this discussion  Return if use of MMJ products inadequately controls her symptoms  Subjective:   Chief Complaint  New consultation for:  Medical Marijuana certifcation  HPI     Stacey Moya is a 70 y o  female with stage IIIC fallopian tube cancer  Patient has completed 3 cycles of neoadjuvant chemotherapy and underwent an interval cytoreductive surgery on 4/2  She is under the care of GYN/ONC Dr Cody Hernandez  Post operatively she was issued some percocet for pain control  She continues to have some abdominal pain especially at night  The incision appears to be healing well but she notes an area where the glue recently came off that is a little red and sore  In addition the pain, her chemotherapy treatments have been accompanied by the development of nausea and neuropathy in her hands  The following portions of the medical history were reviewed: past medical history, problem list, medication list, and social history      Current Outpatient Prescriptions:     ascorbic acid (VITAMIN C) 250 mg tablet, Take 250 mg by mouth daily, Disp: , Rfl:     Calcium Carbonate (CALCIUM 600 PO), Take by mouth, Disp: , Rfl:     Cholecalciferol (VITAMIN D3) 48028 units TABS, Take by mouth, Disp: , Rfl:     enoxaparin (LOVENOX) 40 mg/0 4 mL, Inject 0 4 mL (40 mg total) under the skin daily for 14 days, Disp: 5 6 mL, Rfl: 0    Lactobacillus (CVS PROBIOTIC ACIDOPHILUS PO), Take 1 capsule by mouth daily  , Disp: , Rfl:     lidocaine-prilocaine (EMLA) cream, APPLY 1 INCH TO IV SITE PRIOR TO CHEMO TREATMENT, Disp: , Rfl: 0    LORazepam (ATIVAN) 1 mg tablet, take 1 tablet by mouth every 6 hours if needed for nausea or anxiety, Disp: , Rfl: 0    Magnesium 200 MG TABS, Take by mouth daily  , Disp: , Rfl:     ondansetron (ZOFRAN) 8 mg tablet, Take by mouth every 8 (eight) hours as needed for nausea or vomiting, Disp: , Rfl:     oxyCODONE-acetaminophen (PERCOCET) 5-325 mg per tablet, Take 1 tablet by mouth every 4 (four) hours as needed for moderate pain Max Daily Amount: 6 tablets, Disp: 30 tablet, Rfl: 0    pantoprazole (PROTONIX) 40 mg tablet, Take 1 tablet by mouth daily in the early morning, Disp: 30 tablet, Rfl: 0  No current facility-administered medications for this visit       Facility-Administered Medications Ordered in Other Visits:     alteplase (CATHFLO) injection 2 mg, 2 mg, Intracatheter, PRN, Laney Garcia PA-C    [START ON 4/26/2018] alteplase (CATHFLO) injection 2 mg, 2 mg, Intracatheter, PRN, MD Raman Pacheco  [START ON 4/26/2018] CARBOplatin (PARAPLATIN) 429 mg in sodium chloride 0 9 % 250 mL IVPB, 429 mg, Intravenous, Once, MD Raman Pacheco  [START ON 4/26/2018] dexamethasone (DECADRON) 20 mg in sodium chloride 0 9 % 50 mL IVPB, 20 mg, Intravenous, Once, MD Rmaan Pacheco  [START ON 4/26/2018] diphenhydrAMINE (BENADRYL) 25 mg in sodium chloride 0 9 % 50 mL IVPB, 25 mg, Intravenous, Once, MD Raman Pacheco  [START ON 4/26/2018] famotidine (PEPCID) 20 mg in sodium chloride 0 9 % 52 mL IVPB, 20 mg, Intravenous, Once, MD Raman Pacheco  [START ON 4/26/2018] fosaprepitant (EMEND) 150 mg in sodium chloride 0 9 % 250 mL IVPB, 150 mg, Intravenous, Once, Ana Feliciano MD    heparin lock flush 100 units/mL injection 300 Units, 300 Units, Intracatheter, PRN, Isabel Greene PA-C, 300 Units at 04/24/18 1401    [START ON 4/26/2018] heparin lock flush 100 units/mL injection 300 Units, 300 Units, Intracatheter, GISELLE, MD Raman Pacheco [START ON 4/26/2018] PACLitaxel (TAXOL) 261 mg in sodium chloride 0 9 % 1,000 mL chemo IVPB, 261 mg, Intravenous, Once, MD Dorothy Damian  [START ON 4/26/2018] palonosetron (ALOXI) injection 0 25 mg, 0 25 mg, Intravenous, Once, MD Dorothy Damian  [START ON 4/26/2018] sodium chloride 0 9 % infusion, 20 mL/hr, Intravenous, Continuous, Zac Adams MD  Review of Systems   Constitutional: Positive for activity change, appetite change, fatigue and unexpected weight change  HENT: Negative  Eyes: Negative  Respiratory: Negative  Cardiovascular: Negative  Gastrointestinal: Positive for abdominal pain and nausea  Endocrine: Negative  Genitourinary: Negative  Musculoskeletal: Negative  Skin: Positive for pallor  Allergic/Immunologic: Negative  Neurological: Positive for weakness and numbness  Hematological: Negative  Psychiatric/Behavioral: Positive for sleep disturbance  Objective:  Vital Signs  /86   Pulse 76   Temp 98 8 °F (37 1 °C) (Tympanic)   Wt 48 8 kg (107 lb 8 oz)   BMI 18 74 kg/m²    Physical Exam    Constitutional: Frail female but in good spirits  No distress  HENT:   Head: Normocephalic and atraumatic  Eyes: EOM are normal  Right eye exhibits no discharge  Left eye exhibits no discharge  No scleral icterus  Pulmonary/Chest: Effort normal  No stridor  No respiratory distress  Abdominal: large vertical scar  The edges of her skin were well approximated from top to bottom with no leakage  There is a slight degree of erythema present but only along the scar and not extending to the surrounding skin  Musculoskeletal: No edema  Neurological: Alert, oriented and appropriately conversant  Skin: Skin is dry, not diaphoretic  Pale  Psychiatric: Displays a normal mood and affect  Behavior, judgement and thought content appear normal    Vitals reviewed

## 2018-04-26 ENCOUNTER — HOSPITAL ENCOUNTER (OUTPATIENT)
Dept: INFUSION CENTER | Facility: CLINIC | Age: 63
Discharge: HOME | End: 2018-04-26
Payer: MEDICARE

## 2018-04-26 VITALS
HEIGHT: 63 IN | DIASTOLIC BLOOD PRESSURE: 66 MMHG | WEIGHT: 107.58 LBS | TEMPERATURE: 99 F | BODY MASS INDEX: 19.06 KG/M2 | HEART RATE: 66 BPM | RESPIRATION RATE: 18 BRPM | SYSTOLIC BLOOD PRESSURE: 138 MMHG

## 2018-04-26 PROCEDURE — 96367 TX/PROPH/DG ADDL SEQ IV INF: CPT

## 2018-04-26 PROCEDURE — 96375 TX/PRO/DX INJ NEW DRUG ADDON: CPT

## 2018-04-26 PROCEDURE — 96417 CHEMO IV INFUS EACH ADDL SEQ: CPT

## 2018-04-26 PROCEDURE — 96413 CHEMO IV INFUSION 1 HR: CPT

## 2018-04-26 PROCEDURE — 96415 CHEMO IV INFUSION ADDL HR: CPT

## 2018-04-26 RX ADMIN — FAMOTIDINE 20 MG: 10 INJECTION, SOLUTION INTRAVENOUS at 10:00

## 2018-04-26 RX ADMIN — CARBOPLATIN 429 MG: 10 INJECTION, SOLUTION INTRAVENOUS at 14:11

## 2018-04-26 RX ADMIN — PACLITAXEL 261 MG: 6 INJECTION, SOLUTION, CONCENTRATE INTRAVENOUS at 11:13

## 2018-04-26 RX ADMIN — SODIUM CHLORIDE 20 ML/HR: 0.9 INJECTION, SOLUTION INTRAVENOUS at 09:04

## 2018-04-26 RX ADMIN — HEPARIN 300 UNITS: 100 SYRINGE at 15:19

## 2018-04-26 RX ADMIN — SODIUM CHLORIDE 150 MG: 9 INJECTION, SOLUTION INTRAVENOUS at 10:24

## 2018-04-26 RX ADMIN — DEXAMETHASONE SODIUM PHOSPHATE 20 MG: 10 INJECTION, SOLUTION INTRAMUSCULAR; INTRAVENOUS at 09:35

## 2018-04-26 RX ADMIN — PALONOSETRON HYDROCHLORIDE 0.25 MG: 0.25 INJECTION INTRAVENOUS at 11:01

## 2018-04-26 RX ADMIN — DIPHENHYDRAMINE HYDROCHLORIDE 25 MG: 50 INJECTION, SOLUTION INTRAMUSCULAR; INTRAVENOUS at 09:08

## 2018-04-26 NOTE — PLAN OF CARE
Problem: Potential for Falls  Goal: Patient will remain free of falls  INTERVENTIONS:  - Assess patient frequently for physical needs  -  Identify cognitive and physical deficits and behaviors that affect risk of falls    -  [place] fall precautions as indicated by assessment   - Educate patient/family on patient safety including physical limitations  - Instruct patient to call for assistance with activity based on assessment  - Modify environment to reduce risk of injury  - Consider OT/PT consult to assist with strengthening/mobility   Outcome: Progressing

## 2018-05-01 ENCOUNTER — HOSPITAL ENCOUNTER (OUTPATIENT)
Dept: INFUSION CENTER | Facility: CLINIC | Age: 63
Discharge: HOME | End: 2018-05-01
Payer: MEDICARE

## 2018-05-01 LAB
ALBUMIN SERPL BCP-MCNC: 3.8 G/DL (ref 3.5–5)
ALP SERPL-CCNC: 48 U/L (ref 46–116)
ALT SERPL W P-5'-P-CCNC: 24 U/L (ref 12–78)
ANION GAP SERPL CALCULATED.3IONS-SCNC: 11 MMOL/L (ref 4–13)
AST SERPL W P-5'-P-CCNC: 20 U/L (ref 5–45)
BASOPHILS # BLD AUTO: 0.02 THOUSANDS/ΜL (ref 0–0.1)
BASOPHILS NFR BLD AUTO: 1 % (ref 0–1)
BILIRUB SERPL-MCNC: 0.3 MG/DL (ref 0.2–1)
BUN SERPL-MCNC: 13 MG/DL (ref 5–25)
CALCIUM SERPL-MCNC: 9.2 MG/DL (ref 8.3–10.1)
CHLORIDE SERPL-SCNC: 102 MMOL/L (ref 100–108)
CO2 SERPL-SCNC: 25 MMOL/L (ref 21–32)
CREAT SERPL-MCNC: 0.71 MG/DL (ref 0.6–1.3)
EOSINOPHIL # BLD AUTO: 0.11 THOUSAND/ΜL (ref 0–0.61)
EOSINOPHIL NFR BLD AUTO: 3 % (ref 0–6)
ERYTHROCYTE [DISTWIDTH] IN BLOOD BY AUTOMATED COUNT: 15.1 % (ref 11.6–15.1)
GFR SERPL CREATININE-BSD FRML MDRD: 90 ML/MIN/1.73SQ M
GLUCOSE SERPL-MCNC: 107 MG/DL (ref 65–140)
HCT VFR BLD AUTO: 34.8 % (ref 34.8–46.1)
HGB BLD-MCNC: 11.3 G/DL (ref 11.5–15.4)
LYMPHOCYTES # BLD AUTO: 1.6 THOUSANDS/ΜL (ref 0.6–4.47)
LYMPHOCYTES NFR BLD AUTO: 39 % (ref 14–44)
MAGNESIUM SERPL-MCNC: 1.9 MG/DL (ref 1.6–2.6)
MCH RBC QN AUTO: 31.8 PG (ref 26.8–34.3)
MCHC RBC AUTO-ENTMCNC: 32.5 G/DL (ref 31.4–37.4)
MCV RBC AUTO: 98 FL (ref 82–98)
MONOCYTES # BLD AUTO: 0.12 THOUSAND/ΜL (ref 0.17–1.22)
MONOCYTES NFR BLD AUTO: 3 % (ref 4–12)
NEUTROPHILS # BLD AUTO: 2.22 THOUSANDS/ΜL (ref 1.85–7.62)
NEUTS SEG NFR BLD AUTO: 55 % (ref 43–75)
NRBC BLD AUTO-RTO: 0 /100 WBCS
PLATELET # BLD AUTO: 211 THOUSANDS/UL (ref 149–390)
PMV BLD AUTO: 9 FL (ref 8.9–12.7)
POTASSIUM SERPL-SCNC: 3.8 MMOL/L (ref 3.5–5.3)
PROT SERPL-MCNC: 8.2 G/DL (ref 6.4–8.2)
RBC # BLD AUTO: 3.55 MILLION/UL (ref 3.81–5.12)
SODIUM SERPL-SCNC: 138 MMOL/L (ref 136–145)
WBC # BLD AUTO: 4.08 THOUSAND/UL (ref 4.31–10.16)

## 2018-05-01 PROCEDURE — 80053 COMPREHEN METABOLIC PANEL: CPT | Performed by: OBSTETRICS & GYNECOLOGY

## 2018-05-01 PROCEDURE — 83735 ASSAY OF MAGNESIUM: CPT | Performed by: OBSTETRICS & GYNECOLOGY

## 2018-05-01 PROCEDURE — 85025 COMPLETE CBC W/AUTO DIFF WBC: CPT | Performed by: OBSTETRICS & GYNECOLOGY

## 2018-05-01 RX ADMIN — HEPARIN 300 UNITS: 100 SYRINGE at 12:43

## 2018-05-01 NOTE — PROGRESS NOTES
Pt comes to infusion center for central labs with port flush  Pt port accessed without difficulty or complaint  Pt aware of future appointments  Pt d/twyla in stable condition

## 2018-05-08 ENCOUNTER — HOSPITAL ENCOUNTER (OUTPATIENT)
Dept: INFUSION CENTER | Facility: CLINIC | Age: 63
Discharge: HOME | End: 2018-05-08
Payer: MEDICARE

## 2018-05-08 LAB
ALBUMIN SERPL BCP-MCNC: 3.6 G/DL (ref 3.5–5)
ALP SERPL-CCNC: 48 U/L (ref 46–116)
ALT SERPL W P-5'-P-CCNC: 24 U/L (ref 12–78)
ANION GAP SERPL CALCULATED.3IONS-SCNC: 7 MMOL/L (ref 4–13)
AST SERPL W P-5'-P-CCNC: 16 U/L (ref 5–45)
BASOPHILS # BLD AUTO: 0.02 THOUSANDS/ΜL (ref 0–0.1)
BASOPHILS NFR BLD AUTO: 0 % (ref 0–1)
BILIRUB SERPL-MCNC: 0.2 MG/DL (ref 0.2–1)
BUN SERPL-MCNC: 11 MG/DL (ref 5–25)
CALCIUM SERPL-MCNC: 8.7 MG/DL (ref 8.3–10.1)
CHLORIDE SERPL-SCNC: 103 MMOL/L (ref 100–108)
CO2 SERPL-SCNC: 28 MMOL/L (ref 21–32)
CREAT SERPL-MCNC: 0.65 MG/DL (ref 0.6–1.3)
EOSINOPHIL # BLD AUTO: 0.1 THOUSAND/ΜL (ref 0–0.61)
EOSINOPHIL NFR BLD AUTO: 2 % (ref 0–6)
ERYTHROCYTE [DISTWIDTH] IN BLOOD BY AUTOMATED COUNT: 14.6 % (ref 11.6–15.1)
GFR SERPL CREATININE-BSD FRML MDRD: 93 ML/MIN/1.73SQ M
GLUCOSE SERPL-MCNC: 87 MG/DL (ref 65–140)
HCT VFR BLD AUTO: 32.3 % (ref 34.8–46.1)
HGB BLD-MCNC: 10.6 G/DL (ref 11.5–15.4)
LYMPHOCYTES # BLD AUTO: 1.87 THOUSANDS/ΜL (ref 0.6–4.47)
LYMPHOCYTES NFR BLD AUTO: 36 % (ref 14–44)
MAGNESIUM SERPL-MCNC: 1.9 MG/DL (ref 1.6–2.6)
MCH RBC QN AUTO: 32.1 PG (ref 26.8–34.3)
MCHC RBC AUTO-ENTMCNC: 32.8 G/DL (ref 31.4–37.4)
MCV RBC AUTO: 98 FL (ref 82–98)
MONOCYTES # BLD AUTO: 0.49 THOUSAND/ΜL (ref 0.17–1.22)
MONOCYTES NFR BLD AUTO: 9 % (ref 4–12)
NEUTROPHILS # BLD AUTO: 2.7 THOUSANDS/ΜL (ref 1.85–7.62)
NEUTS SEG NFR BLD AUTO: 52 % (ref 43–75)
NRBC BLD AUTO-RTO: 0 /100 WBCS
PLATELET # BLD AUTO: 242 THOUSANDS/UL (ref 149–390)
PMV BLD AUTO: 8.6 FL (ref 8.9–12.7)
POTASSIUM SERPL-SCNC: 3.7 MMOL/L (ref 3.5–5.3)
PROT SERPL-MCNC: 7.6 G/DL (ref 6.4–8.2)
RBC # BLD AUTO: 3.3 MILLION/UL (ref 3.81–5.12)
SODIUM SERPL-SCNC: 138 MMOL/L (ref 136–145)
WBC # BLD AUTO: 5.2 THOUSAND/UL (ref 4.31–10.16)

## 2018-05-08 PROCEDURE — 85025 COMPLETE CBC W/AUTO DIFF WBC: CPT | Performed by: OBSTETRICS & GYNECOLOGY

## 2018-05-08 PROCEDURE — 83735 ASSAY OF MAGNESIUM: CPT | Performed by: OBSTETRICS & GYNECOLOGY

## 2018-05-08 PROCEDURE — 80053 COMPREHEN METABOLIC PANEL: CPT | Performed by: OBSTETRICS & GYNECOLOGY

## 2018-05-08 RX ADMIN — HEPARIN 300 UNITS: 100 SYRINGE at 13:23

## 2018-05-08 NOTE — PROGRESS NOTES
Central labs drawn via port  Catheter maintenance performed per protocol without complications  Discharged in stable condition and is aware of next appointment  AVS provided

## 2018-05-15 ENCOUNTER — HOSPITAL ENCOUNTER (OUTPATIENT)
Dept: INFUSION CENTER | Facility: CLINIC | Age: 63
Discharge: HOME | End: 2018-05-15
Payer: MEDICARE

## 2018-05-15 LAB
ALBUMIN SERPL BCP-MCNC: 3.9 G/DL (ref 3.5–5)
ALP SERPL-CCNC: 51 U/L (ref 46–116)
ALT SERPL W P-5'-P-CCNC: 26 U/L (ref 12–78)
ANION GAP SERPL CALCULATED.3IONS-SCNC: 7 MMOL/L (ref 4–13)
AST SERPL W P-5'-P-CCNC: 18 U/L (ref 5–45)
BASOPHILS # BLD AUTO: 0.02 THOUSANDS/ΜL (ref 0–0.1)
BASOPHILS NFR BLD AUTO: 0 % (ref 0–1)
BILIRUB SERPL-MCNC: 0.2 MG/DL (ref 0.2–1)
BUN SERPL-MCNC: 14 MG/DL (ref 5–25)
CALCIUM SERPL-MCNC: 9.3 MG/DL (ref 8.3–10.1)
CHLORIDE SERPL-SCNC: 104 MMOL/L (ref 100–108)
CO2 SERPL-SCNC: 28 MMOL/L (ref 21–32)
CREAT SERPL-MCNC: 0.64 MG/DL (ref 0.6–1.3)
EOSINOPHIL # BLD AUTO: 0.1 THOUSAND/ΜL (ref 0–0.61)
EOSINOPHIL NFR BLD AUTO: 2 % (ref 0–6)
ERYTHROCYTE [DISTWIDTH] IN BLOOD BY AUTOMATED COUNT: 14.3 % (ref 11.6–15.1)
GFR SERPL CREATININE-BSD FRML MDRD: 94 ML/MIN/1.73SQ M
GLUCOSE SERPL-MCNC: 89 MG/DL (ref 65–140)
HCT VFR BLD AUTO: 35.7 % (ref 34.8–46.1)
HGB BLD-MCNC: 11.6 G/DL (ref 11.5–15.4)
IMM GRANULOCYTES # BLD AUTO: 0.03 THOUSAND/UL (ref 0–0.2)
IMM GRANULOCYTES NFR BLD AUTO: 1 % (ref 0–2)
LYMPHOCYTES # BLD AUTO: 2.45 THOUSANDS/ΜL (ref 0.6–4.47)
LYMPHOCYTES NFR BLD AUTO: 52 % (ref 14–44)
MAGNESIUM SERPL-MCNC: 2 MG/DL (ref 1.6–2.6)
MCH RBC QN AUTO: 31.8 PG (ref 26.8–34.3)
MCHC RBC AUTO-ENTMCNC: 32.5 G/DL (ref 31.4–37.4)
MCV RBC AUTO: 98 FL (ref 82–98)
MONOCYTES # BLD AUTO: 0.51 THOUSAND/ΜL (ref 0.17–1.22)
MONOCYTES NFR BLD AUTO: 11 % (ref 4–12)
NEUTROPHILS # BLD AUTO: 1.58 THOUSANDS/ΜL (ref 1.85–7.62)
NEUTS SEG NFR BLD AUTO: 34 % (ref 43–75)
NRBC BLD AUTO-RTO: 0 /100 WBCS
PLATELET # BLD AUTO: 251 THOUSANDS/UL (ref 149–390)
PMV BLD AUTO: 8.4 FL (ref 8.9–12.7)
POTASSIUM SERPL-SCNC: 3.7 MMOL/L (ref 3.5–5.3)
PROT SERPL-MCNC: 8.2 G/DL (ref 6.4–8.2)
RBC # BLD AUTO: 3.65 MILLION/UL (ref 3.81–5.12)
SODIUM SERPL-SCNC: 139 MMOL/L (ref 136–145)
WBC # BLD AUTO: 4.69 THOUSAND/UL (ref 4.31–10.16)

## 2018-05-15 PROCEDURE — 85025 COMPLETE CBC W/AUTO DIFF WBC: CPT | Performed by: OBSTETRICS & GYNECOLOGY

## 2018-05-15 PROCEDURE — 83735 ASSAY OF MAGNESIUM: CPT | Performed by: OBSTETRICS & GYNECOLOGY

## 2018-05-15 PROCEDURE — 86304 IMMUNOASSAY TUMOR CA 125: CPT | Performed by: OBSTETRICS & GYNECOLOGY

## 2018-05-15 PROCEDURE — 80053 COMPREHEN METABOLIC PANEL: CPT | Performed by: OBSTETRICS & GYNECOLOGY

## 2018-05-15 RX ADMIN — HEPARIN SODIUM (PORCINE) LOCK FLUSH IV SOLN 100 UNIT/ML 300 UNITS: 100 SOLUTION at 13:21

## 2018-05-16 ENCOUNTER — OFFICE VISIT (OUTPATIENT)
Dept: GYNECOLOGIC ONCOLOGY | Facility: CLINIC | Age: 63
End: 2018-05-16
Payer: MEDICARE

## 2018-05-16 VITALS
HEIGHT: 63 IN | TEMPERATURE: 97.8 F | SYSTOLIC BLOOD PRESSURE: 118 MMHG | DIASTOLIC BLOOD PRESSURE: 70 MMHG | BODY MASS INDEX: 19.14 KG/M2 | RESPIRATION RATE: 14 BRPM | WEIGHT: 108 LBS | HEART RATE: 74 BPM

## 2018-05-16 DIAGNOSIS — C57.00 CARCINOMA OF FALLOPIAN TUBE, UNSPECIFIED LATERALITY (HCC): Primary | ICD-10-CM

## 2018-05-16 LAB — CANCER AG125 SERPL-ACNC: 11.7 U/ML (ref 0–30)

## 2018-05-16 PROCEDURE — 99024 POSTOP FOLLOW-UP VISIT: CPT | Performed by: OBSTETRICS & GYNECOLOGY

## 2018-05-16 RX ORDER — PALONOSETRON 0.05 MG/ML
0.25 INJECTION, SOLUTION INTRAVENOUS ONCE
Status: COMPLETED | OUTPATIENT
Start: 2018-05-17 | End: 2018-05-17

## 2018-05-16 RX ORDER — SODIUM CHLORIDE 9 MG/ML
20 INJECTION, SOLUTION INTRAVENOUS CONTINUOUS
Status: DISCONTINUED | OUTPATIENT
Start: 2018-05-17 | End: 2018-05-20 | Stop reason: HOSPADM

## 2018-05-16 NOTE — PROGRESS NOTES
Emy Billingsley  1955  [address] [place] [facility] [address]      Chief Complaint   Patient presents with    Chemotherapy     Cycle #5, vaginal cuff check         Previous Therapy: see below    History of Present Illness: The patient is a delightful 64-year-old with stage IIIC fallopian tube cancer  Patient has completed 3 cycles of neoadjuvant chemotherapy and underwent an interval cytoreductive surgery  Patient comes in today for 2nd of postoperative appointment and has no complaints  Patient feels like she has well healed from her surgery  The patient has re-initiated her 2nd round of chemotherapy  Patient underwent cycle 4 on April 26, 2018  Patient states that she has undergone chemotherapy with minimal complaints other than fatigue  Her most recent absolute neutrophil count is 1 58  Fallopian tube carcinoma ([facility] [address] )    1/4/2018 Initial Diagnosis     Paracentesis revealing adenocarcinoma of gynecologic origin         1/25/2018 - 3/8/2018 Chemotherapy     3 cycles of carboplatin AUC 6 and taxol 175 mg/m2 squared Q 3 weeks  Pretreatment Ca 125: 534 6          4/2/2018 Surgery     Exploratory laparotomy, total abdominal hysterectomy, bilateral salpingo-oophorectomy, omentectomy, appendectomy  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  No gross residual disease  4/26/2018 -  Chemotherapy     3 cycles of carboplatin AUC 5 and Taxol 175 mg/m2 Q 3 weeks (carboplatin dose dropped to AUC of 5 for neutropenia prior to cycle 3)            Review of Systems   Constitutional: Positive for fatigue  Negative for activity change, appetite change, chills, diaphoresis, fever and unexpected weight change     HENT: Negative for congestion, dental problem, drooling, ear discharge, ear pain, facial swelling, hearing loss, mouth sores, nosebleeds, postnasal drip, rhinorrhea, sinus pain, sinus pressure, sneezing, sore throat, tinnitus, trouble swallowing and voice change  Eyes: Negative for photophobia, pain, discharge, redness, itching and visual disturbance  Respiratory: Negative for apnea, cough, choking, chest tightness, shortness of breath, wheezing and stridor  Cardiovascular: Negative for chest pain, palpitations and leg swelling  Gastrointestinal: Negative for abdominal distention, abdominal pain, anal bleeding, blood in stool, constipation, diarrhea, nausea, rectal pain and vomiting  Endocrine: Negative for cold intolerance, heat intolerance, polydipsia, polyphagia and polyuria  Genitourinary: Negative for decreased urine volume, difficulty urinating, dyspareunia, dysuria, enuresis, flank pain, frequency, genital sores, hematuria, menstrual problem, pelvic pain, urgency, vaginal bleeding, vaginal discharge and vaginal pain  Musculoskeletal: Negative for arthralgias, back pain, gait problem, joint swelling, myalgias, neck pain and neck stiffness  Skin: Negative for color change, pallor, rash and wound  Allergic/Immunologic: Negative for environmental allergies, food allergies and immunocompromised state  Neurological: Negative for dizziness, tremors, seizures, syncope, facial asymmetry, speech difficulty, weakness, light-headedness, numbness and headaches  Hematological: Negative for adenopathy  Does not bruise/bleed easily  Psychiatric/Behavioral: Negative for agitation, behavioral problems, confusion, decreased concentration, dysphoric mood, hallucinations, self-injury, sleep disturbance and suicidal ideas  The patient is not nervous/anxious and is not hyperactive          Patient Active Problem List   Diagnosis    Abdominal pain    Hypokalemia    Hyponatremia    Hypertension    Malignant ascites    Fallopian tube carcinoma (HCC)    Cancer related pain    Chemotherapy-induced neuropathy ([facility] [address] )    Chemotherapy-induced nausea    Medical marijuana use     Social History     Social History    Marital status: /Civil Union Spouse name: N/A    Number of children: N/A    Years of education: N/A     Occupational History    Not on file  Social History Main Topics    Smoking status: Never Smoker    Smokeless tobacco: Never Used    Alcohol use No    Drug use: No    Sexual activity: Not on file     Other Topics Concern    Not on file     Social History Narrative    No narrative on file     Past Medical History:   Diagnosis Date    Arthritis     Ascites     Cancer [facility] [address] )     Hypertension 1/4/2018    Stroke ([facility] ) 1995       Current Outpatient Prescriptions:     ascorbic acid (VITAMIN C) 250 mg tablet, Take 250 mg by mouth daily, Disp: , Rfl:     Calcium Carbonate (CALCIUM 600 PO), Take by mouth, Disp: , Rfl:     Cholecalciferol (VITAMIN D3) 68413 units TABS, Take by mouth, Disp: , Rfl:     Lactobacillus ([employer] PROBIOTIC ACIDOPHILUS PO), Take 1 capsule by mouth daily  , Disp: , Rfl:     lidocaine-prilocaine (EMLA) cream, APPLY 1 INCH TO IV SITE PRIOR TO CHEMO TREATMENT, Disp: , Rfl: 0    LORazepam (ATIVAN) 1 mg tablet, take 1 tablet by mouth every 6 hours if needed for nausea or anxiety, Disp: , Rfl: 0    Magnesium 200 MG TABS, Take by mouth daily  , Disp: , Rfl:     ondansetron (ZOFRAN) 8 mg tablet, Take by mouth every 8 (eight) hours as needed for nausea or vomiting, Disp: , Rfl:     oxyCODONE-acetaminophen (PERCOCET) 5-325 mg per tablet, Take 1 tablet by mouth every 4 (four) hours as needed for moderate pain Max Daily Amount: 6 tablets, Disp: 30 tablet, Rfl: 0    pantoprazole (PROTONIX) 40 mg tablet, Take 1 tablet by mouth daily in the early morning, Disp: 30 tablet, Rfl: 0    enoxaparin (LOVENOX) 40 mg/0 4 mL, Inject 0 4 mL (40 mg total) under the skin daily for 14 days, Disp: 5 6 mL, Rfl: 0  No current facility-administered medications for this visit       Facility-Administered Medications Ordered in Other Visits:     alteplase (CATHFLO) injection 2 mg, 2 mg, Intracatheter, PRN, Aleda Carrel Synnamon, PA-C    heparin lock flush 100 units/mL injection 300 Units, 300 Units, Intracatheter, PRN, Tamika Griffin PA-C, 300 Units at 05/15/18 1321  Allergies   Allergen Reactions    Black Pepper [Piper] Rash     ROSE THE INSIDE OF THE MOUTH    Hexachlorophene Other (See Comments)     "Burns my skin"    Yeast-[place] Pov-Iodine Med [Povidone Iodine]      PHYSOHEX CLEANSER- "burns my skin"     Vitals:    05/16/18 0948   BP: 118/70   Pulse: 74   Resp: 14   Temp: 97 8 °F (36 6 °C)       Labs:  CMP  Lab Results   Component Value Date     05/15/2018    K 3 7 05/15/2018     05/15/2018    CO2 28 05/15/2018    ANIONGAP 7 05/15/2018    BUN 14 05/15/2018    CREATININE 0 64 05/15/2018    GLUCOSE 89 05/15/2018    GLUF 119 (H) 01/22/2018    CALCIUM 9 3 05/15/2018    AST 18 05/15/2018    ALT 26 05/15/2018    ALKPHOS 51 05/15/2018    PROT 8 2 05/15/2018    BILITOT 0 20 05/15/2018    EGFR 94 05/15/2018       BMP  Lab Results   Component Value Date    GLUCOSE 89 05/15/2018    CALCIUM 9 3 05/15/2018     05/15/2018    K 3 7 05/15/2018    CO2 28 05/15/2018     05/15/2018    BUN 14 05/15/2018    CREATININE 0 64 05/15/2018       Lipids  No results found for: CHOL  No results found for: HDL  No results found for: LDLCALC  No results found for: TRIG  No components found for: CHOLHDL    Hemoglobin A1C  Lab Results   Component Value Date    HGBA1C 5 1 03/26/2018       Fasting Glucose  Lab Results   Component Value Date    GLUF 119 (H) 01/22/2018       Insulin     Thyroid  No results found for: TSH, U9CAFRG, H4HHKAS, THYROIDAB    Hepatic Function Panel  Lab Results   Component Value Date    ALT 26 05/15/2018    AST 18 05/15/2018    ALKPHOS 51 05/15/2018    BILITOT 0 20 05/15/2018       Celiac Disease Antibody Panel  No results found for: ENDOMYSIAL IGA, GLIADIN IGA, GLIADIN IGG, IGA, TISSUE TRANSGLUT AB, TTG IGA   Iron  No results found for: IRON, TIBC, FERRITIN    Other Labs:     Final Diagnosis   Primary Tumor of the Ovary/Fallopian Tube/Peritoneum (Includes specimens A to D, Pelvic washings [id number] and prior Ascitic fluid [id number])  - Procedure: Total hysterectomy, bilateral salpingo-oophorectomy, omentectomy and appendectomy  - Hysterectomy type: Abdominal  - Specimen integrity:      - Right ovary (if applicable): intact      - Left ovary (if applicable):intact      - Right fallopian tube (if applicable): intact      - Left fallopian tube (if applicable):intact      - Morcellated specimen (specify organ): N/A  - Tumor site: Bilateral fallopian tubes (See comment)  - Ovarian surface involvement:Present, bilateral  - Fallopian tube surface involvement: Present, bilateral  - Tumor size: largest tumor focus noted in omentum, 2 6 cm  - Histologic type: Serous carcinoma   - Histologic grade: High grade  - Implants (required for advanced stage serous/seromucinous borderline tumors only): N/A  - Other tissue/organ involvement: Cervix focally involved with carcinoma at deep margin  Appendix with tumor deposits on serosal surface  - Largest extrapelvic peritoneal focus (required only if applicable): 2 6 cm macroscopic    - Specify site: Omentum  - Peritoneal/ascetic fluid: Negative for carcinoma ([id number])  - Pleural fluid: Not submitted  - Treatment effect: Minimal to moderate response (CRS 1 to 2)  - Regional lymph nodes:    - No lymph nodes submitted or found  - Pathologic stage classification (pTNM, AJCC 8th edition): ypT3c, pNx, G2 (high)  - FIGO stage (2015 FIGO cancer report): IIIC  - Additional pathologic findings: Leiomyoma of uterus  - Ancillary studies: Not performed  - Clinical history: malignant ascites, post chemotherapy ([id number])  - Comments: Foci of invasive mucosal carcinoma are noted in bilateral fallopian tubes, hence the primary site for serous carcinoma is presumed to be tubal   Ref: Renae bryan  Assessment of new system for primary site assignment in high grade serous carcinoma of fallopian tube, ovary and peritoneum  Histopathology 2015 [phone]     FINAL DIAGNOSIS ON EACH SPECIMEN ON THIS CASE:  A  Omentum:  - Serous carcinoma, high grade   - Largest tumor deposit, 2 6 cm       B  Ovary and Fallopian Tube, Left:  - Serous carcinoma, high grade  - Invasive carcinoma involves fallopian tube (B10) and ovary  - Surface involvement of ovary is present       C  Uterus, cervix, right fallopian  tube and ovary:  - Serous carcinoma high grade, involving focally involving cervix at deep margin   - Inactive endometrium   - Leiomyoma  - Serous carcinoma involves fallopian tube and ovary      D  Appendix:  - Serous carcinoma, high grade involving periappendiceal serosa  - Appendix with fibrous obliteration of tip, no transmural involvement with tumor  Physical Exam   Constitutional: She is oriented to person, place, and time  She appears well-developed and well-nourished  No distress  HENT:   Head: Normocephalic and atraumatic  Right Ear: External ear normal    Left Ear: External ear normal    Nose: Nose normal    Mouth/Throat: Oropharynx is clear and moist  No oropharyngeal exudate  Eyes: Conjunctivae and EOM are normal  Pupils are equal, round, and reactive to light  Right eye exhibits no discharge  Left eye exhibits no discharge  No scleral icterus  Neck: Normal range of motion  Neck supple  No JVD present  No tracheal deviation present  No thyromegaly present  Cardiovascular: Normal rate, regular rhythm, normal heart sounds and intact distal pulses  Exam reveals no gallop and no friction rub  No murmur heard  Pulmonary/Chest: Effort normal and breath sounds normal  No stridor  No respiratory distress  She has no wheezes  She has no rales  She exhibits no tenderness  Abdominal: Soft  Bowel sounds are normal  She exhibits no distension and no mass  There is no tenderness  There is no rebound and no guarding  Well healed incision(s)   Genitourinary: No vaginal discharge found     Genitourinary Comments: Surgical absence of the cervix, uterus, bilateral fallopian tubes and ovaries   Musculoskeletal: Normal range of motion  She exhibits no edema, tenderness or deformity  Lymphadenopathy:     She has no cervical adenopathy  Neurological: She is alert and oriented to person, place, and time  She has normal reflexes  No cranial nerve deficit  She exhibits normal muscle tone  Coordination normal    Skin: Skin is warm and dry  No rash noted  She is not diaphoretic  No erythema  No pallor  Psychiatric: She has a normal mood and affect  Her behavior is normal  Judgment and thought content normal        Assessment  stage IIIC fallopian tube cancer undergoing treatment    Plan  the patient is cleared for cycle 5 chemotherapy on May 17 2018  We will initiate prophylactic Neulasta since her absolute neutrophil count is 1 58  Patient will follow up per chemo calender  Scott C Donzetta Severe, MD, PhD, Joel Garcia  Attending Physician, [address] [place] [address]

## 2018-05-17 ENCOUNTER — HOSPITAL ENCOUNTER (OUTPATIENT)
Dept: INFUSION CENTER | Facility: CLINIC | Age: 63
Discharge: HOME | End: 2018-05-17
Payer: MEDICARE

## 2018-05-17 VITALS
SYSTOLIC BLOOD PRESSURE: 135 MMHG | HEART RATE: 77 BPM | DIASTOLIC BLOOD PRESSURE: 78 MMHG | TEMPERATURE: 98.4 F | RESPIRATION RATE: 18 BRPM | BODY MASS INDEX: 18.91 KG/M2 | WEIGHT: 106.7 LBS | HEIGHT: 63 IN

## 2018-05-17 PROCEDURE — 96367 TX/PROPH/DG ADDL SEQ IV INF: CPT

## 2018-05-17 PROCEDURE — 96413 CHEMO IV INFUSION 1 HR: CPT

## 2018-05-17 PROCEDURE — 96415 CHEMO IV INFUSION ADDL HR: CPT

## 2018-05-17 PROCEDURE — 96417 CHEMO IV INFUS EACH ADDL SEQ: CPT

## 2018-05-17 PROCEDURE — 96375 TX/PRO/DX INJ NEW DRUG ADDON: CPT

## 2018-05-17 RX ADMIN — PACLITAXEL 261 MG: 6 INJECTION, SOLUTION, CONCENTRATE INTRAVENOUS at 10:42

## 2018-05-17 RX ADMIN — DIPHENHYDRAMINE HYDROCHLORIDE 25 MG: 50 INJECTION, SOLUTION INTRAMUSCULAR; INTRAVENOUS at 09:17

## 2018-05-17 RX ADMIN — CARBOPLATIN 435 MG: 10 INJECTION, SOLUTION INTRAVENOUS at 13:41

## 2018-05-17 RX ADMIN — PALONOSETRON HYDROCHLORIDE 0.25 MG: 0.25 INJECTION INTRAVENOUS at 10:37

## 2018-05-17 RX ADMIN — FAMOTIDINE 20 MG: 10 INJECTION, SOLUTION INTRAVENOUS at 09:40

## 2018-05-17 RX ADMIN — SODIUM CHLORIDE 150 MG: 9 INJECTION, SOLUTION INTRAVENOUS at 10:02

## 2018-05-17 RX ADMIN — HEPARIN SODIUM (PORCINE) LOCK FLUSH IV SOLN 100 UNIT/ML 300 UNITS: 100 SOLUTION at 14:50

## 2018-05-17 RX ADMIN — DEXAMETHASONE SODIUM PHOSPHATE 20 MG: 10 INJECTION, SOLUTION INTRAMUSCULAR; INTRAVENOUS at 08:55

## 2018-05-17 RX ADMIN — SODIUM CHLORIDE 20 ML/HR: 0.9 INJECTION, SOLUTION INTRAVENOUS at 08:52

## 2018-05-17 NOTE — PLAN OF CARE
Problem: Potential for Falls  Goal: Patient will remain free of falls  INTERVENTIONS:  - Assess patient frequently for physical needs  -  Identify cognitive and physical deficits and behaviors that affect risk of falls    -  [place] fall precautions as indicated by assessment   - Educate patient/family on patient safety including physical limitations  - Instruct patient to call for assistance with activity based on assessment  - Modify environment to reduce risk of injury  - Consider OT/PT consult to assist with strengthening/mobility   Outcome: Progressing

## 2018-05-17 NOTE — PROGRESS NOTES
Chemo infused without incident, pt offered no complaints   Neulasta appt moved from 1030 to 1530 tomorrow, pt made aware and given updated avs

## 2018-05-18 ENCOUNTER — HOSPITAL ENCOUNTER (OUTPATIENT)
Dept: INFUSION CENTER | Facility: CLINIC | Age: 63
Discharge: HOME | End: 2018-05-18
Payer: MEDICARE

## 2018-05-18 PROCEDURE — 96372 THER/PROPH/DIAG INJ SC/IM: CPT

## 2018-05-18 RX ADMIN — PEGFILGRASTIM 6 MG: 6 INJECTION SUBCUTANEOUS at 15:35

## 2018-05-18 NOTE — PROGRESS NOTES
Pt offers no complaints  Neluasta given in left arm without any issues  Pt aware of next appointment   Declines AVS

## 2018-05-22 ENCOUNTER — HOSPITAL ENCOUNTER (OUTPATIENT)
Dept: INFUSION CENTER | Facility: CLINIC | Age: 63
Discharge: HOME | End: 2018-05-22
Payer: MEDICARE

## 2018-05-22 LAB
ALBUMIN SERPL BCP-MCNC: 4 G/DL (ref 3.5–5)
ALP SERPL-CCNC: 103 U/L (ref 46–116)
ALT SERPL W P-5'-P-CCNC: 24 U/L (ref 12–78)
ANION GAP SERPL CALCULATED.3IONS-SCNC: 9 MMOL/L (ref 4–13)
AST SERPL W P-5'-P-CCNC: 15 U/L (ref 5–45)
BASOPHILS # BLD MANUAL: 0 THOUSAND/UL (ref 0–0.1)
BASOPHILS NFR MAR MANUAL: 0 % (ref 0–1)
BILIRUB SERPL-MCNC: 0.3 MG/DL (ref 0.2–1)
BUN SERPL-MCNC: 10 MG/DL (ref 5–25)
CALCIUM SERPL-MCNC: 9.9 MG/DL (ref 8.3–10.1)
CHLORIDE SERPL-SCNC: 99 MMOL/L (ref 100–108)
CO2 SERPL-SCNC: 30 MMOL/L (ref 21–32)
CREAT SERPL-MCNC: 0.68 MG/DL (ref 0.6–1.3)
EOSINOPHIL # BLD MANUAL: 0 THOUSAND/UL (ref 0–0.4)
EOSINOPHIL NFR BLD MANUAL: 0 % (ref 0–6)
ERYTHROCYTE [DISTWIDTH] IN BLOOD BY AUTOMATED COUNT: 13.8 % (ref 11.6–15.1)
GFR SERPL CREATININE-BSD FRML MDRD: 92 ML/MIN/1.73SQ M
GLUCOSE SERPL-MCNC: 77 MG/DL (ref 65–140)
HCT VFR BLD AUTO: 38 % (ref 34.8–46.1)
HGB BLD-MCNC: 12.5 G/DL (ref 11.5–15.4)
LYMPHOCYTES # BLD AUTO: 23 % (ref 14–44)
LYMPHOCYTES # BLD AUTO: 3.88 THOUSAND/UL (ref 0.6–4.47)
MAGNESIUM SERPL-MCNC: 1.9 MG/DL (ref 1.6–2.6)
MCH RBC QN AUTO: 32.1 PG (ref 26.8–34.3)
MCHC RBC AUTO-ENTMCNC: 32.9 G/DL (ref 31.4–37.4)
MCV RBC AUTO: 97 FL (ref 82–98)
METAMYELOCYTES NFR BLD MANUAL: 1 % (ref 0–1)
MONOCYTES # BLD AUTO: 0.34 THOUSAND/UL (ref 0–1.22)
MONOCYTES NFR BLD: 2 % (ref 4–12)
NEUTROPHILS # BLD MANUAL: 11.81 THOUSAND/UL (ref 1.85–7.62)
NEUTS BAND NFR BLD MANUAL: 25 % (ref 0–8)
NEUTS SEG NFR BLD AUTO: 45 % (ref 43–75)
NRBC BLD AUTO-RTO: 0 /100 WBCS
PLATELET # BLD AUTO: 108 THOUSANDS/UL (ref 149–390)
PLATELET BLD QL SMEAR: ABNORMAL
PMV BLD AUTO: 9.2 FL (ref 8.9–12.7)
POTASSIUM SERPL-SCNC: 3.7 MMOL/L (ref 3.5–5.3)
PROT SERPL-MCNC: 8.4 G/DL (ref 6.4–8.2)
RBC # BLD AUTO: 3.9 MILLION/UL (ref 3.81–5.12)
SODIUM SERPL-SCNC: 138 MMOL/L (ref 136–145)
TOTAL CELLS COUNTED SPEC: 100
VARIANT LYMPHS # BLD AUTO: 4 %
WBC # BLD AUTO: 16.87 THOUSAND/UL (ref 4.31–10.16)

## 2018-05-22 PROCEDURE — 83735 ASSAY OF MAGNESIUM: CPT | Performed by: OBSTETRICS & GYNECOLOGY

## 2018-05-22 PROCEDURE — 85027 COMPLETE CBC AUTOMATED: CPT | Performed by: OBSTETRICS & GYNECOLOGY

## 2018-05-22 PROCEDURE — 80053 COMPREHEN METABOLIC PANEL: CPT | Performed by: OBSTETRICS & GYNECOLOGY

## 2018-05-22 PROCEDURE — 85007 BL SMEAR W/DIFF WBC COUNT: CPT | Performed by: OBSTETRICS & GYNECOLOGY

## 2018-05-22 RX ADMIN — HEPARIN SODIUM (PORCINE) LOCK FLUSH IV SOLN 100 UNIT/ML 300 UNITS: 100 SOLUTION at 13:28

## 2018-05-22 NOTE — PROGRESS NOTES
Central labs drawn via port  Catheter maintenance performed per protocol without complications  Pt discharged in stable condition and is aware of next appointment  AVS provided

## 2018-05-29 ENCOUNTER — HOSPITAL ENCOUNTER (OUTPATIENT)
Dept: INFUSION CENTER | Facility: CLINIC | Age: 63
Discharge: HOME | End: 2018-05-29
Payer: MEDICARE

## 2018-05-29 LAB
ALBUMIN SERPL BCP-MCNC: 3.7 G/DL (ref 3.5–5)
ALP SERPL-CCNC: 83 U/L (ref 46–116)
ALT SERPL W P-5'-P-CCNC: 22 U/L (ref 12–78)
ANION GAP SERPL CALCULATED.3IONS-SCNC: 10 MMOL/L (ref 4–13)
AST SERPL W P-5'-P-CCNC: 14 U/L (ref 5–45)
BASOPHILS # BLD MANUAL: 0 THOUSAND/UL (ref 0–0.1)
BASOPHILS NFR MAR MANUAL: 0 % (ref 0–1)
BILIRUB SERPL-MCNC: 0.2 MG/DL (ref 0.2–1)
BUN SERPL-MCNC: 11 MG/DL (ref 5–25)
CALCIUM SERPL-MCNC: 8.5 MG/DL (ref 8.3–10.1)
CHLORIDE SERPL-SCNC: 103 MMOL/L (ref 100–108)
CO2 SERPL-SCNC: 26 MMOL/L (ref 21–32)
CREAT SERPL-MCNC: 0.61 MG/DL (ref 0.6–1.3)
DOHLE BOD BLD QL SMEAR: PRESENT
EOSINOPHIL # BLD MANUAL: 0.1 THOUSAND/UL (ref 0–0.4)
EOSINOPHIL NFR BLD MANUAL: 1 % (ref 0–6)
ERYTHROCYTE [DISTWIDTH] IN BLOOD BY AUTOMATED COUNT: 14.2 % (ref 11.6–15.1)
GFR SERPL CREATININE-BSD FRML MDRD: 95 ML/MIN/1.73SQ M
GLUCOSE SERPL-MCNC: 91 MG/DL (ref 65–140)
HCT VFR BLD AUTO: 35.8 % (ref 34.8–46.1)
HGB BLD-MCNC: 11.6 G/DL (ref 11.5–15.4)
LYMPHOCYTES # BLD AUTO: 1.99 THOUSAND/UL (ref 0.6–4.47)
LYMPHOCYTES # BLD AUTO: 19 % (ref 14–44)
MAGNESIUM SERPL-MCNC: 1.8 MG/DL (ref 1.6–2.6)
MCH RBC QN AUTO: 32.2 PG (ref 26.8–34.3)
MCHC RBC AUTO-ENTMCNC: 32.4 G/DL (ref 31.4–37.4)
MCV RBC AUTO: 99 FL (ref 82–98)
MONOCYTES # BLD AUTO: 0.63 THOUSAND/UL (ref 0–1.22)
MONOCYTES NFR BLD: 6 % (ref 4–12)
NEUTROPHILS # BLD MANUAL: 7.64 THOUSAND/UL (ref 1.85–7.62)
NEUTS BAND NFR BLD MANUAL: 10 % (ref 0–8)
NEUTS SEG NFR BLD AUTO: 63 % (ref 43–75)
NRBC BLD AUTO-RTO: 0 /100 WBCS
PLATELET # BLD AUTO: 188 THOUSANDS/UL (ref 149–390)
PLATELET BLD QL SMEAR: ADEQUATE
PMV BLD AUTO: 9.4 FL (ref 8.9–12.7)
POTASSIUM SERPL-SCNC: 3.8 MMOL/L (ref 3.5–5.3)
PROT SERPL-MCNC: 7.6 G/DL (ref 6.4–8.2)
RBC # BLD AUTO: 3.6 MILLION/UL (ref 3.81–5.12)
SODIUM SERPL-SCNC: 139 MMOL/L (ref 136–145)
TOTAL CELLS COUNTED SPEC: 100
TOXIC GRANULES BLD QL SMEAR: PRESENT
VARIANT LYMPHS # BLD AUTO: 1 %
WBC # BLD AUTO: 10.46 THOUSAND/UL (ref 4.31–10.16)

## 2018-05-29 PROCEDURE — 85007 BL SMEAR W/DIFF WBC COUNT: CPT | Performed by: OBSTETRICS & GYNECOLOGY

## 2018-05-29 PROCEDURE — 85027 COMPLETE CBC AUTOMATED: CPT | Performed by: OBSTETRICS & GYNECOLOGY

## 2018-05-29 PROCEDURE — 80053 COMPREHEN METABOLIC PANEL: CPT | Performed by: OBSTETRICS & GYNECOLOGY

## 2018-05-29 PROCEDURE — 83735 ASSAY OF MAGNESIUM: CPT | Performed by: OBSTETRICS & GYNECOLOGY

## 2018-05-29 RX ADMIN — Medication 300 UNITS: at 13:18

## 2018-06-05 ENCOUNTER — HOSPITAL ENCOUNTER (OUTPATIENT)
Dept: INFUSION CENTER | Facility: CLINIC | Age: 63
Discharge: HOME | End: 2018-06-05
Payer: MEDICARE

## 2018-06-05 LAB
ALBUMIN SERPL BCP-MCNC: 3.9 G/DL (ref 3.5–5)
ALP SERPL-CCNC: 68 U/L (ref 46–116)
ALT SERPL W P-5'-P-CCNC: 22 U/L (ref 12–78)
ANION GAP SERPL CALCULATED.3IONS-SCNC: 8 MMOL/L (ref 4–13)
AST SERPL W P-5'-P-CCNC: 17 U/L (ref 5–45)
BASOPHILS # BLD AUTO: 0.03 THOUSANDS/ΜL (ref 0–0.1)
BASOPHILS NFR BLD AUTO: 1 % (ref 0–1)
BILIRUB SERPL-MCNC: 0.3 MG/DL (ref 0.2–1)
BUN SERPL-MCNC: 10 MG/DL (ref 5–25)
CALCIUM SERPL-MCNC: 9.2 MG/DL (ref 8.3–10.1)
CANCER AG125 SERPL-ACNC: 10.3 U/ML (ref 0–30)
CHLORIDE SERPL-SCNC: 103 MMOL/L (ref 100–108)
CO2 SERPL-SCNC: 27 MMOL/L (ref 21–32)
CREAT SERPL-MCNC: 0.52 MG/DL (ref 0.6–1.3)
EOSINOPHIL # BLD AUTO: 0.07 THOUSAND/ΜL (ref 0–0.61)
EOSINOPHIL NFR BLD AUTO: 1 % (ref 0–6)
ERYTHROCYTE [DISTWIDTH] IN BLOOD BY AUTOMATED COUNT: 14.2 % (ref 11.6–15.1)
GFR SERPL CREATININE-BSD FRML MDRD: 101 ML/MIN/1.73SQ M
GLUCOSE SERPL-MCNC: 92 MG/DL (ref 65–140)
HCT VFR BLD AUTO: 35.8 % (ref 34.8–46.1)
HGB BLD-MCNC: 11.7 G/DL (ref 11.5–15.4)
IMM GRANULOCYTES # BLD AUTO: 0.04 THOUSAND/UL (ref 0–0.2)
IMM GRANULOCYTES NFR BLD AUTO: 1 % (ref 0–2)
LYMPHOCYTES # BLD AUTO: 2.3 THOUSANDS/ΜL (ref 0.6–4.47)
LYMPHOCYTES NFR BLD AUTO: 36 % (ref 14–44)
MAGNESIUM SERPL-MCNC: 2 MG/DL (ref 1.6–2.6)
MCH RBC QN AUTO: 32.5 PG (ref 26.8–34.3)
MCHC RBC AUTO-ENTMCNC: 32.7 G/DL (ref 31.4–37.4)
MCV RBC AUTO: 99 FL (ref 82–98)
MONOCYTES # BLD AUTO: 0.63 THOUSAND/ΜL (ref 0.17–1.22)
MONOCYTES NFR BLD AUTO: 10 % (ref 4–12)
NEUTROPHILS # BLD AUTO: 3.41 THOUSANDS/ΜL (ref 1.85–7.62)
NEUTS SEG NFR BLD AUTO: 51 % (ref 43–75)
NRBC BLD AUTO-RTO: 0 /100 WBCS
PLATELET # BLD AUTO: 266 THOUSANDS/UL (ref 149–390)
PMV BLD AUTO: 8.6 FL (ref 8.9–12.7)
POTASSIUM SERPL-SCNC: 3.9 MMOL/L (ref 3.5–5.3)
PROT SERPL-MCNC: 7.9 G/DL (ref 6.4–8.2)
RBC # BLD AUTO: 3.6 MILLION/UL (ref 3.81–5.12)
SODIUM SERPL-SCNC: 138 MMOL/L (ref 136–145)
WBC # BLD AUTO: 6.48 THOUSAND/UL (ref 4.31–10.16)

## 2018-06-05 PROCEDURE — 85025 COMPLETE CBC W/AUTO DIFF WBC: CPT | Performed by: OBSTETRICS & GYNECOLOGY

## 2018-06-05 PROCEDURE — 80053 COMPREHEN METABOLIC PANEL: CPT | Performed by: OBSTETRICS & GYNECOLOGY

## 2018-06-05 PROCEDURE — 83735 ASSAY OF MAGNESIUM: CPT | Performed by: OBSTETRICS & GYNECOLOGY

## 2018-06-05 PROCEDURE — 86304 IMMUNOASSAY TUMOR CA 125: CPT | Performed by: OBSTETRICS & GYNECOLOGY

## 2018-06-05 RX ADMIN — HEPARIN 300 UNITS: 100 SYRINGE at 13:20

## 2018-06-05 NOTE — PROGRESS NOTES
Central labs drawn via port  Catheter maintenance performed per protocol without complications  Pt aware to return on Thursday for chemotherapy treatment  AVS provided

## 2018-06-06 ENCOUNTER — OFFICE VISIT (OUTPATIENT)
Dept: GYNECOLOGIC ONCOLOGY | Facility: CLINIC | Age: 63
End: 2018-06-06
Payer: MEDICARE

## 2018-06-06 VITALS
HEART RATE: 76 BPM | HEIGHT: 64 IN | RESPIRATION RATE: 16 BRPM | TEMPERATURE: 97.7 F | WEIGHT: 109 LBS | BODY MASS INDEX: 18.61 KG/M2 | DIASTOLIC BLOOD PRESSURE: 68 MMHG | SYSTOLIC BLOOD PRESSURE: 110 MMHG

## 2018-06-06 DIAGNOSIS — C57.00 CARCINOMA OF FALLOPIAN TUBE, UNSPECIFIED LATERALITY (HCC): Primary | ICD-10-CM

## 2018-06-06 PROCEDURE — 99214 OFFICE O/P EST MOD 30 MIN: CPT | Performed by: OBSTETRICS & GYNECOLOGY

## 2018-06-06 RX ORDER — SODIUM CHLORIDE 9 MG/ML
20 INJECTION, SOLUTION INTRAVENOUS CONTINUOUS
Status: DISCONTINUED | OUTPATIENT
Start: 2018-06-07 | End: 2018-06-10 | Stop reason: HOSPADM

## 2018-06-06 RX ORDER — PALONOSETRON 0.05 MG/ML
0.25 INJECTION, SOLUTION INTRAVENOUS ONCE
Status: COMPLETED | OUTPATIENT
Start: 2018-06-07 | End: 2018-06-07

## 2018-06-06 NOTE — ADDENDUM NOTE
Addended by: Lisa Chang on: 6/6/2018 01:51 PM     Modules accepted: Orders Blood pressure is still appeared to be a little on the high side. Would like to see if there is an improvement if he doubles his dose of metoprolol to 50 mg daily. He could continue monitoring his blood pressures and update us in a couple of weeks with how things are looking.

## 2018-06-06 NOTE — PROGRESS NOTES
Fariba Duffy  1955  [address] [place] [id number]      Chief Complaint   Patient presents with    Chemotherapy     #6         Previous Therapy: see below    History of Present Illness: The patient is a delightful 24-year-old with stage IIIC fallopian tube cancer  Patient has completed 3 cycles of neoadjuvant chemotherapy and underwent an interval cytoreductive surgery  Patient comes in today for 2nd of postoperative appointment and has no complaints  Patient feels like she has well healed from her surgery  The patient has re-initiated her 2nd round of chemotherapy  Patient states that she has undergone chemotherapy with minimal complaints other than fatigue  She was started on Neulasta prior to cycle 5 for neutropenia  Patient comes in today with minimal complaints  She is here for pre chemo clearance for cycle 6  Fallopian tube carcinoma ([facility] [address] )    1/4/2018 Initial Diagnosis     Paracentesis revealing adenocarcinoma of gynecologic origin         1/25/2018 - 3/8/2018 Chemotherapy     3 cycles of carboplatin AUC 6 and taxol 175 mg/m2 squared Q 3 weeks  Pretreatment Ca 125: 534 6          4/2/2018 Surgery     Exploratory laparotomy, total abdominal hysterectomy, bilateral salpingo-oophorectomy, omentectomy, appendectomy  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  No gross residual disease  4/26/2018 -  Chemotherapy     3 cycles of carboplatin AUC 5 and Taxol 175 mg/m2 Q 3 weeks (carboplatin dose dropped to AUC of 5 for neutropenia prior to cycle 3)            Review of Systems   Constitutional: Positive for fatigue  Negative for activity change, appetite change, chills, diaphoresis, fever and unexpected weight change     HENT: Negative for congestion, dental problem, drooling, ear discharge, ear pain, facial swelling, hearing loss, mouth sores, nosebleeds, postnasal drip, rhinorrhea, sinus pain, sinus pressure, sneezing, sore throat, tinnitus, trouble swallowing and voice change  Eyes: Negative for photophobia, pain, discharge, redness, itching and visual disturbance  Respiratory: Negative for apnea, cough, choking, chest tightness, shortness of breath, wheezing and stridor  Cardiovascular: Negative for chest pain, palpitations and leg swelling  Gastrointestinal: Negative for abdominal distention, abdominal pain, anal bleeding, blood in stool, constipation, diarrhea, nausea, rectal pain and vomiting  Endocrine: Negative for cold intolerance, heat intolerance, polydipsia, polyphagia and polyuria  Genitourinary: Negative for decreased urine volume, difficulty urinating, dyspareunia, dysuria, enuresis, flank pain, frequency, genital sores, hematuria, menstrual problem, pelvic pain, urgency, vaginal bleeding, vaginal discharge and vaginal pain  Musculoskeletal: Negative for arthralgias, back pain, gait problem, joint swelling, myalgias, neck pain and neck stiffness  Skin: Negative for color change, pallor, rash and wound  Allergic/Immunologic: Negative for environmental allergies, food allergies and immunocompromised state  Neurological: Negative for dizziness, tremors, seizures, syncope, facial asymmetry, speech difficulty, weakness, light-headedness, numbness and headaches  Hematological: Negative for adenopathy  Does not bruise/bleed easily  Psychiatric/Behavioral: Negative for agitation, behavioral problems, confusion, decreased concentration, dysphoric mood, hallucinations, self-injury, sleep disturbance and suicidal ideas  The patient is not nervous/anxious and is not hyperactive          Patient Active Problem List   Diagnosis    Abdominal pain    Hypokalemia    Hyponatremia    Hypertension    Malignant ascites    Fallopian tube carcinoma (HCC)    Cancer related pain    Chemotherapy-induced neuropathy ([facility] [address] )    Chemotherapy-induced nausea    Medical marijuana use     Social History     Social History    Marital status: /Civil Union     Spouse name: N/A    Number of children: N/A    Years of education: N/A     Occupational History    Not on file  Social History Main Topics    Smoking status: Never Smoker    Smokeless tobacco: Never Used    Alcohol use No    Drug use: No    Sexual activity: Not on file     Other Topics Concern    Not on file     Social History Narrative    No narrative on file     Past Medical History:   Diagnosis Date    Arthritis     Ascites     Cancer ([facility] [address] )     Hypertension 1/4/2018    St[facility] 1995       Current Outpatient Prescriptions:     ascorbic acid (VITAMIN C) 250 mg tablet, Take 250 mg by mouth daily, Disp: , Rfl:     Calcium Carbonate (CALCIUM 600 PO), Take by mouth, Disp: , Rfl:     Cholecalciferol (VITAMIN D3) 85746 units TABS, Take by mouth, Disp: , Rfl:     Lactobacillus ([employer]S PROBIOTIC ACIDOPHILUS PO), Take 1 capsule by mouth daily  , Disp: , Rfl:     lidocaine-prilocaine (EMLA) cream, APPLY 1 INCH TO IV SITE PRIOR TO CHEMO TREATMENT, Disp: , Rfl: 0    LORazepam (ATIVAN) 1 mg tablet, take 1 tablet by mouth every 6 hours if needed for nausea or anxiety, Disp: , Rfl: 0    Magnesium 200 MG TABS, Take by mouth daily  , Disp: , Rfl:     oxyCODONE-acetaminophen (PERCOCET) 5-325 mg per tablet, Take 1 tablet by mouth every 4 (four) hours as needed for moderate pain Max Daily Amount: 6 tablets, Disp: 30 tablet, Rfl: 0    pantoprazole (PROTONIX) 40 mg tablet, Take 1 tablet by mouth daily in the early morning, Disp: 30 tablet, Rfl: 0    enoxaparin (LOVENOX) 40 mg/0 4 mL, Inject 0 4 mL (40 mg total) under the skin daily for 14 days, Disp: 5 6 mL, Rfl: 0    ondansetron (ZOFRAN) 8 mg tablet, Take by mouth every 8 (eight) hours as needed for nausea or vomiting, Disp: , Rfl:   No current facility-administered medications for this visit       Facility-Administered Medications Ordered in Other Visits:     alteplase (CATHFLO) injection 2 mg, 2 mg, Intracatheter, Airam DSEAI MD    heparin lock flush 100 units/mL injection 300 Units, 300 Units, Intracatheter, Airam DESAI MD, 300 Units at 06/05/18 1320  Allergies   Allergen Reactions    Black Pepper [Piper] Rash     ROSE THE INSIDE OF THE MOUTH    Hexachlorophene Other (See Comments)     "Burns my skin"    Yeast-[place] Pov-Iodine Med [Povidone Iodine]      PHYSOHEX CLEANSER- "burns my skin"     Vitals:    06/06/18 1027   BP: 110/68   Pulse: 76   Resp: 16   Temp: 97 7 °F (36 5 °C)       Labs:  CMP  Lab Results   Component Value Date     06/05/2018    K 3 9 06/05/2018     06/05/2018    CO2 27 06/05/2018    ANIONGAP 8 06/05/2018    BUN 10 06/05/2018    CREATININE 0 52 (L) 06/05/2018    GLUCOSE 92 06/05/2018    GLUF 119 (H) 01/22/2018    CALCIUM 9 2 06/05/2018    AST 17 06/05/2018    ALT 22 06/05/2018    ALKPHOS 68 06/05/2018    PROT 7 9 06/05/2018    BILITOT 0 30 06/05/2018    EGFR 101 06/05/2018       BMP  Lab Results   Component Value Date    GLUCOSE 92 06/05/2018    CALCIUM 9 2 06/05/2018     06/05/2018    K 3 9 06/05/2018    CO2 27 06/05/2018     06/05/2018    BUN 10 06/05/2018    CREATININE 0 52 (L) 06/05/2018       Lipids  No results found for: CHOL  No results found for: HDL  No results found for: LDLCALC  No results found for: TRIG  No components found for: CHOLHDL    Hemoglobin A1C  Lab Results   Component Value Date    HGBA1C 5 1 03/26/2018       Fasting Glucose  Lab Results   Component Value Date    GLUF 119 (H) 01/22/2018       Insulin     Thyroid  No results found for: TSH, [id number], W4FBIEG, THYROIDAB    Hepatic Function Panel  Lab Results   Component Value Date    ALT 22 06/05/2018    AST 17 06/05/2018    ALKPHOS 68 06/05/2018    BILITOT 0 30 06/05/2018       Celiac Disease Antibody Panel  No results found for: ENDOMYSIAL IGA, GLIADIN IGA, GLIADIN IGG, IGA, TISSUE TRANSGLUT AB, TTG IGA   Iron  No results found for: IRON, TIBC, FERRITIN    Other Labs:     Final Diagnosis   Primary Tumor of the Ovary/Fallopian Tube/Peritoneum (Includes specimens A to D, Pelvic washings [id number] and prior Ascitic fluid [id number])  - Procedure: Total hysterectomy, bilateral salpingo-oophorectomy, omentectomy and appendectomy  - Hysterectomy type: Abdominal  - Specimen integrity:      - Right ovary (if applicable): intact      - Left ovary (if applicable):intact      - Right fallopian tube (if applicable): intact      - Left fallopian tube (if applicable):intact      - Morcellated specimen (specify organ): N/A  - Tumor site: Bilateral fallopian tubes (See comment)  - Ovarian surface involvement:Present, bilateral  - Fallopian tube surface involvement: Present, bilateral  - Tumor size: largest tumor focus noted in omentum, 2 6 cm  - Histologic type: Serous carcinoma   - Histologic grade: High grade  - Implants (required for advanced stage serous/seromucinous borderline tumors only): N/A  - Other tissue/organ involvement: Cervix focally involved with carcinoma at deep margin   Appendix with tumor deposits on serosal surface  - Largest extrapelvic peritoneal focus (required only if applicable): 2 6 cm macroscopic    - Specify site: Omentum  - Peritoneal/ascetic fluid: Negative for carcinoma ([id number])  - Pleural fluid: Not submitted  - Treatment effect: Minimal to moderate response (CRS 1 to 2)  - Regional lymph nodes:    - No lymph nodes submitted or found  - Pathologic stage classification (pTNM, AJCC 8th edition): ypT3c, pNx, G2 (high)  - FIGO stage (2015 FIGO cancer report): IIIC  - Additional pathologic findings: Leiomyoma of uterus  - Ancillary studies: Not performed  - Clinical history: malignant ascites, post chemotherapy ([id number])  - Comments: Foci of invasive mucosal carcinoma are noted in bilateral fallopian tubes, hence the primary site for serous carcinoma is presumed to be tubal   Ref: Bryce Baron al  Assessment of new system for primary site assignment in high grade serous carcinoma of fallopian tube, ovary and peritoneum  Histopathology 2015 [phone]     FINAL DIAGNOSIS ON EACH SPECIMEN ON THIS CASE:  A  Omentum:  - Serous carcinoma, high grade   - Largest tumor deposit, 2 6 cm       B  Ovary and Fallopian Tube, Left:  - Serous carcinoma, high grade  - Invasive carcinoma involves fallopian tube (B10) and ovary  - Surface involvement of ovary is present       C  Uterus, cervix, right fallopian  tube and ovary:  - Serous carcinoma high grade, involving focally involving cervix at deep margin   - Inactive endometrium   - Leiomyoma  - Serous carcinoma involves fallopian tube and ovary      D  Appendix:  - Serous carcinoma, high grade involving periappendiceal serosa  - Appendix with fibrous obliteration of tip, no transmural involvement with tumor  Physical Exam   Constitutional: She is oriented to person, place, and time  She appears well-developed and well-nourished  No distress  HENT:   Head: Normocephalic and atraumatic  Right Ear: External ear normal    Left Ear: External ear normal    Nose: Nose normal    Mouth/Throat: Oropharynx is clear and moist  No oropharyngeal exudate  Eyes: Conjunctivae and EOM are normal  Pupils are equal, round, and reactive to light  Right eye exhibits no discharge  Left eye exhibits no discharge  No scleral icterus  Neck: Normal range of motion  Neck supple  No JVD present  No tracheal deviation present  No thyromegaly present  Cardiovascular: Normal rate, regular rhythm, normal heart sounds and intact distal pulses  Exam reveals no gallop and no friction rub  No murmur heard  Pulmonary/Chest: Effort normal and breath sounds normal  No stridor  No respiratory distress  She has no wheezes  She has no rales  She exhibits no tenderness  Abdominal: Soft  Bowel sounds are normal  She exhibits no distension and no mass  There is no tenderness  There is no rebound and no guarding     Well healed incision(s)   Genitourinary: No vaginal discharge found  Genitourinary Comments: Surgical absence of the cervix, uterus, bilateral fallopian tubes and ovaries   Musculoskeletal: Normal range of motion  She exhibits no edema, tenderness or deformity  Lymphadenopathy:     She has no cervical adenopathy  Neurological: She is alert and oriented to person, place, and time  She has normal reflexes  No cranial nerve deficit  She exhibits normal muscle tone  Coordination normal    Skin: Skin is warm and dry  No rash noted  She is not diaphoretic  No erythema  No pallor  Psychiatric: She has a normal mood and affect  Her behavior is normal  Judgment and thought content normal        Assessment  stage IIIC fallopian tube cancer undergoing treatment    Plan      The patient is cleared for cycle 6 chemotherapy  She will need a CT scan of the chest, abdomen and pelvis upon completion of treatment    Patient will follow up per chemo tanner Scott MD, PhD, Dillon Mcintosh  Attending Physician, [address] [place] [address]

## 2018-06-07 ENCOUNTER — HOSPITAL ENCOUNTER (OUTPATIENT)
Dept: INFUSION CENTER | Facility: CLINIC | Age: 63
Discharge: HOME | End: 2018-06-07
Payer: MEDICARE

## 2018-06-07 VITALS
RESPIRATION RATE: 18 BRPM | HEART RATE: 65 BPM | WEIGHT: 108.91 LBS | BODY MASS INDEX: 19.3 KG/M2 | TEMPERATURE: 98.1 F | DIASTOLIC BLOOD PRESSURE: 70 MMHG | SYSTOLIC BLOOD PRESSURE: 161 MMHG | HEIGHT: 63 IN

## 2018-06-07 PROCEDURE — 96413 CHEMO IV INFUSION 1 HR: CPT

## 2018-06-07 PROCEDURE — 96375 TX/PRO/DX INJ NEW DRUG ADDON: CPT

## 2018-06-07 PROCEDURE — 96367 TX/PROPH/DG ADDL SEQ IV INF: CPT

## 2018-06-07 PROCEDURE — 96415 CHEMO IV INFUSION ADDL HR: CPT

## 2018-06-07 PROCEDURE — 96417 CHEMO IV INFUS EACH ADDL SEQ: CPT

## 2018-06-07 RX ADMIN — CARBOPLATIN 435 MG: 10 INJECTION, SOLUTION INTRAVENOUS at 13:45

## 2018-06-07 RX ADMIN — PALONOSETRON HYDROCHLORIDE 0.25 MG: 0.25 INJECTION INTRAVENOUS at 10:34

## 2018-06-07 RX ADMIN — PACLITAXEL 262 MG: 6 INJECTION, SOLUTION, CONCENTRATE INTRAVENOUS at 10:39

## 2018-06-07 RX ADMIN — DEXAMETHASONE SODIUM PHOSPHATE 20 MG: 10 INJECTION, SOLUTION INTRAMUSCULAR; INTRAVENOUS at 09:03

## 2018-06-07 RX ADMIN — HEPARIN SODIUM (PORCINE) LOCK FLUSH IV SOLN 100 UNIT/ML 300 UNITS: 100 SOLUTION at 14:48

## 2018-06-07 RX ADMIN — SODIUM CHLORIDE 20 ML/HR: 0.9 INJECTION, SOLUTION INTRAVENOUS at 08:55

## 2018-06-07 RX ADMIN — SODIUM CHLORIDE 150 MG: 9 INJECTION, SOLUTION INTRAVENOUS at 10:00

## 2018-06-07 RX ADMIN — DIPHENHYDRAMINE HYDROCHLORIDE 25 MG: 50 INJECTION, SOLUTION INTRAMUSCULAR; INTRAVENOUS at 09:19

## 2018-06-07 RX ADMIN — FAMOTIDINE 20 MG: 10 INJECTION, SOLUTION INTRAVENOUS at 09:39

## 2018-06-07 NOTE — PROGRESS NOTES
Neulasta ordered 6/8/18  Pt does not want neulasta, as she had such terrible bone pain last time she received the drug  Call to physician's office to make aware   Order received to hold neulasta 6/8/18 per pt request

## 2018-06-07 NOTE — PLAN OF CARE
Problem: Potential for Falls  Goal: Patient will remain free of falls  INTERVENTIONS:  - Assess patient frequently for physical needs  -  Identify cognitive and physical deficits and behaviors that affect risk of falls    -  [place] fall precautions as indicated by assessment   - Educate patient/family on patient safety including physical limitations  - Instruct patient to call for assistance with activity based on assessment  - Modify environment to reduce risk of injury  - Consider OT/PT consult to assist with strengthening/mobility   Outcome: Progressing

## 2018-06-12 ENCOUNTER — HOSPITAL ENCOUNTER (OUTPATIENT)
Dept: INFUSION CENTER | Facility: CLINIC | Age: 63
Discharge: HOME | End: 2018-06-12
Payer: MEDICARE

## 2018-06-12 LAB
ALBUMIN SERPL BCP-MCNC: 3.6 G/DL (ref 3.5–5)
ALP SERPL-CCNC: 58 U/L (ref 46–116)
ALT SERPL W P-5'-P-CCNC: 19 U/L (ref 12–78)
ANION GAP SERPL CALCULATED.3IONS-SCNC: 10 MMOL/L (ref 4–13)
AST SERPL W P-5'-P-CCNC: 13 U/L (ref 5–45)
BASOPHILS # BLD AUTO: 0.03 THOUSANDS/ΜL (ref 0–0.1)
BASOPHILS NFR BLD AUTO: 0 % (ref 0–1)
BILIRUB SERPL-MCNC: 0.7 MG/DL (ref 0.2–1)
BUN SERPL-MCNC: 11 MG/DL (ref 5–25)
CALCIUM SERPL-MCNC: 9.1 MG/DL (ref 8.3–10.1)
CHLORIDE SERPL-SCNC: 100 MMOL/L (ref 100–108)
CO2 SERPL-SCNC: 26 MMOL/L (ref 21–32)
CREAT SERPL-MCNC: 0.59 MG/DL (ref 0.6–1.3)
EOSINOPHIL # BLD AUTO: 0.04 THOUSAND/ΜL (ref 0–0.61)
EOSINOPHIL NFR BLD AUTO: 0 % (ref 0–6)
ERYTHROCYTE [DISTWIDTH] IN BLOOD BY AUTOMATED COUNT: 13.8 % (ref 11.6–15.1)
GFR SERPL CREATININE-BSD FRML MDRD: 96 ML/MIN/1.73SQ M
GLUCOSE SERPL-MCNC: 129 MG/DL (ref 65–140)
HCT VFR BLD AUTO: 33.6 % (ref 34.8–46.1)
HGB BLD-MCNC: 11.2 G/DL (ref 11.5–15.4)
IMM GRANULOCYTES # BLD AUTO: 0.06 THOUSAND/UL (ref 0–0.2)
IMM GRANULOCYTES NFR BLD AUTO: 1 % (ref 0–2)
LYMPHOCYTES # BLD AUTO: 1.39 THOUSANDS/ΜL (ref 0.6–4.47)
LYMPHOCYTES NFR BLD AUTO: 14 % (ref 14–44)
MAGNESIUM SERPL-MCNC: 1.9 MG/DL (ref 1.6–2.6)
MCH RBC QN AUTO: 32.8 PG (ref 26.8–34.3)
MCHC RBC AUTO-ENTMCNC: 33.3 G/DL (ref 31.4–37.4)
MCV RBC AUTO: 99 FL (ref 82–98)
MONOCYTES # BLD AUTO: 0.27 THOUSAND/ΜL (ref 0.17–1.22)
MONOCYTES NFR BLD AUTO: 3 % (ref 4–12)
NEUTROPHILS # BLD AUTO: 8.1 THOUSANDS/ΜL (ref 1.85–7.62)
NEUTS SEG NFR BLD AUTO: 82 % (ref 43–75)
NRBC BLD AUTO-RTO: 0 /100 WBCS
PLATELET # BLD AUTO: 213 THOUSANDS/UL (ref 149–390)
PMV BLD AUTO: 9.2 FL (ref 8.9–12.7)
POTASSIUM SERPL-SCNC: 3.3 MMOL/L (ref 3.5–5.3)
PROT SERPL-MCNC: 7.8 G/DL (ref 6.4–8.2)
RBC # BLD AUTO: 3.41 MILLION/UL (ref 3.81–5.12)
SODIUM SERPL-SCNC: 136 MMOL/L (ref 136–145)
WBC # BLD AUTO: 9.89 THOUSAND/UL (ref 4.31–10.16)

## 2018-06-12 PROCEDURE — 80053 COMPREHEN METABOLIC PANEL: CPT | Performed by: OBSTETRICS & GYNECOLOGY

## 2018-06-12 PROCEDURE — 85025 COMPLETE CBC W/AUTO DIFF WBC: CPT | Performed by: OBSTETRICS & GYNECOLOGY

## 2018-06-12 PROCEDURE — 83735 ASSAY OF MAGNESIUM: CPT | Performed by: OBSTETRICS & GYNECOLOGY

## 2018-06-12 RX ADMIN — HEPARIN SODIUM (PORCINE) LOCK FLUSH IV SOLN 100 UNIT/ML 300 UNITS: 100 SOLUTION at 12:36

## 2018-06-19 ENCOUNTER — HOSPITAL ENCOUNTER (OUTPATIENT)
Dept: INFUSION CENTER | Facility: CLINIC | Age: 63
Discharge: HOME | End: 2018-06-19
Payer: MEDICARE

## 2018-06-19 LAB
ALBUMIN SERPL BCP-MCNC: 3.2 G/DL (ref 3.5–5)
ALP SERPL-CCNC: 54 U/L (ref 46–116)
ALT SERPL W P-5'-P-CCNC: 18 U/L (ref 12–78)
ANION GAP SERPL CALCULATED.3IONS-SCNC: 11 MMOL/L (ref 4–13)
AST SERPL W P-5'-P-CCNC: 13 U/L (ref 5–45)
BASOPHILS # BLD AUTO: 0.04 THOUSANDS/ΜL (ref 0–0.1)
BASOPHILS NFR BLD AUTO: 0 % (ref 0–1)
BILIRUB SERPL-MCNC: 0.2 MG/DL (ref 0.2–1)
BUN SERPL-MCNC: 10 MG/DL (ref 5–25)
CALCIUM SERPL-MCNC: 8.7 MG/DL (ref 8.3–10.1)
CHLORIDE SERPL-SCNC: 103 MMOL/L (ref 100–108)
CO2 SERPL-SCNC: 27 MMOL/L (ref 21–32)
CREAT SERPL-MCNC: 0.68 MG/DL (ref 0.6–1.3)
EOSINOPHIL # BLD AUTO: 0.06 THOUSAND/ΜL (ref 0–0.61)
EOSINOPHIL NFR BLD AUTO: 1 % (ref 0–6)
ERYTHROCYTE [DISTWIDTH] IN BLOOD BY AUTOMATED COUNT: 13.8 % (ref 11.6–15.1)
GFR SERPL CREATININE-BSD FRML MDRD: 92 ML/MIN/1.73SQ M
GLUCOSE SERPL-MCNC: 97 MG/DL (ref 65–140)
HCT VFR BLD AUTO: 31.3 % (ref 34.8–46.1)
HGB BLD-MCNC: 10.3 G/DL (ref 11.5–15.4)
IMM GRANULOCYTES # BLD AUTO: 0.06 THOUSAND/UL (ref 0–0.2)
IMM GRANULOCYTES NFR BLD AUTO: 1 % (ref 0–2)
LYMPHOCYTES # BLD AUTO: 1.78 THOUSANDS/ΜL (ref 0.6–4.47)
LYMPHOCYTES NFR BLD AUTO: 20 % (ref 14–44)
MAGNESIUM SERPL-MCNC: 1.9 MG/DL (ref 1.6–2.6)
MCH RBC QN AUTO: 32.3 PG (ref 26.8–34.3)
MCHC RBC AUTO-ENTMCNC: 32.9 G/DL (ref 31.4–37.4)
MCV RBC AUTO: 98 FL (ref 82–98)
MONOCYTES # BLD AUTO: 1.03 THOUSAND/ΜL (ref 0.17–1.22)
MONOCYTES NFR BLD AUTO: 11 % (ref 4–12)
NEUTROPHILS # BLD AUTO: 6.08 THOUSANDS/ΜL (ref 1.85–7.62)
NEUTS SEG NFR BLD AUTO: 67 % (ref 43–75)
NRBC BLD AUTO-RTO: 0 /100 WBCS
PLATELET # BLD AUTO: 316 THOUSANDS/UL (ref 149–390)
PMV BLD AUTO: 8.7 FL (ref 8.9–12.7)
POTASSIUM SERPL-SCNC: 3.2 MMOL/L (ref 3.5–5.3)
PROT SERPL-MCNC: 7.9 G/DL (ref 6.4–8.2)
RBC # BLD AUTO: 3.19 MILLION/UL (ref 3.81–5.12)
SODIUM SERPL-SCNC: 141 MMOL/L (ref 136–145)
WBC # BLD AUTO: 9.05 THOUSAND/UL (ref 4.31–10.16)

## 2018-06-19 PROCEDURE — 80053 COMPREHEN METABOLIC PANEL: CPT | Performed by: PHYSICIAN ASSISTANT

## 2018-06-19 PROCEDURE — 85025 COMPLETE CBC W/AUTO DIFF WBC: CPT | Performed by: PHYSICIAN ASSISTANT

## 2018-06-19 PROCEDURE — 83735 ASSAY OF MAGNESIUM: CPT | Performed by: PHYSICIAN ASSISTANT

## 2018-06-19 RX ADMIN — HEPARIN SODIUM (PORCINE) LOCK FLUSH IV SOLN 100 UNIT/ML 300 UNITS: 100 SOLUTION at 13:20

## 2018-06-19 NOTE — PROGRESS NOTES
Pt offers no complaints  Labs drawn and sent to the lab  Port flushed and hep-locked  Aware of next appointments  AVS printed and given to patient

## 2018-06-21 ENCOUNTER — OFFICE VISIT (OUTPATIENT)
Dept: GYNECOLOGIC ONCOLOGY | Facility: CLINIC | Age: 63
End: 2018-06-21

## 2018-06-21 DIAGNOSIS — C57.00 CARCINOMA OF FALLOPIAN TUBE, UNSPECIFIED LATERALITY (HCC): ICD-10-CM

## 2018-06-21 NOTE — PROGRESS NOTES
Status:  Genetic testing pending, estimated turn around time: 3 weeks    Summary:    The patient was seen for genetic counseling regarding possible genetic testing, relative to her personal diagnosis of fallopian tube cancer  Family information was reviewed and a three generation pedigree drawn  The patient was recently diagnosed with fallopian tube cancer at the age of 57  The  family history is significant for her mother who was diagnosed with endometrial cancer at 67 and breast cancer at 66  Please see pedigree for additional family history details  We reviewed the genetics of cancer, hereditary and familial risks, and the main benefits and limitation of genetic testing for susceptibility to cancer  We discussed hereditary cancer syndromes associated with fallopian tube cancers including HBOC and Carrillo syndrome  We reviewed cancer risks associated with these genes, options for medical management, and potential risks for family members  Genetic Testing: We reviewed the genetic testing process, insurance concerns, and possible results  The patient elected to pursue genetic testing for genes associated with hereditary gynecologic cancers  Informed consent was obtained and a DNA sample was collected  The sample was sent to [facility] for [employer] panel testing  Test results should be available in approximately 3 weeks  The patient has been scheduled for a follow up appointment to discuss results on 7/19/2018

## 2018-06-26 ENCOUNTER — HOSPITAL ENCOUNTER (OUTPATIENT)
Dept: INFUSION CENTER | Facility: CLINIC | Age: 63
Discharge: HOME | End: 2018-06-26
Payer: MEDICARE

## 2018-06-26 LAB
ALBUMIN SERPL BCP-MCNC: 3.4 G/DL (ref 3.5–5)
ALP SERPL-CCNC: 48 U/L (ref 46–116)
ALT SERPL W P-5'-P-CCNC: 19 U/L (ref 12–78)
ANION GAP SERPL CALCULATED.3IONS-SCNC: 8 MMOL/L (ref 4–13)
AST SERPL W P-5'-P-CCNC: 15 U/L (ref 5–45)
BASOPHILS # BLD MANUAL: 0.07 THOUSAND/UL (ref 0–0.1)
BASOPHILS NFR MAR MANUAL: 1 % (ref 0–1)
BILIRUB SERPL-MCNC: 0.2 MG/DL (ref 0.2–1)
BUN SERPL-MCNC: 8 MG/DL (ref 5–25)
CALCIUM SERPL-MCNC: 9 MG/DL (ref 8.3–10.1)
CANCER AG125 SERPL-ACNC: 11.3 U/ML (ref 0–30)
CHLORIDE SERPL-SCNC: 103 MMOL/L (ref 100–108)
CO2 SERPL-SCNC: 26 MMOL/L (ref 21–32)
CREAT SERPL-MCNC: 0.72 MG/DL (ref 0.6–1.3)
EOSINOPHIL # BLD MANUAL: 0.14 THOUSAND/UL (ref 0–0.4)
EOSINOPHIL NFR BLD MANUAL: 2 % (ref 0–6)
ERYTHROCYTE [DISTWIDTH] IN BLOOD BY AUTOMATED COUNT: 13.8 % (ref 11.6–15.1)
GFR SERPL CREATININE-BSD FRML MDRD: 88 ML/MIN/1.73SQ M
GLUCOSE SERPL-MCNC: 80 MG/DL (ref 65–140)
HCT VFR BLD AUTO: 32.3 % (ref 34.8–46.1)
HGB BLD-MCNC: 10.7 G/DL (ref 11.5–15.4)
LYMPHOCYTES # BLD AUTO: 2.57 THOUSAND/UL (ref 0.6–4.47)
LYMPHOCYTES # BLD AUTO: 38 % (ref 14–44)
MAGNESIUM SERPL-MCNC: 2 MG/DL (ref 1.6–2.6)
MCH RBC QN AUTO: 32.1 PG (ref 26.8–34.3)
MCHC RBC AUTO-ENTMCNC: 33.1 G/DL (ref 31.4–37.4)
MCV RBC AUTO: 97 FL (ref 82–98)
MONOCYTES # BLD AUTO: 0.61 THOUSAND/UL (ref 0–1.22)
MONOCYTES NFR BLD: 9 % (ref 4–12)
NEUTROPHILS # BLD MANUAL: 3.17 THOUSAND/UL (ref 1.85–7.62)
NEUTS BAND NFR BLD MANUAL: 8 % (ref 0–8)
NEUTS SEG NFR BLD AUTO: 39 % (ref 43–75)
NRBC BLD AUTO-RTO: 0 /100 WBCS
PLATELET # BLD AUTO: 341 THOUSANDS/UL (ref 149–390)
PLATELET BLD QL SMEAR: ADEQUATE
PMV BLD AUTO: 8.2 FL (ref 8.9–12.7)
POTASSIUM SERPL-SCNC: 3.8 MMOL/L (ref 3.5–5.3)
PROT SERPL-MCNC: 8 G/DL (ref 6.4–8.2)
RBC # BLD AUTO: 3.33 MILLION/UL (ref 3.81–5.12)
SODIUM SERPL-SCNC: 137 MMOL/L (ref 136–145)
TOTAL CELLS COUNTED SPEC: 100
VARIANT LYMPHS # BLD AUTO: 3 %
WBC # BLD AUTO: 6.75 THOUSAND/UL (ref 4.31–10.16)

## 2018-06-26 PROCEDURE — 86304 IMMUNOASSAY TUMOR CA 125: CPT | Performed by: PHYSICIAN ASSISTANT

## 2018-06-26 PROCEDURE — 80053 COMPREHEN METABOLIC PANEL: CPT | Performed by: PHYSICIAN ASSISTANT

## 2018-06-26 PROCEDURE — 85027 COMPLETE CBC AUTOMATED: CPT | Performed by: PHYSICIAN ASSISTANT

## 2018-06-26 PROCEDURE — 85007 BL SMEAR W/DIFF WBC COUNT: CPT | Performed by: PHYSICIAN ASSISTANT

## 2018-06-26 PROCEDURE — 83735 ASSAY OF MAGNESIUM: CPT | Performed by: PHYSICIAN ASSISTANT

## 2018-06-26 RX ADMIN — HEPARIN 300 UNITS: 100 SYRINGE at 13:51

## 2018-07-02 ENCOUNTER — HOSPITAL ENCOUNTER (OUTPATIENT)
Dept: CT IMAGING | Facility: HOSPITAL | Age: 63
Discharge: HOME | End: 2018-07-02
Payer: MEDICARE

## 2018-07-02 DIAGNOSIS — C57.00 CARCINOMA OF FALLOPIAN TUBE, UNSPECIFIED LATERALITY (HCC): ICD-10-CM

## 2018-07-02 PROCEDURE — 74177 CT ABD & PELVIS W/CONTRAST: CPT

## 2018-07-02 PROCEDURE — 71260 CT THORAX DX C+: CPT

## 2018-07-02 RX ADMIN — IOHEXOL 100 ML: 350 INJECTION, SOLUTION INTRAVENOUS at 12:47

## 2018-07-11 ENCOUNTER — OFFICE VISIT (OUTPATIENT)
Dept: GYNECOLOGIC ONCOLOGY | Facility: CLINIC | Age: 63
End: 2018-07-11
Payer: MEDICARE

## 2018-07-11 VITALS
DIASTOLIC BLOOD PRESSURE: 66 MMHG | HEIGHT: 63 IN | SYSTOLIC BLOOD PRESSURE: 106 MMHG | HEART RATE: 78 BPM | WEIGHT: 108.6 LBS | TEMPERATURE: 98.2 F | BODY MASS INDEX: 19.24 KG/M2 | RESPIRATION RATE: 16 BRPM

## 2018-07-11 DIAGNOSIS — C57.00 CARCINOMA OF FALLOPIAN TUBE, UNSPECIFIED LATERALITY (HCC): Primary | ICD-10-CM

## 2018-07-11 PROCEDURE — 99214 OFFICE O/P EST MOD 30 MIN: CPT | Performed by: OBSTETRICS & GYNECOLOGY

## 2018-07-11 NOTE — PROGRESS NOTES
Daniel Alford  1955  [address] [facility] [address]      Chief Complaint   Patient presents with    Follow-up     Finished chemotherapy         Previous Therapy: see below    History of Present Illness: The patient is a delightful 38-year-old with stage IIIC fallopian tube cancer  Patient has completed 3 cycles of neoadjuvant chemotherapy and underwent an interval cytoreductive surgery  Patient then completed an additional 3 cycles of chemotherapy  This was completed on June 7, 2018  Patient had a completion CT scan on July 2, 2018 which showed no evidence of disease   was checked after all treatment and was normal at 11 3  Patient comes in today with minimal complaints  Fallopian tube carcinoma ([facility] [address] )    1/4/2018 Initial Diagnosis     Paracentesis revealing adenocarcinoma of gynecologic origin         1/25/2018 - 3/8/2018 Chemotherapy     3 cycles of carboplatin AUC 6 and taxol 175 mg/m2 squared Q 3 weeks  Pretreatment Ca 125: 534 6          4/2/2018 Surgery     Exploratory laparotomy, total abdominal hysterectomy, bilateral salpingo-oophorectomy, omentectomy, appendectomy  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  No gross residual disease  4/26/2018 - 6/7/2018 Chemotherapy     3 cycles of carboplatin AUC 5 and Taxol 175 mg/m2 Q 3 weeks (carboplatin dose dropped to AUC of 5 for neutropenia prior to cycle 3)            Review of Systems   Constitutional: Positive for fatigue  Negative for activity change, appetite change, chills, diaphoresis, fever and unexpected weight change  HENT: Negative for congestion, dental problem, drooling, ear discharge, ear pain, facial swelling, hearing loss, mouth sores, nosebleeds, postnasal drip, rhinorrhea, sinus pain, sinus pressure, sneezing, sore throat, tinnitus, trouble swallowing and voice change      Eyes: Negative for photophobia, pain, discharge, redness, itching and visual disturbance  Respiratory: Negative for apnea, cough, choking, chest tightness, shortness of breath, wheezing and stridor  Cardiovascular: Negative for chest pain, palpitations and leg swelling  Gastrointestinal: Negative for abdominal distention, abdominal pain, anal bleeding, blood in stool, constipation, diarrhea, nausea, rectal pain and vomiting  Endocrine: Negative for cold intolerance, heat intolerance, polydipsia, polyphagia and polyuria  Genitourinary: Negative for decreased urine volume, difficulty urinating, dyspareunia, dysuria, enuresis, flank pain, frequency, genital sores, hematuria, menstrual problem, pelvic pain, urgency, vaginal bleeding, vaginal discharge and vaginal pain  Musculoskeletal: Negative for arthralgias, back pain, gait problem, joint swelling, myalgias, neck pain and neck stiffness  Skin: Negative for color change, pallor, rash and wound  Allergic/Immunologic: Negative for environmental allergies, food allergies and immunocompromised state  Neurological: Negative for dizziness, tremors, seizures, syncope, facial asymmetry, speech difficulty, weakness, light-headedness, numbness and headaches  Hematological: Negative for adenopathy  Does not bruise/bleed easily  Psychiatric/Behavioral: Negative for agitation, behavioral problems, confusion, decreased concentration, dysphoric mood, hallucinations, self-injury, sleep disturbance and suicidal ideas  The patient is not nervous/anxious and is not hyperactive          Patient Active Problem List   Diagnosis    Abdominal pain    Hypokalemia    Hyponatremia    Hypertension    Malignant ascites    Fallopian tube carcinoma (HCC)    Cancer related pain    Chemotherapy-induced neuropathy (HCC)    Chemotherapy-induced nausea    Medical marijuana use     Social History     Social History    Marital status: /Civil Union     Spouse name: N/A    Number of children: N/A    Years of education: N/A     Occupational History    Not on file       Social History Main Topics    Smoking status: Never Smoker    Smokeless tobacco: Never Used    Alcohol use No    Drug use: No    Sexual activity: Not on file     Other Topics Concern    Not on file     Social History Narrative    No narrative on file     Past Medical History:   Diagnosis Date    Arthritis     Ascites     [facility]     Hypertension 1/4/2018    Stroke ([facility] ) 1995       Current Outpatient Prescriptions:     ascorbic acid (VITAMIN C) 250 mg tablet, Take 250 mg by mouth daily, Disp: , Rfl:     Calcium Carbonate (CALCIUM 600 PO), Take by mouth, Disp: , Rfl:     Cholecalciferol (VITAMIN D3) 68642 units TABS, Take by mouth, Disp: , Rfl:     Lactobacillus ([employer] PROBIOTIC ACIDOPHILUS PO), Take 1 capsule by mouth daily  , Disp: , Rfl:     lidocaine-prilocaine (EMLA) cream, APPLY 1 INCH TO IV SITE PRIOR TO CHEMO TREATMENT, Disp: , Rfl: 0    Magnesium 200 MG TABS, Take by mouth daily  , Disp: , Rfl:     enoxaparin (LOVENOX) 40 mg/0 4 mL, Inject 0 4 mL (40 mg total) under the skin daily for 14 days, Disp: 5 6 mL, Rfl: 0    LORazepam (ATIVAN) 1 mg tablet, take 1 tablet by mouth every 6 hours if needed for nausea or anxiety, Disp: , Rfl: 0    ondansetron (ZOFRAN) 8 mg tablet, Take by mouth every 8 (eight) hours as needed for nausea or vomiting, Disp: , Rfl:     oxyCODONE-acetaminophen (PERCOCET) 5-325 mg per tablet, Take 1 tablet by mouth every 4 (four) hours as needed for moderate pain Max Daily Amount: 6 tablets, Disp: 30 tablet, Rfl: 0    pantoprazole (PROTONIX) 40 mg tablet, Take 1 tablet by mouth daily in the early morning, Disp: 30 tablet, Rfl: 0  Allergies   Allergen Reactions    Black Pepper [Piper] Rash     ROSE THE INSIDE OF THE MOUTH    Hexachlorophene Other (See Comments)     "Burns my skin"    Yeast-G[place]roy Pov-Iodine Med [Povidone Iodine]      PHYSOHEX CLEANSER- "burns my skin"     Vitals:    07/11/18 1136   BP: 106/66   Pulse: 78   Resp: 16 Temp: 98 2 °F (36 8 °C)       Labs:  CMP  Lab Results   Component Value Date     06/26/2018    K 3 8 06/26/2018     06/26/2018    CO2 26 06/26/2018    ANIONGAP 8 06/26/2018    BUN 8 06/26/2018    CREATININE 0 72 06/26/2018    GLUCOSE 80 06/26/2018    GLUF 119 (H) 01/22/2018    CALCIUM 9 0 06/26/2018    AST 15 06/26/2018    ALT 19 06/26/2018    ALKPHOS 48 06/26/2018    PROT 8 0 06/26/2018    BILITOT 0 20 06/26/2018    EGFR 88 06/26/2018       BMP  Lab Results   Component Value Date    GLUCOSE 80 06/26/2018    CALCIUM 9 0 06/26/2018     06/26/2018    K 3 8 06/26/2018    CO2 26 06/26/2018     06/26/2018    BUN 8 06/26/2018    CREATININE 0 72 06/26/2018       Lipids  No results found for: CHOL  No results found for: HDL  No results found for: LDLCALC  No results found for: TRIG  No components found for: CHOLHDL    Hemoglobin A1C  Lab Results   Component Value Date    HGBA1C 5 1 03/26/2018       Fasting Glucose  Lab Results   Component Value Date    GLUF 119 (H) 01/22/2018       Insulin     Thyroid  No results found for: TSH, F5FYTQU, R3SKWFL, THYROIDAB    Hepatic Function Panel  Lab Results   Component Value Date    ALT 19 06/26/2018    AST 15 06/26/2018    ALKPHOS 48 06/26/2018    BILITOT 0 20 06/26/2018       Celiac Disease Antibody Panel  No results found for: ENDOMYSIAL IGA, GLIADIN IGA, GLIADIN IGG, IGA, TISSUE TRANSGLUT AB, TTG IGA   Iron  No results found for: IRON, TIBC, FERRITIN    Other Labs:     7/2/2018 CT C/A/P:  FINDINGS:     CHEST     LUNGS:  Right middle lobe scarring unchanged  4 mm nodule in the right lower lobe (2:46) unchanged from the prior exam   3 mm nodule in the anterior segment of the right middle lobe (3:35) again noted and unchanged    3 mm nodule in the right upper lobe   (3:30) unchanged      PLEURA:  Unremarkable      HEART/GREAT VESSELS:  Unremarkable for patient's age      MEDIASTINUM AND WILLAM:  Unremarkable      CHEST WALL AND LOWER NECK:   Left IJ approach central venous catheter with peripheral injection port seen with its tip just superior to the junction of the SVC and the right atrium        ABDOMEN     LIVER/BILIARY TREE:  Unremarkable      GALLBLADDER:  No calcified gallstones  No pericholecystic inflammatory change      SPLEEN:  Unremarkable      PANCREAS:  Unremarkable      ADRENAL GLANDS:  Unremarkable      KIDNEYS/URETERS:  Unremarkable  No hydronephrosis      STOMACH AND BOWEL:  There is colonic diverticulosis without evidence of acute diverticulitis      APPENDIX:  No findings to suggest appendicitis      ABDOMINOPELVIC CAVITY:  No ascites or free intraperitoneal air  No lymphadenopathy  Previously seen omental disease is not visible on the current exam   Para-aortic adenopathy is also not present on the current exam, especially in comparison to the   prior exam      VESSELS:  Unremarkable for patient's age      PELVIS     REPRODUCTIVE ORGANS:  Patient has undergone hysterectomy and bilateral salpingo-oophorectomy in the interval      URINARY BLADDER:  Unremarkable      ABDOMINAL WALL/INGUINAL REGIONS:  Unremarkable      OSSEOUS STRUCTURES:  No acute fracture or destructive osseous lesion      IMPRESSION:     Patient appears to be responding to chemotherapy  The previously seen omental metastasis and para-aortic adenopathy are not visible on the current exam      Patient undergone a hysterectomy and salpingo-oophorectomy in the interval      Previously seen pulmonary nodules are stable indicating it is unlikely to represent metastatic disease or if they do, are responding to chemotherapy      No ascites  Final Diagnosis   Primary Tumor of the Ovary/Fallopian Tube/Peritoneum (Includes specimens A to D, Pelvic washings [id number] and prior Ascitic fluid [id number])  - Procedure:  Total hysterectomy, bilateral salpingo-oophorectomy, omentectomy and appendectomy  - Hysterectomy type: Abdominal  - Specimen integrity:      - Right ovary (if applicable): intact      - Left ovary (if applicable):intact      - Right fallopian tube (if applicable): intact      - Left fallopian tube (if applicable):intact      - Morcellated specimen (specify organ): N/A  - Tumor site: Bilateral fallopian tubes (See comment)  - Ovarian surface involvement:Present, bilateral  - Fallopian tube surface involvement: Present, bilateral  - Tumor size: largest tumor focus noted in omentum, 2 6 cm  - Histologic type: Serous carcinoma   - Histologic grade: High grade  - Implants (required for advanced stage serous/seromucinous borderline tumors only): N/A  - Other tissue/organ involvement: Cervix focally involved with carcinoma at deep margin  Appendix with tumor deposits on serosal surface  - Largest extrapelvic peritoneal focus (required only if applicable): 2 6 cm macroscopic    - Specify site: Omentum  - Peritoneal/ascetic fluid: Negative for carcinoma ([id number])  - Pleural fluid: Not submitted  - Treatment effect: Minimal to moderate response (CRS 1 to 2)  - Regional lymph nodes:    - No lymph nodes submitted or found  - Pathologic stage classification (pTNM, AJCC 8th edition): ypT3c, pNx, G2 (high)  - FIGO stage (2015 FIGO cancer report): IIIC  - Additional pathologic findings: Leiomyoma of uterus  - Ancillary studies: Not performed  - Clinical history: malignant ascites, post chemotherapy ([id number])  - Comments: Foci of invasive mucosal carcinoma are noted in bilateral fallopian tubes, hence the primary site for serous carcinoma is presumed to be tubal   Ref: Max Malave al  Assessment of new system for primary site assignment in high grade serous carcinoma of fallopian tube, ovary and peritoneum  Histopathology 2015 [phone]     FINAL DIAGNOSIS ON EACH SPECIMEN ON THIS CASE:  A  Omentum:  - Serous carcinoma, high grade   - Largest tumor deposit, 2 6 cm       B  Ovary and Fallopian Tube, Left:  - Serous carcinoma, high grade    - Invasive carcinoma involves fallopian tube (B10) and ovary   - Surface involvement of ovary is present       C  Uterus, cervix, right fallopian  tube and ovary:  - Serous carcinoma high grade, involving focally involving cervix at deep margin   - Inactive endometrium   - Leiomyoma  - Serous carcinoma involves fallopian tube and ovary      D  Appendix:  - Serous carcinoma, high grade involving periappendiceal serosa  - Appendix with fibrous obliteration of tip, no transmural involvement with tumor  Physical Exam   Constitutional: She is oriented to person, place, and time  She appears well-developed and well-nourished  No distress  HENT:   Head: Normocephalic and atraumatic  Right Ear: External ear normal    Left Ear: External ear normal    Nose: Nose normal    Mouth/Throat: Oropharynx is clear and moist  No oropharyngeal exudate  Eyes: Conjunctivae and EOM are normal  Pupils are equal, round, and reactive to light  Right eye exhibits no discharge  Left eye exhibits no discharge  No scleral icterus  Neck: Normal range of motion  Neck supple  No JVD present  No tracheal deviation present  No thyromegaly present  Cardiovascular: Normal rate, regular rhythm, normal heart sounds and intact distal pulses  Exam reveals no gallop and no friction rub  No murmur heard  Pulmonary/Chest: Effort normal and breath sounds normal  No stridor  No respiratory distress  She has no wheezes  She has no rales  She exhibits no tenderness  Abdominal: Soft  Bowel sounds are normal  She exhibits no distension and no mass  There is no tenderness  There is no rebound and no guarding  Well healed incision(s)   Genitourinary: No vaginal discharge found  Genitourinary Comments: Surgical absence of the cervix, uterus, bilateral fallopian tubes and ovaries   Musculoskeletal: Normal range of motion  She exhibits no edema, tenderness or deformity  Lymphadenopathy:     She has no cervical adenopathy     Neurological: She is alert and oriented to person, place, and time  She has normal reflexes  No cranial nerve deficit  She exhibits normal muscle tone  Coordination normal    Skin: Skin is warm and dry  No rash noted  She is not diaphoretic  No erythema  No pallor  Psychiatric: She has a normal mood and affect  Her behavior is normal  Judgment and thought content normal        Assessment  stage IIIC fallopian tube cancer currently without evidence  Of disease    Plan      Patient will return in 3 months for her 1st surveillance visit  A  will be checked prior to that visit  Chris Cheng MD, PhD, Reshma Lyman  Attending Physician, [address] [place] [address]

## 2018-07-26 ENCOUNTER — OFFICE VISIT (OUTPATIENT)
Dept: GYNECOLOGIC ONCOLOGY | Facility: CLINIC | Age: 63
End: 2018-07-26

## 2018-07-26 DIAGNOSIS — C57.00 CARCINOMA OF FALLOPIAN TUBE, UNSPECIFIED LATERALITY (HCC): Primary | ICD-10-CM

## 2018-07-26 PROCEDURE — 99999 PR OFFICE/OUTPT VISIT,PROCEDURE ONLY: CPT | Performed by: GENETIC COUNSELOR, MS

## 2018-07-26 NOTE — PROGRESS NOTES
Genetic Testing Results  Genetic testing results are NEGATIVE for mutations and large rearrangements in:  AVE, BARD1, BRCA1, BRCA2, BRIP1, CDH1, CHEK2, EPCAM, MLH1, MRE11A, MSH2, MSH6, MUTYH, NBN, NF1, PALB2, PMS2, PTEN, RAD50, RAD51C, RAD51D, SMARC4, STK11, Tp53 ([facility])        Discussion  The patient returned to the office for discussion of her genetic testing results  Genetic testing results were disclosed to the patient  No mutations were identified in any of the hereditary gynecologic cancer genes tested  We discussed that while reassuring, these results do not explain the history of cancer in the family, and it is possible that an unidentifiable mutation or a mutation in another gene(s) is leading to an increased risk of cancer in the family  Therefore it is important that the patient and family members maintain regular cancer screening as directed by their physicians  The patients questions were answered and she was encouraged to call with any future questions or concerns

## 2018-08-06 ENCOUNTER — HOSPITAL ENCOUNTER (OUTPATIENT)
Dept: INFUSION CENTER | Facility: CLINIC | Age: 63
Discharge: HOME | End: 2018-08-06

## 2018-08-07 ENCOUNTER — HOSPITAL ENCOUNTER (OUTPATIENT)
Dept: INFUSION CENTER | Facility: CLINIC | Age: 63
Discharge: HOME | End: 2018-08-07
Payer: MEDICARE

## 2018-08-07 PROCEDURE — 96523 IRRIG DRUG DELIVERY DEVICE: CPT

## 2018-08-07 RX ADMIN — HEPARIN 300 UNITS: 100 SYRINGE at 11:58

## 2018-08-07 NOTE — PROGRESS NOTES
Pt port flushed per protocol  Pt offers no  Complaints no complications noted   Pt aware of next appt declined avs  Pt stated no changes to meds or allergies

## 2018-09-10 ENCOUNTER — DOCUMENTATION (OUTPATIENT)
Dept: INFUSION CENTER | Facility: HOSPITAL | Age: 63
End: 2018-09-10

## 2018-09-10 ENCOUNTER — HOSPITAL ENCOUNTER (OUTPATIENT)
Dept: INFUSION CENTER | Facility: CLINIC | Age: 63
Discharge: HOME | End: 2018-09-10
Payer: MEDICARE

## 2018-09-10 PROCEDURE — 96523 IRRIG DRUG DELIVERY DEVICE: CPT

## 2018-09-10 RX ADMIN — HEPARIN 300 UNITS: 100 SYRINGE at 13:42

## 2018-09-10 NOTE — SOCIAL WORK
MSW met with pt in the infusion center at [facility]  Pt shared her financial difficulties since her diagnosis and especially now that her  has been diagnosed  The pt brought a credit card bill and was requesting that the cancer care funds be used towards this bill  MSW assured the pt this would be submitted on this day  The pt shared the story of her cancer journey and how it was the week of her last chemo that they learned of her 's diagnosis  Significant emotional support was provided and will be ongoing

## 2018-09-10 NOTE — PROGRESS NOTES
Pt offers no complaints  Port accessed, flushed, hep-locked and de-accessed without any issues  Pt aware of next appointments   Declines AVS

## 2018-10-04 ENCOUNTER — HOSPITAL ENCOUNTER (OUTPATIENT)
Dept: INFUSION CENTER | Facility: CLINIC | Age: 63
Discharge: HOME | End: 2018-10-04
Payer: MEDICARE

## 2018-10-04 DIAGNOSIS — C57.00 CARCINOMA OF FALLOPIAN TUBE, UNSPECIFIED LATERALITY (HCC): ICD-10-CM

## 2018-10-04 LAB — CANCER AG125 SERPL-ACNC: 10.2 U/ML (ref 0–30)

## 2018-10-04 PROCEDURE — 86304 IMMUNOASSAY TUMOR CA 125: CPT

## 2018-10-04 RX ADMIN — HEPARIN 300 UNITS: 100 SYRINGE at 12:58

## 2018-10-09 ENCOUNTER — OFFICE VISIT (OUTPATIENT)
Dept: GYNECOLOGIC ONCOLOGY | Facility: CLINIC | Age: 63
End: 2018-10-09
Payer: MEDICARE

## 2018-10-09 VITALS
BODY MASS INDEX: 20.14 KG/M2 | HEART RATE: 64 BPM | HEIGHT: 63 IN | SYSTOLIC BLOOD PRESSURE: 110 MMHG | RESPIRATION RATE: 14 BRPM | DIASTOLIC BLOOD PRESSURE: 70 MMHG | WEIGHT: 113.7 LBS | TEMPERATURE: 98.2 F

## 2018-10-09 DIAGNOSIS — C57.02 CARCINOMA OF LEFT FALLOPIAN TUBE (HCC): Primary | ICD-10-CM

## 2018-10-09 PROCEDURE — 99214 OFFICE O/P EST MOD 30 MIN: CPT | Performed by: OBSTETRICS & GYNECOLOGY

## 2018-10-09 NOTE — PROGRESS NOTES
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  The patient will return in 3 months for her next surveillance visit  She will have a  drawn prior to that visit  Patient has requested that her port be removed as her seatbelt rubs against it frequently  I have ordered her port to be removed by Interventional Radiology  Relevant Orders        IR port Removal        CHIEF COMPLAINT:   I am here for follow-up    Problem:  Cancer Staging  Fallopian tube carcinoma [facility])  Staging form: Ovary, Fallopian Tube, Primary Peritoneal, AJCC 8th Edition  - Clinical stage from 4/2/2018: FIGO Stage IIIC - Signed by Giana Lopez MD on 4/16/2018    Previous therapy:     Fallopian tube carcinoma ([facility] [address] )    1/4/2018 Initial Diagnosis     Paracentesis revealing adenocarcinoma of gynecologic origin         1/25/2018 - 3/8/2018 Chemotherapy     3 cycles of carboplatin AUC 6 and taxol 175 mg/m2 squared Q 3 weeks  Pretreatment Ca 125: 534 6          4/2/2018 Surgery     Exploratory laparotomy, total abdominal hysterectomy, bilateral salpingo-oophorectomy, omentectomy, appendectomy  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  No gross residual disease  4/26/2018 - 6/7/2018 Chemotherapy     3 cycles of carboplatin AUC 5 and Taxol 175 mg/m2 Q 3 weeks (carboplatin dose dropped to AUC of 5 for neutropenia prior to cycle 3)          Patient ID: Lindsay Guardado is a 70 y o  female  HPI   The patient is a delightful 39-year-old with history 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  Patient initially underwent 3 cycles of neoadjuvant chemotherapy with a good radiographic and biochemical response  Her pretreatment  was elevated at 534 6  Patient then underwent interval cytoreductive surgery on April 2, 2018    There was no gross residual disease at the time of her surgery  Patient then completed another 3 cycles of adjuvant chemotherapy and she completed this on June 7, 2018  She has no new complaints today  No vaginal bleeding, abdominal/pelvic pain  Normal bowel and bladder function  No interval change in medical history since last visit  Quality of life is good  Patient's most recent  on October 4, 2018 was normal at 10 2      The following portions of the patient's history were reviewed and updated as appropriate: allergies, current medications, past family history, past medical history, past social history, past surgical history and problem list     Review of Systems    Current Outpatient Prescriptions   Medication Sig Dispense Refill    Arnica 20 % TINC arnica      ascorbic acid (VITAMIN C) 250 mg tablet Take 250 mg by mouth daily      Calcium Carbonate (CALCIUM 600 PO) Take by mouth      Cholecalciferol (VITAMIN D3) 49414 units TABS Take by mouth      Lactobacillus (CVS PROBIOTIC ACIDOPHILUS PO) Take 1 capsule by mouth daily        lidocaine-prilocaine (EMLA) cream APPLY 1 INCH TO IV SITE PRIOR TO CHEMO TREATMENT  0    enoxaparin (LOVENOX) 40 mg/0 4 mL Inject 0 4 mL (40 mg total) under the skin daily for 14 days 5 6 mL 0    LORazepam (ATIVAN) 1 mg tablet take 1 tablet by mouth every 6 hours if needed for nausea or anxiety  0    Magnesium 200 MG TABS Take by mouth daily        ondansetron (ZOFRAN) 8 mg tablet Take by mouth every 8 (eight) hours as needed for nausea or vomiting      oxyCODONE-acetaminophen (PERCOCET) 5-325 mg per tablet Take 1 tablet by mouth every 4 (four) hours as needed for moderate pain Max Daily Amount: 6 tablets (Patient not taking: Reported on 10/9/2018 ) 30 tablet 0    pantoprazole (PROTONIX) 40 mg tablet Take 1 tablet by mouth daily in the early morning (Patient not taking: Reported on 10/9/2018 ) 30 tablet 0    Probiotic Product (PROBIOTIC-10 PO) Probiotic       No current facility-administered medications for this visit  Objective:    Blood pressure 110/70, pulse 64, temperature 98 2 °F (36 8 °C), temperature source Oral, resp  rate 14, height 5' 3 25" (1 607 m), weight 51 6 kg (113 lb 11 2 oz)  Body mass index is 19 98 kg/m²  Body surface area is 1 53 meters squared  Physical Exam   Constitutional: She is oriented to person, place, and time  She appears well-developed and well-nourished  No distress  HENT:   Head: Normocephalic and atraumatic  Right Ear: External ear normal    Left Ear: External ear normal    Nose: Nose normal    Mouth/Throat: Oropharynx is clear and moist  No oropharyngeal exudate  Eyes: Pupils are equal, round, and reactive to light  Conjunctivae and EOM are normal  Right eye exhibits no discharge  Left eye exhibits no discharge  No scleral icterus  Neck: Normal range of motion  Neck supple  No JVD present  No tracheal deviation present  No thyromegaly present  Cardiovascular: Normal rate, regular rhythm, normal heart sounds and intact distal pulses  Exam reveals no gallop and no friction rub  No murmur heard  Pulmonary/Chest: Effort normal and breath sounds normal  No stridor  No respiratory distress  She has no wheezes  She has no rales  She exhibits no tenderness  Abdominal: Soft  Bowel sounds are normal  She exhibits no distension and no mass  There is no tenderness  There is no rebound and no guarding  Genitourinary: No vaginal discharge found  Genitourinary Comments: -Normal external female genitalia, normal Bartholin's and [place]'s glands  -Normal midline urethral meatus  No lesions notes  -Bladder without fullness mass or tenderness  -Vagina without lesion or discharge No significant cystocele or rectocele noted  -Cervix surgically absent  -Uterus surgically absent  -Adnexae surgically absent  -Anus without fissure of lesion     Musculoskeletal: Normal range of motion  She exhibits no edema, tenderness or deformity     Lymphadenopathy:     She has no cervical adenopathy  Neurological: She is alert and oriented to person, place, and time  She has normal reflexes  No cranial nerve deficit  She exhibits normal muscle tone  Coordination normal    Skin: Skin is warm and dry  No rash noted  She is not diaphoretic  No erythema  No pallor  Psychiatric: She has a normal mood and affect  Her behavior is normal  Judgment and thought content normal        Lab Results   Component Value Date     10 2 10/04/2018     Lab Results   Component Value Date     06/26/2018    K 3 8 06/26/2018     06/26/2018    CO2 26 06/26/2018    BUN 8 06/26/2018    CREATININE 0 72 06/26/2018    GLUF 119 (H) 01/22/2018    CALCIUM 9 0 06/26/2018    AST 15 06/26/2018    ALT 19 06/26/2018    ALKPHOS 48 06/26/2018    EGFR 88 06/26/2018     Lab Results   Component Value Date    WBC 6 75 06/26/2018    HGB 10 7 (L) 06/26/2018    HCT 32 3 (L) 06/26/2018    MCV 97 06/26/2018     06/26/2018     Lab Results   Component Value Date    NEUTROABS 6 08 06/19/2018     Final Diagnosis   Primary Tumor of the Ovary/Fallopian Tube/Peritoneum (Includes specimens A to D, Pelvic washings [id number] and prior Ascitic fluid [id number])  - Procedure:  Total hysterectomy, bilateral salpingo-oophorectomy, omentectomy and appendectomy  - Hysterectomy type: Abdominal  - Specimen integrity:      - Right ovary (if applicable): intact      - Left ovary (if applicable):intact      - Right fallopian tube (if applicable): intact      - Left fallopian tube (if applicable):intact      - Morcellated specimen (specify organ): N/A  - Tumor site: Bilateral fallopian tubes (See comment)  - Ovarian surface involvement:Present, bilateral  - Fallopian tube surface involvement: Present, bilateral  - Tumor size: largest tumor focus noted in omentum, 2 6 cm  - Histologic type: Serous carcinoma   - Histologic grade: High grade  - Implants (required for advanced stage serous/seromucinous borderline tumors only): N/A  - Other tissue/organ involvement: Cervix focally involved with carcinoma at deep margin  Appendix with tumor deposits on serosal surface  - Largest extrapelvic peritoneal focus (required only if applicable): 2 6 cm macroscopic    - Specify site: Omentum  - Peritoneal/ascetic fluid: Negative for carcinoma ([id number])  - Pleural fluid: Not submitted  - Treatment effect: Minimal to moderate response (CRS 1 to 2)  - Regional lymph nodes:    - No lymph nodes submitted or found  - Pathologic stage classification (pTNM, AJCC 8th edition): ypT3c, pNx, G2 (high)  - FIGO stage (2015 FIGO cancer report): IIIC  - Additional pathologic findings: Leiomyoma of uterus  - Ancillary studies: Not performed  - Clinical history: malignant ascites, post chemotherapy ([id number])  - Comments: Foci of invasive mucosal carcinoma are noted in bilateral fallopian tubes, hence the primary site for serous carcinoma is presumed to be tubal   Ref: Chrissy Acevedo al  Assessment of new system for primary site assignment in high grade serous carcinoma of fallopian tube, ovary and peritoneum  Histopathology 2015 [phone]     FINAL DIAGNOSIS ON EACH SPECIMEN ON THIS CASE:  A  Omentum:  - Serous carcinoma, high grade   - Largest tumor deposit, 2 6 cm       B  Ovary and Fallopian Tube, Left:  - Serous carcinoma, high grade  - Invasive carcinoma involves fallopian tube (B10) and ovary  - Surface involvement of ovary is present       C  Uterus, cervix, right fallopian  tube and ovary:  - Serous carcinoma high grade, involving focally involving cervix at deep margin   - Inactive endometrium   - Leiomyoma  - Serous carcinoma involves fallopian tube and ovary      D  Appendix:  - Serous carcinoma, high grade involving periappendiceal serosa  - Appendix with fibrous obliteration of tip, no transmural involvement with tumor  Chris Elkins MD, PhD, Felisha Ledezma  Attending Physician, Gynecologic Oncology  [address] [place] [address]  [place] [address]

## 2018-10-09 NOTE — ASSESSMENT & PLAN NOTE
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

## 2018-10-19 ENCOUNTER — TELEPHONE (OUTPATIENT)
Dept: INTERVENTIONAL RADIOLOGY/VASCULAR | Facility: HOSPITAL | Age: 63
End: 2018-10-19

## 2018-10-19 RX ORDER — SODIUM CHLORIDE 9 MG/ML
75 INJECTION, SOLUTION INTRAVENOUS CONTINUOUS
Status: CANCELLED | OUTPATIENT
Start: 2018-10-19

## 2018-10-22 ENCOUNTER — TELEPHONE (OUTPATIENT)
Dept: SURGERY | Facility: HOSPITAL | Age: 63
End: 2018-10-22

## 2018-10-23 ENCOUNTER — HOSPITAL ENCOUNTER (OUTPATIENT)
Dept: INTERVENTIONAL RADIOLOGY/VASCULAR | Facility: HOSPITAL | Age: 63
Discharge: HOME | End: 2018-10-23
Attending: OBSTETRICS & GYNECOLOGY | Admitting: RADIOLOGY
Payer: MEDICARE

## 2018-10-23 VITALS
HEIGHT: 64 IN | DIASTOLIC BLOOD PRESSURE: 59 MMHG | HEART RATE: 55 BPM | TEMPERATURE: 98 F | RESPIRATION RATE: 27 BRPM | WEIGHT: 112.21 LBS | OXYGEN SATURATION: 98 % | SYSTOLIC BLOOD PRESSURE: 125 MMHG | BODY MASS INDEX: 19.16 KG/M2

## 2018-10-23 DIAGNOSIS — C57.02 CARCINOMA OF LEFT FALLOPIAN TUBE (HCC): ICD-10-CM

## 2018-10-23 LAB
ERYTHROCYTE [DISTWIDTH] IN BLOOD BY AUTOMATED COUNT: 12.5 % (ref 11.6–15.1)
HCT VFR BLD AUTO: 39.7 % (ref 34.8–46.1)
HGB BLD-MCNC: 13 G/DL (ref 11.5–15.4)
INR PPP: 1 (ref 0.86–1.17)
MCH RBC QN AUTO: 31.1 PG (ref 26.8–34.3)
MCHC RBC AUTO-ENTMCNC: 32.7 G/DL (ref 31.4–37.4)
MCV RBC AUTO: 95 FL (ref 82–98)
PLATELET # BLD AUTO: 222 THOUSANDS/UL (ref 149–390)
PMV BLD AUTO: 8.5 FL (ref 8.9–12.7)
PROTHROMBIN TIME: 13.1 SECONDS (ref 11.8–14.2)
RBC # BLD AUTO: 4.18 MILLION/UL (ref 3.81–5.12)
WBC # BLD AUTO: 5.21 THOUSAND/UL (ref 4.31–10.16)

## 2018-10-23 PROCEDURE — 85610 PROTHROMBIN TIME: CPT | Performed by: RADIOLOGY

## 2018-10-23 PROCEDURE — 99152 MOD SED SAME PHYS/QHP 5/>YRS: CPT | Performed by: RADIOLOGY

## 2018-10-23 PROCEDURE — 77001 FLUOROGUIDE FOR VEIN DEVICE: CPT | Performed by: RADIOLOGY

## 2018-10-23 PROCEDURE — 85027 COMPLETE CBC AUTOMATED: CPT | Performed by: RADIOLOGY

## 2018-10-23 PROCEDURE — 99153 MOD SED SAME PHYS/QHP EA: CPT

## 2018-10-23 PROCEDURE — 99152 MOD SED SAME PHYS/QHP 5/>YRS: CPT

## 2018-10-23 PROCEDURE — 36590 REMOVAL TUNNELED CV CATH: CPT

## 2018-10-23 PROCEDURE — 36590 REMOVAL TUNNELED CV CATH: CPT | Performed by: RADIOLOGY

## 2018-10-23 RX ORDER — MULTIVIT-MIN/IRON FUM/FOLIC AC 7.5 MG-4
1 TABLET ORAL DAILY
COMMUNITY
End: 2021-11-29 | Stop reason: HOSPADM

## 2018-10-23 RX ORDER — LIDOCAINE HYDROCHLORIDE 10 MG/ML
INJECTION, SOLUTION INFILTRATION; PERINEURAL CODE/TRAUMA/SEDATION MEDICATION
Status: COMPLETED | OUTPATIENT
Start: 2018-10-23 | End: 2018-10-23

## 2018-10-23 RX ORDER — MIDAZOLAM HYDROCHLORIDE 1 MG/ML
INJECTION INTRAMUSCULAR; INTRAVENOUS CODE/TRAUMA/SEDATION MEDICATION
Status: COMPLETED | OUTPATIENT
Start: 2018-10-23 | End: 2018-10-23

## 2018-10-23 RX ORDER — SODIUM CHLORIDE 9 MG/ML
75 INJECTION, SOLUTION INTRAVENOUS CONTINUOUS
Status: DISCONTINUED | OUTPATIENT
Start: 2018-10-23 | End: 2018-10-27 | Stop reason: HOSPADM

## 2018-10-23 RX ORDER — FENTANYL CITRATE 50 UG/ML
INJECTION, SOLUTION INTRAMUSCULAR; INTRAVENOUS CODE/TRAUMA/SEDATION MEDICATION
Status: COMPLETED | OUTPATIENT
Start: 2018-10-23 | End: 2018-10-23

## 2018-10-23 RX ADMIN — MIDAZOLAM HYDROCHLORIDE 0.5 MG: 1 INJECTION, SOLUTION INTRAMUSCULAR; INTRAVENOUS at 14:05

## 2018-10-23 RX ADMIN — FENTANYL CITRATE 50 MCG: 50 INJECTION, SOLUTION INTRAMUSCULAR; INTRAVENOUS at 13:51

## 2018-10-23 RX ADMIN — FENTANYL CITRATE 25 MCG: 50 INJECTION, SOLUTION INTRAMUSCULAR; INTRAVENOUS at 14:00

## 2018-10-23 RX ADMIN — MIDAZOLAM HYDROCHLORIDE 0.5 MG: 1 INJECTION, SOLUTION INTRAMUSCULAR; INTRAVENOUS at 13:57

## 2018-10-23 RX ADMIN — MIDAZOLAM HYDROCHLORIDE 1 MG: 1 INJECTION, SOLUTION INTRAMUSCULAR; INTRAVENOUS at 13:51

## 2018-10-23 RX ADMIN — LIDOCAINE HYDROCHLORIDE 10 ML: 10 INJECTION, SOLUTION INFILTRATION; PERINEURAL at 13:57

## 2018-10-23 RX ADMIN — FENTANYL CITRATE 25 MCG: 50 INJECTION, SOLUTION INTRAMUSCULAR; INTRAVENOUS at 13:57

## 2018-10-23 NOTE — DISCHARGE INSTRUCTIONS
Implanted Venous Access Port Removal    WHAT YOU NEED TO KNOW:   An implanted venous access port is a device used to give treatments and take blood  It may also be called a central venous access device (CVAD)  The port is a small container that is placed under your skin, usually in your upper chest  A port can also be placed in your arm or abdomen  The port is attached to a catheter that enters a large vein  DISCHARGE INSTRUCTIONS:   Resume your normal diet  Small sips of flat soda will help with mild nausea  Prevent an infection:     Wash your hands often  Use soap and water  Clean your hands before and  after you care for your incision  Check your skin for infection every day  Look for redness, swelling, or fluid oozing from the incision site  Dressing may come off in 24 hours  Medical glue will peel off on its own in 5 to 10 days  You may shower 24 hours after procedure  Follow up with your healthcare provider as directed  Write down your questions so you remember to ask them during your visits  Activity:  You may return to your daily activities when the area heals  Contact Interventional Radiology at [phone] Himanshu PATIENTS: Contact Interventional Radiology at [phone]) Alvaro Toribio PATIENTS: Contact Interventional Radiology at [phone]) if:     You have a fever  You have persistent nausea  Your inciscion site is red, swollen, or draining pus  You have questions or concerns about your condition or care  Seek care immediately or call 911 if:  Blood soaks through your bandage  The skin over or around your incision breaks open  Your heart is jumping or fluttering  You have a headache, blurred vision, and feel confused  You have pain in your arm, neck, shoulder, or chest     You have trouble breathing that is getting worse over time

## 2018-10-23 NOTE — INTERVAL H&P NOTE
Update: (This section must be completed if the H&P was completed greater than 24 hrs to procedure or admission)    H&P reviewed  After examining the patient, I find no changed to the H&P since it had been written  Patient re-evaluated   Accept as history and physical     Livan Woodruff MD/October 23, 2018/2:26 PM

## 2018-10-23 NOTE — H&P (VIEW-ONLY)
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  The patient will return in 3 months for her next surveillance visit  She will have a  drawn prior to that visit  Patient has requested that her port be removed as her seatbelt rubs against it frequently  I have ordered her port to be removed by Interventional Radiology  Relevant Orders        IR port Removal        CHIEF COMPLAINT:   I am here for follow-up    Problem:  Cancer Staging  Fallopian tube carcinoma [facility])  Staging form: Ovary, Fallopian Tube, Primary Peritoneal, AJCC 8th Edition  - Clinical stage from 4/2/2018: FIGO Stage IIIC - Signed by Taco Dang MD on 4/16/2018    Previous therapy:     Fallopian tube carcinoma ([facility] [address] )    1/4/2018 Initial Diagnosis     Paracentesis revealing adenocarcinoma of gynecologic origin         1/25/2018 - 3/8/2018 Chemotherapy     3 cycles of carboplatin AUC 6 and taxol 175 mg/m2 squared Q 3 weeks  Pretreatment Ca 125: 534 6          4/2/2018 Surgery     Exploratory laparotomy, total abdominal hysterectomy, bilateral salpingo-oophorectomy, omentectomy, appendectomy  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  No gross residual disease  4/26/2018 - 6/7/2018 Chemotherapy     3 cycles of carboplatin AUC 5 and Taxol 175 mg/m2 Q 3 weeks (carboplatin dose dropped to AUC of 5 for neutropenia prior to cycle 3)          Patient ID: Doc Borrero is a 70 y o  female  HPI   The patient is a delightful 78-year-old with history 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  Patient initially underwent 3 cycles of neoadjuvant chemotherapy with a good radiographic and biochemical response  Her pretreatment  was elevated at 534 6  Patient then underwent interval cytoreductive surgery on April 2, 2018    There was no gross residual disease at the time of her surgery  Patient then completed another 3 cycles of adjuvant chemotherapy and she completed this on June 7, 2018  She has no new complaints today  No vaginal bleeding, abdominal/pelvic pain  Normal bowel and bladder function  No interval change in medical history since last visit  Quality of life is good  Patient's most recent  on October 4, 2018 was normal at 10 2      The following portions of the patient's history were reviewed and updated as appropriate: allergies, current medications, past family history, past medical history, past social history, past surgical history and problem list     Review of Systems    Current Outpatient Prescriptions   Medication Sig Dispense Refill    Arnica 20 % TINC arnica      ascorbic acid (VITAMIN C) 250 mg tablet Take 250 mg by mouth daily      Calcium Carbonate (CALCIUM 600 PO) Take by mouth      Cholecalciferol (VITAMIN D3) 86164 units TABS Take by mouth      Lactobacillus (CVS PROBIOTIC ACIDOPHILUS PO) Take 1 capsule by mouth daily        lidocaine-prilocaine (EMLA) cream APPLY 1 INCH TO IV SITE PRIOR TO CHEMO TREATMENT  0    enoxaparin (LOVENOX) 40 mg/0 4 mL Inject 0 4 mL (40 mg total) under the skin daily for 14 days 5 6 mL 0    LORazepam (ATIVAN) 1 mg tablet take 1 tablet by mouth every 6 hours if needed for nausea or anxiety  0    Magnesium 200 MG TABS Take by mouth daily        ondansetron (ZOFRAN) 8 mg tablet Take by mouth every 8 (eight) hours as needed for nausea or vomiting      oxyCODONE-acetaminophen (PERCOCET) 5-325 mg per tablet Take 1 tablet by mouth every 4 (four) hours as needed for moderate pain Max Daily Amount: 6 tablets (Patient not taking: Reported on 10/9/2018 ) 30 tablet 0    pantoprazole (PROTONIX) 40 mg tablet Take 1 tablet by mouth daily in the early morning (Patient not taking: Reported on 10/9/2018 ) 30 tablet 0    Probiotic Product (PROBIOTIC-10 PO) Probiotic       No current facility-administered medications for this visit  Objective:    Blood pressure 110/70, pulse 64, temperature 98 2 °F (36 8 °C), temperature source Oral, resp  rate 14, height 5' 3 25" (1 607 m), weight 51 6 kg (113 lb 11 2 oz)  Body mass index is 19 98 kg/m²  Body surface area is 1 53 meters squared  Physical Exam   Constitutional: She is oriented to person, place, and time  She appears well-developed and well-nourished  No distress  HENT:   Head: Normocephalic and atraumatic  Right Ear: External ear normal    Left Ear: External ear normal    Nose: Nose normal    Mouth/Throat: Oropharynx is clear and moist  No oropharyngeal exudate  Eyes: Pupils are equal, round, and reactive to light  Conjunctivae and EOM are normal  Right eye exhibits no discharge  Left eye exhibits no discharge  No scleral icterus  Neck: Normal range of motion  Neck supple  No JVD present  No tracheal deviation present  No thyromegaly present  Cardiovascular: Normal rate, regular rhythm, normal heart sounds and intact distal pulses  Exam reveals no gallop and no friction rub  No murmur heard  Pulmonary/Chest: Effort normal and breath sounds normal  No stridor  No respiratory distress  She has no wheezes  She has no rales  She exhibits no tenderness  Abdominal: Soft  Bowel sounds are normal  She exhibits no distension and no mass  There is no tenderness  There is no rebound and no guarding  Genitourinary: No vaginal discharge found  Genitourinary Comments: -Normal external female genitalia, normal Bartholin's and [place]'s glands  -Normal midline urethral meatus  No lesions notes  -Bladder without fullness mass or tenderness  -Vagina without lesion or discharge No significant cystocele or rectocele noted  -Cervix surgically absent  -Uterus surgically absent  -Adnexae surgically absent  -Anus without fissure of lesion     Musculoskeletal: Normal range of motion  She exhibits no edema, tenderness or deformity     Lymphadenopathy:     She has no cervical adenopathy  Neurological: She is alert and oriented to person, place, and time  She has normal reflexes  No cranial nerve deficit  She exhibits normal muscle tone  Coordination normal    Skin: Skin is warm and dry  No rash noted  She is not diaphoretic  No erythema  No pallor  Psychiatric: She has a normal mood and affect  Her behavior is normal  Judgment and thought content normal        Lab Results   Component Value Date     10 2 10/04/2018     Lab Results   Component Value Date     06/26/2018    K 3 8 06/26/2018     06/26/2018    CO2 26 06/26/2018    BUN 8 06/26/2018    CREATININE 0 72 06/26/2018    GLUF 119 (H) 01/22/2018    CALCIUM 9 0 06/26/2018    AST 15 06/26/2018    ALT 19 06/26/2018    ALKPHOS 48 06/26/2018    EGFR 88 06/26/2018     Lab Results   Component Value Date    WBC 6 75 06/26/2018    HGB 10 7 (L) 06/26/2018    HCT 32 3 (L) 06/26/2018    MCV 97 06/26/2018     06/26/2018     Lab Results   Component Value Date    NEUTROABS 6 08 06/19/2018     Final Diagnosis   Primary Tumor of the Ovary/Fallopian Tube/Peritoneum (Includes specimens A to D, Pelvic washings [id number] and prior Ascitic fluid [id number])  - Procedure:  Total hysterectomy, bilateral salpingo-oophorectomy, omentectomy and appendectomy  - Hysterectomy type: Abdominal  - Specimen integrity:      - Right ovary (if applicable): intact      - Left ovary (if applicable):intact      - Right fallopian tube (if applicable): intact      - Left fallopian tube (if applicable):intact      - Morcellated specimen (specify organ): N/A  - Tumor site: Bilateral fallopian tubes (See comment)  - Ovarian surface involvement:Present, bilateral  - Fallopian tube surface involvement: Present, bilateral  - Tumor size: largest tumor focus noted in omentum, 2 6 cm  - Histologic type: Serous carcinoma   - Histologic grade: High grade  - Implants (required for advanced stage serous/seromucinous borderline tumors only): N/A  - Other tissue/organ involvement: Cervix focally involved with carcinoma at deep margin  Appendix with tumor deposits on serosal surface  - Largest extrapelvic peritoneal focus (required only if applicable): 2 6 cm macroscopic    - Specify site: Omentum  - Peritoneal/ascetic fluid: Negative for carcinoma ([id number])  - Pleural fluid: Not submitted  - Treatment effect: Minimal to moderate response (CRS 1 to 2)  - Regional lymph nodes:    - No lymph nodes submitted or found  - Pathologic stage classification (pTNM, AJCC 8th edition): ypT3c, pNx, G2 (high)  - FIGO stage (2015 FIGO cancer report): IIIC  - Additional pathologic findings: Leiomyoma of uterus  - Ancillary studies: Not performed  - Clinical history: malignant ascites, post chemotherapy ([id number])  - Comments: Foci of invasive mucosal carcinoma are noted in bilateral fallopian tubes, hence the primary site for serous carcinoma is presumed to be tubal   Ref: Chrissy Acevedo al  Assessment of new system for primary site assignment in high grade serous carcinoma of fallopian tube, ovary and peritoneum  Histopathology 2015 [phone]     FINAL DIAGNOSIS ON EACH SPECIMEN ON THIS CASE:  A  Omentum:  - Serous carcinoma, high grade   - Largest tumor deposit, 2 6 cm       B  Ovary and Fallopian Tube, Left:  - Serous carcinoma, high grade  - Invasive carcinoma involves fallopian tube (B10) and ovary  - Surface involvement of ovary is present       C  Uterus, cervix, right fallopian  tube and ovary:  - Serous carcinoma high grade, involving focally involving cervix at deep margin   - Inactive endometrium   - Leiomyoma  - Serous carcinoma involves fallopian tube and ovary      D  Appendix:  - Serous carcinoma, high grade involving periappendiceal serosa  - Appendix with fibrous obliteration of tip, no transmural involvement with tumor  Chris Elkins MD, PhD, Felisha Ledezma  Attending Physician, Gynecologic Oncology  [address] [place] [address]  [place] [address]

## 2018-10-30 ENCOUNTER — HOSPITAL ENCOUNTER (OUTPATIENT)
Dept: INFUSION CENTER | Facility: CLINIC | Age: 63
Discharge: HOME | End: 2018-10-30

## 2018-12-13 ENCOUNTER — TELEPHONE (OUTPATIENT)
Dept: INTERNAL MEDICINE CLINIC | Facility: CLINIC | Age: 63
End: 2018-12-13

## 2019-01-08 PROBLEM — E87.6 HYPOKALEMIA: Status: RESOLVED | Noted: 2018-01-04 | Resolved: 2019-01-08

## 2019-01-08 PROBLEM — R11.0 CHEMOTHERAPY-INDUCED NAUSEA: Status: RESOLVED | Noted: 2018-04-25 | Resolved: 2019-01-08

## 2019-01-08 PROBLEM — G89.3 CANCER RELATED PAIN: Status: RESOLVED | Noted: 2018-04-25 | Resolved: 2019-01-08

## 2019-01-08 PROBLEM — T45.1X5A CHEMOTHERAPY-INDUCED NAUSEA: Status: RESOLVED | Noted: 2018-04-25 | Resolved: 2019-01-08

## 2019-01-08 PROBLEM — R18.0 MALIGNANT ASCITES: Status: RESOLVED | Noted: 2018-01-15 | Resolved: 2019-01-08

## 2019-01-08 PROBLEM — E87.1 HYPONATREMIA: Status: RESOLVED | Noted: 2018-01-04 | Resolved: 2019-01-08

## 2019-01-08 PROBLEM — R10.9 ABDOMINAL PAIN: Status: RESOLVED | Noted: 2018-01-04 | Resolved: 2019-01-08

## 2019-01-09 ENCOUNTER — OFFICE VISIT (OUTPATIENT)
Dept: URGENT CARE | Facility: CLINIC | Age: 64
End: 2019-01-09
Payer: MEDICARE

## 2019-01-09 ENCOUNTER — APPOINTMENT (OUTPATIENT)
Dept: LAB | Facility: CLINIC | Age: 64
End: 2019-01-09
Payer: MEDICARE

## 2019-01-09 VITALS
RESPIRATION RATE: 18 BRPM | HEIGHT: 63 IN | BODY MASS INDEX: 20.94 KG/M2 | DIASTOLIC BLOOD PRESSURE: 72 MMHG | HEART RATE: 64 BPM | TEMPERATURE: 98.7 F | SYSTOLIC BLOOD PRESSURE: 120 MMHG | OXYGEN SATURATION: 97 % | WEIGHT: 118.2 LBS

## 2019-01-09 DIAGNOSIS — J01.00 ACUTE NON-RECURRENT MAXILLARY SINUSITIS: Primary | ICD-10-CM

## 2019-01-09 DIAGNOSIS — C57.02 CARCINOMA OF LEFT FALLOPIAN TUBE (HCC): ICD-10-CM

## 2019-01-09 PROCEDURE — G0463 HOSPITAL OUTPT CLINIC VISIT: HCPCS

## 2019-01-09 PROCEDURE — 36415 COLL VENOUS BLD VENIPUNCTURE: CPT

## 2019-01-09 PROCEDURE — 99213 OFFICE O/P EST LOW 20 MIN: CPT

## 2019-01-09 PROCEDURE — 86304 IMMUNOASSAY TUMOR CA 125: CPT

## 2019-01-09 RX ORDER — AMOXICILLIN AND CLAVULANATE POTASSIUM 875; 125 MG/1; MG/1
1 TABLET, FILM COATED ORAL EVERY 12 HOURS SCHEDULED
Qty: 14 TABLET | Refills: 0 | Status: SHIPPED | OUTPATIENT
Start: 2019-01-09 | End: 2019-01-16

## 2019-01-09 RX ORDER — BENZONATATE 100 MG/1
100 CAPSULE ORAL 3 TIMES DAILY PRN
Qty: 30 CAPSULE | Refills: 0 | Status: SHIPPED | OUTPATIENT
Start: 2019-01-09 | End: 2019-09-11

## 2019-01-09 NOTE — PROGRESS NOTES
[facility]        NAME: Liddie Phoenix is a 70 y o  female  : 1955    MRN: [id number]  DATE: 2019  TIME: 2:53 PM    Assessment and Plan   Acute non-recurrent maxillary sinusitis [J01 00]  1  Acute non-recurrent maxillary sinusitis  benzonatate (TESSALON PERLES) 100 mg capsule    amoxicillin-clavulanate (AUGMENTIN) 875-125 mg per tablet         Patient Instructions     Patient Instructions   Continue cold meds otc  Follow up with PCP in 3-5 days  Proceed to  ER if symptoms worsen  Chief Complaint     Chief Complaint   Patient presents with    Cold Like Symptoms     Symptoms since Friday  complains of nasal and chest congestion   Cough     productive for clear sputum  History of Present Illness         59-year-old female complains of cough congestion sinus pressure in her cheeks and tooth pain for 6 days  No fever home  Taking over-the-counter cold medicines with minimal relief  No chest pain or shortness of breath  No nausea or vomiting          Review of Systems   Review of Systems      Current Medications       Current Outpatient Prescriptions:     Arnica 20 % TINC, arnica, Disp: , Rfl:     ascorbic acid (VITAMIN C) 250 mg tablet, Take 250 mg by mouth daily, Disp: , Rfl:     Calcium Carbonate (CALCIUM 600 PO), Take by mouth, Disp: , Rfl:     Cholecalciferol (VITAMIN D3) 19668 units TABS, Take by mouth, Disp: , Rfl:     Lactobacillus (CVS PROBIOTIC ACIDOPHILUS PO), Take 1 capsule by mouth daily  , Disp: , Rfl:     Multiple Vitamins-Minerals (MULTIVITAMIN WITH MINERALS) tablet, Take 1 tablet by mouth daily, Disp: , Rfl:     amoxicillin-clavulanate (AUGMENTIN) 875-125 mg per tablet, Take 1 tablet by mouth every 12 (twelve) hours for 7 days, Disp: 14 tablet, Rfl: 0    benzonatate (TESSALON PERLES) 100 mg capsule, Take 1 capsule (100 mg total) by mouth 3 (three) times a day as needed for cough, Disp: 30 capsule, Rfl: 0    Current Allergies     Allergies as of 01/09/2019 - Reviewed 01/09/2019   Allergen Reaction Noted    Black pepper [piper] Rash 01/04/2018    Hexachlorophene Other (See Comments)     Yeast-lucinda pov-iodine med [povidone iodine]  04/16/2013            The following portions of the patient's history were reviewed and updated as appropriate: allergies, current medications, past family history, past medical history, past social history, past surgical history and problem list      Past Medical History:   Diagnosis Date    Arthritis     Ascites     Cancer ([facility] [address] )     fallopian tube    Hypertension 1/4/2018    Stroke ([facility] [address] ) 1995       Past Surgical History:   Procedure Laterality Date    HARDWARE REMOVAL      bilat knees    IR PORT REMOVAL  10/23/2018    KNEE SURGERY      PARACENTESIS      KS KRYSTINA SALP-OOPH W/OMENTECT,LAZARO,RAD DISSECT N/A 4/2/2018    Procedure: TOTAL ABDOMINAL HYSTERECTOMY, BILATERAL SALPINGOOPHORECTOMY, OMENTECTOMY;  Surgeon: Gunnar Mckeon MD;  [facility];  Service: Gynecology Oncology    KS EXPLORATORY OF ABDOMEN N/A 4/2/2018    Procedure: Tia Amas;  Surgeon: Gunnar Mckeon MD;  [facility];  Service: Gynecology Oncology    TONSILLECTOMY         Family History   Problem Relation Age of Onset    Cancer Mother          Medications have been verified  Objective   /72 (BP Location: Left arm, Patient Position: Sitting)   Pulse 64   Temp 98 7 °F (37 1 °C) (Temporal)   Resp 18   Ht 5' 3" (1 6 m)   Wt 53 6 kg (118 lb 3 2 oz)   SpO2 97%   BMI 20 94 kg/m²        Physical Exam     Physical Exam   Constitutional: She appears well-developed and well-nourished  No distress  HENT:   Head: Normocephalic and atraumatic  Right Ear: Tympanic membrane, external ear and ear canal normal    Left Ear: Tympanic membrane, external ear and ear canal normal    Nose: Mucosal edema and rhinorrhea present  Right sinus exhibits maxillary sinus tenderness   Right sinus exhibits no frontal sinus tenderness  Left sinus exhibits maxillary sinus tenderness  Left sinus exhibits no frontal sinus tenderness  Mouth/Throat: Oropharynx is clear and moist  No posterior oropharyngeal erythema  Eyes: Pupils are equal, round, and reactive to light  Conjunctivae and EOM are normal  No scleral icterus  Neck: Normal range of motion  Neck supple  Cardiovascular: Normal rate, regular rhythm and normal heart sounds  Pulmonary/Chest: Effort normal and breath sounds normal  No respiratory distress  She has no wheezes  She has no rales  Abdominal: Soft  Bowel sounds are normal  She exhibits no distension and no mass  There is no tenderness  There is no rebound and no guarding  Lymphadenopathy:     She has no cervical adenopathy  Skin: Skin is warm and dry  No rash noted

## 2019-01-10 LAB — CANCER AG125 SERPL-ACNC: 19.4 U/ML (ref 0–30)

## 2019-01-14 PROBLEM — Z85.44 HISTORY OF CANCER OF FALLOPIAN TUBE IN ADULTHOOD: Status: ACTIVE | Noted: 2018-02-05

## 2019-01-14 PROBLEM — Z08 ENCOUNTER FOR FOLLOW-UP EXAMINATION AFTER COMPLETED TREATMENT FOR MALIGNANT NEOPLASM: Status: ACTIVE | Noted: 2019-01-14

## 2019-01-15 ENCOUNTER — OFFICE VISIT (OUTPATIENT)
Dept: GYNECOLOGIC ONCOLOGY | Facility: CLINIC | Age: 64
End: 2019-01-15
Payer: MEDICARE

## 2019-01-15 VITALS
WEIGHT: 117.5 LBS | HEART RATE: 65 BPM | BODY MASS INDEX: 20.06 KG/M2 | TEMPERATURE: 97.9 F | HEIGHT: 64 IN | DIASTOLIC BLOOD PRESSURE: 82 MMHG | SYSTOLIC BLOOD PRESSURE: 144 MMHG

## 2019-01-15 DIAGNOSIS — Z85.44 HISTORY OF CANCER OF FALLOPIAN TUBE IN ADULTHOOD: Primary | ICD-10-CM

## 2019-01-15 DIAGNOSIS — Z08 ENCOUNTER FOR FOLLOW-UP EXAMINATION AFTER COMPLETED TREATMENT FOR MALIGNANT NEOPLASM: ICD-10-CM

## 2019-01-15 PROCEDURE — 99212 OFFICE O/P EST SF 10 MIN: CPT | Performed by: OBSTETRICS & GYNECOLOGY

## 2019-01-15 NOTE — PROGRESS NOTES
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  The patient will return in 3 months for her next surveillance visit  Patient will have a  drawn just prior to that visit  Relevant Orders            CHIEF COMPLAINT:   "I am here for follow-up "    Problem:  Cancer Staging  Fallopian tube carcinoma ([facility] [address] )  Staging form: Ovary, Fallopian Tube, Primary Peritoneal, AJCC 8th Edition  - Clinical stage from 4/2/2018: FIGO Stage IIIC - Signed by Sherlyn Lopez MD on 4/16/2018    Previous therapy:     History of cancer of fallopian tube in adulthood    1/4/2018 Initial Diagnosis     Paracentesis revealing adenocarcinoma of gynecologic origin         1/25/2018 - 3/8/2018 Chemotherapy     3 cycles of carboplatin AUC 6 and taxol 175 mg/m2 squared Q 3 weeks  Pretreatment Ca 125: 534 6          4/2/2018 Surgery     Exploratory laparotomy, total abdominal hysterectomy, bilateral salpingo-oophorectomy, omentectomy, appendectomy  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  No gross residual disease  4/26/2018 - 6/7/2018 Chemotherapy     3 cycles of carboplatin AUC 5 and Taxol 175 mg/m2 Q 3 weeks (carboplatin dose dropped to AUC of 5 for neutropenia prior to cycle 3)          Patient ID: Ben Clarke is a 70 y o  female  HPI   The patient is a delightful 18-year-old with history 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  Patient initially underwent 3 cycles of neoadjuvant chemotherapy with a good radiographic and biochemical response  Her pretreatment  was elevated at 534 6  Patient then underwent interval cytoreductive surgery on April 2, 2018    There was no gross residual disease at the time of her surgery  Patient then completed another 3 cycles of adjuvant chemotherapy and she completed this on June 7, 2018  She has no new complaints today  No vaginal bleeding, abdominal/pelvic pain  Normal bowel and bladder function  No interval change in medical history since last visit  Quality of life is good  Patient's most recent  on January 9, 2019 was normal at 19 4  The following portions of the patient's history were reviewed and updated as appropriate: allergies, current medications, past family history, past medical history, past social history, past surgical history and problem list     Review of Systems    Current Outpatient Prescriptions   Medication Sig Dispense Refill    amoxicillin-clavulanate (AUGMENTIN) 875-125 mg per tablet Take 1 tablet by mouth every 12 (twelve) hours for 7 days 14 tablet 0    Arnica 20 % TINC arnica      ascorbic acid (VITAMIN C) 250 mg tablet Take 250 mg by mouth daily      benzonatate (TESSALON PERLES) 100 mg capsule Take 1 capsule (100 mg total) by mouth 3 (three) times a day as needed for cough 30 capsule 0    Calcium Carbonate (CALCIUM 600 PO) Take by mouth      Cholecalciferol (VITAMIN D3) 62392 units TABS Take by mouth      Lactobacillus (CVS PROBIOTIC ACIDOPHILUS PO) Take 1 capsule by mouth daily        Multiple Vitamins-Minerals (MULTIVITAMIN WITH MINERALS) tablet Take 1 tablet by mouth daily       No current facility-administered medications for this visit  Objective:    Blood pressure 144/82, pulse 65, temperature 97 9 °F (36 6 °C), temperature source Oral, height 5' 3 75" (1 619 m), weight 53 3 kg (117 lb 8 oz)  Body mass index is 20 33 kg/m²  Body surface area is 1 56 meters squared  Physical Exam   Constitutional: She is oriented to person, place, and time  She appears well-developed and well-nourished  No distress  HENT:   Head: Normocephalic and atraumatic     Right Ear: External ear normal    Left Ear: External ear normal    Nose: Nose normal    Mouth/Throat: Oropharynx is clear and moist  No oropharyngeal exudate  Eyes: Pupils are equal, round, and reactive to light  Conjunctivae and EOM are normal  Right eye exhibits no discharge  Left eye exhibits no discharge  No scleral icterus  Neck: Normal range of motion  Neck supple  No JVD present  No tracheal deviation present  No thyromegaly present  Cardiovascular: Normal rate, regular rhythm, normal heart sounds and intact distal pulses  Exam reveals no gallop and no friction rub  No murmur heard  Pulmonary/Chest: Effort normal and breath sounds normal  No stridor  No respiratory distress  She has no wheezes  She has no rales  She exhibits no tenderness  Abdominal: Soft  Bowel sounds are normal  She exhibits no distension and no mass  There is no tenderness  There is no rebound and no guarding  Genitourinary: No vaginal discharge found  Genitourinary Comments: -Normal external female genitalia, normal Bartholin's and Bonham's glands  -Normal midline urethral meatus  No lesions notes  -Bladder without fullness mass or tenderness  -Vagina without lesion or discharge No significant cystocele or rectocele noted  -Cervix surgically absent  -Uterus surgically absent  -Adnexae surgically absent  -Anus without fissure of lesion     Musculoskeletal: Normal range of motion  She exhibits no edema, tenderness or deformity  Lymphadenopathy:     She has no cervical adenopathy  Neurological: She is alert and oriented to person, place, and time  She has normal reflexes  No cranial nerve deficit  She exhibits normal muscle tone  Coordination normal    Skin: Skin is warm and dry  No rash noted  She is not diaphoretic  No erythema  No pallor  Psychiatric: She has a normal mood and affect   Her behavior is normal  Judgment and thought content normal        Lab Results   Component Value Date     19 4 01/09/2019     Lab Results   Component Value Date    K 3 8 06/26/2018     06/26/2018    CO2 26 06/26/2018    BUN 8 06/26/2018    CREATININE 0 72 06/26/2018    GLUF 119 (H) 01/22/2018    CALCIUM 9 0 06/26/2018    AST 15 06/26/2018    ALT 19 06/26/2018    ALKPHOS 48 06/26/2018    EGFR 88 06/26/2018     Lab Results   Component Value Date    WBC 5 21 10/23/2018    HGB 13 0 10/23/2018    HCT 39 7 10/23/2018    MCV 95 10/23/2018     10/23/2018     Lab Results   Component Value Date    NEUTROABS 6 08 06/19/2018     Final Diagnosis   Primary Tumor of the Ovary/Fallopian Tube/Peritoneum (Includes specimens A to D, Pelvic washings [id number] and prior Ascitic fluid [id number])  - Procedure: Total hysterectomy, bilateral salpingo-oophorectomy, omentectomy and appendectomy  - Hysterectomy type: Abdominal  - Specimen integrity:      - Right ovary (if applicable): intact      - Left ovary (if applicable):intact      - Right fallopian tube (if applicable): intact      - Left fallopian tube (if applicable):intact      - Morcellated specimen (specify organ): N/A  - Tumor site: Bilateral fallopian tubes (See comment)  - Ovarian surface involvement:Present, bilateral  - Fallopian tube surface involvement: Present, bilateral  - Tumor size: largest tumor focus noted in omentum, 2 6 cm  - Histologic type: Serous carcinoma   - Histologic grade: High grade  - Implants (required for advanced stage serous/seromucinous borderline tumors only): N/A  - Other tissue/organ involvement: Cervix focally involved with carcinoma at deep margin   Appendix with tumor deposits on serosal surface  - Largest extrapelvic peritoneal focus (required only if applicable): 2 6 cm macroscopic    - Specify site: Omentum  - Peritoneal/ascetic fluid: Negative for carcinoma ([id number])  - Pleural fluid: Not submitted  - Treatment effect: Minimal to moderate response (CRS 1 to 2)  - Regional lymph nodes:    - No lymph nodes submitted or found  - Pathologic stage classification (pTNM, AJCC 8th edition): ypT3c, pNx, G2 (high)  - FIGO stage (2015 FIGO cancer report): IIIC  - Additional pathologic findings: Leiomyoma of uterus  - Ancillary studies: Not performed  - Clinical history: malignant ascites, post chemotherapy ([id number])  - Comments: Foci of invasive mucosal carcinoma are noted in bilateral fallopian tubes, hence the primary site for serous carcinoma is presumed to be tubal   Ref: Marcello bryan  Assessment of new system for primary site assignment in high grade serous carcinoma of fallopian tube, ovary and peritoneum  Histopathology 2015 [phone]     FINAL DIAGNOSIS ON EACH SPECIMEN ON THIS CASE:  A  Omentum:  - Serous carcinoma, high grade   - Largest tumor deposit, 2 6 cm       B  Ovary and Fallopian Tube, Left:  - Serous carcinoma, high grade  - Invasive carcinoma involves fallopian tube (B10) and ovary  - Surface involvement of ovary is present       C  Uterus, cervix, right fallopian  tube and ovary:  - Serous carcinoma high grade, involving focally involving cervix at deep margin   - Inactive endometrium   - Leiomyoma  - Serous carcinoma involves fallopian tube and ovary      D  Appendix:  - Serous carcinoma, high grade involving periappendiceal serosa  - Appendix with fibrous obliteration of tip, no transmural involvement with tumor  Scott C Elysa Hammans, MD, PhD, [facility]  Attending Physician, [address] [place] [address]

## 2019-01-15 NOTE — ASSESSMENT & PLAN NOTE
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

## 2019-02-14 ENCOUNTER — OFFICE VISIT (OUTPATIENT)
Dept: URGENT CARE | Facility: CLINIC | Age: 64
End: 2019-02-14
Payer: MEDICARE

## 2019-02-14 VITALS
HEIGHT: 64 IN | DIASTOLIC BLOOD PRESSURE: 99 MMHG | TEMPERATURE: 98.7 F | WEIGHT: 120 LBS | HEART RATE: 71 BPM | RESPIRATION RATE: 18 BRPM | BODY MASS INDEX: 20.49 KG/M2 | OXYGEN SATURATION: 97 % | SYSTOLIC BLOOD PRESSURE: 137 MMHG

## 2019-02-14 DIAGNOSIS — R07.89 CHEST WALL PAIN: Primary | ICD-10-CM

## 2019-02-14 PROCEDURE — 99213 OFFICE O/P EST LOW 20 MIN: CPT | Performed by: PHYSICIAN ASSISTANT

## 2019-02-14 PROCEDURE — G0463 HOSPITAL OUTPT CLINIC VISIT: HCPCS | Performed by: PHYSICIAN ASSISTANT

## 2019-02-14 NOTE — PROGRESS NOTES
[facility]        NAME: Jw Lee is a 70 y o  female  : 1955    MRN: [id number]  DATE: 2019  TIME: 3:22 PM    Assessment and Plan   Chest wall pain [R07 89]  1  Chest wall pain  Ambulatory referral to Occupational Therapy         Patient Instructions       Likely adhesions from the scar  She benefit from some manual therapy  I discussed with the occupational therapist in the building  She will gladly devaluate her so I gave her referral   Follow up with PCP in 3-5 days  Proceed to  ER if symptoms worsen  Chief Complaint     Chief Complaint   Patient presents with    Chest Pain     PT is having pain from port removal          History of Present Illness        49-year-old female with a history of fallopian tube cancer complains of pain at her site of her port  The port was removed in October  She has pain around that site that comes and goes  Sometimes the pain wakes her up from sleep  No fever or chills  No drainage from the wound        Review of Systems   Review of Systems      Current Medications       Current Outpatient Medications:     Arnica 20 % TINC, arnica, Disp: , Rfl:     ascorbic acid (VITAMIN C) 250 mg tablet, Take 250 mg by mouth daily, Disp: , Rfl:     Calcium Carbonate (CALCIUM 600 PO), Take by mouth, Disp: , Rfl:     Cholecalciferol (VITAMIN D3) 84313 units TABS, Take by mouth, Disp: , Rfl:     Lactobacillus (CVS PROBIOTIC ACIDOPHILUS PO), Take 1 capsule by mouth daily  , Disp: , Rfl:     Multiple Vitamins-Minerals (MULTIVITAMIN WITH MINERALS) tablet, Take 1 tablet by mouth daily, Disp: , Rfl:     benzonatate (TESSALON PERLES) 100 mg capsule, Take 1 capsule (100 mg total) by mouth 3 (three) times a day as needed for cough (Patient not taking: Reported on 2019), Disp: 30 capsule, Rfl: 0    Current Allergies     Allergies as of 2019 - Reviewed 01/15/2019   Allergen Reaction Noted    Black pepper [piper] Rash 2018    Hexachlorophene Other (See Comments)     Yeast-lucinda pov-iodine med [povidone iodine]  04/16/2013            The following portions of the patient's history were reviewed and updated as appropriate: allergies, current medications, past family history, past medical history, past social history, past surgical history and problem list      Past Medical History:   Diagnosis Date    Arthritis     Ascites     Cancer ([facility] [address] )     fallopian tube    Hypertension 1/4/2018    Stroke ([facility] [address] ) 1995       Past Surgical History:   Procedure Laterality Date    HARDWARE REMOVAL      bilat knees    IR PORT REMOVAL  10/23/2018    KNEE SURGERY      PARACENTESIS      CA KRYSTINA SALP-OOPH W/OMENTECT,LAZARO,RAD DISSECT N/A 4/2/2018    Procedure: TOTAL ABDOMINAL HYSTERECTOMY, BILATERAL SALPINGOOPHORECTOMY, OMENTECTOMY;  Surgeon: Gómez Hatfield MD;  [facility];  Service: Gynecology Oncology    CA EXPLORATORY OF ABDOMEN N/A 4/2/2018    Procedure: Liss Minors;  Surgeon: Gómez Hatfield MD;  [facility];  Service: Gynecology Oncology    TONSILLECTOMY         Family History   Problem Relation Age of Onset    Cancer Mother          Medications have been verified  Objective   /99 (BP Location: Right arm, Patient Position: Sitting, Cuff Size: Standard)   Pulse 71   Temp 98 7 °F (37 1 °C)   Resp 18   Ht 5' 4" (1 626 m)   Wt 54 4 kg (120 lb)   SpO2 97%   BMI 20 60 kg/m²        Physical Exam     Physical Exam   Constitutional: She appears well-developed and well-nourished  Cardiovascular: Regular rhythm, normal heart sounds and normal pulses  Pulmonary/Chest: Effort normal and breath sounds normal    Skin:     Left anterior chest 2 cm linear scar well healed  No erythema or drainage  No open wound  Mildly tender around the scar  Some fullness of the skin but no madai induration  No fluctuance

## 2019-02-26 ENCOUNTER — EVALUATION (OUTPATIENT)
Dept: OCCUPATIONAL THERAPY | Facility: CLINIC | Age: 64
End: 2019-02-26
Payer: MEDICARE

## 2019-02-26 DIAGNOSIS — R07.89 CHEST WALL PAIN: ICD-10-CM

## 2019-02-26 PROCEDURE — 97165 OT EVAL LOW COMPLEX 30 MIN: CPT

## 2019-02-26 PROCEDURE — 97140 MANUAL THERAPY 1/> REGIONS: CPT

## 2019-02-26 NOTE — PROGRESS NOTES
OT Evaluation     Today's date: 2019  Patient name: Gunnar Shaver  : 1955  MRN: [id number]  Referring provider: Kate Mathias PA-C  Dx:   Encounter Diagnosis     ICD-10-CM    1  Chest wall pain R07 89 Ambulatory referral to Occupational Therapy                  Assessment  Assessment details: This is a 75year old female with a painful scar from where her port had been located  Patient had under gone chemotherapy treatments in 2018 and she reports the port felt uncomfortable form the time it was placed until it was removed  She now has dense, adherent scarring with pain where the port was located  Patient has tightness at end range of left shoulder flexion and abduction and she has some deficits in shoulder strength  Patient has pain in the left side of the chest during ADLs and has difficulty sleeping due to the pain  This patient is a good candidate for OT services to restoore left shoulder ROM and strength and to abolish scar pain  Impairments: abnormal or restricted ROM, impaired physical strength and pain with function  Other impairment: Adherent scarring  Functional limitations: Impaired sleep patternsUnderstanding of Dx/Px/POC: excellent  Goals  ST/LTGs ( 4-6 weeks)  1  Patient will be independent in HEP to maintain ROM, strength and to mobilize the scar at discharge  2  Patient will demonstrate left shoulder ROM WNL for independence in self care  3  Patient will demonstrate 5/5 MMT in the left shoulder for independence in home management tasks  4    Patient will report resolution of scar and chest wall pain for normal sleep patterns      Plan  Patient would benefit from: skilled occupational therapy  Planned therapy interventions: manual therapy, patient education, postural training, strengthening, stretching, therapeutic exercise and home exercise program  Other planned therapy interventions: kinesio tape, scar management  Frequency: 2x week  Duration in weeks: 6  Plan of Care beginning date: 2019  Plan of Care expiration date: 2019  Treatment plan discussed with: patient        Subjective Evaluation    History of Present Illness  Onset date: 2018  Mechanism of injury: surgery  Mechanism of injury: Patient diagnosed with fallopian tube cancer in 2018 and treated with hysterectomy and chemotherapy treatments  Had pain from the the time the port was placed and has had pain since the port was removed in 10/2018  Pain in the scar at the port site is painful to touch and with movement  Pain wakes her at night  Patient now presents for OT evaluation and treatment          Recurrent probem    Quality of life: excellent    Pain  Current pain ratin  At best pain ratin  At worst pain ratin  Location: Port scar and periphery of the scar  Quality: discomfort and dull ache  Relieving factors: rest and change in position  Exacerbated by: Scapular retraction, touching and moving the scar  Progression: no change    Social Support  Lives with: adult children and spouse    Employment status: not working ()  Hand dominance: right  Life stress:  has stage 4 spinal cancer    Treatments  No previous or current treatments  Patient Goals  Patient goals for therapy: independence with ADLs/IADLs and decreased pain  Patient goal: Improve sleep        Objective     Observations     Additional Observation Details  19:  Scar is 1 5 cm  Red area  2cm around the periphery of the scar    Scar is dense and hypersensitive    Active Range of Motion   Left Shoulder   Flexion: 155 degrees   Abduction: 130 degrees   External rotation 90°: WFL  Internal rotation 90°: WFL    Right Shoulder   Flexion: 168 degrees   Abduction: 150 degrees     Strength/Myotome Testing     Left Shoulder     Planes of Motion   Flexion: 4   Abduction: 4   External rotation at 0°: 5   Internal rotation at 0°: 5     Right Shoulder     Planes of Motion   Flexion: 4   Abduction: 4   Adduction: 5   Internal rotation at 0°: 5           Precautions:  Hx fallopian cancer    Specialty Daily Treatment Diary     Manual  2/26       Scar massage 5'       PROM shoulder 5'                                   Exercise Diary  2/26       HEP Corner stretch, wall slides, scar massage                                                                                                                                                                   Modalities

## 2019-03-04 ENCOUNTER — OFFICE VISIT (OUTPATIENT)
Dept: OCCUPATIONAL THERAPY | Facility: CLINIC | Age: 64
End: 2019-03-04
Payer: MEDICARE

## 2019-03-04 DIAGNOSIS — R07.89 CHEST WALL PAIN: Primary | ICD-10-CM

## 2019-03-04 PROCEDURE — 97140 MANUAL THERAPY 1/> REGIONS: CPT

## 2019-03-04 PROCEDURE — 97110 THERAPEUTIC EXERCISES: CPT

## 2019-03-04 NOTE — PROGRESS NOTES
Daily Note     Today's date: 3/4/2019  Patient name: Sam Zamora  : 1955  MRN: [id number]  Referring provider: Nargis Salinas PA-C  Dx:   Encounter Diagnosis     ICD-10-CM    1  Chest wall pain R07 89                   Subjective: I've been doing the exercises and massaging my scar  It feels better      Objective: See treatment diary below    Precautions:  Hx fallopian cancer     Specialty Daily Treatment Diary      Manual  2/26  3/4         Scar massage 5'  8'         PROM shoulder 5'  8'          Kinesio tape    over scar                                           Exercise Diary  2/26  3/4         HEP Corner stretch, wall slides, scar massage  Kinesio tape wear, care, precaution          OH pulley flex    x20                         abd    x20          Dowel ex ER     x20                           IR    x20         Tband mid rows                  b/l ext                  b/l ER       IR                  flex                  abd                                                                                                                                                               Modalities                                                            Assessment: Tolerated treatment well  Patient exhibited good technique with therapeutic exercises  Scar is significantly more supple and patient has been compliant with scar massage at home  Good PROM in the left shoulder      Plan: Progress treatment as tolerated

## 2019-03-07 ENCOUNTER — OFFICE VISIT (OUTPATIENT)
Dept: OCCUPATIONAL THERAPY | Facility: CLINIC | Age: 64
End: 2019-03-07
Payer: MEDICARE

## 2019-03-07 DIAGNOSIS — R07.89 CHEST WALL PAIN: Primary | ICD-10-CM

## 2019-03-07 PROCEDURE — 97110 THERAPEUTIC EXERCISES: CPT

## 2019-03-07 PROCEDURE — 97140 MANUAL THERAPY 1/> REGIONS: CPT

## 2019-03-07 NOTE — PROGRESS NOTES
OT Discharge     Today's date: 3/7/2019  Patient name: Francisco Sidhu  : 1955  MRN: [id number]  Referring provider: Kaycee Gandhi PA-C  Dx:   Encounter Diagnosis     ICD-10-CM    1  Chest wall pain R07 89                   Assessment  Assessment details: This is a 75year old female with a painful scar from where her port had been located  Patient had under gone chemotherapy treatments in 2018 and she reports the port felt uncomfortable form the time it was placed until it was removed  She now has dense, adherent scarring with pain where the port was located  Patient has tightness at end range of left shoulder flexion and abduction and she has some deficits in shoulder strength  Patient has pain in the left side of the chest during ADLs and has difficulty sleeping due to the pain  This patient is a good candidate for OT services to restoore left shoulder ROM and strength and to abolish scar pain  3/7/19:  Patient now demonstrates full AROM WNL and strength WNL in the BUE  Scar on chest is no longer hypersenstive  Scar is more mobile  Hypertrophy has decreased, but not fully resolved  Patient wears kinesio tape over the scar and this has decreased hypertrophy and decreased pain at night  Patient educated in HEP for pectoral and shoulder stretches, scar massage, and scapular strengthening for HEP  Discharge to HEP at this time  Understanding of Dx/Px/POC: excellent  Goals  ST/LTGs ( 4-6 weeks)  1  Patient will be independent in HEP to maintain ROM, strength and to mobilize the scar at discharge  GOAL MET  2  Patient will demonstrate left shoulder ROM WNL for independence in self care  GOAL MET  3  Patient will demonstrate 5/5 MMT in the left shoulder for independence in home management tasks  GOAL MET  4  Patient will report resolution of scar and chest wall pain for normal sleep patterns   GOAL MET      Plan  Plan details: Discharge to HEP  Treatment plan discussed with: patient        Subjective Evaluation    History of Present Illness  Onset date: 2018  Mechanism of injury: surgery  Mechanism of injury: Patient diagnosed with fallopian tube cancer in 2018 and treated with hysterectomy and chemotherapy treatments  Had pain from the the time the port was placed and has had pain since the port was removed in 10/2018  Pain in the scar at the port site is painful to touch and with movement  Pain wakes her at night  Patient now presents for OT evaluation and treatment    3/7/19:  Patient states she is feeling better and no longer has pain at night when sleeping  The kinesio tape has reduced pain and hypertrophy in the scar  Patient states she is ready for discharge          Recurrent probem    Quality of life: excellent    Pain  Current pain ratin  At best pain ratin  At worst pain ratin  Location: Port scar  Quality: twinge of pain  Relieving factors: change in position  Aggravating factors: nothing  Progression: resolved    Social Support  Lives with: adult children and spouse    Employment status: not working ()  Hand dominance: right  Life stress:  has stage 4 spinal cancer    Treatments  No previous or current treatments  Patient Goals  Patient goals for therapy: independence with ADLs/IADLs and decreased pain  Patient goal: Improve sleep        Objective     Observations     Additional Observation Details  19:  Scar is 1 5 cm  Red area  2cm around the periphery of the scar  Scar is dense and hypersensitive    3/7/19:  Hypertrophy has decreased, but not fully resolved    Scar is mobile and no longer hypersensitive    Active Range of Motion   Left Shoulder   Normal active range of motion  External rotation 90°: WFL  Internal rotation 90°: WFL    Right Shoulder   Normal active range of motion    Strength/Myotome Testing     Left Shoulder     Planes of Motion   Flexion: 5   Extension: 5   Abduction: 5   Adduction: 5   External rotation at 0°: 5   Internal rotation at 0°: 5     Right Shoulder     Planes of Motion   Flexion: 5   Extension: 5   Abduction: 5   Adduction: 5   Internal rotation at 0°: 5           Precautions:  Hx fallopian cancer     Specialty Daily Treatment Diary      Manual  2/26  3/4  3/7       Scar massage 5'  8'  8'       PROM shoulder 5'  8'          Kinesio tape    over scar  scar                                         Exercise Diary  2/26  3/4  3/7       HEP Corner stretch, wall slides, scar massage  Kinesio tape wear, care, precaution  green t band for scapular strengthening        OH pulley flex    x20  5' x 30                       abd    x20  5' x 30        Dowel ex ER     x20                           IR    x20         Tband mid rows      x30 G            b/l ext      x30 G            b/l ER       IR      x30 ER, G            high rows      x30 G            abd                                                                                                                                                               Modalities

## 2019-03-11 ENCOUNTER — APPOINTMENT (OUTPATIENT)
Dept: OCCUPATIONAL THERAPY | Facility: CLINIC | Age: 64
End: 2019-03-11
Payer: MEDICARE

## 2019-03-14 ENCOUNTER — APPOINTMENT (OUTPATIENT)
Dept: OCCUPATIONAL THERAPY | Facility: CLINIC | Age: 64
End: 2019-03-14
Payer: MEDICARE

## 2019-03-19 ENCOUNTER — APPOINTMENT (OUTPATIENT)
Dept: OCCUPATIONAL THERAPY | Facility: CLINIC | Age: 64
End: 2019-03-19
Payer: MEDICARE

## 2019-03-21 ENCOUNTER — APPOINTMENT (OUTPATIENT)
Dept: OCCUPATIONAL THERAPY | Facility: CLINIC | Age: 64
End: 2019-03-21
Payer: MEDICARE

## 2019-03-25 ENCOUNTER — APPOINTMENT (OUTPATIENT)
Dept: OCCUPATIONAL THERAPY | Facility: CLINIC | Age: 64
End: 2019-03-25
Payer: MEDICARE

## 2019-03-28 ENCOUNTER — APPOINTMENT (OUTPATIENT)
Dept: OCCUPATIONAL THERAPY | Facility: CLINIC | Age: 64
End: 2019-03-28
Payer: MEDICARE

## 2019-05-02 ENCOUNTER — OFFICE VISIT (OUTPATIENT)
Dept: PALLIATIVE MEDICINE | Facility: CLINIC | Age: 64
End: 2019-05-02
Payer: MEDICARE

## 2019-05-02 VITALS
SYSTOLIC BLOOD PRESSURE: 122 MMHG | HEART RATE: 68 BPM | TEMPERATURE: 99 F | OXYGEN SATURATION: 98 % | WEIGHT: 124.5 LBS | HEIGHT: 64 IN | BODY MASS INDEX: 21.25 KG/M2 | DIASTOLIC BLOOD PRESSURE: 72 MMHG

## 2019-05-02 DIAGNOSIS — R11.0 NAUSEA: ICD-10-CM

## 2019-05-02 DIAGNOSIS — G62.0 CHEMOTHERAPY-INDUCED NEUROPATHY (HCC): ICD-10-CM

## 2019-05-02 DIAGNOSIS — T45.1X5A CHEMOTHERAPY-INDUCED NEUROPATHY (HCC): ICD-10-CM

## 2019-05-02 DIAGNOSIS — G47.9 DIFFICULTY SLEEPING: ICD-10-CM

## 2019-05-02 DIAGNOSIS — Z79.899 MEDICAL MARIJUANA USE: ICD-10-CM

## 2019-05-02 DIAGNOSIS — Z85.44 HISTORY OF CANCER OF FALLOPIAN TUBE IN ADULTHOOD: Primary | ICD-10-CM

## 2019-05-02 PROBLEM — L85.3 DRY SKIN: Status: ACTIVE | Noted: 2019-05-02

## 2019-05-02 PROCEDURE — 99214 OFFICE O/P EST MOD 30 MIN: CPT | Performed by: FAMILY MEDICINE

## 2019-05-10 ENCOUNTER — APPOINTMENT (OUTPATIENT)
Dept: LAB | Facility: CLINIC | Age: 64
End: 2019-05-10
Payer: MEDICARE

## 2019-05-10 DIAGNOSIS — Z08 ENCOUNTER FOR FOLLOW-UP EXAMINATION AFTER COMPLETED TREATMENT FOR MALIGNANT NEOPLASM: ICD-10-CM

## 2019-05-10 DIAGNOSIS — Z85.44 HISTORY OF CANCER OF FALLOPIAN TUBE IN ADULTHOOD: ICD-10-CM

## 2019-05-10 PROCEDURE — 86304 IMMUNOASSAY TUMOR CA 125: CPT

## 2019-05-10 PROCEDURE — 36415 COLL VENOUS BLD VENIPUNCTURE: CPT

## 2019-05-11 LAB — CANCER AG125 SERPL-ACNC: 24.8 U/ML (ref 0–30)

## 2019-05-15 ENCOUNTER — OFFICE VISIT (OUTPATIENT)
Dept: GYNECOLOGIC ONCOLOGY | Facility: CLINIC | Age: 64
End: 2019-05-15
Payer: MEDICARE

## 2019-05-15 VITALS
DIASTOLIC BLOOD PRESSURE: 86 MMHG | BODY MASS INDEX: 21.25 KG/M2 | HEART RATE: 76 BPM | WEIGHT: 124.5 LBS | TEMPERATURE: 98.2 F | RESPIRATION RATE: 16 BRPM | HEIGHT: 64 IN | SYSTOLIC BLOOD PRESSURE: 102 MMHG

## 2019-05-15 DIAGNOSIS — C56.9 MALIGNANT NEOPLASM OF OVARY, UNSPECIFIED LATERALITY (HCC): Primary | ICD-10-CM

## 2019-05-15 DIAGNOSIS — C56.3 MALIGNANT NEOPLASM OF BOTH OVARIES (HCC): ICD-10-CM

## 2019-05-15 PROCEDURE — 99214 OFFICE O/P EST MOD 30 MIN: CPT | Performed by: OBSTETRICS & GYNECOLOGY

## 2019-05-15 PROCEDURE — 1160F RVW MEDS BY RX/DR IN RCRD: CPT | Performed by: OBSTETRICS & GYNECOLOGY

## 2019-05-17 ENCOUNTER — APPOINTMENT (OUTPATIENT)
Dept: LAB | Facility: CLINIC | Age: 64
End: 2019-05-17
Payer: MEDICARE

## 2019-05-17 DIAGNOSIS — C56.9 MALIGNANT NEOPLASM OF OVARY, UNSPECIFIED LATERALITY (HCC): ICD-10-CM

## 2019-05-17 LAB
BUN SERPL-MCNC: 19 MG/DL (ref 5–25)
CREAT SERPL-MCNC: 0.76 MG/DL (ref 0.6–1.3)
GFR SERPL CREATININE-BSD FRML MDRD: 82 ML/MIN/1.73SQ M

## 2019-05-17 PROCEDURE — 82565 ASSAY OF CREATININE: CPT

## 2019-05-17 PROCEDURE — 84520 ASSAY OF UREA NITROGEN: CPT

## 2019-05-17 PROCEDURE — 36415 COLL VENOUS BLD VENIPUNCTURE: CPT

## 2019-05-22 ENCOUNTER — HOSPITAL ENCOUNTER (OUTPATIENT)
Dept: CT IMAGING | Facility: HOSPITAL | Age: 64
Discharge: HOME | End: 2019-05-22
Attending: OBSTETRICS & GYNECOLOGY
Payer: MEDICARE

## 2019-05-22 DIAGNOSIS — C56.9 MALIGNANT NEOPLASM OF OVARY, UNSPECIFIED LATERALITY (HCC): ICD-10-CM

## 2019-05-22 PROCEDURE — 71260 CT THORAX DX C+: CPT

## 2019-05-22 PROCEDURE — 74177 CT ABD & PELVIS W/CONTRAST: CPT

## 2019-05-22 RX ADMIN — IOHEXOL 100 ML: 350 INJECTION, SOLUTION INTRAVENOUS at 12:59

## 2019-06-07 ENCOUNTER — OFFICE VISIT (OUTPATIENT)
Dept: GYNECOLOGIC ONCOLOGY | Facility: CLINIC | Age: 64
End: 2019-06-07
Payer: MEDICARE

## 2019-06-07 ENCOUNTER — APPOINTMENT (OUTPATIENT)
Dept: LAB | Facility: HOSPITAL | Age: 64
End: 2019-06-07
Attending: OBSTETRICS & GYNECOLOGY
Payer: MEDICARE

## 2019-06-07 VITALS
BODY MASS INDEX: 21.43 KG/M2 | RESPIRATION RATE: 18 BRPM | SYSTOLIC BLOOD PRESSURE: 128 MMHG | WEIGHT: 125.5 LBS | TEMPERATURE: 98.2 F | HEIGHT: 64 IN | DIASTOLIC BLOOD PRESSURE: 82 MMHG | HEART RATE: 62 BPM

## 2019-06-07 DIAGNOSIS — C57.02 CARCINOMA OF LEFT FALLOPIAN TUBE (HCC): ICD-10-CM

## 2019-06-07 DIAGNOSIS — C56.9 MALIGNANT NEOPLASM OF OVARY, UNSPECIFIED LATERALITY (HCC): ICD-10-CM

## 2019-06-07 DIAGNOSIS — C56.9 MALIGNANT NEOPLASM OF OVARY, UNSPECIFIED LATERALITY (HCC): Primary | ICD-10-CM

## 2019-06-07 LAB
ALBUMIN SERPL BCP-MCNC: 4.1 G/DL (ref 3.5–5)
ALP SERPL-CCNC: 64 U/L (ref 46–116)
ALT SERPL W P-5'-P-CCNC: 20 U/L (ref 12–78)
ANION GAP SERPL CALCULATED.3IONS-SCNC: 9 MMOL/L (ref 4–13)
AST SERPL W P-5'-P-CCNC: 19 U/L (ref 5–45)
BASOPHILS # BLD AUTO: 0.06 THOUSANDS/ΜL (ref 0–0.1)
BASOPHILS NFR BLD AUTO: 1 % (ref 0–1)
BILIRUB SERPL-MCNC: 0.3 MG/DL (ref 0.2–1)
BUN SERPL-MCNC: 20 MG/DL (ref 5–25)
CALCIUM SERPL-MCNC: 9.3 MG/DL (ref 8.3–10.1)
CHLORIDE SERPL-SCNC: 102 MMOL/L (ref 100–108)
CO2 SERPL-SCNC: 28 MMOL/L (ref 21–32)
CREAT SERPL-MCNC: 0.79 MG/DL (ref 0.6–1.3)
EOSINOPHIL # BLD AUTO: 0.28 THOUSAND/ΜL (ref 0–0.61)
EOSINOPHIL NFR BLD AUTO: 4 % (ref 0–6)
ERYTHROCYTE [DISTWIDTH] IN BLOOD BY AUTOMATED COUNT: 12.5 % (ref 11.6–15.1)
GFR SERPL CREATININE-BSD FRML MDRD: 78 ML/MIN/1.73SQ M
GLUCOSE SERPL-MCNC: 77 MG/DL (ref 65–140)
HCT VFR BLD AUTO: 42.6 % (ref 34.8–46.1)
HGB BLD-MCNC: 13.5 G/DL (ref 11.5–15.4)
IMM GRANULOCYTES # BLD AUTO: 0.03 THOUSAND/UL (ref 0–0.2)
IMM GRANULOCYTES NFR BLD AUTO: 1 % (ref 0–2)
LYMPHOCYTES # BLD AUTO: 2.47 THOUSANDS/ΜL (ref 0.6–4.47)
LYMPHOCYTES NFR BLD AUTO: 37 % (ref 14–44)
MCH RBC QN AUTO: 31 PG (ref 26.8–34.3)
MCHC RBC AUTO-ENTMCNC: 31.7 G/DL (ref 31.4–37.4)
MCV RBC AUTO: 98 FL (ref 82–98)
MONOCYTES # BLD AUTO: 0.65 THOUSAND/ΜL (ref 0.17–1.22)
MONOCYTES NFR BLD AUTO: 10 % (ref 4–12)
NEUTROPHILS # BLD AUTO: 3.16 THOUSANDS/ΜL (ref 1.85–7.62)
NEUTS SEG NFR BLD AUTO: 47 % (ref 43–75)
NRBC BLD AUTO-RTO: 0 /100 WBCS
PLATELET # BLD AUTO: 272 THOUSANDS/UL (ref 149–390)
PMV BLD AUTO: 8.4 FL (ref 8.9–12.7)
POTASSIUM SERPL-SCNC: 4 MMOL/L (ref 3.5–5.3)
PROT SERPL-MCNC: 8.7 G/DL (ref 6.4–8.2)
RBC # BLD AUTO: 4.35 MILLION/UL (ref 3.81–5.12)
SODIUM SERPL-SCNC: 139 MMOL/L (ref 136–145)
WBC # BLD AUTO: 6.65 THOUSAND/UL (ref 4.31–10.16)

## 2019-06-07 PROCEDURE — 85025 COMPLETE CBC W/AUTO DIFF WBC: CPT

## 2019-06-07 PROCEDURE — 80053 COMPREHEN METABOLIC PANEL: CPT

## 2019-06-07 PROCEDURE — 99214 OFFICE O/P EST MOD 30 MIN: CPT | Performed by: OBSTETRICS & GYNECOLOGY

## 2019-06-07 PROCEDURE — 36415 COLL VENOUS BLD VENIPUNCTURE: CPT

## 2019-06-10 DIAGNOSIS — C57.02 CARCINOMA OF LEFT FALLOPIAN TUBE (HCC): Primary | ICD-10-CM

## 2019-06-10 DIAGNOSIS — C56.3 MALIGNANT NEOPLASM OF BOTH OVARIES (HCC): ICD-10-CM

## 2019-06-10 RX ORDER — LORAZEPAM 1 MG/1
1 TABLET ORAL EVERY 6 HOURS PRN
Qty: 20 TABLET | Refills: 1 | Status: SHIPPED | OUTPATIENT
Start: 2019-06-10 | End: 2019-09-11

## 2019-06-10 RX ORDER — ONDANSETRON HYDROCHLORIDE 8 MG/1
8 TABLET, FILM COATED ORAL EVERY 8 HOURS PRN
Qty: 20 TABLET | Refills: 1 | Status: SHIPPED | OUTPATIENT
Start: 2019-06-10 | End: 2019-08-26 | Stop reason: SDUPTHER

## 2019-06-10 RX ORDER — PALONOSETRON 0.05 MG/ML
0.25 INJECTION, SOLUTION INTRAVENOUS ONCE
Status: CANCELLED | OUTPATIENT
Start: 2019-06-21

## 2019-06-12 DIAGNOSIS — C56.3 MALIGNANT NEOPLASM OF BOTH OVARIES (HCC): ICD-10-CM

## 2019-06-21 ENCOUNTER — HOSPITAL ENCOUNTER (OUTPATIENT)
Dept: INFUSION CENTER | Facility: CLINIC | Age: 64
End: 2019-06-21

## 2019-07-10 ENCOUNTER — APPOINTMENT (OUTPATIENT)
Dept: LAB | Facility: CLINIC | Age: 64
End: 2019-07-10
Payer: MEDICARE

## 2019-07-10 DIAGNOSIS — C56.3 MALIGNANT NEOPLASM OF BOTH OVARIES (HCC): ICD-10-CM

## 2019-07-10 LAB
ALBUMIN SERPL BCP-MCNC: 4 G/DL (ref 3.5–5)
ALP SERPL-CCNC: 57 U/L (ref 46–116)
ALT SERPL W P-5'-P-CCNC: 22 U/L (ref 12–78)
ANION GAP SERPL CALCULATED.3IONS-SCNC: 7 MMOL/L (ref 4–13)
AST SERPL W P-5'-P-CCNC: 16 U/L (ref 5–45)
BASOPHILS # BLD AUTO: 0.03 THOUSANDS/ΜL (ref 0–0.1)
BASOPHILS NFR BLD AUTO: 1 % (ref 0–1)
BILIRUB SERPL-MCNC: 0.32 MG/DL (ref 0.2–1)
BUN SERPL-MCNC: 14 MG/DL (ref 5–25)
CALCIUM SERPL-MCNC: 9.4 MG/DL (ref 8.3–10.1)
CANCER AG125 SERPL-ACNC: 27 U/ML (ref 0–30)
CHLORIDE SERPL-SCNC: 106 MMOL/L (ref 100–108)
CO2 SERPL-SCNC: 26 MMOL/L (ref 21–32)
CREAT SERPL-MCNC: 0.72 MG/DL (ref 0.6–1.3)
EOSINOPHIL # BLD AUTO: 0.34 THOUSAND/ΜL (ref 0–0.61)
EOSINOPHIL NFR BLD AUTO: 6 % (ref 0–6)
ERYTHROCYTE [DISTWIDTH] IN BLOOD BY AUTOMATED COUNT: 12.6 % (ref 11.6–15.1)
GFR SERPL CREATININE-BSD FRML MDRD: 88 ML/MIN/1.73SQ M
GLUCOSE SERPL-MCNC: 79 MG/DL (ref 65–140)
HCT VFR BLD AUTO: 41.3 % (ref 34.8–46.1)
HGB BLD-MCNC: 13.2 G/DL (ref 11.5–15.4)
IMM GRANULOCYTES # BLD AUTO: 0.01 THOUSAND/UL (ref 0–0.2)
IMM GRANULOCYTES NFR BLD AUTO: 0 % (ref 0–2)
LYMPHOCYTES # BLD AUTO: 2.15 THOUSANDS/ΜL (ref 0.6–4.47)
LYMPHOCYTES NFR BLD AUTO: 35 % (ref 14–44)
MAGNESIUM SERPL-MCNC: 2.5 MG/DL (ref 1.6–2.6)
MCH RBC QN AUTO: 31.4 PG (ref 26.8–34.3)
MCHC RBC AUTO-ENTMCNC: 32 G/DL (ref 31.4–37.4)
MCV RBC AUTO: 98 FL (ref 82–98)
MONOCYTES # BLD AUTO: 0.52 THOUSAND/ΜL (ref 0.17–1.22)
MONOCYTES NFR BLD AUTO: 8 % (ref 4–12)
NEUTROPHILS # BLD AUTO: 3.18 THOUSANDS/ΜL (ref 1.85–7.62)
NEUTS SEG NFR BLD AUTO: 50 % (ref 43–75)
NRBC BLD AUTO-RTO: 0 /100 WBCS
PLATELET # BLD AUTO: 288 THOUSANDS/UL (ref 149–390)
PMV BLD AUTO: 9.4 FL (ref 8.9–12.7)
POTASSIUM SERPL-SCNC: 3.8 MMOL/L (ref 3.5–5.3)
PROT SERPL-MCNC: 8.4 G/DL (ref 6.4–8.2)
RBC # BLD AUTO: 4.2 MILLION/UL (ref 3.81–5.12)
SODIUM SERPL-SCNC: 139 MMOL/L (ref 136–145)
WBC # BLD AUTO: 6.23 THOUSAND/UL (ref 4.31–10.16)

## 2019-07-10 PROCEDURE — 86304 IMMUNOASSAY TUMOR CA 125: CPT

## 2019-07-10 PROCEDURE — 36415 COLL VENOUS BLD VENIPUNCTURE: CPT | Performed by: PHYSICIAN ASSISTANT

## 2019-07-10 PROCEDURE — 83735 ASSAY OF MAGNESIUM: CPT | Performed by: PHYSICIAN ASSISTANT

## 2019-07-10 PROCEDURE — 85025 COMPLETE CBC W/AUTO DIFF WBC: CPT | Performed by: PHYSICIAN ASSISTANT

## 2019-07-10 PROCEDURE — 80053 COMPREHEN METABOLIC PANEL: CPT | Performed by: PHYSICIAN ASSISTANT

## 2019-07-11 RX ORDER — PALONOSETRON 0.05 MG/ML
0.25 INJECTION, SOLUTION INTRAVENOUS ONCE
Status: CANCELLED | OUTPATIENT
Start: 2019-07-12

## 2019-07-11 RX ORDER — SODIUM CHLORIDE 9 MG/ML
20 INJECTION, SOLUTION INTRAVENOUS ONCE
Status: CANCELLED | OUTPATIENT
Start: 2019-07-12

## 2019-07-12 ENCOUNTER — HOSPITAL ENCOUNTER (OUTPATIENT)
Dept: INFUSION CENTER | Facility: CLINIC | Age: 64
Discharge: HOME | End: 2019-07-12
Payer: MEDICARE

## 2019-07-12 VITALS
HEART RATE: 75 BPM | HEIGHT: 63 IN | RESPIRATION RATE: 16 BRPM | WEIGHT: 125.88 LBS | SYSTOLIC BLOOD PRESSURE: 140 MMHG | BODY MASS INDEX: 22.3 KG/M2 | TEMPERATURE: 97.8 F | DIASTOLIC BLOOD PRESSURE: 77 MMHG

## 2019-07-12 DIAGNOSIS — C56.3 MALIGNANT NEOPLASM OF BOTH OVARIES (HCC): Primary | ICD-10-CM

## 2019-07-12 PROCEDURE — 96415 CHEMO IV INFUSION ADDL HR: CPT

## 2019-07-12 PROCEDURE — 96413 CHEMO IV INFUSION 1 HR: CPT

## 2019-07-12 PROCEDURE — 96375 TX/PRO/DX INJ NEW DRUG ADDON: CPT

## 2019-07-12 PROCEDURE — 96367 TX/PROPH/DG ADDL SEQ IV INF: CPT

## 2019-07-12 PROCEDURE — 96417 CHEMO IV INFUS EACH ADDL SEQ: CPT

## 2019-07-12 RX ORDER — PALONOSETRON 0.05 MG/ML
0.25 INJECTION, SOLUTION INTRAVENOUS ONCE
Status: COMPLETED | OUTPATIENT
Start: 2019-07-12 | End: 2019-07-12

## 2019-07-12 RX ORDER — SODIUM CHLORIDE 9 MG/ML
20 INJECTION, SOLUTION INTRAVENOUS ONCE
Status: COMPLETED | OUTPATIENT
Start: 2019-07-12 | End: 2019-07-12

## 2019-07-12 RX ADMIN — PALONOSETRON 0.25 MG: 0.25 INJECTION, SOLUTION INTRAVENOUS at 09:29

## 2019-07-12 RX ADMIN — CARBOPLATIN 439.5 MG: 10 INJECTION, SOLUTION INTRAVENOUS at 14:40

## 2019-07-12 RX ADMIN — SODIUM CHLORIDE 150 MG: 0.9 INJECTION, SOLUTION INTRAVENOUS at 10:54

## 2019-07-12 RX ADMIN — SODIUM CHLORIDE 20 ML/HR: 0.9 INJECTION, SOLUTION INTRAVENOUS at 09:22

## 2019-07-12 RX ADMIN — FAMOTIDINE 20 MG: 10 INJECTION, SOLUTION INTRAVENOUS at 10:30

## 2019-07-12 RX ADMIN — PACLITAXEL 216 MG: 6 INJECTION, SOLUTION INTRAVENOUS at 11:36

## 2019-07-12 RX ADMIN — DEXAMETHASONE SODIUM PHOSPHATE 20 MG: 10 INJECTION, SOLUTION INTRAMUSCULAR; INTRAVENOUS at 09:35

## 2019-07-12 RX ADMIN — DIPHENHYDRAMINE HYDROCHLORIDE 25 MG: 50 INJECTION, SOLUTION INTRAMUSCULAR; INTRAVENOUS at 09:58

## 2019-07-12 NOTE — SOCIAL WORK
IOANA met with pt in the [facility] center today, she is back for a recurrence of her fallopian tube cancer  We know each other from her previous treatment as well as her 's previous treatment  She says that she is doing well today and although she is disappointed to be back, she is in good spirits and has a good attitude about needing further treatment  She says that her  is doing well also, and we are going to look at coordinating their appointments as to avoid them driving here multiple times a week  She would be very appreciative of that  She says that her family is all gathering together tonight to celebrate her son's birthday, and she is looking forward to seeing them all  She denies any needs, concerns, or questions at this time but is appreciative of the visit  IOANA will remain available to pt and her  throughout their treatment times, no concerns at this point

## 2019-07-17 ENCOUNTER — APPOINTMENT (OUTPATIENT)
Dept: LAB | Facility: CLINIC | Age: 64
End: 2019-07-17
Payer: MEDICARE

## 2019-07-23 ENCOUNTER — OFFICE VISIT (OUTPATIENT)
Dept: GYNECOLOGIC ONCOLOGY | Facility: CLINIC | Age: 64
End: 2019-07-23
Payer: MEDICARE

## 2019-07-23 VITALS
BODY MASS INDEX: 22.32 KG/M2 | RESPIRATION RATE: 18 BRPM | WEIGHT: 126 LBS | SYSTOLIC BLOOD PRESSURE: 118 MMHG | TEMPERATURE: 98.3 F | HEIGHT: 63 IN | HEART RATE: 63 BPM | DIASTOLIC BLOOD PRESSURE: 84 MMHG

## 2019-07-23 DIAGNOSIS — M25.551 PAIN OF RIGHT HIP JOINT: ICD-10-CM

## 2019-07-23 DIAGNOSIS — M54.41 ACUTE RIGHT-SIDED LOW BACK PAIN WITH RIGHT-SIDED SCIATICA: Primary | ICD-10-CM

## 2019-07-23 PROCEDURE — 99213 OFFICE O/P EST LOW 20 MIN: CPT | Performed by: OBSTETRICS & GYNECOLOGY

## 2019-07-23 PROCEDURE — 1124F ACP DISCUSS-NO DSCNMKR DOCD: CPT | Performed by: OBSTETRICS & GYNECOLOGY

## 2019-07-23 NOTE — ASSESSMENT & PLAN NOTE
The patient has new onset right-sided hip flank buttock pain extending down to her right sciatic region  She describes it as a Ki and occasionally sharp  This occurred after being in the infusion chair for many hours  I do not believe that the pain is related to the chemo but may be related to lumbosacral neuropathy while sitting  I have recommended continued use of nonsteroidals over-the-counter  An MRI will be ordered to rule out any oncologic component

## 2019-07-23 NOTE — PROGRESS NOTES
Assessment/Plan:    Problem List Items Addressed This Visit        Other    Pain of right hip joint     The patient has new onset right-sided hip flank buttock pain extending down to her right sciatic region  She describes it as a Ki and occasionally sharp  This occurred after being in the infusion chair for many hours  I do not believe that the pain is related to the chemo but may be related to lumbosacral neuropathy while sitting  I have recommended continued use of nonsteroidals over-the-counter  An MRI will be ordered to rule out any oncologic component  Other Visit Diagnoses     Acute right-sided low back pain with right-sided sciatica    -  Primary    Relevant Orders    MRI lumbar spine w contrast            CHIEF COMPLAINT:  Right hip pain      Problem:  Cancer Staging  Fallopian tube cancer, carcinoma ([facility] [address] )  Staging form: Ovary, Fallopian Tube, Primary Peritoneal, AJCC 8th Edition  - Clinical stage from 4/2/2018: FIGO Stage IIIC - Signed by Marcelo Vanegas MD on 4/16/2018        Previous therapy:     Fallopian tube cancer, carcinoma ([facility] [address] )    1/4/2018 Initial Diagnosis     Paracentesis revealing adenocarcinoma of gynecologic origin      1/25/2018 - 3/8/2018 Chemotherapy     3 cycles of carboplatin AUC 6 and taxol 175 mg/m2 squared Q 3 weeks  Pretreatment Ca 125: 534 6       4/2/2018 Surgery     Exploratory laparotomy, total abdominal hysterectomy, bilateral salpingo-oophorectomy, omentectomy, appendectomy  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  No gross residual disease        4/26/2018 - 6/7/2018 Chemotherapy     3 cycles of carboplatin AUC 5 and Taxol 175 mg/m2 Q 3 weeks (carboplatin dose dropped to AUC of 5 for neutropenia prior to cycle 3)        Malignant neoplasm of ovary ([facility] [address] )    3/22/2018 Initial Diagnosis     Malignant neoplasm of ovary ([facility] [address] )      6/12/2019 -  Chemotherapy     CARBOplatin (PARAPLATIN) 439 5 mg in sodium chloride 0 9 % 250 mL IVPB, 439 5 mg, Intravenous, Once, 1 of 4 cycles  PACLitaxel (TAXOL) 216 mg in sodium chloride 0 9 % 500 mL chemo IVPB, 135 mg/m2 = 216 mg (77 1 % of original dose 175 mg/m2), Intravenous, Once, 1 of 4 cycles  Dose modification: 135 mg/m2 (original dose 175 mg/m2, Cycle 1, Reason: Other (See Comments))           Patient ID: Stacey Moya is a 75 y o  female  Patient presents today for evaluation and management of right-sided hip pain  She is a very pleasant 49-year-old white female with history of recurrent fallopian tube carcinoma  Is known to go to left pelvic sidewall  She has recently been diagnosed and underwent treatment with her 1st cycle of carboplatin paclitaxel approximately 2  weeks ago  The patient noted the onset of the pain on her right buttock extending down the right lateral side of her leg after being in the chemo chair for the day of her chemo  This has progressively become worse  She is now having difficulty bearing weight on that side  The pain is relatively well controlled with a single 200 mg ibuprofen tablet periodically  She has no bowel or bladder symptoms  She has had no difficulty with her chemotherapy  The following portions of the patient's history were reviewed and updated as appropriate: allergies, current medications, past family history, past medical history, past social history, past surgical history and problem list     Review of Systems   Constitutional: Negative  HENT: Negative  Eyes: Negative  Respiratory: Negative  Cardiovascular: Negative  Gastrointestinal: Negative  Endocrine: Negative  Musculoskeletal: Negative  Skin: Negative  Neurological: Negative  Hematological: Negative  Psychiatric/Behavioral: Negative          Current Outpatient Medications   Medication Sig Dispense Refill    Arnica 20 % TINC arnica      ascorbic acid (VITAMIN C) 250 mg tablet Take 250 mg by mouth daily      benzonatate (TESSALON PERLES) 100 mg capsule Take 1 capsule (100 mg total) by mouth 3 (three) times a day as needed for cough 30 capsule 0    Calcium Carbonate (CALCIUM 600 PO) Take by mouth      Cholecalciferol (VITAMIN D3) 13984 units TABS Take by mouth      Lactobacillus (CVS PROBIOTIC ACIDOPHILUS PO) Take 1 capsule by mouth daily        LORazepam (ATIVAN) 1 mg tablet Take 1 tablet (1 mg total) by mouth every 6 (six) hours as needed (nausea or anxiety) 20 tablet 1    Multiple Vitamins-Minerals (MULTIVITAMIN WITH MINERALS) tablet Take 1 tablet by mouth daily      ondansetron (ZOFRAN) 8 mg tablet Take 1 tablet (8 mg total) by mouth every 8 (eight) hours as needed for nausea or vomiting 20 tablet 1     No current facility-administered medications for this visit  Objective:    Blood pressure 118/84, pulse 63, temperature 98 3 °F (36 8 °C), resp  rate 18, height 5' 3 31" (1 608 m), weight 57 2 kg (126 lb)  Body mass index is 22 1 kg/m²  Body surface area is 1 6 meters squared  Physical Exam   Constitutional: She is oriented to person, place, and time  She appears well-developed and well-nourished  No back tenderness noted  However on gait the patient has weakness at the right hip   HENT:   Head: Normocephalic and atraumatic  Eyes: Pupils are equal, round, and reactive to light  EOM are normal    Neck: Normal range of motion  Neck supple  Cardiovascular: Normal rate, regular rhythm and normal heart sounds  Pulmonary/Chest: Effort normal and breath sounds normal  No respiratory distress  Abdominal: Soft  Bowel sounds are normal  She exhibits no distension and no ascites  There is no tenderness  There is no rigidity, no rebound and no guarding  Genitourinary:   Genitourinary Comments: Deferred   Musculoskeletal: Normal range of motion  Lymphadenopathy:     She has no cervical adenopathy  She has no axillary adenopathy  Right: No inguinal and no supraclavicular adenopathy present          Left: No inguinal and no supraclavicular adenopathy present  Neurological: She is alert and oriented to person, place, and time  Skin: Skin is warm and dry  Psychiatric: She has a normal mood and affect   Her behavior is normal        Lab Results   Component Value Date     27 0 07/10/2019     Lab Results   Component Value Date    K 4 2 07/17/2019     07/17/2019    CO2 27 07/17/2019    BUN 12 07/17/2019    CREATININE 0 82 07/17/2019    GLUF 119 (H) 01/22/2018    CALCIUM 9 7 07/17/2019    AST 23 07/17/2019    ALT 30 07/17/2019    ALKPHOS 57 07/17/2019    EGFR 75 07/17/2019     Lab Results   Component Value Date    WBC 4 08 (L) 07/17/2019    HGB 14 1 07/17/2019    HCT 42 7 07/17/2019    MCV 97 07/17/2019     07/17/2019     Lab Results   Component Value Date    NEUTROABS 2 22 07/17/2019

## 2019-07-26 ENCOUNTER — APPOINTMENT (OUTPATIENT)
Dept: LAB | Facility: CLINIC | Age: 64
End: 2019-07-26
Payer: MEDICARE

## 2019-07-29 DIAGNOSIS — C56.3 MALIGNANT NEOPLASM OF BOTH OVARIES (HCC): Primary | ICD-10-CM

## 2019-07-29 DIAGNOSIS — M25.551 PAIN OF RIGHT HIP JOINT: ICD-10-CM

## 2019-07-30 ENCOUNTER — HOSPITAL ENCOUNTER (OUTPATIENT)
Dept: MRI IMAGING | Facility: CLINIC | Age: 64
Discharge: HOME | End: 2019-07-30
Payer: MEDICARE

## 2019-07-30 DIAGNOSIS — M54.41 ACUTE RIGHT-SIDED LOW BACK PAIN WITH RIGHT-SIDED SCIATICA: ICD-10-CM

## 2019-07-30 PROCEDURE — A9585 GADOBUTROL INJECTION: HCPCS | Performed by: OBSTETRICS & GYNECOLOGY

## 2019-07-30 PROCEDURE — 72158 MRI LUMBAR SPINE W/O & W/DYE: CPT

## 2019-07-30 RX ADMIN — GADOBUTROL 6 ML: 604.72 INJECTION INTRAVENOUS at 14:02

## 2019-07-31 ENCOUNTER — APPOINTMENT (OUTPATIENT)
Dept: LAB | Facility: HOSPITAL | Age: 64
End: 2019-07-31
Payer: MEDICARE

## 2019-07-31 ENCOUNTER — OFFICE VISIT (OUTPATIENT)
Dept: GYNECOLOGIC ONCOLOGY | Facility: CLINIC | Age: 64
End: 2019-07-31
Payer: MEDICARE

## 2019-07-31 VITALS
BODY MASS INDEX: 22.5 KG/M2 | HEIGHT: 63 IN | SYSTOLIC BLOOD PRESSURE: 120 MMHG | RESPIRATION RATE: 16 BRPM | HEART RATE: 71 BPM | TEMPERATURE: 98.6 F | WEIGHT: 127 LBS | DIASTOLIC BLOOD PRESSURE: 88 MMHG

## 2019-07-31 DIAGNOSIS — C57.02 CARCINOMA OF LEFT FALLOPIAN TUBE (HCC): ICD-10-CM

## 2019-07-31 DIAGNOSIS — C57.02 CARCINOMA OF LEFT FALLOPIAN TUBE (HCC): Primary | ICD-10-CM

## 2019-07-31 DIAGNOSIS — M25.551 PAIN OF RIGHT HIP JOINT: ICD-10-CM

## 2019-07-31 DIAGNOSIS — M25.551 HIP PAIN, ACUTE, RIGHT: ICD-10-CM

## 2019-07-31 LAB
ALBUMIN SERPL BCP-MCNC: 4 G/DL (ref 3.5–5)
ALP SERPL-CCNC: 63 U/L (ref 46–116)
ALT SERPL W P-5'-P-CCNC: 22 U/L (ref 12–78)
ANION GAP SERPL CALCULATED.3IONS-SCNC: 10 MMOL/L (ref 4–13)
AST SERPL W P-5'-P-CCNC: 19 U/L (ref 5–45)
BASOPHILS # BLD AUTO: 0.04 THOUSANDS/ΜL (ref 0–0.1)
BASOPHILS NFR BLD AUTO: 1 % (ref 0–1)
BILIRUB SERPL-MCNC: 0.3 MG/DL (ref 0.2–1)
BUN SERPL-MCNC: 12 MG/DL (ref 5–25)
CALCIUM SERPL-MCNC: 10 MG/DL (ref 8.3–10.1)
CHLORIDE SERPL-SCNC: 101 MMOL/L (ref 100–108)
CO2 SERPL-SCNC: 28 MMOL/L (ref 21–32)
CREAT SERPL-MCNC: 0.79 MG/DL (ref 0.6–1.3)
EOSINOPHIL # BLD AUTO: 0.15 THOUSAND/ΜL (ref 0–0.61)
EOSINOPHIL NFR BLD AUTO: 3 % (ref 0–6)
ERYTHROCYTE [DISTWIDTH] IN BLOOD BY AUTOMATED COUNT: 12.4 % (ref 11.6–15.1)
GFR SERPL CREATININE-BSD FRML MDRD: 78 ML/MIN/1.73SQ M
GLUCOSE SERPL-MCNC: 84 MG/DL (ref 65–140)
HCT VFR BLD AUTO: 40.1 % (ref 34.8–46.1)
HGB BLD-MCNC: 13.2 G/DL (ref 11.5–15.4)
IMM GRANULOCYTES # BLD AUTO: 0.02 THOUSAND/UL (ref 0–0.2)
IMM GRANULOCYTES NFR BLD AUTO: 0 % (ref 0–2)
LYMPHOCYTES # BLD AUTO: 2.11 THOUSANDS/ΜL (ref 0.6–4.47)
LYMPHOCYTES NFR BLD AUTO: 40 % (ref 14–44)
MAGNESIUM SERPL-MCNC: 2.1 MG/DL (ref 1.6–2.6)
MCH RBC QN AUTO: 32 PG (ref 26.8–34.3)
MCHC RBC AUTO-ENTMCNC: 32.9 G/DL (ref 31.4–37.4)
MCV RBC AUTO: 97 FL (ref 82–98)
MONOCYTES # BLD AUTO: 0.58 THOUSAND/ΜL (ref 0.17–1.22)
MONOCYTES NFR BLD AUTO: 11 % (ref 4–12)
NEUTROPHILS # BLD AUTO: 2.35 THOUSANDS/ΜL (ref 1.85–7.62)
NEUTS SEG NFR BLD AUTO: 45 % (ref 43–75)
NRBC BLD AUTO-RTO: 0 /100 WBCS
PLATELET # BLD AUTO: 226 THOUSANDS/UL (ref 149–390)
PMV BLD AUTO: 8.6 FL (ref 8.9–12.7)
POTASSIUM SERPL-SCNC: 4.3 MMOL/L (ref 3.5–5.3)
PROT SERPL-MCNC: 8.6 G/DL (ref 6.4–8.2)
RBC # BLD AUTO: 4.12 MILLION/UL (ref 3.81–5.12)
SODIUM SERPL-SCNC: 139 MMOL/L (ref 136–145)
WBC # BLD AUTO: 5.25 THOUSAND/UL (ref 4.31–10.16)

## 2019-07-31 PROCEDURE — 85025 COMPLETE CBC W/AUTO DIFF WBC: CPT

## 2019-07-31 PROCEDURE — 99214 OFFICE O/P EST MOD 30 MIN: CPT | Performed by: OBSTETRICS & GYNECOLOGY

## 2019-07-31 PROCEDURE — 80053 COMPREHEN METABOLIC PANEL: CPT

## 2019-07-31 PROCEDURE — 36415 COLL VENOUS BLD VENIPUNCTURE: CPT

## 2019-07-31 PROCEDURE — 83735 ASSAY OF MAGNESIUM: CPT

## 2019-07-31 RX ORDER — CELECOXIB 100 MG/1
100 CAPSULE ORAL 2 TIMES DAILY
Qty: 30 CAPSULE | Refills: 2 | Status: SHIPPED | OUTPATIENT
Start: 2019-07-31 | End: 2019-09-11

## 2019-07-31 NOTE — LETTER
July 31, 2019     [facility] [place] [address]    Patient: Sandra Ramirez   YOB: 1955   Date of Visit: 7/31/2019       Dear Dr Mary Telles: Thank you for referring Sandra Ramirez to me for evaluation  Below are my notes for this consultation  If you have questions, please do not hesitate to call me  I look forward to following your patient along with you  Sincerely,        Tejal Mendez MD        CC: No Recipients  Tejal Mendez MD  7/31/2019  1:36 PM  Sign at close encounter  Assessment/Plan:    Problem List Items Addressed This Visit        Genitourinary    Fallopian tube cancer, carcinoma ([facility] [address] ) - Primary     Patient underwent her 1st cycle of chemotherapy and tolerated this well from infusion standpoint in from a toxicity standpoint  She has however developed hip pain and it is unclear as to the etiology  The patient remains on nonsteroidal anti-inflammatory drugs  An MRI was performed but is not yet read  We will recommend continued treatment with carboplatin and paclitaxel at present dosages and continue the treatment plan as is  Other    Pain of right hip joint     Patient continues to have right-sided hip pain which extends down the right leg and into the right buttock  MRI of the lumbosacral spine is presently awaiting to be red  Her pain medication isn't helping  I have elected to prescribe Celebrex 100 mg p o  B i d   In the interim we will get her MRI  If no lesion is noted consideration of bone scan will be performed             Other Visit Diagnoses     Hip pain, acute, right        Relevant Medications    celecoxib (CeleBREX) 100 mg capsule            CHIEF COMPLAINT:  Evaluation for cycle 2 carboplatin paclitaxel for recurrent stage IIIC fallopian tube cancer      Problem:  Cancer Staging  Fallopian tube cancer, carcinoma ([facility] [address] )  Staging form: Ovary, Fallopian Tube, Primary Peritoneal, AJCC 8th Edition  - Clinical stage from 4/2/2018: FIGO Stage IIIC - Signed by Airam Hinds MD on 4/16/2018        Previous therapy:     Fallopian tube cancer, carcinoma ([facility] [address] )    1/4/2018 Initial Diagnosis     Paracentesis revealing adenocarcinoma of gynecologic origin      1/25/2018 - 3/8/2018 Chemotherapy     3 cycles of carboplatin AUC 6 and taxol 175 mg/m2 squared Q 3 weeks  Pretreatment Ca 125: 534 6       4/2/2018 Surgery     Exploratory laparotomy, total abdominal hysterectomy, bilateral salpingo-oophorectomy, omentectomy, appendectomy  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  No gross residual disease  4/26/2018 - 6/7/2018 Chemotherapy     3 cycles of carboplatin AUC 5 and Taxol 175 mg/m2 Q 3 weeks (carboplatin dose dropped to AUC of 5 for neutropenia prior to cycle 3)        Malignant neoplasm of ovary ([facility] [address] )    3/22/2018 Initial Diagnosis     Malignant neoplasm of ovary ([facility] [address] )      6/12/2019 -  Chemotherapy     CARBOplatin (PARAPLATIN) 439 5 mg in sodium chloride 0 9 % 250 mL IVPB, 439 5 mg, Intravenous, Once, 1 of 4 cycles  PACLitaxel (TAXOL) 216 mg in sodium chloride 0 9 % 500 mL chemo IVPB, 135 mg/m2 = 216 mg (77 1 % of original dose 175 mg/m2), Intravenous, Once, 1 of 4 cycles  Dose modification: 135 mg/m2 (original dose 175 mg/m2, Cycle 1, Reason: Other (See Comments))           Patient ID: Ness Li is a 75 y o  female  Patient presents today for evaluation and management of cycle 2  Carboplatin area under the curve of 5 and paclitaxel 135 milligrams/meter squared for history of recurrent stage IIIC fallopian tube carcinoma  The patient has recently undergone cycle 1 and tolerated this well  Her dina labs revealed no significant cytopenias  She tolerated the drugs well  Her  is 25  The patient did have right hip pain which began immediately after her infusion  The patient however does not feel this is sciatica because she was diagnosed with sciatica before and this feels different   This was felt to be likely related to her position within the infusion chair  The patient was recommended nonsteroidal pain medications and an MRI was ordered  The MRI was performed yesterday but is not currently read yet    Today, the patient is doing well  She denies significant abdominal pain, pelvic pain, nausea, vomiting, constipation, diarrhea, fevers, chills, or vaginal bleeding  She does continue to note lower right hip and buttock pain  She has maxed out her over-the-counter Motrin at 600 mg p o  Q 6 and would like a prescription medication  The following portions of the patient's history were reviewed and updated as appropriate: allergies, current medications, past family history, past medical history, past social history, past surgical history and problem list     Review of Systems   Constitutional: Negative  HENT: Negative  Eyes: Negative  Respiratory: Negative  Cardiovascular: Negative  Gastrointestinal: Negative  Endocrine: Negative  Genitourinary: Negative  Musculoskeletal: Negative  Skin: Negative  Neurological: Negative  Hematological: Negative  Psychiatric/Behavioral: Negative          Current Outpatient Medications   Medication Sig Dispense Refill    Arnica 20 % TINC arnica      ascorbic acid (VITAMIN C) 250 mg tablet Take 250 mg by mouth daily      benzonatate (TESSALON PERLES) 100 mg capsule Take 1 capsule (100 mg total) by mouth 3 (three) times a day as needed for cough 30 capsule 0    Calcium Carbonate (CALCIUM 600 PO) Take by mouth      Cholecalciferol (VITAMIN D3) 49724 units TABS Take by mouth      Lactobacillus (CVS PROBIOTIC ACIDOPHILUS PO) Take 1 capsule by mouth daily        LORazepam (ATIVAN) 1 mg tablet Take 1 tablet (1 mg total) by mouth every 6 (six) hours as needed (nausea or anxiety) 20 tablet 1    Multiple Vitamins-Minerals (MULTIVITAMIN WITH MINERALS) tablet Take 1 tablet by mouth daily      ondansetron (ZOFRAN) 8 mg tablet Take 1 tablet (8 mg total) by mouth every 8 (eight) hours as needed for nausea or vomiting 20 tablet 1    celecoxib (CeleBREX) 100 mg capsule Take 1 capsule (100 mg total) by mouth 2 (two) times a day 30 capsule 2     No current facility-administered medications for this visit  Objective:    Blood pressure 120/88, pulse 71, temperature 98 6 °F (37 °C), resp  rate 16, height 5' 3 31" (1 608 m), weight 57 6 kg (127 lb)  Body mass index is 22 28 kg/m²  Body surface area is 1 6 meters squared  Physical Exam   Constitutional: She is oriented to person, place, and time  She appears well-developed and well-nourished  HENT:   Head: Normocephalic and atraumatic  Eyes: EOM are normal    Neck: Normal range of motion  Neck supple  No thyromegaly present  Cardiovascular: Normal rate, regular rhythm and normal heart sounds  Pulmonary/Chest: Effort normal and breath sounds normal    Abdominal: Soft  Bowel sounds are normal    Well healed  incisions  Genitourinary:   Genitourinary Comments: -Normal external female genitalia, normal Bartholin's and [place]'s glands                  -Normal midline urethral meatus  No lesions notes                  -Bladder without fullness mass or tenderness                  -Vagina without lesion or discharge No significant cystocele or rectocele noted                  -Cervix surgically absent                  -Uterus surgically absent                  -Adnexae surgically absent                  - Anus without fissure of lesion     Musculoskeletal: Normal range of motion  Lymphadenopathy:     She has no cervical adenopathy  Neurological: She is alert and oriented to person, place, and time  Skin: Skin is warm and dry  Psychiatric: She has a normal mood and affect   Her behavior is normal        Lab Results   Component Value Date     27 0 07/10/2019     Lab Results   Component Value Date    K 4 0 07/26/2019     07/26/2019    CO2 28 07/26/2019    BUN 12 07/26/2019 CREATININE 0 75 07/26/2019    GLUF 119 (H) 01/22/2018    CALCIUM 9 2 07/26/2019    AST 22 07/26/2019    ALT 25 07/26/2019    ALKPHOS 58 07/26/2019    EGFR 83 07/26/2019     Lab Results   Component Value Date    WBC 4 64 07/26/2019    HGB 12 4 07/26/2019    HCT 39 1 07/26/2019    MCV 98 07/26/2019     07/26/2019     Lab Results   Component Value Date    NEUTROABS 2 17 07/26/2019

## 2019-07-31 NOTE — ASSESSMENT & PLAN NOTE
Patient underwent her 1st cycle of chemotherapy and tolerated this well from infusion standpoint in from a toxicity standpoint  She has however developed hip pain and it is unclear as to the etiology  The patient remains on nonsteroidal anti-inflammatory drugs  An MRI was performed but is not yet read  We will recommend continued treatment with carboplatin and paclitaxel at present dosages and continue the treatment plan as is

## 2019-07-31 NOTE — PROGRESS NOTES
Assessment/Plan:    Problem List Items Addressed This Visit        Genitourinary    Fallopian tube cancer, carcinoma ([facility] [address] ) - Primary     Patient underwent her 1st cycle of chemotherapy and tolerated this well from infusion standpoint in from a toxicity standpoint  She has however developed hip pain and it is unclear as to the etiology  The patient remains on nonsteroidal anti-inflammatory drugs  An MRI was performed but is not yet read  We will recommend continued treatment with carboplatin and paclitaxel at present dosages and continue the treatment plan as is  Other    Pain of right hip joint     Patient continues to have right-sided hip pain which extends down the right leg and into the right buttock  MRI of the lumbosacral spine is presently awaiting to be red  Her pain medication isn't helping  I have elected to prescribe Celebrex 100 mg p o  B i d   In the interim we will get her MRI  If no lesion is noted consideration of bone scan will be performed  Other Visit Diagnoses     Hip pain, acute, right        Relevant Medications    celecoxib (CeleBREX) 100 mg capsule            CHIEF COMPLAINT:  Evaluation for cycle 2 carboplatin paclitaxel for recurrent stage IIIC fallopian tube cancer      Problem:  Cancer Staging  Fallopian tube cancer, carcinoma (HCC)  Staging form: Ovary, Fallopian Tube, Primary Peritoneal, AJCC 8th Edition  - Clinical stage from 4/2/2018: FIGO Stage IIIC - Signed by Santosh Pollard MD on 4/16/2018        Previous therapy:     Fallopian tube cancer, carcinoma (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  No gross residual disease  4/26/2018 - 6/7/2018 Chemotherapy     3 cycles of carboplatin AUC 5 and Taxol 175 mg/m2 Q 3 weeks (carboplatin dose dropped to AUC of 5 for neutropenia prior to cycle 3)        Malignant neoplasm of ovary ([facility] [address] )    3/22/2018 Initial Diagnosis     Malignant neoplasm of ovary ([facility] [address] )      6/12/2019 -  Chemotherapy     CARBOplatin (PARAPLATIN) 439 5 mg in sodium chloride 0 9 % 250 mL IVPB, 439 5 mg, Intravenous, Once, 1 of 4 cycles  PACLitaxel (TAXOL) 216 mg in sodium chloride 0 9 % 500 mL chemo IVPB, 135 mg/m2 = 216 mg (77 1 % of original dose 175 mg/m2), Intravenous, Once, 1 of 4 cycles  Dose modification: 135 mg/m2 (original dose 175 mg/m2, Cycle 1, Reason: Other (See Comments))           Patient ID: Chery Hurst is a 75 y o  female  Patient presents today for evaluation and management of cycle 2  Carboplatin area under the curve of 5 and paclitaxel 135 milligrams/meter squared for history of recurrent stage IIIC fallopian tube carcinoma  The patient has recently undergone cycle 1 and tolerated this well  Her dina labs revealed no significant cytopenias  She tolerated the drugs well  Her  is 25  The patient did have right hip pain which began immediately after her infusion  The patient however does not feel this is sciatica because she was diagnosed with sciatica before and this feels different  This was felt to be likely related to her position within the infusion chair  The patient was recommended nonsteroidal pain medications and an MRI was ordered  The MRI was performed yesterday but is not currently read yet    Today, the patient is doing well  She denies significant abdominal pain, pelvic pain, nausea, vomiting, constipation, diarrhea, fevers, chills, or vaginal bleeding  She does continue to note lower right hip and buttock pain    She has maxed out her over-the-counter Motrin at 600 mg p o  Q 6 and would like a prescription medication  The following portions of the patient's history were reviewed and updated as appropriate: allergies, current medications, past family history, past medical history, past social history, past surgical history and problem list     Review of Systems   Constitutional: Negative  HENT: Negative  Eyes: Negative  Respiratory: Negative  Cardiovascular: Negative  Gastrointestinal: Negative  Endocrine: Negative  Genitourinary: Negative  Musculoskeletal: Negative  Skin: Negative  Neurological: Negative  Hematological: Negative  Psychiatric/Behavioral: Negative  Current Outpatient Medications   Medication Sig Dispense Refill    Arnica 20 % TINC arnica      ascorbic acid (VITAMIN C) 250 mg tablet Take 250 mg by mouth daily      benzonatate (TESSALON PERLES) 100 mg capsule Take 1 capsule (100 mg total) by mouth 3 (three) times a day as needed for cough 30 capsule 0    Calcium Carbonate (CALCIUM 600 PO) Take by mouth      Cholecalciferol (VITAMIN D3) 37032 units TABS Take by mouth      Lactobacillus ([employer]S PROBIOTIC ACIDOPHILUS PO) Take 1 capsule by mouth daily        LORazepam (ATIVAN) 1 mg tablet Take 1 tablet (1 mg total) by mouth every 6 (six) hours as needed (nausea or anxiety) 20 tablet 1    Multiple Vitamins-Minerals (MULTIVITAMIN WITH MINERALS) tablet Take 1 tablet by mouth daily      ondansetron (ZOFRAN) 8 mg tablet Take 1 tablet (8 mg total) by mouth every 8 (eight) hours as needed for nausea or vomiting 20 tablet 1    celecoxib (CeleBREX) 100 mg capsule Take 1 capsule (100 mg total) by mouth 2 (two) times a day 30 capsule 2     No current facility-administered medications for this visit  Objective:    Blood pressure 120/88, pulse 71, temperature 98 6 °F (37 °C), resp  rate 16, height 5' 3 31" (1 608 m), weight 57 6 kg (127 lb)  Body mass index is 22 28 kg/m²  Body surface area is 1 6 meters squared      Physical Exam   Constitutional: She is oriented to person, place, and time  She appears well-developed and well-nourished  HENT:   Head: Normocephalic and atraumatic  Eyes: EOM are normal    Neck: Normal range of motion  Neck supple  No thyromegaly present  Cardiovascular: Normal rate, regular rhythm and normal heart sounds  Pulmonary/Chest: Effort normal and breath sounds normal    Abdominal: Soft  Bowel sounds are normal    Well healed  incisions  Genitourinary:   Genitourinary Comments: -Normal external female genitalia, normal Bartholin's and [place]'s glands                  -Normal midline urethral meatus  No lesions notes                  -Bladder without fullness mass or tenderness                  -Vagina without lesion or discharge No significant cystocele or rectocele noted                  -Cervix surgically absent                  -Uterus surgically absent                  -Adnexae surgically absent                  - Anus without fissure of lesion     Musculoskeletal: Normal range of motion  Lymphadenopathy:     She has no cervical adenopathy  Neurological: She is alert and oriented to person, place, and time  Skin: Skin is warm and dry  Psychiatric: She has a normal mood and affect   Her behavior is normal        Lab Results   Component Value Date     27 0 07/10/2019     Lab Results   Component Value Date    K 4 0 07/26/2019     07/26/2019    CO2 28 07/26/2019    BUN 12 07/26/2019    CREATININE 0 75 07/26/2019    GLUF 119 (H) 01/22/2018    CALCIUM 9 2 07/26/2019    AST 22 07/26/2019    ALT 25 07/26/2019    ALKPHOS 58 07/26/2019    EGFR 83 07/26/2019     Lab Results   Component Value Date    WBC 4 64 07/26/2019    HGB 12 4 07/26/2019    HCT 39 1 07/26/2019    MCV 98 07/26/2019     07/26/2019     Lab Results   Component Value Date    NEUTROABS 2 17 07/26/2019

## 2019-08-01 RX ORDER — PALONOSETRON 0.05 MG/ML
0.25 INJECTION, SOLUTION INTRAVENOUS ONCE
Status: CANCELLED | OUTPATIENT
Start: 2019-08-02

## 2019-08-01 RX ORDER — SODIUM CHLORIDE 9 MG/ML
20 INJECTION, SOLUTION INTRAVENOUS ONCE
Status: CANCELLED | OUTPATIENT
Start: 2019-08-02

## 2019-08-02 ENCOUNTER — HOSPITAL ENCOUNTER (OUTPATIENT)
Dept: INFUSION CENTER | Facility: CLINIC | Age: 64
Discharge: HOME | End: 2019-08-02
Payer: MEDICARE

## 2019-08-02 VITALS
TEMPERATURE: 97.5 F | SYSTOLIC BLOOD PRESSURE: 151 MMHG | DIASTOLIC BLOOD PRESSURE: 73 MMHG | WEIGHT: 126.6 LBS | HEIGHT: 63 IN | BODY MASS INDEX: 22.43 KG/M2 | HEART RATE: 66 BPM

## 2019-08-02 DIAGNOSIS — C56.3 MALIGNANT NEOPLASM OF BOTH OVARIES (HCC): Primary | ICD-10-CM

## 2019-08-02 DIAGNOSIS — M54.40 ACUTE RIGHT-SIDED LOW BACK PAIN WITH SCIATICA, SCIATICA LATERALITY UNSPECIFIED: Primary | ICD-10-CM

## 2019-08-02 PROCEDURE — 96375 TX/PRO/DX INJ NEW DRUG ADDON: CPT

## 2019-08-02 PROCEDURE — 96367 TX/PROPH/DG ADDL SEQ IV INF: CPT

## 2019-08-02 PROCEDURE — 96413 CHEMO IV INFUSION 1 HR: CPT

## 2019-08-02 PROCEDURE — 96415 CHEMO IV INFUSION ADDL HR: CPT

## 2019-08-02 PROCEDURE — 96417 CHEMO IV INFUS EACH ADDL SEQ: CPT

## 2019-08-02 RX ORDER — SODIUM CHLORIDE 9 MG/ML
20 INJECTION, SOLUTION INTRAVENOUS ONCE
Status: COMPLETED | OUTPATIENT
Start: 2019-08-02 | End: 2019-08-02

## 2019-08-02 RX ORDER — PALONOSETRON 0.05 MG/ML
0.25 INJECTION, SOLUTION INTRAVENOUS ONCE
Status: COMPLETED | OUTPATIENT
Start: 2019-08-02 | End: 2019-08-02

## 2019-08-02 RX ORDER — OXYCODONE HYDROCHLORIDE 5 MG/1
5 TABLET ORAL EVERY 6 HOURS PRN
Qty: 20 TABLET | Refills: 0 | Status: SHIPPED | OUTPATIENT
Start: 2019-08-02 | End: 2020-08-11 | Stop reason: HOSPADM

## 2019-08-02 RX ADMIN — DIPHENHYDRAMINE HYDROCHLORIDE 25 MG: 50 INJECTION, SOLUTION INTRAMUSCULAR; INTRAVENOUS at 09:28

## 2019-08-02 RX ADMIN — SODIUM CHLORIDE 20 ML/HR: 0.9 INJECTION, SOLUTION INTRAVENOUS at 08:39

## 2019-08-02 RX ADMIN — DEXAMETHASONE SODIUM PHOSPHATE 20 MG: 10 INJECTION, SOLUTION INTRAMUSCULAR; INTRAVENOUS at 09:11

## 2019-08-02 RX ADMIN — PALONOSETRON 0.25 MG: 0.25 INJECTION, SOLUTION INTRAVENOUS at 09:00

## 2019-08-02 RX ADMIN — CARBOPLATIN 443.5 MG: 10 INJECTION, SOLUTION INTRAVENOUS at 13:56

## 2019-08-02 RX ADMIN — SODIUM CHLORIDE 150 MG: 0.9 INJECTION, SOLUTION INTRAVENOUS at 10:04

## 2019-08-02 RX ADMIN — PACLITAXEL 216 MG: 6 INJECTION, SOLUTION INTRAVENOUS at 10:45

## 2019-08-02 RX ADMIN — FAMOTIDINE 20 MG: 10 INJECTION, SOLUTION INTRAVENOUS at 09:45

## 2019-08-02 NOTE — PROGRESS NOTES
Pt received chemo infusion w/out any adverse effects, labs w/in parameters   Offers no complaints, AVS given

## 2019-08-02 NOTE — PLAN OF CARE
Problem: Potential for Falls  Goal: Patient will remain free of falls  Description  INTERVENTIONS:  - Assess patient frequently for physical needs  -  Identify cognitive and physical deficits and behaviors that affect risk of falls    -  [place] fall precautions as indicated by assessment   - Educate patient/family on patient safety including physical limitations  - Instruct patient to call for assistance with activity based on assessment  - Modify environment to reduce risk of injury  - Consider OT/PT consult to assist with strengthening/mobility  Outcome: Progressing

## 2019-08-05 ENCOUNTER — NURSE TRIAGE (OUTPATIENT)
Dept: PHYSICAL THERAPY | Facility: OTHER | Age: 64
End: 2019-08-05

## 2019-08-05 DIAGNOSIS — M54.5 LOW BACK PAIN, UNSPECIFIED BACK PAIN LATERALITY, UNSPECIFIED CHRONICITY, WITH SCIATICA PRESENCE UNSPECIFIED: Primary | ICD-10-CM

## 2019-08-05 DIAGNOSIS — M51.26 HERNIATED LUMBAR INTERVERTEBRAL DISC: Primary | ICD-10-CM

## 2019-08-05 DIAGNOSIS — M51.26 HERNIATION OF LUMBAR INTERVERTEBRAL DISC: ICD-10-CM

## 2019-08-05 NOTE — TELEPHONE ENCOUNTER
This nurse did review in detail the comp spine program and what we can provide for the pt for their back pain  Pt is already scheduled for physical therapy at the [facility] site    Referral closed

## 2019-08-07 ENCOUNTER — APPOINTMENT (OUTPATIENT)
Dept: LAB | Facility: CLINIC | Age: 64
End: 2019-08-07
Payer: MEDICARE

## 2019-08-07 DIAGNOSIS — C57.02 CARCINOMA OF LEFT FALLOPIAN TUBE (HCC): ICD-10-CM

## 2019-08-07 LAB
ALBUMIN SERPL BCP-MCNC: 4.2 G/DL (ref 3.5–5)
ALP SERPL-CCNC: 63 U/L (ref 46–116)
ALT SERPL W P-5'-P-CCNC: 29 U/L (ref 12–78)
ANION GAP SERPL CALCULATED.3IONS-SCNC: 9 MMOL/L (ref 4–13)
AST SERPL W P-5'-P-CCNC: 27 U/L (ref 5–45)
BASOPHILS # BLD MANUAL: 0 THOUSAND/UL (ref 0–0.1)
BASOPHILS NFR MAR MANUAL: 0 % (ref 0–1)
BILIRUB SERPL-MCNC: 0.36 MG/DL (ref 0.2–1)
BUN SERPL-MCNC: 12 MG/DL (ref 5–25)
CALCIUM SERPL-MCNC: 9.5 MG/DL (ref 8.3–10.1)
CHLORIDE SERPL-SCNC: 102 MMOL/L (ref 100–108)
CO2 SERPL-SCNC: 25 MMOL/L (ref 21–32)
CREAT SERPL-MCNC: 0.76 MG/DL (ref 0.6–1.3)
EOSINOPHIL # BLD MANUAL: 0 THOUSAND/UL (ref 0–0.4)
EOSINOPHIL NFR BLD MANUAL: 0 % (ref 0–6)
ERYTHROCYTE [DISTWIDTH] IN BLOOD BY AUTOMATED COUNT: 12.2 % (ref 11.6–15.1)
GFR SERPL CREATININE-BSD FRML MDRD: 82 ML/MIN/1.73SQ M
GLUCOSE SERPL-MCNC: 74 MG/DL (ref 65–140)
HCT VFR BLD AUTO: 42 % (ref 34.8–46.1)
HGB BLD-MCNC: 13.6 G/DL (ref 11.5–15.4)
LYMPHOCYTES # BLD AUTO: 1.29 THOUSAND/UL (ref 0.6–4.47)
LYMPHOCYTES # BLD AUTO: 30 % (ref 14–44)
MAGNESIUM SERPL-MCNC: 2.4 MG/DL (ref 1.6–2.6)
MCH RBC QN AUTO: 31.6 PG (ref 26.8–34.3)
MCHC RBC AUTO-ENTMCNC: 32.4 G/DL (ref 31.4–37.4)
MCV RBC AUTO: 97 FL (ref 82–98)
MONOCYTES # BLD AUTO: 0 THOUSAND/UL (ref 0–1.22)
MONOCYTES NFR BLD: 0 % (ref 4–12)
NEUTROPHILS # BLD MANUAL: 1.81 THOUSAND/UL (ref 1.85–7.62)
NEUTS SEG NFR BLD AUTO: 42 % (ref 43–75)
NRBC BLD AUTO-RTO: 0 /100 WBCS
PLATELET # BLD AUTO: 192 THOUSANDS/UL (ref 149–390)
PLATELET BLD QL SMEAR: ADEQUATE
PMV BLD AUTO: 9.4 FL (ref 8.9–12.7)
POTASSIUM SERPL-SCNC: 4.3 MMOL/L (ref 3.5–5.3)
PROT SERPL-MCNC: 9.1 G/DL (ref 6.4–8.2)
RBC # BLD AUTO: 4.31 MILLION/UL (ref 3.81–5.12)
SMUDGE CELLS BLD QL SMEAR: PRESENT
SODIUM SERPL-SCNC: 136 MMOL/L (ref 136–145)
VARIANT LYMPHS # BLD AUTO: 28 %
WBC # BLD AUTO: 4.31 THOUSAND/UL (ref 4.31–10.16)

## 2019-08-07 PROCEDURE — 85007 BL SMEAR W/DIFF WBC COUNT: CPT

## 2019-08-07 PROCEDURE — 85027 COMPLETE CBC AUTOMATED: CPT

## 2019-08-07 PROCEDURE — 80053 COMPREHEN METABOLIC PANEL: CPT

## 2019-08-07 PROCEDURE — 36415 COLL VENOUS BLD VENIPUNCTURE: CPT

## 2019-08-07 PROCEDURE — 83735 ASSAY OF MAGNESIUM: CPT

## 2019-08-12 ENCOUNTER — EVALUATION (OUTPATIENT)
Dept: PHYSICAL THERAPY | Age: 64
End: 2019-08-12
Payer: MEDICARE

## 2019-08-12 DIAGNOSIS — M54.5 BILATERAL LOW BACK PAIN, UNSPECIFIED CHRONICITY, WITH SCIATICA PRESENCE UNSPECIFIED: Primary | ICD-10-CM

## 2019-08-12 PROCEDURE — 97140 MANUAL THERAPY 1/> REGIONS: CPT | Performed by: PHYSICAL THERAPIST

## 2019-08-12 PROCEDURE — 97162 PT EVAL MOD COMPLEX 30 MIN: CPT | Performed by: PHYSICAL THERAPIST

## 2019-08-12 PROCEDURE — 97110 THERAPEUTIC EXERCISES: CPT | Performed by: PHYSICAL THERAPIST

## 2019-08-12 NOTE — PROGRESS NOTES
PT Evaluation     Today's date: 2019  Patient name: Lorne Mooney  : 1955  MRN: [id number]  Referring provider: Siobhan Hendricks PA-C  Dx:   Encounter Diagnosis     ICD-10-CM    1  Bilateral low back pain, unspecified chronicity, with sciatica presence unspecified M54 5        Start Time: 1545  Stop Time: 1645  Total time in clinic (min): 60 minutes    Assessment  Assessment details: Lorne Mooney is a 75 y o  female who presents with pain, decreased strength and decreased ROM  Due to these impairments, Patient has difficulty performing a/iadls, recreational activities and engaging in social activities  Patient's clinical presentation is consistent with their referring diagnosis of low back pain  Patient would benefit from skilled physical therapy to address their aforementioned impairments, improve their level of function and to improve their overall quality of life  Impairments: abnormal or restricted ROM, activity intolerance, impaired physical strength, lacks appropriate home exercise program, pain with function, poor posture  and poor body mechanics  Understanding of Dx/Px/POC: good   Prognosis: good    Goals  ST-3 WEEKS  1  Decrease pain by 2 points on VAS at its worst   2   Increase ROM by > 5-15  deg in all deficients planes  3   Increase core/LE by 1/2 MMT grade in all deficient planes  LT-6 WEEKS  1  Patient to be independent with a/iadls  2  Increase functional activities for leisure and home activities to previous LOF    3  Independent with HEP and/or fitness program   4  Walk for 20 minutes in neighborhood without pan increase    Plan  Patient would benefit from: skilled physical therapy  Planned modality interventions: cryotherapy and thermotherapy: hydrocollator packs  Planned therapy interventions: activity modification, aquatic therapy, flexibility, functional ROM exercises, home exercise program, manual therapy, neuromuscular re-education, patient education, postural training, therapeutic activities and therapeutic exercise  Frequency: 2-3x week  Duration in weeks: 8  Plan of Care beginning date: 2019  Plan of Care expiration date: 11/10/2019  Treatment plan discussed with: patient        Subjective Evaluation    History of Present Illness  Date of onset: 7/15/2019  Mechanism of injury: A few days after first chemotherapy treatment onset upon awaking located in rigth buttock  To oncologist that day who recommended PT and medication  MRI performed  Patient notes since onset of severe pain that symptoms are improving  Hysterectomy 2018 due to fallopian tube cancer with mets to omentum  CT scan on 19  Quality of life: good    Pain  Current pain ratin  At best pain ratin  At worst pain ratin  Location: low back pain, deep hip, right groin and inner thigh  Quality: sharp, radiating and dull ache  Relieving factors: rest, relaxation and medications (sitting straigth)  Aggravating factors: lifting, stair climbing, walking and standing  Progression: improved    Social Support  L[facility] in: [facility]  Lives with: spouse    Employment status: not working  Exercise history: fitness program      Diagnostic Tests  MRI studies: abnormal  Treatments  Current treatment: chiropractic and medication  Patient Goals  Patient goals for therapy: increased strength and decreased pain  Patient goal: walk long distances without pain for 15-20 minutes        Objective     Static Posture     Head  Forward  Shoulders  Rounded  Scapulae  Right elevated  Pelvis   Pelvis (Left): Elevated  Tenderness     Right Hip   Tenderness in the PSIS, greater trochanter and ischial tuberosity       Neurological Testing     Sensation     Lumbar   Left   Intact: light touch    Right   Intact: light touch    Active Range of Motion     Lumbar   Flexion:  with pain Restriction level: minimal  Extension:  with pain Restriction level: moderate  Left lateral flexion:  Restriction level: minimal  Right lateral flexion:  with pain Restriction level: minimal    Strength/Myotome Testing     Left Hip   Planes of Motion   Flexion: 4  Abduction: 4  Adduction: 4    Right Hip   Planes of Motion   Flexion: 3+  Extension: 4-  Abduction: 4-    Left Knee   Flexion: 4  Extension: 4    Right Knee   Flexion: 4-  Extension: 4-    Left Ankle/Foot   Dorsiflexion: 4  Plantar flexion: 4    Right Ankle/Foot   Dorsiflexion: 4  Plantar flexion: 4-    Muscle Activation     Additional Muscle Activation Details  Difficulty isolating transverse abdominals    Functional Assessment        Comments  Limited walking tolerance  Stairs fatiguing and painful  Interrupted sleep initially with onset of pan  Rest periods with exercise/activity  Standing tolerance less than 10 minutes  Change of positions painful at times  Uses SPC for long distance ambulation      Flowsheet Rows      Most Recent Value   PT/OT G-Codes   Current Score  36   Projected Score  59             Precautions: ovarian cancer, HTN, right hip pain, stroke hx      Manual  8/12            B LEs 10            R buttock/Prox ITB/ MFR 5                                                       Exercise Diary  8/12            ADIM             Ball heel slides                                                                                                                                                                                                                                                           Modalities  8/12            HT 10

## 2019-08-14 ENCOUNTER — APPOINTMENT (OUTPATIENT)
Dept: LAB | Facility: CLINIC | Age: 64
End: 2019-08-14
Payer: MEDICARE

## 2019-08-14 ENCOUNTER — OFFICE VISIT (OUTPATIENT)
Dept: PHYSICAL THERAPY | Age: 64
End: 2019-08-14
Payer: MEDICARE

## 2019-08-14 DIAGNOSIS — M54.5 BILATERAL LOW BACK PAIN, UNSPECIFIED CHRONICITY, WITH SCIATICA PRESENCE UNSPECIFIED: Primary | ICD-10-CM

## 2019-08-14 DIAGNOSIS — C57.02 CARCINOMA OF LEFT FALLOPIAN TUBE (HCC): ICD-10-CM

## 2019-08-14 LAB
ALBUMIN SERPL BCP-MCNC: 4.1 G/DL (ref 3.5–5)
ALP SERPL-CCNC: 65 U/L (ref 46–116)
ALT SERPL W P-5'-P-CCNC: 22 U/L (ref 12–78)
ANION GAP SERPL CALCULATED.3IONS-SCNC: 6 MMOL/L (ref 4–13)
AST SERPL W P-5'-P-CCNC: 15 U/L (ref 5–45)
BASOPHILS # BLD AUTO: 0.03 THOUSANDS/ΜL (ref 0–0.1)
BASOPHILS NFR BLD AUTO: 1 % (ref 0–1)
BILIRUB SERPL-MCNC: 0.24 MG/DL (ref 0.2–1)
BUN SERPL-MCNC: 10 MG/DL (ref 5–25)
CALCIUM SERPL-MCNC: 9.2 MG/DL (ref 8.3–10.1)
CHLORIDE SERPL-SCNC: 105 MMOL/L (ref 100–108)
CO2 SERPL-SCNC: 26 MMOL/L (ref 21–32)
CREAT SERPL-MCNC: 0.7 MG/DL (ref 0.6–1.3)
EOSINOPHIL # BLD AUTO: 0.16 THOUSAND/ΜL (ref 0–0.61)
EOSINOPHIL NFR BLD AUTO: 4 % (ref 0–6)
ERYTHROCYTE [DISTWIDTH] IN BLOOD BY AUTOMATED COUNT: 12.5 % (ref 11.6–15.1)
GFR SERPL CREATININE-BSD FRML MDRD: 91 ML/MIN/1.73SQ M
GLUCOSE SERPL-MCNC: 85 MG/DL (ref 65–140)
HCT VFR BLD AUTO: 37.2 % (ref 34.8–46.1)
HGB BLD-MCNC: 12 G/DL (ref 11.5–15.4)
IMM GRANULOCYTES # BLD AUTO: 0.01 THOUSAND/UL (ref 0–0.2)
IMM GRANULOCYTES NFR BLD AUTO: 0 % (ref 0–2)
LYMPHOCYTES # BLD AUTO: 1.75 THOUSANDS/ΜL (ref 0.6–4.47)
LYMPHOCYTES NFR BLD AUTO: 46 % (ref 14–44)
MAGNESIUM SERPL-MCNC: 2.4 MG/DL (ref 1.6–2.6)
MCH RBC QN AUTO: 31.3 PG (ref 26.8–34.3)
MCHC RBC AUTO-ENTMCNC: 32.3 G/DL (ref 31.4–37.4)
MCV RBC AUTO: 97 FL (ref 82–98)
MONOCYTES # BLD AUTO: 0.31 THOUSAND/ΜL (ref 0.17–1.22)
MONOCYTES NFR BLD AUTO: 8 % (ref 4–12)
NEUTROPHILS # BLD AUTO: 1.56 THOUSANDS/ΜL (ref 1.85–7.62)
NEUTS SEG NFR BLD AUTO: 41 % (ref 43–75)
NRBC BLD AUTO-RTO: 0 /100 WBCS
PLATELET # BLD AUTO: 225 THOUSANDS/UL (ref 149–390)
PMV BLD AUTO: 9.1 FL (ref 8.9–12.7)
POTASSIUM SERPL-SCNC: 3.7 MMOL/L (ref 3.5–5.3)
PROT SERPL-MCNC: 8.5 G/DL (ref 6.4–8.2)
RBC # BLD AUTO: 3.84 MILLION/UL (ref 3.81–5.12)
SODIUM SERPL-SCNC: 137 MMOL/L (ref 136–145)
WBC # BLD AUTO: 3.82 THOUSAND/UL (ref 4.31–10.16)

## 2019-08-14 PROCEDURE — 83735 ASSAY OF MAGNESIUM: CPT

## 2019-08-14 PROCEDURE — 97140 MANUAL THERAPY 1/> REGIONS: CPT

## 2019-08-14 PROCEDURE — 97110 THERAPEUTIC EXERCISES: CPT

## 2019-08-14 PROCEDURE — 85025 COMPLETE CBC W/AUTO DIFF WBC: CPT

## 2019-08-14 PROCEDURE — 36415 COLL VENOUS BLD VENIPUNCTURE: CPT

## 2019-08-14 PROCEDURE — 80053 COMPREHEN METABOLIC PANEL: CPT

## 2019-08-14 NOTE — PROGRESS NOTES
Daily Note     Today's date: 2019  Patient name: Lawson Landry  : 1955  MRN: [id number]  Referring provider: Vickey Sun PA-C  Dx:   Encounter Diagnosis     ICD-10-CM    1  Bilateral low back pain, unspecified chronicity, with sciatica presence unspecified M54 5        Start Time: 1255  Stop Time: 1345  Total time in clinic (min): 50 minutes    Subjective: Patient reports sore and stiff today  Objective: See treatment diary below      Assessment: Tolerated treatment well, increased tenderness right hip and buttock area  Did well with exercises added today  Patient demonstrated fatigue post treatment and would benefit from continued PT      Plan: Continue per plan of care        Precautions: ovarian cancer, HTN, right hip pain, stroke hx      Manual             B LEs 10 10           R buttock/Prox ITB/ MFR 5 5                                                      Exercise Diary             ADIM  5"x20           Ball heel slides  20x           Bridge   10x           clamshell  20x           Slant   30"x4           nustep  5'                                                                                             Modalities             HT 10 10'

## 2019-08-16 ENCOUNTER — OFFICE VISIT (OUTPATIENT)
Dept: PHYSICAL THERAPY | Age: 64
End: 2019-08-16
Payer: MEDICARE

## 2019-08-16 DIAGNOSIS — M54.5 BILATERAL LOW BACK PAIN, UNSPECIFIED CHRONICITY, WITH SCIATICA PRESENCE UNSPECIFIED: Primary | ICD-10-CM

## 2019-08-16 PROCEDURE — 97140 MANUAL THERAPY 1/> REGIONS: CPT | Performed by: PHYSICAL THERAPIST

## 2019-08-16 PROCEDURE — 97110 THERAPEUTIC EXERCISES: CPT | Performed by: PHYSICAL THERAPIST

## 2019-08-16 NOTE — PROGRESS NOTES
Daily Note     Today's date: 2019  Patient name: Emy Billingsley  : 1955  MRN: [id number]  Referring provider: Palma Main PA-C  Dx:   Encounter Diagnosis     ICD-10-CM    1  Bilateral low back pain, unspecified chronicity, with sciatica presence unspecified M54 5        Start Time: 1345  Stop Time: 1440  Total time in clinic (min): 55 minutes    Subjective: Patient notes feeling better with pain at <1/10 low back and right hip area  Objective: See treatment diary below      Assessment: Tolerated treatment well  Patient exhibited good technique with therapeutic exercises and would benefit from continued PT Modified ther exercise due to patient time constraint  Plan: Continue per plan of care        Precautions: ovarian cancer, HTN, right hip pain, stroke hx      Manual            B LEs 10 10 10          R buttock/Prox ITB/ MFR 5 5 5                                                     Exercise Diary            ADIM  5"x20           PPT   20x          Ball heel slides  20x 30x          Bridge   10x 20x  ball          clamshell  20x nt          Slant   30"x4 4x          nustep  5'                                                                                             Modalities            HT 10 10' 10'

## 2019-08-19 ENCOUNTER — OFFICE VISIT (OUTPATIENT)
Dept: PHYSICAL THERAPY | Age: 64
End: 2019-08-19
Payer: MEDICARE

## 2019-08-19 DIAGNOSIS — M54.5 BILATERAL LOW BACK PAIN, UNSPECIFIED CHRONICITY, WITH SCIATICA PRESENCE UNSPECIFIED: Primary | ICD-10-CM

## 2019-08-19 PROCEDURE — 97140 MANUAL THERAPY 1/> REGIONS: CPT | Performed by: PHYSICAL THERAPIST

## 2019-08-19 PROCEDURE — 97110 THERAPEUTIC EXERCISES: CPT | Performed by: PHYSICAL THERAPIST

## 2019-08-19 NOTE — PROGRESS NOTES
Daily Note     Today's date: 2019  Patient name: Fariba Duffy  : 1955  MRN: [id number]  Referring provider: Gwyn Conner PA-C  Dx:   Encounter Diagnosis     ICD-10-CM    1  Bilateral low back pain, unspecified chronicity, with sciatica presence unspecified M54 5        Start Time: 1345  Stop Time: 1438  Total time in clinic (min): 53 minutes    Subjective: Notes no hip pain; 1/2 central low back pain  Objective: See treatment diary below      Assessment: Tolerated treatment well  Patient would benefit from continued PT      Plan: Continue per plan of care        Precautions: ovarian cancer, HTN, right hip pain, stroke hx      Manual           B LEs 10 10 10 10         R buttock/Prox ITB/ MFR 5 5 5 5                                                    Exercise Diary           ADIM  5"x20           PPT   20x          Ball heel slides  20x 30x 30x         Bridge   10x 20x  ball 20x  ball         clamshell  20x nt 20x         Slant   30"x4 4x 4x         nustep  5'                        Hip ABD    20/30         Hip ADD    20/30         Calf press    20/30         Row Deltoid    20/30                          Modalities            HT 10 10' 10'

## 2019-08-21 ENCOUNTER — OFFICE VISIT (OUTPATIENT)
Dept: PHYSICAL THERAPY | Age: 64
End: 2019-08-21
Payer: MEDICARE

## 2019-08-21 ENCOUNTER — OFFICE VISIT (OUTPATIENT)
Dept: GYNECOLOGIC ONCOLOGY | Facility: CLINIC | Age: 64
End: 2019-08-21
Payer: MEDICARE

## 2019-08-21 ENCOUNTER — APPOINTMENT (OUTPATIENT)
Dept: LAB | Facility: HOSPITAL | Age: 64
End: 2019-08-21
Payer: MEDICARE

## 2019-08-21 VITALS
SYSTOLIC BLOOD PRESSURE: 128 MMHG | HEART RATE: 70 BPM | WEIGHT: 124 LBS | BODY MASS INDEX: 21.97 KG/M2 | RESPIRATION RATE: 18 BRPM | TEMPERATURE: 98.9 F | DIASTOLIC BLOOD PRESSURE: 82 MMHG | HEIGHT: 63 IN

## 2019-08-21 DIAGNOSIS — C57.02 CARCINOMA OF LEFT FALLOPIAN TUBE (HCC): Primary | ICD-10-CM

## 2019-08-21 DIAGNOSIS — C57.02 CARCINOMA OF LEFT FALLOPIAN TUBE (HCC): ICD-10-CM

## 2019-08-21 DIAGNOSIS — M54.5 BILATERAL LOW BACK PAIN, UNSPECIFIED CHRONICITY, WITH SCIATICA PRESENCE UNSPECIFIED: Primary | ICD-10-CM

## 2019-08-21 LAB
ALBUMIN SERPL BCP-MCNC: 4.1 G/DL (ref 3.5–5)
ALP SERPL-CCNC: 65 U/L (ref 46–116)
ALT SERPL W P-5'-P-CCNC: 23 U/L (ref 12–78)
ANION GAP SERPL CALCULATED.3IONS-SCNC: 9 MMOL/L (ref 4–13)
AST SERPL W P-5'-P-CCNC: 20 U/L (ref 5–45)
BASOPHILS # BLD MANUAL: 0 THOUSAND/UL (ref 0–0.1)
BASOPHILS NFR MAR MANUAL: 0 % (ref 0–1)
BILIRUB SERPL-MCNC: 0.3 MG/DL (ref 0.2–1)
BUN SERPL-MCNC: 8 MG/DL (ref 5–25)
CALCIUM SERPL-MCNC: 9.7 MG/DL (ref 8.3–10.1)
CHLORIDE SERPL-SCNC: 102 MMOL/L (ref 100–108)
CO2 SERPL-SCNC: 27 MMOL/L (ref 21–32)
CREAT SERPL-MCNC: 0.74 MG/DL (ref 0.6–1.3)
EOSINOPHIL # BLD MANUAL: 0.08 THOUSAND/UL (ref 0–0.4)
EOSINOPHIL NFR BLD MANUAL: 2 % (ref 0–6)
ERYTHROCYTE [DISTWIDTH] IN BLOOD BY AUTOMATED COUNT: 13 % (ref 11.6–15.1)
GFR SERPL CREATININE-BSD FRML MDRD: 85 ML/MIN/1.73SQ M
GLUCOSE SERPL-MCNC: 76 MG/DL (ref 65–140)
HCT VFR BLD AUTO: 39.4 % (ref 34.8–46.1)
HGB BLD-MCNC: 12.8 G/DL (ref 11.5–15.4)
LYMPHOCYTES # BLD AUTO: 1.93 THOUSAND/UL (ref 0.6–4.47)
LYMPHOCYTES # BLD AUTO: 50 % (ref 14–44)
MAGNESIUM SERPL-MCNC: 2.2 MG/DL (ref 1.6–2.6)
MCH RBC QN AUTO: 31.8 PG (ref 26.8–34.3)
MCHC RBC AUTO-ENTMCNC: 32.5 G/DL (ref 31.4–37.4)
MCV RBC AUTO: 98 FL (ref 82–98)
MONOCYTES # BLD AUTO: 0.35 THOUSAND/UL (ref 0–1.22)
MONOCYTES NFR BLD: 9 % (ref 4–12)
NEUTROPHILS # BLD MANUAL: 1.42 THOUSAND/UL (ref 1.85–7.62)
NEUTS SEG NFR BLD AUTO: 37 % (ref 43–75)
NRBC BLD AUTO-RTO: 0 /100 WBCS
PLATELET # BLD AUTO: 260 THOUSANDS/UL (ref 149–390)
PLATELET BLD QL SMEAR: ADEQUATE
PMV BLD AUTO: 8.6 FL (ref 8.9–12.7)
POTASSIUM SERPL-SCNC: 4 MMOL/L (ref 3.5–5.3)
PROT SERPL-MCNC: 8.8 G/DL (ref 6.4–8.2)
RBC # BLD AUTO: 4.03 MILLION/UL (ref 3.81–5.12)
SODIUM SERPL-SCNC: 138 MMOL/L (ref 136–145)
TOTAL CELLS COUNTED SPEC: 100
VARIANT LYMPHS # BLD AUTO: 2 %
WBC # BLD AUTO: 3.85 THOUSAND/UL (ref 4.31–10.16)

## 2019-08-21 PROCEDURE — 97110 THERAPEUTIC EXERCISES: CPT | Performed by: PHYSICAL THERAPIST

## 2019-08-21 PROCEDURE — 85007 BL SMEAR W/DIFF WBC COUNT: CPT

## 2019-08-21 PROCEDURE — 97140 MANUAL THERAPY 1/> REGIONS: CPT | Performed by: PHYSICAL THERAPIST

## 2019-08-21 PROCEDURE — 85027 COMPLETE CBC AUTOMATED: CPT

## 2019-08-21 PROCEDURE — 99214 OFFICE O/P EST MOD 30 MIN: CPT | Performed by: OBSTETRICS & GYNECOLOGY

## 2019-08-21 PROCEDURE — 80053 COMPREHEN METABOLIC PANEL: CPT

## 2019-08-21 PROCEDURE — 83735 ASSAY OF MAGNESIUM: CPT

## 2019-08-21 PROCEDURE — 36415 COLL VENOUS BLD VENIPUNCTURE: CPT

## 2019-08-21 NOTE — ASSESSMENT & PLAN NOTE
The patient is tolerating her chemotherapy well  Her toxicity is acceptable  Her most recent dina labs show a decline in her absolute neutrophil count approximately 1 5  She is due for treatment in 2 or 3 days  We will recheck her white blood cell count today time prior to administering chemo  If the blood cell counts are within normal limits we will continue present chemotherapy at present dosages with carboplatin area under the curve of 5 and paclitaxel at 135 milligrams/meter squared    A CT scan of the chest abdomen pelvis will be performed prior to her next treatment

## 2019-08-21 NOTE — LETTER
August 21, 2019     [address] [place] [address]    Patient: Chelsea Goodman   YOB: 1955   Date of Visit: 8/21/2019       Dear Dr Deirdre Pereira: Thank you for referring Chelsea Goodman to me for evaluation  Below are my notes for this consultation  If you have questions, please do not hesitate to call me  I look forward to following your patient along with you  Sincerely,        Ekaterina Simon MD        CC: No Recipients  Ekaterina Simon MD  8/21/2019  1:43 PM  Sign at close encounter  Assessment/Plan:    Problem List Items Addressed This Visit        Genitourinary    Fallopian tube cancer, carcinoma ([facility] [address] ) - Primary     The patient is tolerating her chemotherapy well  Her toxicity is acceptable  Her most recent dina labs show a decline in her absolute neutrophil count approximately 1 5  She is due for treatment in 2 or 3 days  We will recheck her white blood cell count today time prior to administering chemo  If the blood cell counts are within normal limits we will continue present chemotherapy at present dosages with carboplatin area under the curve of 5 and paclitaxel at 135 milligrams/meter squared  A CT scan of the chest abdomen pelvis will be performed prior to her next treatment         Relevant Orders    CT chest abdomen pelvis w contrast            CHIEF COMPLAINT:  Consideration of chemo therapy cycle 3   With carboplatin and paclitaxel for recurrent stage IIIC fallopian tube carcinoma      Problem:  Cancer Staging  Fallopian tube cancer, carcinoma ([address] )  Staging form: Ovary, Fallopian Tube, Primary Peritoneal, AJCC 8th Edition  - Clinical stage from 4/2/2018: FIGO Stage IIIC - Signed by Yaritza Perez MD on 4/16/2018        Previous therapy:     Fallopian tube cancer, carcinoma ([facility] [address] )    1/4/2018 Initial Diagnosis     Paracentesis revealing adenocarcinoma of gynecologic origin      1/25/2018 - 3/8/2018 Chemotherapy     3 cycles of carboplatin AUC 6 and taxol 175 mg/m2 squared Q 3 weeks  Pretreatment Ca 125: 534 6       4/2/2018 Surgery     Exploratory laparotomy, total abdominal hysterectomy, bilateral salpingo-oophorectomy, omentectomy, appendectomy  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  No gross residual disease  4/26/2018 - 6/7/2018 Chemotherapy     3 cycles of carboplatin AUC 5 and Taxol 175 mg/m2 Q 3 weeks (carboplatin dose dropped to AUC of 5 for neutropenia prior to cycle 3)        Malignant neoplasm of ovary ([facility] [address] )    3/22/2018 Initial Diagnosis     Malignant neoplasm of ovary ([facility] [address] )      6/12/2019 -  Chemotherapy     CARBOplatin (PARAPLATIN) 439 5 mg in sodium chloride 0 9 % 250 mL IVPB, 439 5 mg, Intravenous, Once, 2 of 4 cycles  Administration: 439 5 mg (7/12/2019), 443 5 mg (8/2/2019)  PACLitaxel (TAXOL) 216 mg in sodium chloride 0 9 % 500 mL chemo IVPB, 135 mg/m2 = 216 mg (77 1 % of original dose 175 mg/m2), Intravenous, Once, 2 of 4 cycles  Dose modification: 135 mg/m2 (original dose 175 mg/m2, Cycle 1, Reason: Other (See Comments))  Administration: 216 mg (7/12/2019), 216 mg (8/2/2019)           Patient ID: Poppy Robert is a 75 y o  female  Patient presents for her 3rd cycle of carboplatin area under the curve of 5 and paclitaxel 135 milligrams/meter squared for recurrent fallopian tube cancer  She is tolerating her chemotherapy reasonably well with the exception of back and hip pain after her 1st cycle  An MRI was performed with aforementioned findings:  MPRESSION:        1  Left pelvic necrotic lymphadenopathy is similar to the prior CT scan  2   Scattered spondylotic changes with right-sided disc herniations at L3-4, L4-5 and L5-S1   3   No evidence of osseous metastases in the visualized portions of the spine  The patient was recommended Celebrex for pain control but elected not to take this due to insurance issues    The pain subsequently resolved spontaneously and she is having no more issues with this     With regard to other toxicities the patient is doing well  Her absolute neutrophil count has decreased to its lowest point today at 1 5  All other blood counts including platelets white blood cell count absolute neutrophil count and red blood cell count have all been fine  The patient's most recent  is 27 from 07/10/19  It does not appear that these are being drawn with her chemotherapy  The following portions of the patient's history were reviewed and updated as appropriate: allergies, current medications, past family history, past medical history, past social history, past surgical history and problem list     Review of Systems   Constitutional: Negative  HENT: Negative  Eyes: Negative  Respiratory: Negative  Cardiovascular: Negative  Gastrointestinal: Negative  Endocrine: Negative  Genitourinary: Negative  Musculoskeletal: Negative  Skin: Negative  Neurological: Negative  Hematological: Negative  Psychiatric/Behavioral: Negative          Current Outpatient Medications   Medication Sig Dispense Refill    Arnica 20 % TINC arnica      ascorbic acid (VITAMIN C) 250 mg tablet Take 250 mg by mouth daily      benzonatate (TESSALON PERLES) 100 mg capsule Take 1 capsule (100 mg total) by mouth 3 (three) times a day as needed for cough 30 capsule 0    Calcium Carbonate (CALCIUM 600 PO) Take by mouth      celecoxib (CeleBREX) 100 mg capsule Take 1 capsule (100 mg total) by mouth 2 (two) times a day 30 capsule 2    Cholecalciferol (VITAMIN D3) 91713 units TABS Take by mouth      Lactobacillus (CVS PROBIOTIC ACIDOPHILUS PO) Take 1 capsule by mouth daily        LORazepam (ATIVAN) 1 mg tablet Take 1 tablet (1 mg total) by mouth every 6 (six) hours as needed (nausea or anxiety) 20 tablet 1    Multiple Vitamins-Minerals (MULTIVITAMIN WITH MINERALS) tablet Take 1 tablet by mouth daily      ondansetron (ZOFRAN) 8 mg tablet Take 1 tablet (8 mg total) by mouth every 8 (eight) hours as needed for nausea or vomiting 20 tablet 1    oxyCODONE (ROXICODONE) 5 mg immediate release tablet Take 1 tablet (5 mg total) by mouth every 6 (six) hours as needed for moderate painMax Daily Amount: 20 mg 20 tablet 0     No current facility-administered medications for this visit  Objective:    Blood pressure 128/82, pulse 70, temperature 98 9 °F (37 2 °C), resp  rate 18, height 5' 3 19" (1 605 m), weight 56 2 kg (124 lb)  Body mass index is 21 83 kg/m²  Body surface area is 1 58 meters squared  Physical Exam   Constitutional: She is oriented to person, place, and time  She appears well-developed and well-nourished  HENT:   Head: Normocephalic and atraumatic  Eyes: EOM are normal    Neck: Normal range of motion  Neck supple  No thyromegaly present  Cardiovascular: Normal rate, regular rhythm and normal heart sounds  Pulmonary/Chest: Effort normal and breath sounds normal    Abdominal: Soft  Bowel sounds are normal    Well healed laparoscopic incisions  Genitourinary:   Genitourinary Comments: Deferred       Musculoskeletal: Normal range of motion  Lymphadenopathy:     She has no cervical adenopathy  Neurological: She is alert and oriented to person, place, and time  Skin: Skin is warm and dry  Psychiatric: She has a normal mood and affect   Her behavior is normal        Lab Results   Component Value Date     27 0 07/10/2019     Lab Results   Component Value Date    K 3 7 08/14/2019     08/14/2019    CO2 26 08/14/2019    BUN 10 08/14/2019    CREATININE 0 70 08/14/2019    GLUF 119 (H) 01/22/2018    CALCIUM 9 2 08/14/2019    AST 15 08/14/2019    ALT 22 08/14/2019    ALKPHOS 65 08/14/2019    EGFR 91 08/14/2019     Lab Results   Component Value Date    WBC 3 82 (L) 08/14/2019    HGB 12 0 08/14/2019    HCT 37 2 08/14/2019    MCV 97 08/14/2019     08/14/2019     Lab Results   Component Value Date    NEUTROABS 1 56 (L) 08/14/2019

## 2019-08-21 NOTE — PROGRESS NOTES
Daily Note     Today's date: 2019  Patient name: Titi Cavazos  : 1955  MRN: [id number]  Referring provider: Trista Barcenas PA-C  Dx:   Encounter Diagnosis     ICD-10-CM    1  Bilateral low back pain, unspecified chronicity, with sciatica presence unspecified M54 5        Start Time: 1530  Stop Time: 1631  Total time in clinic (min): 61 minutes    Subjective:  "less than a quarter" regarding pain level  Objective: See treatment diary below      Assessment: Tolerated treatment well  Patient demonstrated fatigue post treatment and would benefit from continued PT  Doing well with decreased pain and increased mobility  Plan: Continue per plan of care  Prepare for discharge      Precautions: ovarian cancer, HTN, right hip pain, stroke hx      Manual          B LEs 10 10 10 10 10        R buttock/Prox ITB/ MFR 5 5 5 5 5                                                   Exercise Diary          ADIM  5"x20           PPT   20x          Ball heel slides  20x 30x 30x 30x        Bridge   10x 20x  ball 20x  ball 30x        clamshell  20x nt 20x 20x        Slant   30"x4 4x 4x 4x        nustep  5'                        Hip ABD    20/30         Hip ADD    20/30         Calf press    20/30         Row Deltoid    20/30                          Modalities          HT 10 10' 10'  10                                  Patient self discharged to fitness program due to feeling better   Discharge PT

## 2019-08-21 NOTE — PROGRESS NOTES
Assessment/Plan:    Problem List Items Addressed This Visit        Genitourinary    Fallopian tube cancer, carcinoma ([facility] [address] ) - Primary     The patient is tolerating her chemotherapy well  Her toxicity is acceptable  Her most recent dina labs show a decline in her absolute neutrophil count approximately 1 5  She is due for treatment in 2 or 3 days  We will recheck her white blood cell count today time prior to administering chemo  If the blood cell counts are within normal limits we will continue present chemotherapy at present dosages with carboplatin area under the curve of 5 and paclitaxel at 135 milligrams/meter squared  A CT scan of the chest abdomen pelvis will be performed prior to her next treatment         Relevant Orders    CT chest abdomen pelvis w contrast            CHIEF COMPLAINT:  Consideration of chemo therapy cycle 3  With carboplatin and paclitaxel for recurrent stage IIIC fallopian tube carcinoma      Problem:  Cancer Staging  Fallopian tube cancer, carcinoma ([facility] [address] )  Staging form: Ovary, Fallopian Tube, Primary Peritoneal, AJCC 8th Edition  - Clinical stage from 4/2/2018: FIGO Stage IIIC - Signed by Zully Butler MD on 4/16/2018        Previous therapy:     Fallopian tube cancer, carcinoma ([facility] [address] )    1/4/2018 Initial Diagnosis     Paracentesis revealing adenocarcinoma of gynecologic origin      1/25/2018 - 3/8/2018 Chemotherapy     3 cycles of carboplatin AUC 6 and taxol 175 mg/m2 squared Q 3 weeks  Pretreatment Ca 125: 534 6       4/2/2018 Surgery     Exploratory laparotomy, total abdominal hysterectomy, bilateral salpingo-oophorectomy, omentectomy, appendectomy  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  No gross residual disease        4/26/2018 - 6/7/2018 Chemotherapy     3 cycles of carboplatin AUC 5 and Taxol 175 mg/m2 Q 3 weeks (carboplatin dose dropped to AUC of 5 for neutropenia prior to cycle 3)        Malignant neoplasm of ovary ([address] )    3/22/2018 Initial Diagnosis Malignant neoplasm of ovary ([facility] [address] )      6/12/2019 -  Chemotherapy     CARBOplatin (PARAPLATIN) 439 5 mg in sodium chloride 0 9 % 250 mL IVPB, 439 5 mg, Intravenous, Once, 2 of 4 cycles  Administration: 439 5 mg (7/12/2019), 443 5 mg (8/2/2019)  PACLitaxel (TAXOL) 216 mg in sodium chloride 0 9 % 500 mL chemo IVPB, 135 mg/m2 = 216 mg (77 1 % of original dose 175 mg/m2), Intravenous, Once, 2 of 4 cycles  Dose modification: 135 mg/m2 (original dose 175 mg/m2, Cycle 1, Reason: Other (See Comments))  Administration: 216 mg (7/12/2019), 216 mg (8/2/2019)           Patient ID: Poppy Robert is a 75 y o  female  Patient presents for her 3rd cycle of carboplatin area under the curve of 5 and paclitaxel 135 milligrams/meter squared for recurrent fallopian tube cancer  She is tolerating her chemotherapy reasonably well with the exception of back and hip pain after her 1st cycle  An MRI was performed with aforementioned findings:  MPRESSION:        1  Left pelvic necrotic lymphadenopathy is similar to the prior CT scan  2   Scattered spondylotic changes with right-sided disc herniations at L3-4, L4-5 and L5-S1   3   No evidence of osseous metastases in the visualized portions of the spine  The patient was recommended Celebrex for pain control but elected not to take this due to insurance issues  The pain subsequently resolved spontaneously and she is having no more issues with this  With regard to other toxicities the patient is doing well  Her absolute neutrophil count has decreased to its lowest point today at 1 5  All other blood counts including platelets white blood cell count absolute neutrophil count and red blood cell count have all been fine  The patient's most recent  is 27 from 07/10/19  It does not appear that these are being drawn with her chemotherapy        The following portions of the patient's history were reviewed and updated as appropriate: allergies, current medications, past family history, past medical history, past social history, past surgical history and problem list     Review of Systems   Constitutional: Negative  HENT: Negative  Eyes: Negative  Respiratory: Negative  Cardiovascular: Negative  Gastrointestinal: Negative  Endocrine: Negative  Genitourinary: Negative  Musculoskeletal: Negative  Skin: Negative  Neurological: Negative  Hematological: Negative  Psychiatric/Behavioral: Negative  Current Outpatient Medications   Medication Sig Dispense Refill    Arnica 20 % TINC arnica      ascorbic acid (VITAMIN C) 250 mg tablet Take 250 mg by mouth daily      benzonatate (TESSALON PERLES) 100 mg capsule Take 1 capsule (100 mg total) by mouth 3 (three) times a day as needed for cough 30 capsule 0    Calcium Carbonate (CALCIUM 600 PO) Take by mouth      celecoxib (CeleBREX) 100 mg capsule Take 1 capsule (100 mg total) by mouth 2 (two) times a day 30 capsule 2    Cholecalciferol (VITAMIN D3) 00987 units TABS Take by mouth      Lactobacillus (CVS PROBIOTIC ACIDOPHILUS PO) Take 1 capsule by mouth daily        LORazepam (ATIVAN) 1 mg tablet Take 1 tablet (1 mg total) by mouth every 6 (six) hours as needed (nausea or anxiety) 20 tablet 1    Multiple Vitamins-Minerals (MULTIVITAMIN WITH MINERALS) tablet Take 1 tablet by mouth daily      ondansetron (ZOFRAN) 8 mg tablet Take 1 tablet (8 mg total) by mouth every 8 (eight) hours as needed for nausea or vomiting 20 tablet 1    oxyCODONE (ROXICODONE) 5 mg immediate release tablet Take 1 tablet (5 mg total) by mouth every 6 (six) hours as needed for moderate painMax Daily Amount: 20 mg 20 tablet 0     No current facility-administered medications for this visit  Objective:    Blood pressure 128/82, pulse 70, temperature 98 9 °F (37 2 °C), resp  rate 18, height 5' 3 19" (1 605 m), weight 56 2 kg (124 lb)  Body mass index is 21 83 kg/m²  Body surface area is 1 58 meters squared      Physical Exam   Constitutional: She is oriented to person, place, and time  She appears well-developed and well-nourished  HENT:   Head: Normocephalic and atraumatic  Eyes: EOM are normal    Neck: Normal range of motion  Neck supple  No thyromegaly present  Cardiovascular: Normal rate, regular rhythm and normal heart sounds  Pulmonary/Chest: Effort normal and breath sounds normal    Abdominal: Soft  Bowel sounds are normal    Well healed laparoscopic incisions  Genitourinary:   Genitourinary Comments: Deferred       Musculoskeletal: Normal range of motion  Lymphadenopathy:     She has no cervical adenopathy  Neurological: She is alert and oriented to person, place, and time  Skin: Skin is warm and dry  Psychiatric: She has a normal mood and affect   Her behavior is normal        Lab Results   Component Value Date     27 0 07/10/2019     Lab Results   Component Value Date    K 3 7 08/14/2019     08/14/2019    CO2 26 08/14/2019    BUN 10 08/14/2019    CREATININE 0 70 08/14/2019    GLUF 119 (H) 01/22/2018    CALCIUM 9 2 08/14/2019    AST 15 08/14/2019    ALT 22 08/14/2019    ALKPHOS 65 08/14/2019    EGFR 91 08/14/2019     Lab Results   Component Value Date    WBC 3 82 (L) 08/14/2019    HGB 12 0 08/14/2019    HCT 37 2 08/14/2019    MCV 97 08/14/2019     08/14/2019     Lab Results   Component Value Date    NEUTROABS 1 56 (L) 08/14/2019

## 2019-08-22 DIAGNOSIS — C56.3 MALIGNANT NEOPLASM OF BOTH OVARIES (HCC): ICD-10-CM

## 2019-08-22 RX ORDER — SODIUM CHLORIDE 9 MG/ML
20 INJECTION, SOLUTION INTRAVENOUS ONCE
Status: CANCELLED | OUTPATIENT
Start: 2019-08-23

## 2019-08-22 RX ORDER — PALONOSETRON 0.05 MG/ML
0.25 INJECTION, SOLUTION INTRAVENOUS ONCE
Status: CANCELLED | OUTPATIENT
Start: 2019-08-23

## 2019-08-23 ENCOUNTER — HOSPITAL ENCOUNTER (OUTPATIENT)
Dept: INFUSION CENTER | Facility: CLINIC | Age: 64
Discharge: HOME | End: 2019-08-23
Payer: MEDICARE

## 2019-08-23 VITALS
SYSTOLIC BLOOD PRESSURE: 165 MMHG | DIASTOLIC BLOOD PRESSURE: 71 MMHG | TEMPERATURE: 98.8 F | BODY MASS INDEX: 22.11 KG/M2 | WEIGHT: 124.78 LBS | HEIGHT: 63 IN | HEART RATE: 64 BPM | RESPIRATION RATE: 18 BRPM

## 2019-08-23 DIAGNOSIS — C56.3 MALIGNANT NEOPLASM OF BOTH OVARIES (HCC): Primary | ICD-10-CM

## 2019-08-23 LAB
BASOPHILS # BLD AUTO: 0.03 THOUSANDS/ΜL (ref 0–0.1)
BASOPHILS NFR BLD AUTO: 1 % (ref 0–1)
EOSINOPHIL # BLD AUTO: 0.11 THOUSAND/ΜL (ref 0–0.61)
EOSINOPHIL NFR BLD AUTO: 3 % (ref 0–6)
ERYTHROCYTE [DISTWIDTH] IN BLOOD BY AUTOMATED COUNT: 13.2 % (ref 11.6–15.1)
HCT VFR BLD AUTO: 38 % (ref 34.8–46.1)
HGB BLD-MCNC: 12.3 G/DL (ref 11.5–15.4)
IMM GRANULOCYTES # BLD AUTO: 0.01 THOUSAND/UL (ref 0–0.2)
IMM GRANULOCYTES NFR BLD AUTO: 0 % (ref 0–2)
LYMPHOCYTES # BLD AUTO: 1.58 THOUSANDS/ΜL (ref 0.6–4.47)
LYMPHOCYTES NFR BLD AUTO: 42 % (ref 14–44)
MCH RBC QN AUTO: 31.5 PG (ref 26.8–34.3)
MCHC RBC AUTO-ENTMCNC: 32.4 G/DL (ref 31.4–37.4)
MCV RBC AUTO: 97 FL (ref 82–98)
MONOCYTES # BLD AUTO: 0.46 THOUSAND/ΜL (ref 0.17–1.22)
MONOCYTES NFR BLD AUTO: 13 % (ref 4–12)
NEUTROPHILS # BLD AUTO: 1.49 THOUSANDS/ΜL (ref 1.85–7.62)
NEUTS SEG NFR BLD AUTO: 41 % (ref 43–75)
NRBC BLD AUTO-RTO: 0 /100 WBCS
PLATELET # BLD AUTO: 218 THOUSANDS/UL (ref 149–390)
PMV BLD AUTO: 8.2 FL (ref 8.9–12.7)
RBC # BLD AUTO: 3.91 MILLION/UL (ref 3.81–5.12)
WBC # BLD AUTO: 3.68 THOUSAND/UL (ref 4.31–10.16)

## 2019-08-23 PROCEDURE — 96417 CHEMO IV INFUS EACH ADDL SEQ: CPT

## 2019-08-23 PROCEDURE — 85025 COMPLETE CBC W/AUTO DIFF WBC: CPT | Performed by: OBSTETRICS & GYNECOLOGY

## 2019-08-23 PROCEDURE — 96367 TX/PROPH/DG ADDL SEQ IV INF: CPT

## 2019-08-23 PROCEDURE — 96413 CHEMO IV INFUSION 1 HR: CPT

## 2019-08-23 PROCEDURE — 96415 CHEMO IV INFUSION ADDL HR: CPT

## 2019-08-23 PROCEDURE — 96375 TX/PRO/DX INJ NEW DRUG ADDON: CPT

## 2019-08-23 RX ORDER — SODIUM CHLORIDE 9 MG/ML
20 INJECTION, SOLUTION INTRAVENOUS ONCE
Status: COMPLETED | OUTPATIENT
Start: 2019-08-23 | End: 2019-08-23

## 2019-08-23 RX ORDER — PALONOSETRON 0.05 MG/ML
0.25 INJECTION, SOLUTION INTRAVENOUS ONCE
Status: COMPLETED | OUTPATIENT
Start: 2019-08-23 | End: 2019-08-23

## 2019-08-23 RX ADMIN — DIPHENHYDRAMINE HYDROCHLORIDE 25 MG: 50 INJECTION, SOLUTION INTRAMUSCULAR; INTRAVENOUS at 08:41

## 2019-08-23 RX ADMIN — PACLITAXEL 213.6 MG: 6 INJECTION, SOLUTION INTRAVENOUS at 10:30

## 2019-08-23 RX ADMIN — SODIUM CHLORIDE 20 ML/HR: 0.9 INJECTION, SOLUTION INTRAVENOUS at 08:41

## 2019-08-23 RX ADMIN — DEXAMETHASONE SODIUM PHOSPHATE 20 MG: 10 INJECTION, SOLUTION INTRAMUSCULAR; INTRAVENOUS at 09:06

## 2019-08-23 RX ADMIN — CARBOPLATIN 456.5 MG: 10 INJECTION, SOLUTION INTRAVENOUS at 13:36

## 2019-08-23 RX ADMIN — SODIUM CHLORIDE 150 MG: 0.9 INJECTION, SOLUTION INTRAVENOUS at 09:52

## 2019-08-23 RX ADMIN — PALONOSETRON 0.25 MG: 0.25 INJECTION, SOLUTION INTRAVENOUS at 09:52

## 2019-08-23 RX ADMIN — FAMOTIDINE 20 MG: 10 INJECTION, SOLUTION INTRAVENOUS at 09:30

## 2019-08-23 NOTE — PLAN OF CARE
Problem: Potential for Falls  Goal: Patient will remain free of falls  Description  INTERVENTIONS:  - Assess patient frequently for physical needs  -  Identify cognitive and physical deficits and behaviors that affect risk of falls    -  Lamar fall precautions as indicated by assessment   - Educate patient/family on patient safety including physical limitations  - Instruct patient to call for assistance with activity based on assessment  - Modify environment to reduce risk of injury  - Consider OT/PT consult to assist with strengthening/mobility  Outcome: Progressing

## 2019-08-23 NOTE — PROGRESS NOTES
Pt here for chemo, STAT CBC drawn, called Karen Hatch with results, awaiting call back for OK to treat

## 2019-08-26 DIAGNOSIS — C57.02 CARCINOMA OF LEFT FALLOPIAN TUBE (HCC): ICD-10-CM

## 2019-08-26 RX ORDER — ONDANSETRON HYDROCHLORIDE 8 MG/1
8 TABLET, FILM COATED ORAL EVERY 8 HOURS PRN
Qty: 20 TABLET | Refills: 1 | Status: SHIPPED | OUTPATIENT
Start: 2019-08-26 | End: 2021-04-08

## 2019-08-28 ENCOUNTER — APPOINTMENT (OUTPATIENT)
Dept: LAB | Facility: CLINIC | Age: 64
End: 2019-08-28
Payer: MEDICARE

## 2019-08-28 DIAGNOSIS — C57.02 CARCINOMA OF LEFT FALLOPIAN TUBE (HCC): Primary | ICD-10-CM

## 2019-08-28 DIAGNOSIS — C57.02 CARCINOMA OF LEFT FALLOPIAN TUBE (HCC): ICD-10-CM

## 2019-08-28 LAB
ALBUMIN SERPL BCP-MCNC: 4.3 G/DL (ref 3.5–5)
ALP SERPL-CCNC: 58 U/L (ref 46–116)
ALT SERPL W P-5'-P-CCNC: 27 U/L (ref 12–78)
ANION GAP SERPL CALCULATED.3IONS-SCNC: 5 MMOL/L (ref 4–13)
ANISOCYTOSIS BLD QL SMEAR: PRESENT
AST SERPL W P-5'-P-CCNC: 21 U/L (ref 5–45)
BASOPHILS # BLD MANUAL: 0.03 THOUSAND/UL (ref 0–0.1)
BASOPHILS NFR MAR MANUAL: 1 % (ref 0–1)
BILIRUB SERPL-MCNC: 0.36 MG/DL (ref 0.2–1)
BUN SERPL-MCNC: 14 MG/DL (ref 5–25)
CALCIUM SERPL-MCNC: 9.6 MG/DL (ref 8.3–10.1)
CHLORIDE SERPL-SCNC: 105 MMOL/L (ref 100–108)
CO2 SERPL-SCNC: 29 MMOL/L (ref 21–32)
CREAT SERPL-MCNC: 0.58 MG/DL (ref 0.6–1.3)
EOSINOPHIL # BLD MANUAL: 0.03 THOUSAND/UL (ref 0–0.4)
EOSINOPHIL NFR BLD MANUAL: 1 % (ref 0–6)
ERYTHROCYTE [DISTWIDTH] IN BLOOD BY AUTOMATED COUNT: 13.3 % (ref 11.6–15.1)
GFR SERPL CREATININE-BSD FRML MDRD: 96 ML/MIN/1.73SQ M
GLUCOSE SERPL-MCNC: 69 MG/DL (ref 65–140)
HCT VFR BLD AUTO: 38.3 % (ref 34.8–46.1)
HGB BLD-MCNC: 12.2 G/DL (ref 11.5–15.4)
LYMPHOCYTES # BLD AUTO: 1.35 THOUSAND/UL (ref 0.6–4.47)
LYMPHOCYTES # BLD AUTO: 40 % (ref 14–44)
MACROCYTES BLD QL AUTO: PRESENT
MAGNESIUM SERPL-MCNC: 2.5 MG/DL (ref 1.6–2.6)
MCH RBC QN AUTO: 31.5 PG (ref 26.8–34.3)
MCHC RBC AUTO-ENTMCNC: 31.9 G/DL (ref 31.4–37.4)
MCV RBC AUTO: 99 FL (ref 82–98)
MONOCYTES # BLD AUTO: 0.14 THOUSAND/UL (ref 0–1.22)
MONOCYTES NFR BLD: 4 % (ref 4–12)
NEUTROPHILS # BLD MANUAL: 1.69 THOUSAND/UL (ref 1.85–7.62)
NEUTS SEG NFR BLD AUTO: 50 % (ref 43–75)
NRBC BLD AUTO-RTO: 0 /100 WBCS
PLATELET # BLD AUTO: 202 THOUSANDS/UL (ref 149–390)
PLATELET BLD QL SMEAR: ADEQUATE
PMV BLD AUTO: 9.2 FL (ref 8.9–12.7)
POTASSIUM SERPL-SCNC: 4.3 MMOL/L (ref 3.5–5.3)
PROT SERPL-MCNC: 8.2 G/DL (ref 6.4–8.2)
RBC # BLD AUTO: 3.87 MILLION/UL (ref 3.81–5.12)
RBC MORPH BLD: PRESENT
SODIUM SERPL-SCNC: 139 MMOL/L (ref 136–145)
VARIANT LYMPHS # BLD AUTO: 4 %
WBC # BLD AUTO: 3.38 THOUSAND/UL (ref 4.31–10.16)

## 2019-08-28 PROCEDURE — 36415 COLL VENOUS BLD VENIPUNCTURE: CPT

## 2019-08-28 PROCEDURE — 85027 COMPLETE CBC AUTOMATED: CPT

## 2019-08-28 PROCEDURE — 83735 ASSAY OF MAGNESIUM: CPT

## 2019-08-28 PROCEDURE — 80053 COMPREHEN METABOLIC PANEL: CPT

## 2019-08-28 PROCEDURE — 85007 BL SMEAR W/DIFF WBC COUNT: CPT

## 2019-09-04 ENCOUNTER — LAB (OUTPATIENT)
Dept: LAB | Facility: CLINIC | Age: 64
End: 2019-09-04
Payer: MEDICARE

## 2019-09-04 ENCOUNTER — HOSPITAL ENCOUNTER (OUTPATIENT)
Dept: CT IMAGING | Facility: HOSPITAL | Age: 64
Discharge: HOME | End: 2019-09-04
Attending: OBSTETRICS & GYNECOLOGY
Payer: MEDICARE

## 2019-09-04 DIAGNOSIS — C57.02 CARCINOMA OF LEFT FALLOPIAN TUBE (HCC): ICD-10-CM

## 2019-09-04 LAB
ALBUMIN SERPL BCP-MCNC: 4.5 G/DL (ref 3.5–5)
ALP SERPL-CCNC: 60 U/L (ref 46–116)
ALT SERPL W P-5'-P-CCNC: 26 U/L (ref 12–78)
ANION GAP SERPL CALCULATED.3IONS-SCNC: 7 MMOL/L (ref 4–13)
AST SERPL W P-5'-P-CCNC: 18 U/L (ref 5–45)
BASOPHILS # BLD AUTO: 0.02 THOUSANDS/ΜL (ref 0–0.1)
BASOPHILS NFR BLD AUTO: 1 % (ref 0–1)
BILIRUB SERPL-MCNC: 0.35 MG/DL (ref 0.2–1)
BUN SERPL-MCNC: 10 MG/DL (ref 5–25)
CALCIUM SERPL-MCNC: 9.1 MG/DL (ref 8.3–10.1)
CHLORIDE SERPL-SCNC: 105 MMOL/L (ref 100–108)
CO2 SERPL-SCNC: 28 MMOL/L (ref 21–32)
CREAT SERPL-MCNC: 0.75 MG/DL (ref 0.6–1.3)
EOSINOPHIL # BLD AUTO: 0.09 THOUSAND/ΜL (ref 0–0.61)
EOSINOPHIL NFR BLD AUTO: 3 % (ref 0–6)
ERYTHROCYTE [DISTWIDTH] IN BLOOD BY AUTOMATED COUNT: 14 % (ref 11.6–15.1)
GFR SERPL CREATININE-BSD FRML MDRD: 83 ML/MIN/1.73SQ M
GLUCOSE SERPL-MCNC: 118 MG/DL (ref 65–140)
HCT VFR BLD AUTO: 37.4 % (ref 34.8–46.1)
HGB BLD-MCNC: 11.9 G/DL (ref 11.5–15.4)
IMM GRANULOCYTES # BLD AUTO: 0.01 THOUSAND/UL (ref 0–0.2)
IMM GRANULOCYTES NFR BLD AUTO: 0 % (ref 0–2)
LYMPHOCYTES # BLD AUTO: 1.34 THOUSANDS/ΜL (ref 0.6–4.47)
LYMPHOCYTES NFR BLD AUTO: 43 % (ref 14–44)
MAGNESIUM SERPL-MCNC: 2.4 MG/DL (ref 1.6–2.6)
MCH RBC QN AUTO: 31.6 PG (ref 26.8–34.3)
MCHC RBC AUTO-ENTMCNC: 31.8 G/DL (ref 31.4–37.4)
MCV RBC AUTO: 100 FL (ref 82–98)
MONOCYTES # BLD AUTO: 0.27 THOUSAND/ΜL (ref 0.17–1.22)
MONOCYTES NFR BLD AUTO: 9 % (ref 4–12)
NEUTROPHILS # BLD AUTO: 1.37 THOUSANDS/ΜL (ref 1.85–7.62)
NEUTS SEG NFR BLD AUTO: 44 % (ref 43–75)
NRBC BLD AUTO-RTO: 0 /100 WBCS
PLATELET # BLD AUTO: 257 THOUSANDS/UL (ref 149–390)
PMV BLD AUTO: 9.2 FL (ref 8.9–12.7)
POTASSIUM SERPL-SCNC: 3.8 MMOL/L (ref 3.5–5.3)
PROT SERPL-MCNC: 8.4 G/DL (ref 6.4–8.2)
RBC # BLD AUTO: 3.76 MILLION/UL (ref 3.81–5.12)
SODIUM SERPL-SCNC: 140 MMOL/L (ref 136–145)
WBC # BLD AUTO: 3.1 THOUSAND/UL (ref 4.31–10.16)

## 2019-09-04 PROCEDURE — 86304 IMMUNOASSAY TUMOR CA 125: CPT

## 2019-09-04 PROCEDURE — 36415 COLL VENOUS BLD VENIPUNCTURE: CPT

## 2019-09-04 PROCEDURE — 85025 COMPLETE CBC W/AUTO DIFF WBC: CPT

## 2019-09-04 PROCEDURE — 74177 CT ABD & PELVIS W/CONTRAST: CPT

## 2019-09-04 PROCEDURE — 83735 ASSAY OF MAGNESIUM: CPT

## 2019-09-04 PROCEDURE — 71260 CT THORAX DX C+: CPT

## 2019-09-04 PROCEDURE — 80053 COMPREHEN METABOLIC PANEL: CPT

## 2019-09-04 RX ADMIN — IOHEXOL 100 ML: 350 INJECTION, SOLUTION INTRAVENOUS at 12:48

## 2019-09-05 LAB — CANCER AG125 SERPL-ACNC: 11.9 U/ML (ref 0–30)

## 2019-09-11 ENCOUNTER — OFFICE VISIT (OUTPATIENT)
Dept: GYNECOLOGIC ONCOLOGY | Facility: CLINIC | Age: 64
End: 2019-09-11
Payer: MEDICARE

## 2019-09-11 VITALS
HEART RATE: 66 BPM | HEIGHT: 63 IN | RESPIRATION RATE: 18 BRPM | WEIGHT: 125 LBS | SYSTOLIC BLOOD PRESSURE: 130 MMHG | BODY MASS INDEX: 22.15 KG/M2 | DIASTOLIC BLOOD PRESSURE: 72 MMHG | TEMPERATURE: 99.1 F

## 2019-09-11 DIAGNOSIS — C56.3 MALIGNANT NEOPLASM OF BOTH OVARIES (HCC): Primary | ICD-10-CM

## 2019-09-11 PROCEDURE — 99214 OFFICE O/P EST MOD 30 MIN: CPT | Performed by: OBSTETRICS & GYNECOLOGY

## 2019-09-11 NOTE — PROGRESS NOTES
Assessment/Plan:    Problem List Items Addressed This Visit        Endocrine    Malignant neoplasm of ovary ([facility] [address] ) - Primary     Patient after 3 cycles of carboplatin and Taxol has a significant improvement in her tumor burden  The patient was interested in discontinuing chemo at this time  We have discussed several options of continuing for cycles 4 5 in 6 or 4 and 5 verses switching to olaparib verses moving to a homeopathic regimen which she has been looking at including protocell  As she is tolerating her chemotherapy regimen well we have agreed to continue the present regimen at present dosages for cycles 4 and 5  We will perform a CT scan after cycle 5 and consider homeopathic regimen at that point for consolidation  The patient is aware now that olaparib is now indicated for this scenario  CHIEF COMPLAINT:  Patient presents for consideration of cycle 4  Carboplatin paclitaxel for recurrent ovarian cancer      Problem:  Cancer Staging  Fallopian tube cancer, carcinoma (HCC)  Staging form: Ovary, Fallopian Tube, Primary Peritoneal, AJCC 8th Edition  - Clinical stage from 4/2/2018: FIGO Stage IIIC - Signed by Matilde Saldaña MD on 4/16/2018        Previous therapy:     Fallopian tube cancer, carcinoma ([facility] [address] )    1/4/2018 Initial Diagnosis     Paracentesis revealing adenocarcinoma of gynecologic origin      1/25/2018 - 3/8/2018 Chemotherapy     3 cycles of carboplatin AUC 6 and taxol 175 mg/m2 squared Q 3 weeks  Pretreatment Ca 125: 534 6       4/2/2018 Surgery     Exploratory laparotomy, total abdominal hysterectomy, bilateral salpingo-oophorectomy, omentectomy, appendectomy  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  No gross residual disease        4/26/2018 - 6/7/2018 Chemotherapy     3 cycles of carboplatin AUC 5 and Taxol 175 mg/m2 Q 3 weeks (carboplatin dose dropped to AUC of 5 for neutropenia prior to cycle 3)        Malignant neoplasm of ovary ([facility] [address] )    3/22/2018 Initial Diagnosis     Malignant neoplasm of ovary ([facility] [address] )      6/12/2019 -  Chemotherapy     CARBOplatin (PARAPLATIN) 439 5 mg in sodium chloride 0 9 % 250 mL IVPB, 439 5 mg, Intravenous, Once, 3 of 4 cycles  Administration: 439 5 mg (7/12/2019), 443 5 mg (8/2/2019), 456 5 mg (8/23/2019)  PACLitaxel (TAXOL) 216 mg in sodium chloride 0 9 % 500 mL chemo IVPB, 135 mg/m2 = 216 mg (77 1 % of original dose 175 mg/m2), Intravenous, Once, 3 of 4 cycles  Dose modification: 135 mg/m2 (original dose 175 mg/m2, Cycle 1, Reason: Other (See Comments))  Administration: 216 mg (7/12/2019), 216 mg (8/2/2019), 213 6 mg (8/23/2019)           Patient ID: Ronnie Velazquez is a 75 y o  female  Patient is a very pleasant 41-year-old white female with history of stage IIIC ovarian cancer recurrent and metastatic  She now presents for consideration of cycle number 4  She has undergone 3 prior cycles and has tolerated these well  Her most recent CT scan reveals the following:  IMPRESSION:     Treatment response with decrease in the size of the previously noted pelvic, subdiaphragmatic and perihepatic implant  No new lung mass or nodule  No new distal metastatic disease  Routine surveillance can be continued    The patient remains doing well  Today, the patient is doing well  She denies significant abdominal pain, pelvic pain, nausea, vomiting, constipation, diarrhea, fevers, chills, or vaginal bleeding  She does note some mild neuropathy in her lower extremities which may be somewhat extending  She does not want to change dosages at this time  With regard to blood work testing, the patient's interval blood work has been acceptable  Her absolute neutrophil count at its dina is between 13 101,500 which is low but acceptable  Her platelets and red blood cell count is unremarkable  Her kidney and liver functions are unremarkable        The following portions of the patient's history were reviewed and updated as appropriate: allergies, current medications, past family history, past medical history, past social history, past surgical history and problem list     Review of Systems   Constitutional: Negative  HENT: Negative  Eyes: Negative  Respiratory: Negative  Cardiovascular: Negative  Gastrointestinal: Negative  Endocrine: Negative  Genitourinary: Negative  Musculoskeletal: Negative  Skin: Negative  Neurological: Negative  Hematological: Negative  Psychiatric/Behavioral: Negative  Current Outpatient Medications   Medication Sig Dispense Refill    Arnica 20 % TINC arnica      ascorbic acid (VITAMIN C) 250 mg tablet Take 250 mg by mouth daily      Calcium Carbonate (CALCIUM 600 PO) Take by mouth      Cholecalciferol (VITAMIN D3) 06687 units TABS Take by mouth      Lactobacillus (CVS PROBIOTIC ACIDOPHILUS PO) Take 1 capsule by mouth daily        Multiple Vitamins-Minerals (MULTIVITAMIN WITH MINERALS) tablet Take 1 tablet by mouth daily      ondansetron (ZOFRAN) 8 mg tablet Take 1 tablet (8 mg total) by mouth every 8 (eight) hours as needed for nausea or vomiting 20 tablet 1    oxyCODONE (ROXICODONE) 5 mg immediate release tablet Take 1 tablet (5 mg total) by mouth every 6 (six) hours as needed for moderate painMax Daily Amount: 20 mg 20 tablet 0     No current facility-administered medications for this visit  Objective:    Blood pressure 130/72, pulse 66, temperature 99 1 °F (37 3 °C), resp  rate 18, height 5' 3 19" (1 605 m), weight 56 7 kg (125 lb)  Body mass index is 22 01 kg/m²  Body surface area is 1 59 meters squared  Physical Exam   Constitutional: She is oriented to person, place, and time  She appears well-developed and well-nourished  HENT:   Head: Normocephalic and atraumatic  Eyes: EOM are normal    Neck: Normal range of motion  Neck supple  No thyromegaly present  Cardiovascular: Normal rate, regular rhythm and normal heart sounds     Pulmonary/Chest: Effort normal and breath sounds normal    Abdominal: Soft  Bowel sounds are normal    Well healed laparoscopic incisions  Genitourinary:   Genitourinary Comments: -Normal external female genitalia, normal Bartholin's and [place]'s glands                  -Normal midline urethral meatus  No lesions notes                  -Bladder without fullness mass or tenderness                  -Vagina without lesion or discharge No significant cystocele or rectocele noted                  -Cervix surgically absent                  -Uterus surgically absent                  -Adnexae surgically absent                  - Anus without fissure of lesion     Musculoskeletal: Normal range of motion  Lymphadenopathy:     She has no cervical adenopathy  Neurological: She is alert and oriented to person, place, and time  Skin: Skin is warm and dry  Psychiatric: She has a normal mood and affect   Her behavior is normal        Lab Results   Component Value Date     11 9 09/04/2019     Lab Results   Component Value Date    K 3 8 09/04/2019     09/04/2019    CO2 28 09/04/2019    BUN 10 09/04/2019    CREATININE 0 75 09/04/2019    GLUF 119 (H) 01/22/2018    CALCIUM 9 1 09/04/2019    AST 18 09/04/2019    ALT 26 09/04/2019    ALKPHOS 60 09/04/2019    EGFR 83 09/04/2019     Lab Results   Component Value Date    WBC 3 10 (L) 09/04/2019    HGB 11 9 09/04/2019    HCT 37 4 09/04/2019     (H) 09/04/2019     09/04/2019     Lab Results   Component Value Date    NEUTROABS 1 37 (L) 09/04/2019

## 2019-09-11 NOTE — ASSESSMENT & PLAN NOTE
Patient after 3 cycles of carboplatin and Taxol has a significant improvement in her tumor burden  The patient was interested in discontinuing chemo at this time  We have discussed several options of continuing for cycles 4 5 in 6 or 4 and 5 verses switching to olaparib verses moving to a homeopathic regimen which she has been looking at including mimiell  As she is tolerating her chemotherapy regimen well we have agreed to continue the present regimen at present dosages for cycles 4 and 5  We will perform a CT scan after cycle 5 and consider homeopathic regimen at that point for consolidation  The patient is aware now that olaparib is now indicated for this scenario

## 2019-09-11 NOTE — LETTER
September 11, 2019     Claudene Cl[address] [place]ma[address]    Patient: Juan Jose Mead   YOB: 1955   Date of Visit: 9/11/2019       Dear Dr Marielena Fang: Thank you for referring Juan Jose Mead to me for evaluation  Below are my notes for this consultation  If you have questions, please do not hesitate to call me  I look forward to following your patient along with you  Sincerely,        Tobias Valentine MD        CC: No Recipients  Tobias Valentine MD  9/11/2019  1:53 PM  Sign at close encounter  Assessment/Plan:    Problem List Items Addressed This Visit        Endocrine    Malignant neoplasm of ovary ([facility] [address] ) - Primary     Patient after 3 cycles of carboplatin and Taxol has a significant improvement in her tumor burden  The patient was interested in discontinuing chemo at this time  We have discussed several options of continuing for cycles 4 5 in 6 or 4 and 5 verses switching to olaparib verses moving to a homeopathic regimen which she has been looking at including protocell  As she is tolerating her chemotherapy regimen well we have agreed to continue the present regimen at present dosages for cycles 4 and 5  We will perform a CT scan after cycle 5 and consider homeopathic regimen at that point for consolidation  The patient is aware now that olaparib is now indicated for this scenario  CHIEF COMPLAINT:  Patient presents for consideration of cycle 4   Carboplatin paclitaxel for recurrent ovarian cancer      Problem:  Cancer Staging  Fallopian tube cancer, carcinoma (HCC)  Staging form: Ovary, Fallopian Tube, Primary Peritoneal, AJCC 8th Edition  - Clinical stage from 4/2/2018: FIGO Stage IIIC - Signed by Jami Morgan MD on 4/16/2018        Previous therapy:     Fallopian tube cancer, carcinoma ([facility] [address] )    1/4/2018 Initial Diagnosis     Paracentesis revealing adenocarcinoma of gynecologic origin      1/25/2018 - 3/8/2018 Chemotherapy     3 cycles of carboplatin AUC 6 and taxol 175 mg/m2 squared Q 3 weeks  Pretreatment Ca 125: 534 6       4/2/2018 Surgery     Exploratory laparotomy, total abdominal hysterectomy, bilateral salpingo-oophorectomy, omentectomy, appendectomy  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  No gross residual disease  4/26/2018 - 6/7/2018 Chemotherapy     3 cycles of carboplatin AUC 5 and Taxol 175 mg/m2 Q 3 weeks (carboplatin dose dropped to AUC of 5 for neutropenia prior to cycle 3)        Malignant neoplasm of ovary ([facility] [address] )    3/22/2018 Initial Diagnosis     Malignant neoplasm of ovary ([facility] [address] )      6/12/2019 -  Chemotherapy     CARBOplatin (PARAPLATIN) 439 5 mg in sodium chloride 0 9 % 250 mL IVPB, 439 5 mg, Intravenous, Once, 3 of 4 cycles  Administration: 439 5 mg (7/12/2019), 443 5 mg (8/2/2019), 456 5 mg (8/23/2019)  PACLitaxel (TAXOL) 216 mg in sodium chloride 0 9 % 500 mL chemo IVPB, 135 mg/m2 = 216 mg (77 1 % of original dose 175 mg/m2), Intravenous, Once, 3 of 4 cycles  Dose modification: 135 mg/m2 (original dose 175 mg/m2, Cycle 1, Reason: Other (See Comments))  Administration: 216 mg (7/12/2019), 216 mg (8/2/2019), 213 6 mg (8/23/2019)           Patient ID: Maged Mejia is a 75 y o  female  Patient is a very pleasant 49-year-old white female with history of stage IIIC ovarian cancer recurrent and metastatic  She now presents for consideration of cycle number 4  She has undergone 3 prior cycles and has tolerated these well  Her most recent CT scan reveals the following:  IMPRESSION:     Treatment response with decrease in the size of the previously noted pelvic, subdiaphragmatic and perihepatic implant  No new lung mass or nodule  No new distal metastatic disease  Routine surveillance can be continued    The patient remains doing well  Today, the patient is doing well  She denies significant abdominal pain, pelvic pain, nausea, vomiting, constipation, diarrhea, fevers, chills, or vaginal bleeding    She does note some mild neuropathy in her lower extremities which may be somewhat extending  She does not want to change dosages at this time  With regard to blood work testing, the patient's interval blood work has been acceptable  Her absolute neutrophil count at its dina is between 13 101,500 which is low but acceptable  Her platelets and red blood cell count is unremarkable  Her kidney and liver functions are unremarkable  The following portions of the patient's history were reviewed and updated as appropriate: allergies, current medications, past family history, past medical history, past social history, past surgical history and problem list     Review of Systems   Constitutional: Negative  HENT: Negative  Eyes: Negative  Respiratory: Negative  Cardiovascular: Negative  Gastrointestinal: Negative  Endocrine: Negative  Genitourinary: Negative  Musculoskeletal: Negative  Skin: Negative  Neurological: Negative  Hematological: Negative  Psychiatric/Behavioral: Negative  Current Outpatient Medications   Medication Sig Dispense Refill    Arnica 20 % TINC arnica      ascorbic acid (VITAMIN C) 250 mg tablet Take 250 mg by mouth daily      Calcium Carbonate (CALCIUM 600 PO) Take by mouth      Cholecalciferol (VITAMIN D3) 86393 units TABS Take by mouth      Lactobacillus (CVS PROBIOTIC ACIDOPHILUS PO) Take 1 capsule by mouth daily        Multiple Vitamins-Minerals (MULTIVITAMIN WITH MINERALS) tablet Take 1 tablet by mouth daily      ondansetron (ZOFRAN) 8 mg tablet Take 1 tablet (8 mg total) by mouth every 8 (eight) hours as needed for nausea or vomiting 20 tablet 1    oxyCODONE (ROXICODONE) 5 mg immediate release tablet Take 1 tablet (5 mg total) by mouth every 6 (six) hours as needed for moderate painMax Daily Amount: 20 mg 20 tablet 0     No current facility-administered medications for this visit              Objective:    Blood pressure 130/72, pulse 66, temperature 99 1 °F (37 3 °C), resp  rate 18, height 5' 3 19" (1 605 m), weight 56 7 kg (125 lb)  Body mass index is 22 01 kg/m²  Body surface area is 1 59 meters squared  Physical Exam   Constitutional: She is oriented to person, place, and time  She appears well-developed and well-nourished  HENT:   Head: Normocephalic and atraumatic  Eyes: EOM are normal    Neck: Normal range of motion  Neck supple  No thyromegaly present  Cardiovascular: Normal rate, regular rhythm and normal heart sounds  Pulmonary/Chest: Effort normal and breath sounds normal    Abdominal: Soft  Bowel sounds are normal    Well healed laparoscopic incisions  Genitourinary:   Genitourinary Comments: -Normal external female genitalia, normal Bartholin's and [place]'s glands                  -Normal midline urethral meatus  No lesions notes                  -Bladder without fullness mass or tenderness                  -Vagina without lesion or discharge No significant cystocele or rectocele noted                  -Cervix surgically absent                  -Uterus surgically absent                  -Adnexae surgically absent                  - Anus without fissure of lesion     Musculoskeletal: Normal range of motion  Lymphadenopathy:     She has no cervical adenopathy  Neurological: She is alert and oriented to person, place, and time  Skin: Skin is warm and dry  Psychiatric: She has a normal mood and affect   Her behavior is normal        Lab Results   Component Value Date     11 9 09/04/2019     Lab Results   Component Value Date    K 3 8 09/04/2019     09/04/2019    CO2 28 09/04/2019    BUN 10 09/04/2019    CREATININE 0 75 09/04/2019    GLUF 119 (H) 01/22/2018    CALCIUM 9 1 09/04/2019    AST 18 09/04/2019    ALT 26 09/04/2019    ALKPHOS 60 09/04/2019    EGFR 83 09/04/2019     Lab Results   Component Value Date    WBC 3 10 (L) 09/04/2019    HGB 11 9 09/04/2019    HCT 37 4 09/04/2019     (H) 09/04/2019  09/04/2019     Lab Results   Component Value Date    NEUTROABS 1 37 (L) 09/04/2019

## 2019-09-12 ENCOUNTER — APPOINTMENT (OUTPATIENT)
Dept: LAB | Facility: CLINIC | Age: 64
End: 2019-09-12
Payer: MEDICARE

## 2019-09-12 DIAGNOSIS — C57.02 CARCINOMA OF LEFT FALLOPIAN TUBE (HCC): ICD-10-CM

## 2019-09-12 DIAGNOSIS — C56.3 MALIGNANT NEOPLASM OF BOTH OVARIES (HCC): ICD-10-CM

## 2019-09-12 LAB
ALBUMIN SERPL BCP-MCNC: 4.5 G/DL (ref 3.5–5)
ALP SERPL-CCNC: 62 U/L (ref 46–116)
ALT SERPL W P-5'-P-CCNC: 20 U/L (ref 12–78)
ANION GAP SERPL CALCULATED.3IONS-SCNC: 6 MMOL/L (ref 4–13)
AST SERPL W P-5'-P-CCNC: 17 U/L (ref 5–45)
BASOPHILS # BLD AUTO: 0.03 THOUSANDS/ΜL (ref 0–0.1)
BASOPHILS NFR BLD AUTO: 1 % (ref 0–1)
BILIRUB SERPL-MCNC: 0.29 MG/DL (ref 0.2–1)
BUN SERPL-MCNC: 10 MG/DL (ref 5–25)
CALCIUM SERPL-MCNC: 9.7 MG/DL (ref 8.3–10.1)
CHLORIDE SERPL-SCNC: 106 MMOL/L (ref 100–108)
CO2 SERPL-SCNC: 27 MMOL/L (ref 21–32)
CREAT SERPL-MCNC: 0.7 MG/DL (ref 0.6–1.3)
EOSINOPHIL # BLD AUTO: 0.1 THOUSAND/ΜL (ref 0–0.61)
EOSINOPHIL NFR BLD AUTO: 2 % (ref 0–6)
ERYTHROCYTE [DISTWIDTH] IN BLOOD BY AUTOMATED COUNT: 14.4 % (ref 11.6–15.1)
GFR SERPL CREATININE-BSD FRML MDRD: 91 ML/MIN/1.73SQ M
GLUCOSE SERPL-MCNC: 74 MG/DL (ref 65–140)
HCT VFR BLD AUTO: 39.2 % (ref 34.8–46.1)
HGB BLD-MCNC: 12.4 G/DL (ref 11.5–15.4)
IMM GRANULOCYTES # BLD AUTO: 0.02 THOUSAND/UL (ref 0–0.2)
IMM GRANULOCYTES NFR BLD AUTO: 1 % (ref 0–2)
LYMPHOCYTES # BLD AUTO: 2.04 THOUSANDS/ΜL (ref 0.6–4.47)
LYMPHOCYTES NFR BLD AUTO: 49 % (ref 14–44)
MAGNESIUM SERPL-MCNC: 2.6 MG/DL (ref 1.6–2.6)
MCH RBC QN AUTO: 31.4 PG (ref 26.8–34.3)
MCHC RBC AUTO-ENTMCNC: 31.6 G/DL (ref 31.4–37.4)
MCV RBC AUTO: 99 FL (ref 82–98)
MONOCYTES # BLD AUTO: 0.62 THOUSAND/ΜL (ref 0.17–1.22)
MONOCYTES NFR BLD AUTO: 15 % (ref 4–12)
NEUTROPHILS # BLD AUTO: 1.31 THOUSANDS/ΜL (ref 1.85–7.62)
NEUTS SEG NFR BLD AUTO: 32 % (ref 43–75)
NRBC BLD AUTO-RTO: 0 /100 WBCS
PLATELET # BLD AUTO: 264 THOUSANDS/UL (ref 149–390)
PMV BLD AUTO: 8.7 FL (ref 8.9–12.7)
POTASSIUM SERPL-SCNC: 4.1 MMOL/L (ref 3.5–5.3)
PROT SERPL-MCNC: 8.4 G/DL (ref 6.4–8.2)
RBC # BLD AUTO: 3.95 MILLION/UL (ref 3.81–5.12)
SODIUM SERPL-SCNC: 139 MMOL/L (ref 136–145)
WBC # BLD AUTO: 4.12 THOUSAND/UL (ref 4.31–10.16)

## 2019-09-12 PROCEDURE — 36415 COLL VENOUS BLD VENIPUNCTURE: CPT

## 2019-09-12 PROCEDURE — 83735 ASSAY OF MAGNESIUM: CPT

## 2019-09-12 PROCEDURE — 80053 COMPREHEN METABOLIC PANEL: CPT

## 2019-09-12 PROCEDURE — 85025 COMPLETE CBC W/AUTO DIFF WBC: CPT

## 2019-09-12 RX ORDER — PALONOSETRON 0.05 MG/ML
0.25 INJECTION, SOLUTION INTRAVENOUS ONCE
Status: CANCELLED | OUTPATIENT
Start: 2019-09-13

## 2019-09-12 RX ORDER — SODIUM CHLORIDE 9 MG/ML
20 INJECTION, SOLUTION INTRAVENOUS ONCE
Status: CANCELLED | OUTPATIENT
Start: 2019-09-13

## 2019-09-13 ENCOUNTER — HOSPITAL ENCOUNTER (OUTPATIENT)
Dept: INFUSION CENTER | Facility: CLINIC | Age: 64
Discharge: HOME | End: 2019-09-13
Payer: MEDICARE

## 2019-09-13 VITALS
SYSTOLIC BLOOD PRESSURE: 150 MMHG | HEIGHT: 63 IN | HEART RATE: 63 BPM | RESPIRATION RATE: 18 BRPM | DIASTOLIC BLOOD PRESSURE: 72 MMHG | WEIGHT: 125.22 LBS | TEMPERATURE: 97.9 F | BODY MASS INDEX: 22.19 KG/M2

## 2019-09-13 DIAGNOSIS — C56.3 MALIGNANT NEOPLASM OF BOTH OVARIES (HCC): Primary | ICD-10-CM

## 2019-09-13 DIAGNOSIS — C57.02 CARCINOMA OF LEFT FALLOPIAN TUBE (HCC): Primary | ICD-10-CM

## 2019-09-13 LAB
BASOPHILS # BLD AUTO: 0.03 THOUSANDS/ΜL (ref 0–0.1)
BASOPHILS NFR BLD AUTO: 1 % (ref 0–1)
EOSINOPHIL # BLD AUTO: 0.13 THOUSAND/ΜL (ref 0–0.61)
EOSINOPHIL NFR BLD AUTO: 3 % (ref 0–6)
ERYTHROCYTE [DISTWIDTH] IN BLOOD BY AUTOMATED COUNT: 14.4 % (ref 11.6–15.1)
HCT VFR BLD AUTO: 39.8 % (ref 34.8–46.1)
HGB BLD-MCNC: 12.6 G/DL (ref 11.5–15.4)
IMM GRANULOCYTES # BLD AUTO: 0.03 THOUSAND/UL (ref 0–0.2)
IMM GRANULOCYTES NFR BLD AUTO: 1 % (ref 0–2)
LYMPHOCYTES # BLD AUTO: 2.07 THOUSANDS/ΜL (ref 0.6–4.47)
LYMPHOCYTES NFR BLD AUTO: 42 % (ref 14–44)
MCH RBC QN AUTO: 31.7 PG (ref 26.8–34.3)
MCHC RBC AUTO-ENTMCNC: 31.7 G/DL (ref 31.4–37.4)
MCV RBC AUTO: 100 FL (ref 82–98)
MONOCYTES # BLD AUTO: 0.62 THOUSAND/ΜL (ref 0.17–1.22)
MONOCYTES NFR BLD AUTO: 13 % (ref 4–12)
NEUTROPHILS # BLD AUTO: 2.05 THOUSANDS/ΜL (ref 1.85–7.62)
NEUTS SEG NFR BLD AUTO: 40 % (ref 43–75)
NRBC BLD AUTO-RTO: 0 /100 WBCS
PLATELET # BLD AUTO: 234 THOUSANDS/UL (ref 149–390)
PMV BLD AUTO: 8.2 FL (ref 8.9–12.7)
RBC # BLD AUTO: 3.97 MILLION/UL (ref 3.81–5.12)
WBC # BLD AUTO: 4.93 THOUSAND/UL (ref 4.31–10.16)

## 2019-09-13 PROCEDURE — 85025 COMPLETE CBC W/AUTO DIFF WBC: CPT | Performed by: OBSTETRICS & GYNECOLOGY

## 2019-09-13 PROCEDURE — 96415 CHEMO IV INFUSION ADDL HR: CPT

## 2019-09-13 PROCEDURE — 96417 CHEMO IV INFUS EACH ADDL SEQ: CPT

## 2019-09-13 PROCEDURE — 96375 TX/PRO/DX INJ NEW DRUG ADDON: CPT

## 2019-09-13 PROCEDURE — 96413 CHEMO IV INFUSION 1 HR: CPT

## 2019-09-13 PROCEDURE — 96367 TX/PROPH/DG ADDL SEQ IV INF: CPT

## 2019-09-13 RX ORDER — SODIUM CHLORIDE 9 MG/ML
20 INJECTION, SOLUTION INTRAVENOUS ONCE
Status: COMPLETED | OUTPATIENT
Start: 2019-09-13 | End: 2019-09-13

## 2019-09-13 RX ORDER — PALONOSETRON 0.05 MG/ML
0.25 INJECTION, SOLUTION INTRAVENOUS ONCE
Status: COMPLETED | OUTPATIENT
Start: 2019-09-13 | End: 2019-09-13

## 2019-09-13 RX ADMIN — FAMOTIDINE 20 MG: 10 INJECTION, SOLUTION INTRAVENOUS at 10:19

## 2019-09-13 RX ADMIN — DEXAMETHASONE SODIUM PHOSPHATE 20 MG: 10 INJECTION, SOLUTION INTRAMUSCULAR; INTRAVENOUS at 09:34

## 2019-09-13 RX ADMIN — SODIUM CHLORIDE 150 MG: 0.9 INJECTION, SOLUTION INTRAVENOUS at 10:43

## 2019-09-13 RX ADMIN — CARBOPLATIN 475.5 MG: 10 INJECTION, SOLUTION INTRAVENOUS at 14:31

## 2019-09-13 RX ADMIN — SODIUM CHLORIDE 20 ML/HR: 0.9 INJECTION, SOLUTION INTRAVENOUS at 09:29

## 2019-09-13 RX ADMIN — DIPHENHYDRAMINE HYDROCHLORIDE 25 MG: 50 INJECTION, SOLUTION INTRAMUSCULAR; INTRAVENOUS at 09:58

## 2019-09-13 RX ADMIN — PACLITAXEL 213.6 MG: 6 INJECTION, SOLUTION INTRAVENOUS at 11:22

## 2019-09-13 RX ADMIN — PALONOSETRON 0.25 MG: 0.25 INJECTION, SOLUTION INTRAVENOUS at 09:29

## 2019-09-13 NOTE — PLAN OF CARE
Problem: Potential for Falls  Goal: Patient will remain free of falls  Description  INTERVENTIONS:  - Assess patient frequently for physical needs  -  Identify cognitive and physical deficits and behaviors that affect risk of falls    -  [place] fall precautions as indicated by assessment   - Educate patient/family on patient safety including physical limitations  - Instruct patient to call for assistance with activity based on assessment  - Modify environment to reduce risk of injury  - Consider OT/PT consult to assist with strengthening/mobility  Outcome: Progressing

## 2019-09-13 NOTE — PROGRESS NOTES
Pt arrives on unit for chemotherapy  STAT CBC sent as ordered prior to treatment  Will await results

## 2019-09-13 NOTE — PROGRESS NOTES
CBC results back ANC 2 05 and Platelets 296  Gabby VALERA notified  As per Samia Lui pt may proceed with treatment  Will continue to monitor

## 2019-09-18 ENCOUNTER — APPOINTMENT (OUTPATIENT)
Dept: LAB | Facility: HOSPITAL | Age: 64
End: 2019-09-18
Payer: MEDICARE

## 2019-09-18 LAB
ALBUMIN SERPL BCP-MCNC: 4.2 G/DL (ref 3.5–5)
ALP SERPL-CCNC: 60 U/L (ref 46–116)
ALT SERPL W P-5'-P-CCNC: 23 U/L (ref 12–78)
ANION GAP SERPL CALCULATED.3IONS-SCNC: 9 MMOL/L (ref 4–13)
AST SERPL W P-5'-P-CCNC: 25 U/L (ref 5–45)
BASOPHILS # BLD AUTO: 0.03 THOUSANDS/ΜL (ref 0–0.1)
BASOPHILS NFR BLD AUTO: 1 % (ref 0–1)
BILIRUB SERPL-MCNC: 0.4 MG/DL (ref 0.2–1)
BUN SERPL-MCNC: 9 MG/DL (ref 5–25)
CALCIUM SERPL-MCNC: 9.6 MG/DL (ref 8.3–10.1)
CHLORIDE SERPL-SCNC: 99 MMOL/L (ref 100–108)
CO2 SERPL-SCNC: 28 MMOL/L (ref 21–32)
CREAT SERPL-MCNC: 0.73 MG/DL (ref 0.6–1.3)
EOSINOPHIL # BLD AUTO: 0.04 THOUSAND/ΜL (ref 0–0.61)
EOSINOPHIL NFR BLD AUTO: 1 % (ref 0–6)
ERYTHROCYTE [DISTWIDTH] IN BLOOD BY AUTOMATED COUNT: 14.1 % (ref 11.6–15.1)
GFR SERPL CREATININE-BSD FRML MDRD: 86 ML/MIN/1.73SQ M
GLUCOSE SERPL-MCNC: 89 MG/DL (ref 65–140)
HCT VFR BLD AUTO: 39.6 % (ref 34.8–46.1)
HGB BLD-MCNC: 13 G/DL (ref 11.5–15.4)
IMM GRANULOCYTES # BLD AUTO: 0.01 THOUSAND/UL (ref 0–0.2)
IMM GRANULOCYTES NFR BLD AUTO: 0 % (ref 0–2)
LYMPHOCYTES # BLD AUTO: 2.04 THOUSANDS/ΜL (ref 0.6–4.47)
LYMPHOCYTES NFR BLD AUTO: 55 % (ref 14–44)
MAGNESIUM SERPL-MCNC: 1.9 MG/DL (ref 1.6–2.6)
MCH RBC QN AUTO: 32.4 PG (ref 26.8–34.3)
MCHC RBC AUTO-ENTMCNC: 32.8 G/DL (ref 31.4–37.4)
MCV RBC AUTO: 99 FL (ref 82–98)
MONOCYTES # BLD AUTO: 0.16 THOUSAND/ΜL (ref 0.17–1.22)
MONOCYTES NFR BLD AUTO: 4 % (ref 4–12)
NEUTROPHILS # BLD AUTO: 1.48 THOUSANDS/ΜL (ref 1.85–7.62)
NEUTS SEG NFR BLD AUTO: 39 % (ref 43–75)
NRBC BLD AUTO-RTO: 0 /100 WBCS
PLATELET # BLD AUTO: 172 THOUSANDS/UL (ref 149–390)
PMV BLD AUTO: 8.7 FL (ref 8.9–12.7)
POTASSIUM SERPL-SCNC: 4.4 MMOL/L (ref 3.5–5.3)
PROT SERPL-MCNC: 8.7 G/DL (ref 6.4–8.2)
RBC # BLD AUTO: 4.01 MILLION/UL (ref 3.81–5.12)
SODIUM SERPL-SCNC: 136 MMOL/L (ref 136–145)
WBC # BLD AUTO: 3.76 THOUSAND/UL (ref 4.31–10.16)

## 2019-09-18 PROCEDURE — 36415 COLL VENOUS BLD VENIPUNCTURE: CPT | Performed by: PHYSICIAN ASSISTANT

## 2019-09-18 PROCEDURE — 83735 ASSAY OF MAGNESIUM: CPT | Performed by: PHYSICIAN ASSISTANT

## 2019-09-18 PROCEDURE — 85025 COMPLETE CBC W/AUTO DIFF WBC: CPT | Performed by: PHYSICIAN ASSISTANT

## 2019-09-18 PROCEDURE — 80053 COMPREHEN METABOLIC PANEL: CPT | Performed by: PHYSICIAN ASSISTANT

## 2019-09-25 ENCOUNTER — APPOINTMENT (OUTPATIENT)
Dept: LAB | Facility: CLINIC | Age: 64
End: 2019-09-25
Payer: MEDICARE

## 2019-10-02 ENCOUNTER — APPOINTMENT (OUTPATIENT)
Dept: LAB | Facility: HOSPITAL | Age: 64
End: 2019-10-02
Payer: MEDICARE

## 2019-10-02 ENCOUNTER — OFFICE VISIT (OUTPATIENT)
Dept: GYNECOLOGIC ONCOLOGY | Facility: CLINIC | Age: 64
End: 2019-10-02
Payer: MEDICARE

## 2019-10-02 VITALS
RESPIRATION RATE: 16 BRPM | WEIGHT: 128 LBS | SYSTOLIC BLOOD PRESSURE: 122 MMHG | HEART RATE: 70 BPM | DIASTOLIC BLOOD PRESSURE: 80 MMHG | BODY MASS INDEX: 22.68 KG/M2 | HEIGHT: 63 IN | TEMPERATURE: 97.7 F

## 2019-10-02 DIAGNOSIS — C56.9 MALIGNANT NEOPLASM OF OVARY, UNSPECIFIED LATERALITY (HCC): Primary | ICD-10-CM

## 2019-10-02 PROCEDURE — 99214 OFFICE O/P EST MOD 30 MIN: CPT | Performed by: OBSTETRICS & GYNECOLOGY

## 2019-10-02 NOTE — LETTER
October 2, 2019     Mariana Murray[address]   [address] [facility] [address]    Patient: Kevan Cifuentes   YOB: 1955   Date of Visit: 10/2/2019       Dear Dr Julia Virk: Thank you for referring Kevan Cifuentes to me for evaluation  Below are my notes for this consultation  If you have questions, please do not hesitate to call me  I look forward to following your patient along with you  Sincerely,        Franky Shrestha MD        CC: No Recipients  Franky Shrestha MD  10/2/2019  2:19 PM  Sign at close encounter  Assessment/Plan:    Problem List Items Addressed This Visit        Endocrine    Malignant neoplasm of ovary [facility]) - Primary     Patient is stable with recurrent ovarian cancer  Her  has dropped to the normal range  Her previous CT scan was showing improvement  The patient has indicated that she would prefer to stop after cycle 5 if in remission  I think this is reasonable  We will therefore plan to give cycle 5 carboplatin paclitaxel at present dosages with weekly blood work after that  We will hold cycle 6 and get a CT scan and   The patient appears to be in a clinical remission we can discontinue chemotherapy  I have discussed with the patient the possibility of consolidation regimen with a PARP inhibitor or Avastin  I have recommended aPARP inhibitor such asOlaparib as the preferred mode of therapy  The patient is considering options but is unlikely to go with any further treatment at this time  Relevant Orders    CT chest abdomen pelvis w contrast                CHIEF COMPLAINT:  Consideration of chemotherapy cycle 5   Carboplatin paclitaxel      Problem:  Cancer Staging  Fallopian tube cancer, carcinoma (HCC)  Staging form: Ovary, Fallopian Tube, Primary Peritoneal, AJCC 8th Edition  - Clinical stage from 4/2/2018: FIGO Stage IIIC - Signed by Eddie Bruner MD on 4/16/2018        Previous therapy:     Fallopian tube cancer, carcinoma ([facility] [address] )    1/4/2018 Initial Diagnosis     Paracentesis revealing adenocarcinoma of gynecologic origin      1/25/2018 - 3/8/2018 Chemotherapy     3 cycles of carboplatin AUC 6 and taxol 175 mg/m2 squared Q 3 weeks  Pretreatment Ca 125: 534 6       4/2/2018 Surgery     Exploratory laparotomy, total abdominal hysterectomy, bilateral salpingo-oophorectomy, omentectomy, appendectomy  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  No gross residual disease  4/26/2018 - 6/7/2018 Chemotherapy     3 cycles of carboplatin AUC 5 and Taxol 175 mg/m2 Q 3 weeks (carboplatin dose dropped to AUC of 5 for neutropenia prior to cycle 3)        Malignant neoplasm of ovary (HCC)    3/22/2018 Initial Diagnosis     Malignant neoplasm of ovary ([address] )      6/12/2019 -  Chemotherapy     CARBOplatin (PARAPLATIN) 439 5 mg in sodium chloride 0 9 % 250 mL IVPB, 439 5 mg, Intravenous, Once, 4 of 6 cycles  Administration: 439 5 mg (7/12/2019), 443 5 mg (8/2/2019), 456 5 mg (8/23/2019), 475 5 mg (9/13/2019)  PACLitaxel (TAXOL) 216 mg in sodium chloride 0 9 % 500 mL chemo IVPB, 135 mg/m2 = 216 mg (77 1 % of original dose 175 mg/m2), Intravenous, Once, 4 of 6 cycles  Dose modification: 135 mg/m2 (original dose 175 mg/m2, Cycle 1, Reason: Other (See Comments))  Administration: 216 mg (7/12/2019), 216 mg (8/2/2019), 213 6 mg (8/23/2019), 213 6 mg (9/13/2019)           Patient ID: Tee Palomares is a 75 y o  female  Patient is a very pleasant 25-year-old white female with history of recurrent stage IIIC ovarian carcinoma  She was ordered originally diagnosed in 2018 and underwent 3 cycles of neoadjuvant carboplatin paclitaxel followed by surgery  She then completed 3 further cycles and was root in remission  The patient came out of remission by  elevation and CT scan and began carboplatin paclitaxel in June of 2019  The patient has tolerated her chemotherapy very well  She presents for cycle 5   She was without complaint and is overall doing quite well  Her blood counts have been stable on this regimen and her  was resolved to normal   Her last 1 was 11  Today, the patient is doing well  She denies significant abdominal pain, pelvic pain, nausea, vomiting, constipation, diarrhea, fevers, chills, or vaginal bleeding  A possible the patient would like to discontinue chemotherapy after upcoming 5th cycle to minimize toxicity  The following portions of the patient's history were reviewed and updated as appropriate: allergies, current medications, past family history, past social history, past surgical history and problem list     Review of Systems   Constitutional: Positive for fatigue  HENT: Negative  Eyes: Negative  Respiratory: Negative  Cardiovascular: Negative  Gastrointestinal: Negative  Endocrine: Negative  Genitourinary: Negative  Musculoskeletal: Negative  Skin: Negative  Neurological: Negative  Hematological: Negative  Psychiatric/Behavioral: Negative  Current Outpatient Medications   Medication Sig Dispense Refill    Arnica 20 % TINC arnica      ascorbic acid (VITAMIN C) 250 mg tablet Take 250 mg by mouth daily      Calcium Carbonate (CALCIUM 600 PO) Take by mouth      Cholecalciferol (VITAMIN D3) 44970 units TABS Take by mouth      Lactobacillus (CVS PROBIOTIC ACIDOPHILUS PO) Take 1 capsule by mouth daily        Multiple Vitamins-Minerals (MULTIVITAMIN WITH MINERALS) tablet Take 1 tablet by mouth daily      ondansetron (ZOFRAN) 8 mg tablet Take 1 tablet (8 mg total) by mouth every 8 (eight) hours as needed for nausea or vomiting 20 tablet 1    oxyCODONE (ROXICODONE) 5 mg immediate release tablet Take 1 tablet (5 mg total) by mouth every 6 (six) hours as needed for moderate painMax Daily Amount: 20 mg 20 tablet 0     No current facility-administered medications for this visit              Objective:    Blood pressure 122/80, pulse 70, temperature 97 7 °F (36 5 °C), resp  rate 16, height 5' 3 19" (1 605 m), weight 58 1 kg (128 lb)  Body mass index is 22 54 kg/m²  Body surface area is 1 6 meters squared  Physical Exam   Constitutional: She is oriented to person, place, and time  She appears well-developed and well-nourished  HENT:   Head: Normocephalic and atraumatic  Eyes: EOM are normal    Neck: Normal range of motion  Neck supple  No thyromegaly present  Cardiovascular: Normal rate, regular rhythm and normal heart sounds  Pulmonary/Chest: Effort normal and breath sounds normal    Abdominal: Soft  Bowel sounds are normal    Well healed laparoscopic incisions  Genitourinary:   Genitourinary Comments: -Normal external female genitalia, normal Bartholin's and [place]'s glands                  -Normal midline urethral meatus  No lesions notes                  -Bladder without fullness mass or tenderness                  -Vagina without lesion or discharge No significant cystocele or rectocele noted                  -Cervix surgically absent                  -Uterus surgically absent                  -Adnexae surgically absent                  - Anus without fissure of lesion     Musculoskeletal: Normal range of motion  Lymphadenopathy:     She has no cervical adenopathy  Neurological: She is alert and oriented to person, place, and time  Skin: Skin is warm and dry  Psychiatric: She has a normal mood and affect   Her behavior is normal        Lab Results   Component Value Date     11 9 09/04/2019     Lab Results   Component Value Date    K 3 8 09/25/2019     (H) 09/25/2019    CO2 28 09/25/2019    BUN 9 09/25/2019    CREATININE 0 73 09/25/2019    GLUF 119 (H) 01/22/2018    CALCIUM 9 5 09/25/2019    AST 18 09/25/2019    ALT 23 09/25/2019    ALKPHOS 56 09/25/2019    EGFR 86 09/25/2019     Lab Results   Component Value Date    WBC 3 37 (L) 09/25/2019    HGB 11 7 09/25/2019    HCT 36 4 09/25/2019     (H) 09/25/2019     09/25/2019     Lab Results   Component Value Date    NEUTROABS 1 53 (L) 09/25/2019

## 2019-10-02 NOTE — ASSESSMENT & PLAN NOTE
Patient is stable with recurrent ovarian cancer  Her  has dropped to the normal range  Her previous CT scan was showing improvement  The patient has indicated that she would prefer to stop after cycle 5 if in remission  I think this is reasonable  We will therefore plan to give cycle 5 carboplatin paclitaxel at present dosages with weekly blood work after that  We will hold cycle 6 and get a CT scan and   The patient appears to be in a clinical remission we can discontinue chemotherapy  I have discussed with the patient the possibility of consolidation regimen with a PARP inhibitor or Avastin  I have recommended aPARP inhibitor such asOlaparib as the preferred mode of therapy  The patient is considering options but is unlikely to go with any further treatment at this time

## 2019-10-02 NOTE — PROGRESS NOTES
Assessment/Plan:    Problem List Items Addressed This Visit        Endocrine    Malignant neoplasm of ovary [facility]) - Primary     Patient is stable with recurrent ovarian cancer  Her  has dropped to the normal range  Her previous CT scan was showing improvement  The patient has indicated that she would prefer to stop after cycle 5 if in remission  I think this is reasonable  We will therefore plan to give cycle 5 carboplatin paclitaxel at present dosages with weekly blood work after that  We will hold cycle 6 and get a CT scan and   The patient appears to be in a clinical remission we can discontinue chemotherapy  I have discussed with the patient the possibility of consolidation regimen with a PARP inhibitor or Avastin  I have recommended aPARP inhibitor such asOlaparib as the preferred mode of therapy  The patient is considering options but is unlikely to go with any further treatment at this time  Relevant Orders    CT chest abdomen pelvis w contrast                CHIEF COMPLAINT:  Consideration of chemotherapy cycle 5  Carboplatin paclitaxel      Problem:  Cancer Staging  Fallopian tube cancer, carcinoma (HCC)  Staging form: Ovary, Fallopian Tube, Primary Peritoneal, AJCC 8th Edition  - Clinical stage from 4/2/2018: FIGO Stage IIIC - Signed by Liana Soulier, MD on 4/16/2018        Previous therapy:     Fallopian tube cancer, carcinoma ([facility] [address] )    1/4/2018 Initial Diagnosis     Paracentesis revealing adenocarcinoma of gynecologic origin      1/25/2018 - 3/8/2018 Chemotherapy     3 cycles of carboplatin AUC 6 and taxol 175 mg/m2 squared Q 3 weeks  Pretreatment Ca 125: 534 6       4/2/2018 Surgery     Exploratory laparotomy, total abdominal hysterectomy, bilateral salpingo-oophorectomy, omentectomy, appendectomy  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  No gross residual disease        4/26/2018 - 6/7/2018 Chemotherapy     3 cycles of carboplatin AUC 5 and Taxol 175 mg/m2 Q 3 weeks (carboplatin dose dropped to AUC of 5 for neutropenia prior to cycle 3)        Malignant neoplasm of ovary (HCC)    3/22/2018 Initial Diagnosis     Malignant neoplasm of ovary ([address] )      6/12/2019 -  Chemotherapy     CARBOplatin (PARAPLATIN) 439 5 mg in sodium chloride 0 9 % 250 mL IVPB, 439 5 mg, Intravenous, Once, 4 of 6 cycles  Administration: 439 5 mg (7/12/2019), 443 5 mg (8/2/2019), 456 5 mg (8/23/2019), 475 5 mg (9/13/2019)  PACLitaxel (TAXOL) 216 mg in sodium chloride 0 9 % 500 mL chemo IVPB, 135 mg/m2 = 216 mg (77 1 % of original dose 175 mg/m2), Intravenous, Once, 4 of 6 cycles  Dose modification: 135 mg/m2 (original dose 175 mg/m2, Cycle 1, Reason: Other (See Comments))  Administration: 216 mg (7/12/2019), 216 mg (8/2/2019), 213 6 mg (8/23/2019), 213 6 mg (9/13/2019)           Patient ID: Rain Walker is a 75 y o  female  Patient is a very pleasant 57-year-old white female with history of recurrent stage IIIC ovarian carcinoma  She was ordered originally diagnosed in 2018 and underwent 3 cycles of neoadjuvant carboplatin paclitaxel followed by surgery  She then completed 3 further cycles and was root in remission  The patient came out of remission by  elevation and CT scan and began carboplatin paclitaxel in June of 2019  The patient has tolerated her chemotherapy very well  She presents for cycle 5  She was without complaint and is overall doing quite well  Her blood counts have been stable on this regimen and her  was resolved to normal   Her last 1 was 11  Today, the patient is doing well  She denies significant abdominal pain, pelvic pain, nausea, vomiting, constipation, diarrhea, fevers, chills, or vaginal bleeding  A possible the patient would like to discontinue chemotherapy after upcoming 5th cycle to minimize toxicity          The following portions of the patient's history were reviewed and updated as appropriate: allergies, current medications, past family history, past social history, past surgical history and problem list     Review of Systems   Constitutional: Positive for fatigue  HENT: Negative  Eyes: Negative  Respiratory: Negative  Cardiovascular: Negative  Gastrointestinal: Negative  Endocrine: Negative  Genitourinary: Negative  Musculoskeletal: Negative  Skin: Negative  Neurological: Negative  Hematological: Negative  Psychiatric/Behavioral: Negative  Current Outpatient Medications   Medication Sig Dispense Refill    Arnica 20 % TINC arnica      ascorbic acid (VITAMIN C) 250 mg tablet Take 250 mg by mouth daily      Calcium Carbonate (CALCIUM 600 PO) Take by mouth      Cholecalciferol (VITAMIN D3) 65278 units TABS Take by mouth      Lactobacillus (CVS PROBIOTIC ACIDOPHILUS PO) Take 1 capsule by mouth daily        Multiple Vitamins-Minerals (MULTIVITAMIN WITH MINERALS) tablet Take 1 tablet by mouth daily      ondansetron (ZOFRAN) 8 mg tablet Take 1 tablet (8 mg total) by mouth every 8 (eight) hours as needed for nausea or vomiting 20 tablet 1    oxyCODONE (ROXICODONE) 5 mg immediate release tablet Take 1 tablet (5 mg total) by mouth every 6 (six) hours as needed for moderate painMax Daily Amount: 20 mg 20 tablet 0     No current facility-administered medications for this visit  Objective:    Blood pressure 122/80, pulse 70, temperature 97 7 °F (36 5 °C), resp  rate 16, height 5' 3 19" (1 605 m), weight 58 1 kg (128 lb)  Body mass index is 22 54 kg/m²  Body surface area is 1 6 meters squared  Physical Exam   Constitutional: She is oriented to person, place, and time  She appears well-developed and well-nourished  HENT:   Head: Normocephalic and atraumatic  Eyes: EOM are normal    Neck: Normal range of motion  Neck supple  No thyromegaly present  Cardiovascular: Normal rate, regular rhythm and normal heart sounds     Pulmonary/Chest: Effort normal and breath sounds normal    Abdominal: Soft  Bowel sounds are normal    Well healed laparoscopic incisions  Genitourinary:   Genitourinary Comments: -Normal external female genitalia, normal Bartholin's and [place]'s glands                  -Normal midline urethral meatus  No lesions notes                  -Bladder without fullness mass or tenderness                  -Vagina without lesion or discharge No significant cystocele or rectocele noted                  -Cervix surgically absent                  -Uterus surgically absent                  -Adnexae surgically absent                  - Anus without fissure of lesion     Musculoskeletal: Normal range of motion  Lymphadenopathy:     She has no cervical adenopathy  Neurological: She is alert and oriented to person, place, and time  Skin: Skin is warm and dry  Psychiatric: She has a normal mood and affect   Her behavior is normal        Lab Results   Component Value Date     11 9 09/04/2019     Lab Results   Component Value Date    K 3 8 09/25/2019     (H) 09/25/2019    CO2 28 09/25/2019    BUN 9 09/25/2019    CREATININE 0 73 09/25/2019    GLUF 119 (H) 01/22/2018    CALCIUM 9 5 09/25/2019    AST 18 09/25/2019    ALT 23 09/25/2019    ALKPHOS 56 09/25/2019    EGFR 86 09/25/2019     Lab Results   Component Value Date    WBC 3 37 (L) 09/25/2019    HGB 11 7 09/25/2019    HCT 36 4 09/25/2019     (H) 09/25/2019     09/25/2019     Lab Results   Component Value Date    NEUTROABS 1 53 (L) 09/25/2019

## 2019-10-03 RX ORDER — PALONOSETRON 0.05 MG/ML
0.25 INJECTION, SOLUTION INTRAVENOUS ONCE
Status: CANCELLED | OUTPATIENT
Start: 2019-10-04

## 2019-10-03 RX ORDER — SODIUM CHLORIDE 9 MG/ML
20 INJECTION, SOLUTION INTRAVENOUS ONCE
Status: CANCELLED | OUTPATIENT
Start: 2019-10-04

## 2019-10-04 ENCOUNTER — HOSPITAL ENCOUNTER (OUTPATIENT)
Dept: INFUSION CENTER | Facility: CLINIC | Age: 64
Discharge: HOME | End: 2019-10-04
Payer: MEDICARE

## 2019-10-04 VITALS
SYSTOLIC BLOOD PRESSURE: 123 MMHG | WEIGHT: 126.1 LBS | RESPIRATION RATE: 16 BRPM | DIASTOLIC BLOOD PRESSURE: 71 MMHG | BODY MASS INDEX: 22.34 KG/M2 | TEMPERATURE: 98.6 F | HEART RATE: 62 BPM | HEIGHT: 63 IN

## 2019-10-04 DIAGNOSIS — C56.3 MALIGNANT NEOPLASM OF BOTH OVARIES (HCC): Primary | ICD-10-CM

## 2019-10-04 LAB
BASOPHILS # BLD AUTO: 0.03 THOUSANDS/ΜL (ref 0–0.1)
BASOPHILS NFR BLD AUTO: 1 % (ref 0–1)
EOSINOPHIL # BLD AUTO: 0.17 THOUSAND/ΜL (ref 0–0.61)
EOSINOPHIL NFR BLD AUTO: 4 % (ref 0–6)
ERYTHROCYTE [DISTWIDTH] IN BLOOD BY AUTOMATED COUNT: 14.8 % (ref 11.6–15.1)
HCT VFR BLD AUTO: 36.3 % (ref 34.8–46.1)
HGB BLD-MCNC: 11.9 G/DL (ref 11.5–15.4)
IMM GRANULOCYTES # BLD AUTO: 0.01 THOUSAND/UL (ref 0–0.2)
IMM GRANULOCYTES NFR BLD AUTO: 0 % (ref 0–2)
LYMPHOCYTES # BLD AUTO: 2.07 THOUSANDS/ΜL (ref 0.6–4.47)
LYMPHOCYTES NFR BLD AUTO: 43 % (ref 14–44)
MCH RBC QN AUTO: 32.9 PG (ref 26.8–34.3)
MCHC RBC AUTO-ENTMCNC: 32.8 G/DL (ref 31.4–37.4)
MCV RBC AUTO: 100 FL (ref 82–98)
MONOCYTES # BLD AUTO: 0.54 THOUSAND/ΜL (ref 0.17–1.22)
MONOCYTES NFR BLD AUTO: 11 % (ref 4–12)
NEUTROPHILS # BLD AUTO: 1.92 THOUSANDS/ΜL (ref 1.85–7.62)
NEUTS SEG NFR BLD AUTO: 41 % (ref 43–75)
NRBC BLD AUTO-RTO: 0 /100 WBCS
PLATELET # BLD AUTO: 199 THOUSANDS/UL (ref 149–390)
PMV BLD AUTO: 8.5 FL (ref 8.9–12.7)
RBC # BLD AUTO: 3.62 MILLION/UL (ref 3.81–5.12)
WBC # BLD AUTO: 4.74 THOUSAND/UL (ref 4.31–10.16)

## 2019-10-04 PROCEDURE — 96417 CHEMO IV INFUS EACH ADDL SEQ: CPT

## 2019-10-04 PROCEDURE — 96413 CHEMO IV INFUSION 1 HR: CPT

## 2019-10-04 PROCEDURE — 85025 COMPLETE CBC W/AUTO DIFF WBC: CPT | Performed by: OBSTETRICS & GYNECOLOGY

## 2019-10-04 PROCEDURE — 96367 TX/PROPH/DG ADDL SEQ IV INF: CPT

## 2019-10-04 PROCEDURE — 96415 CHEMO IV INFUSION ADDL HR: CPT

## 2019-10-04 PROCEDURE — 96375 TX/PRO/DX INJ NEW DRUG ADDON: CPT

## 2019-10-04 RX ORDER — PALONOSETRON 0.05 MG/ML
0.25 INJECTION, SOLUTION INTRAVENOUS ONCE
Status: COMPLETED | OUTPATIENT
Start: 2019-10-04 | End: 2019-10-04

## 2019-10-04 RX ORDER — SODIUM CHLORIDE 9 MG/ML
20 INJECTION, SOLUTION INTRAVENOUS ONCE
Status: COMPLETED | OUTPATIENT
Start: 2019-10-04 | End: 2019-10-04

## 2019-10-04 RX ADMIN — FAMOTIDINE 20 MG: 10 INJECTION, SOLUTION INTRAVENOUS at 10:21

## 2019-10-04 RX ADMIN — DEXAMETHASONE SODIUM PHOSPHATE 20 MG: 10 INJECTION, SOLUTION INTRAMUSCULAR; INTRAVENOUS at 10:42

## 2019-10-04 RX ADMIN — DIPHENHYDRAMINE HYDROCHLORIDE 25 MG: 50 INJECTION, SOLUTION INTRAMUSCULAR; INTRAVENOUS at 09:58

## 2019-10-04 RX ADMIN — SODIUM CHLORIDE 150 MG: 0.9 INJECTION, SOLUTION INTRAVENOUS at 11:07

## 2019-10-04 RX ADMIN — PACLITAXEL 216 MG: 6 INJECTION, SOLUTION INTRAVENOUS at 11:46

## 2019-10-04 RX ADMIN — PALONOSETRON 0.25 MG: 0.25 INJECTION, SOLUTION INTRAVENOUS at 09:54

## 2019-10-04 RX ADMIN — SODIUM CHLORIDE 20 ML/HR: 0.9 INJECTION, SOLUTION INTRAVENOUS at 09:50

## 2019-10-04 RX ADMIN — CARBOPLATIN 487.5 MG: 10 INJECTION, SOLUTION INTRAVENOUS at 14:56

## 2019-10-04 NOTE — PLAN OF CARE
Problem: Potential for Falls  Goal: Patient will remain free of falls  Description  INTERVENTIONS:  - Assess patient frequently for physical needs  -  Identify cognitive and physical deficits and behaviors that affect risk of falls    -  [place] fall precautions as indicated by assessment   - Educate patient/family on patient safety including physical limitations  - Instruct patient to call for assistance with activity based on assessment  - Modify environment to reduce risk of injury  Outcome: Progressing

## 2019-10-09 ENCOUNTER — APPOINTMENT (OUTPATIENT)
Dept: LAB | Facility: HOSPITAL | Age: 64
End: 2019-10-09
Payer: MEDICARE

## 2019-10-09 DIAGNOSIS — C56.9 MALIGNANT NEOPLASM OF OVARY, UNSPECIFIED LATERALITY (HCC): ICD-10-CM

## 2019-10-09 PROCEDURE — 86304 IMMUNOASSAY TUMOR CA 125: CPT

## 2019-10-10 LAB — CANCER AG125 SERPL-ACNC: 11.5 U/ML (ref 0–30)

## 2019-10-16 ENCOUNTER — APPOINTMENT (OUTPATIENT)
Dept: LAB | Facility: CLINIC | Age: 64
End: 2019-10-16
Payer: MEDICARE

## 2019-10-18 ENCOUNTER — HOSPITAL ENCOUNTER (OUTPATIENT)
Dept: CT IMAGING | Facility: HOSPITAL | Age: 64
Discharge: HOME | End: 2019-10-18
Attending: OBSTETRICS & GYNECOLOGY
Payer: MEDICARE

## 2019-10-18 DIAGNOSIS — C56.9 MALIGNANT NEOPLASM OF OVARY, UNSPECIFIED LATERALITY (HCC): ICD-10-CM

## 2019-10-18 PROCEDURE — 74177 CT ABD & PELVIS W/CONTRAST: CPT

## 2019-10-18 PROCEDURE — 71260 CT THORAX DX C+: CPT

## 2019-10-18 RX ADMIN — IOHEXOL 100 ML: 350 INJECTION, SOLUTION INTRAVENOUS at 13:40

## 2019-10-23 ENCOUNTER — OFFICE VISIT (OUTPATIENT)
Dept: GYNECOLOGIC ONCOLOGY | Facility: CLINIC | Age: 64
End: 2019-10-23
Payer: MEDICARE

## 2019-10-23 VITALS
HEART RATE: 80 BPM | TEMPERATURE: 98.8 F | DIASTOLIC BLOOD PRESSURE: 80 MMHG | RESPIRATION RATE: 18 BRPM | WEIGHT: 125 LBS | HEIGHT: 63 IN | SYSTOLIC BLOOD PRESSURE: 110 MMHG | BODY MASS INDEX: 22.15 KG/M2

## 2019-10-23 DIAGNOSIS — C56.3 MALIGNANT NEOPLASM OF BOTH OVARIES (HCC): Primary | ICD-10-CM

## 2019-10-23 PROCEDURE — 99214 OFFICE O/P EST MOD 30 MIN: CPT | Performed by: OBSTETRICS & GYNECOLOGY

## 2019-10-23 NOTE — PROGRESS NOTES
Assessment/Plan:    Problem List Items Addressed This Visit        Endocrine    Malignant neoplasm of ovary ([facility] [address] [address] ) - Primary     Patient appears to have a molecular response by  however her gross response by CT scan is not yet disappeared  Therefore I have recommended continuation of chemotherapy for at least 1 more cycle of carboplatin paclitaxel at same dosages  Then I would strongly consider the use of consolidation treatment  The patient however is feeling the effects of multiple chemotherapy regimens within a year and a half  She would like to go on a chemo holiday  I reviewed her treatment plan and feel that this is not an unreasonable request   She is currently undergoing palliative chemo for her recurrent disease  Her quality of life is difficult and she wishes to have at least the holidays off to regroup  I have therefore recommended coming back for  in 3 months or earlier if symptoms of ascites and abdominal bloating recur  In the meantime we will consider getting foundation 1 studies that these do not look like they have yet been performed      We will have the patient draw her CBC as planned today as her white blood cell count was only 3 last week just to assure that this is getting back to normal   We will see the patient back in 3 months for repeat evaluation         Relevant Orders                CHIEF COMPLAINT:  Consideration of cycle 6 carboplatin paclitaxel for recurrent ovarian cancer      Problem:  Cancer Staging  Fallopian tube cancer, carcinoma ([facility] [address] [address] )  Staging form: Ovary, Fallopian Tube, Primary Peritoneal, AJCC 8th Edition  - Clinical stage from 4/2/2018: FIGO Stage IIIC - Signed by Herson Barron MD on 4/16/2018        Previous therapy:     Fallopian tube cancer, carcinoma ([facility] [address] )    1/4/2018 Initial Diagnosis     Paracentesis revealing adenocarcinoma of gynecologic origin      1/25/2018 - 3/8/2018 Chemotherapy     3 cycles of carboplatin AUC 6 and taxol 175 mg/m2 squared Q 3 weeks  Pretreatment Ca 125: 534 6       4/2/2018 Surgery     Exploratory laparotomy, total abdominal hysterectomy, bilateral salpingo-oophorectomy, omentectomy, appendectomy  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  No gross residual disease  4/26/2018 - 6/7/2018 Chemotherapy     3 cycles of carboplatin AUC 5 and Taxol 175 mg/m2 Q 3 weeks (carboplatin dose dropped to AUC of 5 for neutropenia prior to cycle 3)        Malignant neoplasm of ovary (HCC)    3/22/2018 Initial Diagnosis     Malignant neoplasm of ovary ([facility] [address] )      7/12/2019 -  Chemotherapy     CARBOplatin (PARAPLATIN) 439 5 mg in sodium chloride 0 9 % 250 mL IVPB, 439 5 mg, Intravenous, Once, 5 of 6 cycles  Administration: 439 5 mg (7/12/2019), 443 5 mg (8/2/2019), 456 5 mg (8/23/2019), 475 5 mg (9/13/2019), 487 5 mg (10/4/2019)  PACLitaxel (TAXOL) 216 mg in sodium chloride 0 9 % 500 mL chemo IVPB, 135 mg/m2 = 216 mg (77 1 % of original dose 175 mg/m2), Intravenous, Once, 5 of 6 cycles  Dose modification: 135 mg/m2 (original dose 175 mg/m2, Cycle 1, Reason: Other (See Comments))  Administration: 216 mg (7/12/2019), 216 mg (8/2/2019), 213 6 mg (8/23/2019), 213 6 mg (9/13/2019), 216 mg (10/4/2019)           Patient ID: Mecca Campbell is a 75 y o  female  Patient presents today after undergoing 5 cycles of carboplatin paclitaxel for recurrent ovarian cancer  Her  has normalized and the patient was interested in discontinuing therapy earlier  She is overall tolerating the chemo well however she wanted to exposed herself to the least amount of toxicity  She was not interested in undergoing consolidation treatment either  In anticipation of discontinuing chemotherapy early that I asked the patient to undergo a CT scan prior to this    This reveals the following:     ABDOMINOPELVIC CAVITY:  A nodular implant seen in the superior aspect of the liver just to the right of midline in the subdiaphragmatic location in image 82 series 602, measuring 5 mm x 11 mm, stable  A nodular implant seen in the pelvis on the left site in image 97 series 2, measures 1 5 x 0 8 cm  On the previous study this was measuring 1 5 x 0 8 cm  Additional residual nodular implants in the pelvis on the left side posteriorly in image 97 series 2 measuring 1 3 x 0 7 cm, stable  An additional implant seen in the pelvis in image 92 series 2, measuring 10 mm x 7 mm, stable  Radiation implants seen adjacent to the IVC and liver in the upper abdomen, seen on image 53 series 2 on the previous study of May 22, 2019 is not measurable measuring about 3 7 mm and is seen in image 55 series 2  VESSELS:  Unremarkable for patient's age      PELVIS     REPRODUCTIVE ORGANS:  Unremarkable for patient's age      URINARY BLADDER:  Unremarkable      ABDOMINAL WALL/INGUINAL REGIONS:  Unremarkable      OSSEOUS STRUCTURES:  No acute compression collapse of the vertebra  No gross lytic lesion seen     IMPRESSION:     Stable CT  No new liver lesion, abdominopelvic lymphadenopathy  Residual implants in the pelvis and in the subdiaphragmatic region are stable  Routine surveillance can be continued  Today, the patient is doing well  She denies significant abdominal pain, pelvic pain, nausea, vomiting, constipation, diarrhea, fevers, chills, or vaginal bleeding          The following portions of the patient's history were reviewed and updated as appropriate: allergies, current medications, past medical history, past social history, past surgical history and problem list     Review of Systems   Constitutional: Negative  HENT: Negative  Eyes: Negative  Respiratory: Negative  Cardiovascular: Negative  Gastrointestinal: Negative  Endocrine: Negative  Genitourinary: Negative  Musculoskeletal: Negative  Skin: Negative  Neurological: Negative  Hematological: Negative  Psychiatric/Behavioral: Negative          Current Outpatient Medications   Medication Sig Dispense Refill    Arnica 20 % TINC arnica      ascorbic acid (VITAMIN C) 250 mg tablet Take 250 mg by mouth daily      Calcium Carbonate (CALCIUM 600 PO) Take by mouth      Cholecalciferol (VITAMIN D3) 92702 units TABS Take by mouth      Lactobacillus (CVS PROBIOTIC ACIDOPHILUS PO) Take 1 capsule by mouth daily        Multiple Vitamins-Minerals (MULTIVITAMIN WITH MINERALS) tablet Take 1 tablet by mouth daily      ondansetron (ZOFRAN) 8 mg tablet Take 1 tablet (8 mg total) by mouth every 8 (eight) hours as needed for nausea or vomiting 20 tablet 1    oxyCODONE (ROXICODONE) 5 mg immediate release tablet Take 1 tablet (5 mg total) by mouth every 6 (six) hours as needed for moderate painMax Daily Amount: 20 mg 20 tablet 0     No current facility-administered medications for this visit  Objective:    Blood pressure 110/80, pulse 80, temperature 98 8 °F (37 1 °C), resp  rate 18, height 5' 3 19" (1 605 m), weight 56 7 kg (125 lb)  Body mass index is 22 01 kg/m²  Body surface area is 1 59 meters squared  Physical Exam   Constitutional: She is oriented to person, place, and time  She appears well-developed and well-nourished  HENT:   Head: Normocephalic and atraumatic  Eyes: Pupils are equal, round, and reactive to light  EOM are normal    Neck: Normal range of motion  Neck supple  Cardiovascular: Normal rate, regular rhythm and normal heart sounds  Pulmonary/Chest: Effort normal and breath sounds normal  No respiratory distress  Abdominal: Soft  Bowel sounds are normal  She exhibits no distension and no ascites  There is no tenderness  There is no rigidity, no rebound and no guarding  Genitourinary:   Genitourinary Comments: deferred     Musculoskeletal: Normal range of motion  Lymphadenopathy:     She has no cervical adenopathy  She has no axillary adenopathy  Right: No inguinal and no supraclavicular adenopathy present          Left: No inguinal and no supraclavicular adenopathy present  Neurological: She is alert and oriented to person, place, and time  Skin: Skin is warm and dry  Psychiatric: She has a normal mood and affect   Her behavior is normal        Lab Results   Component Value Date     11 5 10/09/2019     Lab Results   Component Value Date    K 3 9 10/16/2019     10/16/2019    CO2 27 10/16/2019    BUN 11 10/16/2019    CREATININE 0 71 10/16/2019    GLUF 119 (H) 01/22/2018    CALCIUM 9 3 10/16/2019    AST 18 10/16/2019    ALT 21 10/16/2019    ALKPHOS 55 10/16/2019    EGFR 89 10/16/2019     Lab Results   Component Value Date    WBC 3 06 (L) 10/16/2019    HGB 11 7 10/16/2019    HCT 36 1 10/16/2019     (H) 10/16/2019     10/16/2019     Lab Results   Component Value Date    NEUTROABS 1 27 (L) 10/16/2019

## 2019-10-23 NOTE — LETTER
October 23, 2019     [address]   [address] [facility] [address]    Patient: Diane Judd   YOB: 1955   Date of Visit: 10/23/2019       Dear Dr Pantoja Para: Thank you for referring Diane Judd to me for evaluation  Below are my notes for this consultation  If you have questions, please do not hesitate to call me  I look forward to following your patient along with you  Sincerely,        Juliocesar Katz MD        CC: No Recipients  Juliocesar Katz MD  10/23/2019  1:59 PM  Sign at close encounter  Assessment/Plan:    Problem List Items Addressed This Visit        Endocrine    Malignant neoplasm of ovary [facility]) - Primary     Patient appears to have a molecular response by  however her gross response by CT scan is not yet disappeared  Therefore I have recommended continuation of chemotherapy for at least 1 more cycle of carboplatin paclitaxel at same dosages  Then I would strongly consider the use of consolidation treatment  The patient however is feeling the effects of multiple chemotherapy regimens within a year and a half  She would like to go on a chemo holiday  I reviewed her treatment plan and feel that this is not an unreasonable request   She is currently undergoing palliative chemo for her recurrent disease  Her quality of life is difficult and she wishes to have at least the holidays off to regroup  I have therefore recommended coming back for  in 3 months or earlier if symptoms of ascites and abdominal bloating recur  In the meantime we will consider getting foundation 1 studies that these do not look like they have yet been performed      We will have the patient draw her CBC as planned today as her white blood cell count was only 3 last week just to assure that this is getting back to normal   We will see the patient back in 3 months for repeat evaluation         Relevant Orders                CHIEF COMPLAINT:  Consideration of cycle 6 carboplatin paclitaxel for recurrent ovarian cancer      Problem:  Cancer Staging  Fallopian tube cancer, carcinoma (HCC)  Staging form: Ovary, Fallopian Tube, Primary Peritoneal, AJCC 8th Edition  - Clinical stage from 4/2/2018: FIGO Stage IIIC - Signed by Danielle Arndt MD on 4/16/2018        Previous therapy:     Fallopian tube cancer, carcinoma (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 [address] )      7/12/2019 -  Chemotherapy     CARBOplatin (PARAPLATIN) 439 5 mg in sodium chloride 0 9 % 250 mL IVPB, 439 5 mg, Intravenous, Once, 5 of 6 cycles  Administration: 439 5 mg (7/12/2019), 443 5 mg (8/2/2019), 456 5 mg (8/23/2019), 475 5 mg (9/13/2019), 487 5 mg (10/4/2019)  PACLitaxel (TAXOL) 216 mg in sodium chloride 0 9 % 500 mL chemo IVPB, 135 mg/m2 = 216 mg (77 1 % of original dose 175 mg/m2), Intravenous, Once, 5 of 6 cycles  Dose modification: 135 mg/m2 (original dose 175 mg/m2, Cycle 1, Reason: Other (See Comments))  Administration: 216 mg (7/12/2019), 216 mg (8/2/2019), 213 6 mg (8/23/2019), 213 6 mg (9/13/2019), 216 mg (10/4/2019)           Patient ID: Natalya Delgado is a 75 y o  female  Patient presents today after undergoing 5 cycles of carboplatin paclitaxel for recurrent ovarian cancer    Her  has normalized and the patient was interested in discontinuing therapy earlier  She is overall tolerating the chemo well however she wanted to exposed herself to the least amount of toxicity  She was not interested in undergoing consolidation treatment either  In anticipation of discontinuing chemotherapy early that I asked the patient to undergo a CT scan prior to this  This reveals the following:     ABDOMINOPELVIC CAVITY:  A nodular implant seen in the superior aspect of the liver just to the right of midline in the subdiaphragmatic location in image 82 series 602, measuring 5 mm x 11 mm, stable  A nodular implant seen in the pelvis on the left site in image 97 series 2, measures 1 5 x 0 8 cm  On the previous study this was measuring 1 5 x 0 8 cm  Additional residual nodular implants in the pelvis on the left side posteriorly in image 97 series 2 measuring 1 3 x 0 7 cm, stable  An additional implant seen in the pelvis in image 92 series 2, measuring 10 mm x 7 mm, stable  Radiation implants seen adjacent to the IVC and liver in the upper abdomen, seen on image 53 series 2 on the previous study of May 22, 2019 is not measurable measuring about 3 7 mm and is seen in image 55 series 2  VESSELS:  Unremarkable for patient's age      PELVIS     REPRODUCTIVE ORGANS:  Unremarkable for patient's age      URINARY BLADDER:  Unremarkable      ABDOMINAL WALL/INGUINAL REGIONS:  Unremarkable      OSSEOUS STRUCTURES:  No acute compression collapse of the vertebra  No gross lytic lesion seen     IMPRESSION:     Stable CT  No new liver lesion, abdominopelvic lymphadenopathy  Residual implants in the pelvis and in the subdiaphragmatic region are stable  Routine surveillance can be continued  Today, the patient is doing well    She denies significant abdominal pain, pelvic pain, nausea, vomiting, constipation, diarrhea, fevers, chills, or vaginal bleeding          The following portions of the patient's history were reviewed and updated as appropriate: allergies, current medications, past medical history, past social history, past surgical history and problem list     Review of Systems   Constitutional: Negative  HENT: Negative  Eyes: Negative  Respiratory: Negative  Cardiovascular: Negative  Gastrointestinal: Negative  Endocrine: Negative  Genitourinary: Negative  Musculoskeletal: Negative  Skin: Negative  Neurological: Negative  Hematological: Negative  Psychiatric/Behavioral: Negative  Current Outpatient Medications   Medication Sig Dispense Refill    Arnica 20 % TINC arnica      ascorbic acid (VITAMIN C) 250 mg tablet Take 250 mg by mouth daily      Calcium Carbonate (CALCIUM 600 PO) Take by mouth      Cholecalciferol (VITAMIN D3) 35620 units TABS Take by mouth      Lactobacillus (CVS PROBIOTIC ACIDOPHILUS PO) Take 1 capsule by mouth daily        Multiple Vitamins-Minerals (MULTIVITAMIN WITH MINERALS) tablet Take 1 tablet by mouth daily      ondansetron (ZOFRAN) 8 mg tablet Take 1 tablet (8 mg total) by mouth every 8 (eight) hours as needed for nausea or vomiting 20 tablet 1    oxyCODONE (ROXICODONE) 5 mg immediate release tablet Take 1 tablet (5 mg total) by mouth every 6 (six) hours as needed for moderate painMax Daily Amount: 20 mg 20 tablet 0     No current facility-administered medications for this visit  Objective:    Blood pressure 110/80, pulse 80, temperature 98 8 °F (37 1 °C), resp  rate 18, height 5' 3 19" (1 605 m), weight 56 7 kg (125 lb)  Body mass index is 22 01 kg/m²  Body surface area is 1 59 meters squared  Physical Exam   Constitutional: She is oriented to person, place, and time  She appears well-developed and well-nourished  HENT:   Head: Normocephalic and atraumatic  Eyes: Pupils are equal, round, and reactive to light  EOM are normal    Neck: Normal range of motion  Neck supple  Cardiovascular: Normal rate, regular rhythm and normal heart sounds  Pulmonary/Chest: Effort normal and breath sounds normal  No respiratory distress  Abdominal: Soft  Bowel sounds are normal  She exhibits no distension and no ascites  There is no tenderness  There is no rigidity, no rebound and no guarding  Genitourinary:   Genitourinary Comments: deferred     Musculoskeletal: Normal range of motion  Lymphadenopathy:     She has no cervical adenopathy  She has no axillary adenopathy  Right: No inguinal and no supraclavicular adenopathy present  Left: No inguinal and no supraclavicular adenopathy present  Neurological: She is alert and oriented to person, place, and time  Skin: Skin is warm and dry  Psychiatric: She has a normal mood and affect   Her behavior is normal        Lab Results   Component Value Date     11 5 10/09/2019     Lab Results   Component Value Date    K 3 9 10/16/2019     10/16/2019    CO2 27 10/16/2019    BUN 11 10/16/2019    CREATININE 0 71 10/16/2019    GLUF 119 (H) 01/22/2018    CALCIUM 9 3 10/16/2019    AST 18 10/16/2019    ALT 21 10/16/2019    ALKPHOS 55 10/16/2019    EGFR 89 10/16/2019     Lab Results   Component Value Date    WBC 3 06 (L) 10/16/2019    HGB 11 7 10/16/2019    HCT 36 1 10/16/2019     (H) 10/16/2019     10/16/2019     Lab Results   Component Value Date    NEUTROABS 1 27 (L) 10/16/2019

## 2019-10-23 NOTE — ASSESSMENT & PLAN NOTE
Patient appears to have a molecular response by  however her gross response by CT scan is not yet disappeared  Therefore I have recommended continuation of chemotherapy for at least 1 more cycle of carboplatin paclitaxel at same dosages  Then I would strongly consider the use of consolidation treatment  The patient however is feeling the effects of multiple chemotherapy regimens within a year and a half  She would like to go on a chemo holiday  I reviewed her treatment plan and feel that this is not an unreasonable request   She is currently undergoing palliative chemo for her recurrent disease  Her quality of life is difficult and she wishes to have at least the holidays off to regroup  I have therefore recommended coming back for  in 3 months or earlier if symptoms of ascites and abdominal bloating recur  In the meantime we will consider getting foundation 1 studies that these do not look like they have yet been performed      We will have the patient draw her CBC as planned today as her white blood cell count was only 3 last week just to assure that this is getting back to normal   We will see the patient back in 3 months for repeat evaluation

## 2019-10-25 ENCOUNTER — HOSPITAL ENCOUNTER (OUTPATIENT)
Dept: INFUSION CENTER | Facility: CLINIC | Age: 64
End: 2019-10-25

## 2019-10-28 ENCOUNTER — APPOINTMENT (OUTPATIENT)
Dept: LAB | Facility: CLINIC | Age: 64
End: 2019-10-28
Payer: MEDICARE

## 2020-01-21 ENCOUNTER — APPOINTMENT (OUTPATIENT)
Dept: LAB | Facility: CLINIC | Age: 65
End: 2020-01-21
Payer: MEDICARE

## 2020-01-21 DIAGNOSIS — C56.3 MALIGNANT NEOPLASM OF BOTH OVARIES (HCC): ICD-10-CM

## 2020-01-21 PROCEDURE — 36415 COLL VENOUS BLD VENIPUNCTURE: CPT

## 2020-01-21 PROCEDURE — 86304 IMMUNOASSAY TUMOR CA 125: CPT

## 2020-01-22 ENCOUNTER — OFFICE VISIT (OUTPATIENT)
Dept: GYNECOLOGIC ONCOLOGY | Facility: CLINIC | Age: 65
End: 2020-01-22
Payer: MEDICARE

## 2020-01-22 VITALS
BODY MASS INDEX: 22.15 KG/M2 | HEIGHT: 63 IN | WEIGHT: 125 LBS | SYSTOLIC BLOOD PRESSURE: 122 MMHG | DIASTOLIC BLOOD PRESSURE: 80 MMHG | RESPIRATION RATE: 16 BRPM | TEMPERATURE: 98.1 F | HEART RATE: 61 BPM

## 2020-01-22 DIAGNOSIS — C56.9 MALIGNANT NEOPLASM OF OVARY, UNSPECIFIED LATERALITY (HCC): Primary | ICD-10-CM

## 2020-01-22 DIAGNOSIS — C57.02 CARCINOMA OF LEFT FALLOPIAN TUBE (HCC): ICD-10-CM

## 2020-01-22 LAB — CANCER AG125 SERPL-ACNC: 12.2 U/ML (ref 0–30)

## 2020-01-22 PROCEDURE — 99214 OFFICE O/P EST MOD 30 MIN: CPT | Performed by: OBSTETRICS & GYNECOLOGY

## 2020-01-22 NOTE — ASSESSMENT & PLAN NOTE
Patient is very pleasant 78-year-old white female in presently with dormant persistent ovarian cancer now approximately 2 years from original diagnosis  She is presently off treatment for chemo holiday and starting to feel better  We have recommended continued chemo holiday with follow-up in approximately 3 months and  at that time  If her disease is progressive was symptomatology prior to that the patient will call us

## 2020-01-22 NOTE — PROGRESS NOTES
Assessment/Plan:    Problem List Items Addressed This Visit        Endocrine    Malignant neoplasm of ovary ([address] ) - Primary    Relevant Orders           Genitourinary    Fallopian tube cancer, carcinoma ([address] )     Patient is very pleasant 20-year-old white female in presently with dormant persistent ovarian cancer now approximately 2 years from original diagnosis  She is presently off treatment for chemo holiday and starting to feel better  We have recommended continued chemo holiday with follow-up in approximately 3 months and  at that time  If her disease is progressive was symptomatology prior to that the patient will call us  CHIEF COMPLAINT:  Surveillance ovarian cancer      Problem:  Cancer Staging  Fallopian tube cancer, carcinoma ([facility] [address] )  Staging form: Ovary, Fallopian Tube, Primary Peritoneal, AJCC 8th Edition  - Clinical stage from 4/2/2018: FIGO Stage IIIC - Signed by Lito Rodríguez MD on 4/16/2018        Previous therapy:     Fallopian tube cancer, carcinoma ([facility] [address] )    1/4/2018 Initial Diagnosis     Paracentesis revealing adenocarcinoma of gynecologic origin      1/25/2018 - 3/8/2018 Chemotherapy     3 cycles of carboplatin AUC 6 and taxol 175 mg/m2 squared Q 3 weeks  Pretreatment Ca 125: 534 6       4/2/2018 Surgery     Exploratory laparotomy, total abdominal hysterectomy, bilateral salpingo-oophorectomy, omentectomy, appendectomy  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  No gross residual disease        4/26/2018 - 6/7/2018 Chemotherapy     3 cycles of carboplatin AUC 5 and Taxol 175 mg/m2 Q 3 weeks (carboplatin dose dropped to AUC of 5 for neutropenia prior to cycle 3)        Malignant neoplasm of ovary ([address] )    3/22/2018 Initial Diagnosis     Malignant neoplasm of ovary ([address] )      7/12/2019 - 10/24/2019 Chemotherapy     CARBOplatin (PARAPLATIN) 439 5 mg in sodium chloride 0 9 % 250 mL IVPB, 439 5 mg, Intravenous, Once, 5 of 5 cycles  Administration: 439 5 mg (7/12/2019), 443 5 mg (8/2/2019), 456 5 mg (8/23/2019), 475 5 mg (9/13/2019), 487 5 mg (10/4/2019)  PACLitaxel (TAXOL) 216 mg in sodium chloride 0 9 % 500 mL chemo IVPB, 135 mg/m2 = 216 mg (77 1 % of original dose 175 mg/m2), Intravenous, Once, 5 of 5 cycles  Dose modification: 135 mg/m2 (original dose 175 mg/m2, Cycle 1, Reason: Other (See Comments))  Administration: 216 mg (7/12/2019), 216 mg (8/2/2019), 213 6 mg (8/23/2019), 213 6 mg (9/13/2019), 216 mg (10/4/2019)           Patient ID: Mally Hurley is a 75 y o  female  Patient is very pleasant 57-year-old female with a history 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  She was diagnosed in January of 2018 and treated with 3 cycles of carboplatin neoadjuvant chemotherapy followed by debulking followed by 3 further cycles of chemo  The patient was in stable disease but was neve rin remission and was observed for period of time  She shows to discontinue chemotherapy after 5 of planned 6 cycles despite persistent disease due to low  in difficulty tolerating chemo  She has been off chemo for the past 3 months  In the interim the patient is feeling better  Her energy level is improving she is overall feeling well  Today, the patient is doing well  She denies significant abdominal pain, pelvic pain, nausea, vomiting, constipation, diarrhea, fevers, chills, or vaginal bleeding  The following portions of the patient's history were reviewed and updated as appropriate: allergies, current medications, past family history, past social history, past surgical history and problem list     Review of Systems   Constitutional: Negative  HENT: Negative  Eyes: Negative  Respiratory: Negative  Cardiovascular: Negative  Gastrointestinal: Negative  Endocrine: Negative  Genitourinary: Negative  Musculoskeletal: Negative  Skin: Negative  Neurological: Negative  Hematological: Negative  Psychiatric/Behavioral: Negative  Current Outpatient Medications   Medication Sig Dispense Refill    Arnica 20 % TINC arnica      ascorbic acid (VITAMIN C) 250 mg tablet Take 250 mg by mouth daily      Calcium Carbonate (CALCIUM 600 PO) Take by mouth      Cholecalciferol (VITAMIN D3) 16716 units TABS Take by mouth      Lactobacillus (CVS PROBIOTIC ACIDOPHILUS PO) Take 1 capsule by mouth daily        Multiple Vitamins-Minerals (MULTIVITAMIN WITH MINERALS) tablet Take 1 tablet by mouth daily      ondansetron (ZOFRAN) 8 mg tablet Take 1 tablet (8 mg total) by mouth every 8 (eight) hours as needed for nausea or vomiting 20 tablet 1    oxyCODONE (ROXICODONE) 5 mg immediate release tablet Take 1 tablet (5 mg total) by mouth every 6 (six) hours as needed for moderate painMax Daily Amount: 20 mg 20 tablet 0     No current facility-administered medications for this visit  Objective:    Blood pressure 122/80, pulse 61, temperature 98 1 °F (36 7 °C), resp  rate 16, height 5' 3 19" (1 605 m), weight 56 7 kg (125 lb)  Body mass index is 22 01 kg/m²  Body surface area is 1 59 meters squared  Physical Exam   Constitutional: She is oriented to person, place, and time  She appears well-developed and well-nourished  HENT:   Head: Normocephalic and atraumatic  Eyes: EOM are normal    Neck: Normal range of motion  Neck supple  No thyromegaly present  Cardiovascular: Normal rate, regular rhythm and normal heart sounds  Pulmonary/Chest: Effort normal and breath sounds normal    Abdominal: Soft  Bowel sounds are normal    Well healed laparoscopic incisions  Genitourinary:   Genitourinary Comments: -Normal external female genitalia, normal Bartholin's and [place]'s glands                  -Normal midline urethral meatus   No lesions notes                  -Bladder without fullness mass or tenderness                  -Vagina without lesion or discharge No significant cystocele or rectocele noted                  -Cervix surgically absent                  -Uterus surgically absent                  -Adnexae surgically absent                  - Anus without fissure of lesion     Musculoskeletal: Normal range of motion  Lymphadenopathy:     She has no cervical adenopathy  Neurological: She is alert and oriented to person, place, and time  Skin: Skin is warm and dry  Psychiatric: She has a normal mood and affect   Her behavior is normal        Lab Results   Component Value Date     12 2 01/21/2020     Lab Results   Component Value Date    K 4 1 01/21/2020     01/21/2020    CO2 29 01/21/2020    BUN 10 01/21/2020    CREATININE 0 69 01/21/2020    GLUF 119 (H) 01/22/2018    CALCIUM 9 3 01/21/2020    AST 16 01/21/2020    ALT 18 01/21/2020    ALKPHOS 51 01/21/2020    EGFR 91 01/21/2020     Lab Results   Component Value Date    WBC 4 45 01/21/2020    HGB 12 2 01/21/2020    HCT 38 1 01/21/2020     (H) 01/21/2020     01/21/2020     Lab Results   Component Value Date    NEUTROABS 1 88 01/21/2020

## 2020-01-22 NOTE — LETTER
January 22, 2020     [address] [place] [address]    Patient: Natalya Delgado   YOB: 1955   Date of Visit: 1/22/2020       Dear Dr Sam Layton: Thank you for referring Natalya Delgado to me for evaluation  Below are my notes for this consultation  If you have questions, please do not hesitate to call me  I look forward to following your patient along with you  Sincerely,        Zachary Lopez MD        CC: No Recipients  Zachary Lopez MD  1/22/2020  1:15 PM  Incomplete  Assessment/Plan:    Problem List Items Addressed This Visit        Endocrine    Malignant neoplasm of ovary ([address] ) - Primary    Relevant Orders           Genitourinary    Fallopian tube cancer, carcinoma ([address] )     Patient is very pleasant 20-year-old white female in presently with dormant persistent ovarian cancer now approximately 2 years from original diagnosis  She is presently off treatment for chemo holiday and starting to feel better  We have recommended continued chemo holiday with follow-up in approximately 3 months and  at that time  If her disease is progressive was symptomatology prior to that the patient will call us  CHIEF COMPLAINT:  Surveillance ovarian cancer      Problem:  Cancer Staging  Fallopian tube cancer, carcinoma ([address] )  Staging form: Ovary, Fallopian Tube, Primary Peritoneal, AJCC 8th Edition  - Clinical stage from 4/2/2018: FIGO Stage IIIC - Signed by Danielle Arndt MD on 4/16/2018        Previous therapy:     Fallopian tube cancer, carcinoma ([address] )    1/4/2018 Initial Diagnosis     Paracentesis revealing adenocarcinoma of gynecologic origin      1/25/2018 - 3/8/2018 Chemotherapy     3 cycles of carboplatin AUC 6 and taxol 175 mg/m2 squared Q 3 weeks  Pretreatment Ca 125: 534 6       4/2/2018 Surgery     Exploratory laparotomy, total abdominal hysterectomy, bilateral salpingo-oophorectomy, omentectomy, appendectomy   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  No gross residual disease  4/26/2018 - 6/7/2018 Chemotherapy     3 cycles of carboplatin AUC 5 and Taxol 175 mg/m2 Q 3 weeks (carboplatin dose dropped to AUC of 5 for neutropenia prior to cycle 3)        Malignant neoplasm of ovary ([facility] [address] )    3/22/2018 Initial Diagnosis     Malignant neoplasm of ovary ([facility] [address] )      7/12/2019 - 10/24/2019 Chemotherapy     CARBOplatin (PARAPLATIN) 439 5 mg in sodium chloride 0 9 % 250 mL IVPB, 439 5 mg, Intravenous, Once, 5 of 5 cycles  Administration: 439 5 mg (7/12/2019), 443 5 mg (8/2/2019), 456 5 mg (8/23/2019), 475 5 mg (9/13/2019), 487 5 mg (10/4/2019)  PACLitaxel (TAXOL) 216 mg in sodium chloride 0 9 % 500 mL chemo IVPB, 135 mg/m2 = 216 mg (77 1 % of original dose 175 mg/m2), Intravenous, Once, 5 of 5 cycles  Dose modification: 135 mg/m2 (original dose 175 mg/m2, Cycle 1, Reason: Other (See Comments))  Administration: 216 mg (7/12/2019), 216 mg (8/2/2019), 213 6 mg (8/23/2019), 213 6 mg (9/13/2019), 216 mg (10/4/2019)           Patient ID: Bradley Low is a 75 y o  female  Patient is very pleasant 68-year-old female with a history 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  She was diagnosed in January of 2018 and treated with 3 cycles of carboplatin neoadjuvant chemotherapy followed by debulking followed by 3 further cycles of chemo  The patient was in stable disease but was neve rin remission and was observed for period of time  She shows to discontinue chemotherapy after 5 of planned 6 cycles despite persistent disease due to low  in difficulty tolerating chemo  She has been off chemo for the past 3 months  In the interim the patient is feeling better  Her energy level is improving she is overall feeling well  Today, the patient is doing well  She denies significant abdominal pain, pelvic pain, nausea, vomiting, constipation, diarrhea, fevers, chills, or vaginal bleeding        The following portions of the patient's history were reviewed and updated as appropriate: allergies, current medications, past family history, past social history, past surgical history and problem list     Review of Systems   Constitutional: Negative  HENT: Negative  Eyes: Negative  Respiratory: Negative  Cardiovascular: Negative  Gastrointestinal: Negative  Endocrine: Negative  Genitourinary: Negative  Musculoskeletal: Negative  Skin: Negative  Neurological: Negative  Hematological: Negative  Psychiatric/Behavioral: Negative  Current Outpatient Medications   Medication Sig Dispense Refill    Arnica 20 % TINC arnica      ascorbic acid (VITAMIN C) 250 mg tablet Take 250 mg by mouth daily      Calcium Carbonate (CALCIUM 600 PO) Take by mouth      Cholecalciferol (VITAMIN D3) 00947 units TABS Take by mouth      Lactobacillus (CVS PROBIOTIC ACIDOPHILUS PO) Take 1 capsule by mouth daily        Multiple Vitamins-Minerals (MULTIVITAMIN WITH MINERALS) tablet Take 1 tablet by mouth daily      ondansetron (ZOFRAN) 8 mg tablet Take 1 tablet (8 mg total) by mouth every 8 (eight) hours as needed for nausea or vomiting 20 tablet 1    oxyCODONE (ROXICODONE) 5 mg immediate release tablet Take 1 tablet (5 mg total) by mouth every 6 (six) hours as needed for moderate painMax Daily Amount: 20 mg 20 tablet 0     No current facility-administered medications for this visit  Objective:    Blood pressure 122/80, pulse 61, temperature 98 1 °F (36 7 °C), resp  rate 16, height 5' 3 19" (1 605 m), weight 56 7 kg (125 lb)  Body mass index is 22 01 kg/m²  Body surface area is 1 59 meters squared  Physical Exam   Constitutional: She is oriented to person, place, and time  She appears well-developed and well-nourished  HENT:   Head: Normocephalic and atraumatic  Eyes: EOM are normal    Neck: Normal range of motion  Neck supple  No thyromegaly present  Cardiovascular: Normal rate, regular rhythm and normal heart sounds  Pulmonary/Chest: Effort normal and breath sounds normal    Abdominal: Soft  Bowel sounds are normal    Well healed laparoscopic incisions  Genitourinary:   Genitourinary Comments: -Normal external female genitalia, normal Bartholin's and [place]'s glands                  -Normal midline urethral meatus  No lesions notes                  -Bladder without fullness mass or tenderness                  -Vagina without lesion or discharge No significant cystocele or rectocele noted                  -Cervix surgically absent                  -Uterus surgically absent                  -Adnexae surgically absent                  - Anus without fissure of lesion     Musculoskeletal: Normal range of motion  Lymphadenopathy:     She has no cervical adenopathy  Neurological: She is alert and oriented to person, place, and time  Skin: Skin is warm and dry  Psychiatric: She has a normal mood and affect   Her behavior is normal        Lab Results   Component Value Date     12 2 01/21/2020     Lab Results   Component Value Date    K 4 1 01/21/2020     01/21/2020    CO2 29 01/21/2020    BUN 10 01/21/2020    CREATININE 0 69 01/21/2020    GLUF 119 (H) 01/22/2018    CALCIUM 9 3 01/21/2020    AST 16 01/21/2020    ALT 18 01/21/2020    ALKPHOS 51 01/21/2020    EGFR 91 01/21/2020     Lab Results   Component Value Date    WBC 4 45 01/21/2020    HGB 12 2 01/21/2020    HCT 38 1 01/21/2020     (H) 01/21/2020     01/21/2020     Lab Results   Component Value Date    NEUTROABS 1 88 01/21/2020

## 2020-02-07 DIAGNOSIS — B37.3 CANDIDAL VAGINITIS: Primary | ICD-10-CM

## 2020-02-07 RX ORDER — FLUCONAZOLE 150 MG/1
150 TABLET ORAL ONCE
Qty: 1 TABLET | Refills: 0 | Status: SHIPPED | OUTPATIENT
Start: 2020-02-07 | End: 2020-02-07

## 2020-02-10 DIAGNOSIS — B96.89 BV (BACTERIAL VAGINOSIS): Primary | ICD-10-CM

## 2020-02-10 DIAGNOSIS — N76.0 BV (BACTERIAL VAGINOSIS): Primary | ICD-10-CM

## 2020-02-10 RX ORDER — METRONIDAZOLE 500 MG/1
500 TABLET ORAL EVERY 12 HOURS SCHEDULED
Qty: 10 TABLET | Refills: 0 | Status: SHIPPED | OUTPATIENT
Start: 2020-02-10 | End: 2020-02-15

## 2020-04-17 ENCOUNTER — APPOINTMENT (OUTPATIENT)
Dept: LAB | Facility: HOSPITAL | Age: 65
End: 2020-04-17
Attending: OBSTETRICS & GYNECOLOGY
Payer: MEDICARE

## 2020-04-17 DIAGNOSIS — C56.9 MALIGNANT NEOPLASM OF OVARY, UNSPECIFIED LATERALITY (HCC): ICD-10-CM

## 2020-04-17 LAB — CANCER AG125 SERPL-ACNC: 10.5 U/ML (ref 0–30)

## 2020-04-17 PROCEDURE — 36415 COLL VENOUS BLD VENIPUNCTURE: CPT

## 2020-04-17 PROCEDURE — 86304 IMMUNOASSAY TUMOR CA 125: CPT

## 2020-04-22 ENCOUNTER — TELEMEDICINE (OUTPATIENT)
Dept: GYNECOLOGIC ONCOLOGY | Facility: CLINIC | Age: 65
End: 2020-04-22
Payer: MEDICARE

## 2020-04-22 DIAGNOSIS — C57.02 CARCINOMA OF LEFT FALLOPIAN TUBE (HCC): ICD-10-CM

## 2020-04-22 DIAGNOSIS — C56.9 MALIGNANT NEOPLASM OF OVARY, UNSPECIFIED LATERALITY (HCC): Primary | ICD-10-CM

## 2020-04-22 PROCEDURE — 99214 OFFICE O/P EST MOD 30 MIN: CPT | Performed by: OBSTETRICS & GYNECOLOGY

## 2020-05-04 ENCOUNTER — OFFICE VISIT (OUTPATIENT)
Dept: PALLIATIVE MEDICINE | Facility: CLINIC | Age: 65
End: 2020-05-04
Payer: MEDICARE

## 2020-05-04 DIAGNOSIS — C57.02 CARCINOMA OF LEFT FALLOPIAN TUBE (HCC): ICD-10-CM

## 2020-05-04 DIAGNOSIS — R11.0 NAUSEA: ICD-10-CM

## 2020-05-04 DIAGNOSIS — G62.0 CHEMOTHERAPY-INDUCED NEUROPATHY (HCC): ICD-10-CM

## 2020-05-04 DIAGNOSIS — G47.9 DIFFICULTY SLEEPING: ICD-10-CM

## 2020-05-04 DIAGNOSIS — T45.1X5A CHEMOTHERAPY-INDUCED NEUROPATHY (HCC): ICD-10-CM

## 2020-05-04 DIAGNOSIS — Z79.899 MEDICAL MARIJUANA USE: Primary | ICD-10-CM

## 2020-05-04 PROCEDURE — 99214 OFFICE O/P EST MOD 30 MIN: CPT | Performed by: INTERNAL MEDICINE

## 2020-07-13 DIAGNOSIS — Z79.899 ON LONG TERM DRUG THERAPY: ICD-10-CM

## 2020-07-13 DIAGNOSIS — Z91.89 AT RISK FOR OSTEOPOROSIS: ICD-10-CM

## 2020-07-13 DIAGNOSIS — C57.02 CARCINOMA OF LEFT FALLOPIAN TUBE (HCC): Primary | ICD-10-CM

## 2020-07-15 ENCOUNTER — APPOINTMENT (OUTPATIENT)
Dept: LAB | Facility: CLINIC | Age: 65
End: 2020-07-15
Payer: MEDICARE

## 2020-07-15 DIAGNOSIS — C56.9 MALIGNANT NEOPLASM OF OVARY, UNSPECIFIED LATERALITY (HCC): ICD-10-CM

## 2020-07-15 DIAGNOSIS — Z79.899 ON LONG TERM DRUG THERAPY: ICD-10-CM

## 2020-07-15 LAB
25(OH)D3 SERPL-MCNC: 16.1 NG/ML (ref 30–100)
BUN SERPL-MCNC: 11 MG/DL (ref 5–25)
CANCER AG125 SERPL-ACNC: 11.3 U/ML (ref 0–30)
CREAT SERPL-MCNC: 0.79 MG/DL (ref 0.6–1.3)
GFR SERPL CREATININE-BSD FRML MDRD: 78 ML/MIN/1.73SQ M

## 2020-07-15 PROCEDURE — 36415 COLL VENOUS BLD VENIPUNCTURE: CPT

## 2020-07-15 PROCEDURE — 82565 ASSAY OF CREATININE: CPT

## 2020-07-15 PROCEDURE — 86304 IMMUNOASSAY TUMOR CA 125: CPT

## 2020-07-15 PROCEDURE — 82306 VITAMIN D 25 HYDROXY: CPT

## 2020-07-15 PROCEDURE — 84520 ASSAY OF UREA NITROGEN: CPT

## 2020-07-22 ENCOUNTER — HOSPITAL ENCOUNTER (OUTPATIENT)
Dept: CT IMAGING | Facility: HOSPITAL | Age: 65
Discharge: HOME | End: 2020-07-22
Attending: OBSTETRICS & GYNECOLOGY
Payer: MEDICARE

## 2020-07-22 DIAGNOSIS — C56.9 MALIGNANT NEOPLASM OF OVARY, UNSPECIFIED LATERALITY (HCC): ICD-10-CM

## 2020-07-22 PROCEDURE — 74177 CT ABD & PELVIS W/CONTRAST: CPT

## 2020-07-22 PROCEDURE — 71260 CT THORAX DX C+: CPT

## 2020-07-22 RX ADMIN — IOHEXOL 85 ML: 350 INJECTION, SOLUTION INTRAVENOUS at 12:43

## 2020-08-11 ENCOUNTER — OFFICE VISIT (OUTPATIENT)
Dept: GYNECOLOGIC ONCOLOGY | Facility: CLINIC | Age: 65
End: 2020-08-11
Payer: MEDICARE

## 2020-08-11 VITALS
TEMPERATURE: 98.2 F | SYSTOLIC BLOOD PRESSURE: 140 MMHG | RESPIRATION RATE: 16 BRPM | HEART RATE: 66 BPM | DIASTOLIC BLOOD PRESSURE: 88 MMHG | WEIGHT: 122 LBS | HEIGHT: 63 IN | BODY MASS INDEX: 21.62 KG/M2

## 2020-08-11 DIAGNOSIS — C57.02 CARCINOMA OF LEFT FALLOPIAN TUBE (HCC): Primary | ICD-10-CM

## 2020-08-11 PROCEDURE — 99214 OFFICE O/P EST MOD 30 MIN: CPT | Performed by: OBSTETRICS & GYNECOLOGY

## 2020-08-11 NOTE — ASSESSMENT & PLAN NOTE
Patient has an asymptomatic recurrence of her fallopian tube cancer within the pelvis and in the perihepatic area  She has no symptoms her exam is unremarkable  The patient feels that she has recently recovered from her toxicity from chemotherapy and is not interested in undergoing chemotherapy again  She would however consider biological agents  I have explained that the biological agents may be as symptomatic with side effect as the chemotherapy itself  The patient does still remain interested  The patient has been genetic tested but does not appear to have genomic testing  We will refer arrange for [employer] testing and will see the patient back in  3-4 months with  and CT scan the patient will return earlier if symptoms warrant

## 2020-08-11 NOTE — LETTER
August 11, 2020     William Scott DO  [id number]    Patient: Gunnar Shaver   YOB: 1955   Date of Visit: 8/11/2020       Dear Dr Scout Humphrey: Thank you for referring Gunnar Shaver to me for evaluation  Below are my notes for this consultation  If you have questions, please do not hesitate to call me  I look forward to following your patient along with you  Sincerely,        Maddie Cosby MD        CC: No Recipients  Maddie Cosby MD  8/11/2020  2:06 PM  Incomplete  Assessment/Plan:    Problem List Items Addressed This Visit        Genitourinary    Fallopian tube cancer, carcinoma ([facility] [address] ) - Primary     Patient has an asymptomatic recurrence of her fallopian tube cancer within the pelvis and in the perihepatic area  She has no symptoms her exam is unremarkable  The patient feels that she has recently recovered from her toxicity from chemotherapy and is not interested in undergoing chemotherapy again  She would however consider biological agents  I have explained that the biological agents may be as symptomatic with side effect as the chemotherapy itself  The patient does still remain interested  The patient has been genetic tested but does not appear to have genomic testing  We will refer arrange for [employer] testing and will see the patient back in  3-4 months with  and CT scan the patient will return earlier if symptoms warrant           Relevant Orders        BUN    Creatinine, serum    CT chest abdomen pelvis w contrast            CHIEF COMPLAINT:  Surveillance ovarian cancer      Problem:  Cancer Staging  Fallopian tube cancer, carcinoma (HCC)  Staging form: Ovary, Fallopian Tube, Primary Peritoneal, AJCC 8th Edition  - Clinical stage from 4/2/2018: FIGO Stage IIIC - Signed by Brandon Osullivan MD on 4/16/2018        Previous therapy:  Oncology History   Fallopian tube cancer, carcinoma ([facility] [address] )   1/4/2018 Initial Diagnosis Paracentesis revealing adenocarcinoma of gynecologic origin     1/25/2018 - 3/8/2018 Chemotherapy    3 cycles of carboplatin AUC 6 and taxol 175 mg/m2 squared Q 3 weeks  Pretreatment Ca 125: 534 6      4/2/2018 Surgery    Exploratory laparotomy, total abdominal hysterectomy, bilateral salpingo-oophorectomy, omentectomy, appendectomy  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  No gross residual disease  4/26/2018 - 6/7/2018 Chemotherapy    3 cycles of carboplatin AUC 5 and Taxol 175 mg/m2 Q 3 weeks (carboplatin dose dropped to AUC of 5 for neutropenia prior to cycle 3)     7/2018 Genetic Testing    Patient has genetic testing done for ovarian cancer multi panel  Results revealed patient has the following mutation(s):  None     Malignant neoplasm of ovary ([facility] [address] )   3/22/2018 Initial Diagnosis    Malignant neoplasm of ovary ([facility] [address] )     7/12/2019 - 10/24/2019 Chemotherapy    CARBOplatin (PARAPLATIN) 439 5 mg in sodium chloride 0 9 % 250 mL IVPB, 439 5 mg, Intravenous, Once, 5 of 5 cycles  Administration: 439 5 mg (7/12/2019), 443 5 mg (8/2/2019), 456 5 mg (8/23/2019), 475 5 mg (9/13/2019), 487 5 mg (10/4/2019)  PACLitaxel (TAXOL) 216 mg in sodium chloride 0 9 % 500 mL chemo IVPB, 135 mg/m2 = 216 mg (77 1 % of original dose 175 mg/m2), Intravenous, Once, 5 of 5 cycles  Dose modification: 135 mg/m2 (original dose 175 mg/m2, Cycle 1, Reason: Other (See Comments))  Administration: 216 mg (7/12/2019), 216 mg (8/2/2019), 213 6 mg (8/23/2019), 213 6 mg (9/13/2019), 216 mg (10/4/2019)           Patient ID: Salome Fitzgeradl is a [address] [place] y o  female  Patient is very pleasant 58-year-old female with history of recurrent stage IIIC fallopian tube carcinoma  She was originally diagnosed in January of 2018 and underwent neoadjuvant chemotherapy followed by interval debulking followed by completion of chemotherapy with carboplatin paclitaxel    She developed a recurrence less than a year later was treated again with carboplatin paclitaxel between July and October of 2019  She elected to discontinue therapy and go on chemo holiday  She has been on that since that time  The patient  has remained without change  It has been between 10 and 11 since that time  The patient recently underwent a CT scan which reveals the following:  STOMACH AND BOWEL: Colonic diverticulosis  New mass along the left lateral wall of the sigmoid colon measuring 4 6 x 3 1 cm on image 2/97      APPENDIX:  No findings to suggest appendicitis      ABDOMINOPELVIC CAVITY:  Stable nodular implant superior to the liver just to the right of midline in the subdiaphragmatic location on image 2/51 measuring 14 x 11 mm  The nodular implant previously seen in the left pelvis has enlarged significantly on   image 2/92 measuring 2 2 x 1 4 cm (previous 1 5 x 0 8 cm)  Additional nodular implant in the pelvis on the left side more posteriorly measures 2 0 x 1 3 cm (previous 1 3 x 0 7 cm) on image 2/92      VESSELS:  Unremarkable for patient's age      PELVIS     REPRODUCTIVE ORGANS:  Patient is status post hysterectomy      URINARY BLADDER:  Unremarkable      ABDOMINAL WALL/INGUINAL REGIONS:  Unremarkable      OSSEOUS STRUCTURES:  No acute compression deformity or lytic lesion      IMPRESSION:     Enlarging implant in the left hemipelvis with a new mass along the lateral wall of the sigmoid colon measuring 3 1 x 4 6 cm likely a serosal implant      The study was marked in EPIC for immediate notification      Today, the patient is doing well  She denies significant abdominal pain, pelvic pain, nausea, vomiting, constipation, diarrhea, fevers, chills, or vaginal bleeding  Overall the patient feels well  Today, the patient is doing well  She denies significant abdominal pain, pelvic pain, nausea, vomiting, constipation, diarrhea, fevers, chills, or vaginal bleeding        The following portions of the patient's history were reviewed and updated as appropriate: allergies, current medications, past family history, past medical history, past surgical history and problem list     Review of Systems   Constitutional: Negative  HENT: Negative  Eyes: Negative  Respiratory: Negative  Cardiovascular: Negative  Gastrointestinal: Negative  Endocrine: Negative  Genitourinary: Negative  Musculoskeletal: Negative  Skin: Negative  Neurological: Negative  Hematological: Negative  Psychiatric/Behavioral: Negative  Current Outpatient Medications   Medication Sig Dispense Refill    Arnica 20 % TINC arnica      ascorbic acid (VITAMIN C) 250 mg tablet Take 250 mg by mouth daily      Calcium Carbonate (CALCIUM 600 PO) Take by mouth      Cholecalciferol (VITAMIN D3) 29415 units TABS Take by mouth      Lactobacillus (CVS PROBIOTIC ACIDOPHILUS PO) Take 1 capsule by mouth daily        Multiple Vitamins-Minerals (MULTIVITAMIN WITH MINERALS) tablet Take 1 tablet by mouth daily      ondansetron (ZOFRAN) 8 mg tablet Take 1 tablet (8 mg total) by mouth every 8 (eight) hours as needed for nausea or vomiting 20 tablet 1     No current facility-administered medications for this visit  Objective:    Blood pressure 140/88, pulse 66, temperature 98 2 °F (36 8 °C), resp  rate 16, height 5' 3 19" (1 605 m), weight 55 3 kg (122 lb)  Body mass index is 21 48 kg/m²  Body surface area is 1 57 meters squared  Physical Exam  Constitutional:       Appearance: She is well-developed  HENT:      Head: Normocephalic and atraumatic  Neck:      Musculoskeletal: Normal range of motion and neck supple  Thyroid: No thyromegaly  Cardiovascular:      Rate and Rhythm: Normal rate and regular rhythm  Heart sounds: Normal heart sounds  Pulmonary:      Effort: Pulmonary effort is normal       Breath sounds: Normal breath sounds  Abdominal:      General: Bowel sounds are normal       Palpations: Abdomen is soft  Comments:  Well healed laparoscopic incisions  Genitourinary:     Comments: -Normal external female genitalia, normal Bartholin's and [place]'s glands                  -Normal midline urethral meatus  No lesions notes                  -Bladder without fullness mass or tenderness                  -Vagina without lesion or discharge No significant cystocele or rectocele noted                  -Cervix surgically absent                  -Uterus surgically absent                  -Adnexae surgically absent                  - Anus without fissure of lesion    Musculoskeletal: Normal range of motion  Lymphadenopathy:      Cervical: No cervical adenopathy  Skin:     General: Skin is warm and dry  Neurological:      Mental Status: She is alert and oriented to person, place, and time     Psychiatric:         Behavior: Behavior normal          Lab Results   Component Value Date     11 3 07/15/2020     Lab Results   Component Value Date    K 4 1 01/21/2020     01/21/2020    CO2 29 01/21/2020    BUN 11 07/15/2020    CREATININE 0 79 07/15/2020    GLUF 119 (H) 01/22/2018    CALCIUM 9 3 01/21/2020    AST 16 01/21/2020    ALT 18 01/21/2020    ALKPHOS 51 01/21/2020    EGFR 78 07/15/2020     Lab Results   Component Value Date    WBC 4 45 01/21/2020    HGB 12 2 01/21/2020    HCT 38 1 01/21/2020     (H) 01/21/2020     01/21/2020     Lab Results   Component Value Date    NEUTROABS 1 88 01/21/2020        Trend:  Lab Results   Component Value Date     11 3 07/15/2020     10 5 04/17/2020     12 2 01/21/2020     11 5 10/09/2019     11 9 09/04/2019     27 0 07/10/2019     24 8 05/10/2019     19 4 01/09/2019     10 2 10/04/2018     11 3 06/26/2018     10 3 06/05/2018     11 7 05/15/2018     14 7 04/24/2018     22 2 03/26/2018     67 2 (H) 03/05/2018     367 7 (H) 02/12/2018     534 6 (H) 01/04/2018                  Krzysztof Barber MD  8/11/2020 1:45 PM  Incomplete  Assessment/Plan:    Problem List Items Addressed This Visit     None            CHIEF COMPLAINT:  Surveillance ovarian cancer      Problem:  Cancer Staging  Fallopian tube cancer, carcinoma ([facility] [address] )  Staging form: Ovary, Fallopian Tube, Primary Peritoneal, AJCC 8th Edition  - Clinical stage from 4/2/2018: FIGO Stage IIIC - Signed by Antoine Smith MD on 4/16/2018        Previous therapy:  Oncology History   Fallopian tube cancer, carcinoma ([facility] [address] )   1/4/2018 Initial Diagnosis    Paracentesis revealing adenocarcinoma of gynecologic origin     1/25/2018 - 3/8/2018 Chemotherapy    3 cycles of carboplatin AUC 6 and taxol 175 mg/m2 squared Q 3 weeks  Pretreatment Ca 125: 534 6      4/2/2018 Surgery    Exploratory laparotomy, total abdominal hysterectomy, bilateral salpingo-oophorectomy, omentectomy, appendectomy  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  No gross residual disease  4/26/2018 - 6/7/2018 Chemotherapy    3 cycles of carboplatin AUC 5 and Taxol 175 mg/m2 Q 3 weeks (carboplatin dose dropped to AUC of 5 for neutropenia prior to cycle 3)     7/2018 Genetic Testing    Patient has genetic testing done for ovarian cancer multi panel    Results revealed patient has the following mutation(s):  None     Malignant neoplasm of ovary ([facility] [address] )   3/22/2018 Initial Diagnosis    Malignant neoplasm of ovary ([facility] [address] )     7/12/2019 - 10/24/2019 Chemotherapy    CARBOplatin (PARAPLATIN) 439 5 mg in sodium chloride 0 9 % 250 mL IVPB, 439 5 mg, Intravenous, Once, 5 of 5 cycles  Administration: 439 5 mg (7/12/2019), 443 5 mg (8/2/2019), 456 5 mg (8/23/2019), 475 5 mg (9/13/2019), 487 5 mg (10/4/2019)  PACLitaxel (TAXOL) 216 mg in sodium chloride 0 9 % 500 mL chemo IVPB, 135 mg/m2 = 216 mg (77 1 % of original dose 175 mg/m2), Intravenous, Once, 5 of 5 cycles  Dose modification: 135 mg/m2 (original dose 175 mg/m2, Cycle 1, Reason: Other (See Comments))  Administration: 216 mg (7/12/2019), 216 mg (8/2/2019), 213 6 mg (8/23/2019), 213 6 mg (9/13/2019), 216 mg (10/4/2019)           Patient ID: Samuel Gonzalez is a 77 y o  female  Patient is very pleasant 49-year-old female with history of recurrent stage IIIC fallopian tube carcinoma  She was originally diagnosed in January of 2018 and underwent neoadjuvant chemotherapy followed by interval debulking followed by completion of chemotherapy with carboplatin paclitaxel  She developed a recurrence less than a year later was treated again with carboplatin paclitaxel between July and October of 2019  She elected to discontinue therapy and go on chemo holiday  She has been on that since that time  The patient  has remained without change  It has been between 10 and 11 since that time  The patient recently underwent a CT scan which reveals the following:  STOMACH AND BOWEL: Colonic diverticulosis  New mass along the left lateral wall of the sigmoid colon measuring 4 6 x 3 1 cm on image 2/97      APPENDIX:  No findings to suggest appendicitis      ABDOMINOPELVIC CAVITY:  Stable nodular implant superior to the liver just to the right of midline in the subdiaphragmatic location on image 2/51 measuring 14 x 11 mm  The nodular implant previously seen in the left pelvis has enlarged significantly on   image 2/92 measuring 2 2 x 1 4 cm (previous 1 5 x 0 8 cm)      Additional nodular implant in the pelvis on the left side more posteriorly measures 2 0 x 1 3 cm (previous 1 3 x 0 7 cm) on image 2/92      VESSELS:  Unremarkable for patient's age      PELVIS     REPRODUCTIVE ORGANS:  Patient is status post hysterectomy      URINARY BLADDER:  Unremarkable      ABDOMINAL WALL/INGUINAL REGIONS:  Unremarkable      OSSEOUS STRUCTURES:  No acute compression deformity or lytic lesion      IMPRESSION:     Enlarging implant in the left hemipelvis with a new mass along the lateral wall of the sigmoid colon measuring 3 1 x 4 6 cm likely a serosal implant      The study was marked in EPIC for immediate notification      Today, the patient is doing well  She denies significant abdominal pain, pelvic pain, nausea, vomiting, constipation, diarrhea, fevers, chills, or vaginal bleeding  Overall the patient feels well  Today, the patient is doing well  She denies significant abdominal pain, pelvic pain, nausea, vomiting, constipation, diarrhea, fevers, chills, or vaginal bleeding  The following portions of the patient's history were reviewed and updated as appropriate: allergies, current medications, past family history, past medical history, past surgical history and problem list     Review of Systems   Constitutional: Negative  HENT: Negative  Eyes: Negative  Respiratory: Negative  Cardiovascular: Negative  Gastrointestinal: Negative  Endocrine: Negative  Genitourinary: Negative  Musculoskeletal: Negative  Skin: Negative  Neurological: Negative  Hematological: Negative  Psychiatric/Behavioral: Negative  Current Outpatient Medications   Medication Sig Dispense Refill    Arnica 20 % TINC arnica      ascorbic acid (VITAMIN C) 250 mg tablet Take 250 mg by mouth daily      Calcium Carbonate (CALCIUM 600 PO) Take by mouth      Cholecalciferol (VITAMIN D3) 60096 units TABS Take by mouth      Lactobacillus (CVS PROBIOTIC ACIDOPHILUS PO) Take 1 capsule by mouth daily        Multiple Vitamins-Minerals (MULTIVITAMIN WITH MINERALS) tablet Take 1 tablet by mouth daily      ondansetron (ZOFRAN) 8 mg tablet Take 1 tablet (8 mg total) by mouth every 8 (eight) hours as needed for nausea or vomiting 20 tablet 1     No current facility-administered medications for this visit  Objective:    Blood pressure 140/88, pulse 66, temperature 98 2 °F (36 8 °C), resp  rate 16, height 5' 3 19" (1 605 m), weight 55 3 kg (122 lb)  Body mass index is 21 48 kg/m²  Body surface area is 1 57 meters squared      Physical Exam  Constitutional:       Appearance: She is well-developed  HENT:      Head: Normocephalic and atraumatic  Neck:      Musculoskeletal: Normal range of motion and neck supple  Thyroid: No thyromegaly  Cardiovascular:      Rate and Rhythm: Normal rate and regular rhythm  Heart sounds: Normal heart sounds  Pulmonary:      Effort: Pulmonary effort is normal       Breath sounds: Normal breath sounds  Abdominal:      General: Bowel sounds are normal       Palpations: Abdomen is soft  Comments: Well healed laparoscopic incisions  Genitourinary:     Comments: -Normal external female genitalia, normal Bartholin's and [place]'s glands                  -Normal midline urethral meatus  No lesions notes                  -Bladder without fullness mass or tenderness                  -Vagina without lesion or discharge No significant cystocele or rectocele noted                  -Cervix surgically absent                  -Uterus surgically absent                  -Adnexae surgically absent                  - Anus without fissure of lesion    Musculoskeletal: Normal range of motion  Lymphadenopathy:      Cervical: No cervical adenopathy  Skin:     General: Skin is warm and dry  Neurological:      Mental Status: She is alert and oriented to person, place, and time     Psychiatric:         Behavior: Behavior normal          Lab Results   Component Value Date     11 3 07/15/2020     Lab Results   Component Value Date    K 4 1 01/21/2020     01/21/2020    CO2 29 01/21/2020    BUN 11 07/15/2020    CREATININE 0 79 07/15/2020    GLUF 119 (H) 01/22/2018    CALCIUM 9 3 01/21/2020    AST 16 01/21/2020    ALT 18 01/21/2020    ALKPHOS 51 01/21/2020    EGFR 78 07/15/2020     Lab Results   Component Value Date    WBC 4 45 01/21/2020    HGB 12 2 01/21/2020    HCT 38 1 01/21/2020     (H) 01/21/2020     01/21/2020     Lab Results   Component Value Date    NEUTROABS 1 88 01/21/2020        Trend:  Lab Results   Component Value Date  11 3 07/15/2020     10 5 04/17/2020     12 2 01/21/2020     11 5 10/09/2019     11 9 09/04/2019     27 0 07/10/2019     24 8 05/10/2019     19 4 01/09/2019     10 2 10/04/2018     11 3 06/26/2018     10 3 06/05/2018     11 7 05/15/2018     14 7 04/24/2018     22 2 03/26/2018     67 2 (H) 03/05/2018     367 7 (H) 02/12/2018     534 6 (H) 01/04/2018

## 2020-08-11 NOTE — PROGRESS NOTES
Assessment/Plan:    Problem List Items Addressed This Visit        Genitourinary    Fallopian tube cancer, carcinoma ([facility] [address] ) - Primary     Patient has an asymptomatic recurrence of her fallopian tube cancer within the pelvis and in the perihepatic area  She has no symptoms her exam is unremarkable  The patient feels that she has recently recovered from her toxicity from chemotherapy and is not interested in undergoing chemotherapy again  She would however consider biological agents  I have explained that the biological agents may be as symptomatic with side effect as the chemotherapy itself  The patient does still remain interested  The patient has been genetic tested but does not appear to have genomic testing  We will refer arrange for [employer] testing and will see the patient back in  3-4 months with  and CT scan the patient will return earlier if symptoms warrant  Relevant Orders        BUN    Creatinine, serum    CT chest abdomen pelvis w contrast        Overall consultation took 25 minutes with greater than 50% dedicated toward discussion time    CHIEF COMPLAINT:  Surveillance ovarian cancer      Problem:  Cancer Staging  Fallopian tube cancer, carcinoma ([facility] [address] )  Staging form: Ovary, Fallopian Tube, Primary Peritoneal, AJCC 8th Edition  - Clinical stage from 4/2/2018: FIGO Stage IIIC - Signed by Becki Alcantara MD on 4/16/2018        Previous therapy:  Oncology History   Fallopian tube cancer, carcinoma (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  No gross residual disease       4/26/2018 - 6/7/2018 Chemotherapy    3 cycles of carboplatin AUC 5 and Taxol 175 mg/m2 Q 3 weeks (carboplatin dose dropped to AUC of 5 for neutropenia prior to cycle 3)     7/2018 Genetic Testing    Patient has genetic testing done for ovarian cancer multi panel  Results revealed patient has the following mutation(s):  None     Malignant neoplasm of ovary ([facility] [address] )   3/22/2018 Initial Diagnosis    Malignant neoplasm of ovary ([facility] [address] )     7/12/2019 - 10/24/2019 Chemotherapy    CARBOplatin (PARAPLATIN) 439 5 mg in sodium chloride 0 9 % 250 mL IVPB, 439 5 mg, Intravenous, Once, 5 of 5 cycles  Administration: 439 5 mg (7/12/2019), 443 5 mg (8/2/2019), 456 5 mg (8/23/2019), 475 5 mg (9/13/2019), 487 5 mg (10/4/2019)  PACLitaxel (TAXOL) 216 mg in sodium chloride 0 9 % 500 mL chemo IVPB, 135 mg/m2 = 216 mg (77 1 % of original dose 175 mg/m2), Intravenous, Once, 5 of 5 cycles  Dose modification: 135 mg/m2 (original dose 175 mg/m2, Cycle 1, Reason: Other (See Comments))  Administration: 216 mg (7/12/2019), 216 mg (8/2/2019), 213 6 mg (8/23/2019), 213 6 mg (9/13/2019), 216 mg (10/4/2019)           Patient ID: Irwin Smith is a 77 y o  female  Patient is very pleasant 29-year-old female with history of recurrent stage IIIC fallopian tube carcinoma  She was originally diagnosed in January of 2018 and underwent neoadjuvant chemotherapy followed by interval debulking followed by completion of chemotherapy with carboplatin paclitaxel  She developed a recurrence less than a year later was treated again with carboplatin paclitaxel between July and October of 2019  She elected to discontinue therapy and go on chemo holiday  She has been on that since that time  The patient  has remained without change  It has been between 10 and 11 since that time  The patient recently underwent a CT scan which reveals the following:  STOMACH AND BOWEL: Colonic diverticulosis    New mass along the left lateral wall of the sigmoid colon measuring 4 6 x 3 1 cm on image 2/97      APPENDIX:  No findings to suggest appendicitis      ABDOMINOPELVIC CAVITY:  Stable nodular implant superior to the liver just to the right of midline in the subdiaphragmatic location on image 2/51 measuring 14 x 11 mm  The nodular implant previously seen in the left pelvis has enlarged significantly on   image 2/92 measuring 2 2 x 1 4 cm (previous 1 5 x 0 8 cm)  Additional nodular implant in the pelvis on the left side more posteriorly measures 2 0 x 1 3 cm (previous 1 3 x 0 7 cm) on image 2/92      VESSELS:  Unremarkable for patient's age      PELVIS     REPRODUCTIVE ORGANS:  Patient is status post hysterectomy      URINARY BLADDER:  Unremarkable      ABDOMINAL WALL/INGUINAL REGIONS:  Unremarkable      OSSEOUS STRUCTURES:  No acute compression deformity or lytic lesion      IMPRESSION:     Enlarging implant in the left hemipelvis with a new mass along the lateral wall of the sigmoid colon measuring 3 1 x 4 6 cm likely a serosal implant      The study was marked in EPIC for immediate notification      Today, the patient is doing well  She denies significant abdominal pain, pelvic pain, nausea, vomiting, constipation, diarrhea, fevers, chills, or vaginal bleeding  Overall the patient feels well  Today, the patient is doing well  She denies significant abdominal pain, pelvic pain, nausea, vomiting, constipation, diarrhea, fevers, chills, or vaginal bleeding  The following portions of the patient's history were reviewed and updated as appropriate: allergies, current medications, past family history, past medical history, past surgical history and problem list     Review of Systems   Constitutional: Negative  HENT: Negative  Eyes: Negative  Respiratory: Negative  Cardiovascular: Negative  Gastrointestinal: Negative  Endocrine: Negative  Genitourinary: Negative  Musculoskeletal: Negative  Skin: Negative  Neurological: Negative  Hematological: Negative  Psychiatric/Behavioral: Negative  Current Outpatient Medications   Medication Sig Dispense Refill    Arnica 20 % TINC arnica      ascorbic acid (VITAMIN C) 250 mg tablet Take 250 mg by mouth daily      Calcium Carbonate (CALCIUM 600 PO) Take by mouth      Cholecalciferol (VITAMIN D3) 86032 units TABS Take by mouth      Lactobacillus (CVS PROBIOTIC ACIDOPHILUS PO) Take 1 capsule by mouth daily        Multiple Vitamins-Minerals (MULTIVITAMIN WITH MINERALS) tablet Take 1 tablet by mouth daily      ondansetron (ZOFRAN) 8 mg tablet Take 1 tablet (8 mg total) by mouth every 8 (eight) hours as needed for nausea or vomiting 20 tablet 1     No current facility-administered medications for this visit  Objective:    Blood pressure 140/88, pulse 66, temperature 98 2 °F (36 8 °C), resp  rate 16, height 5' 3 19" (1 605 m), weight 55 3 kg (122 lb)  Body mass index is 21 48 kg/m²  Body surface area is 1 57 meters squared  Physical Exam  Constitutional:       Appearance: She is well-developed  HENT:      Head: Normocephalic and atraumatic  Neck:      Musculoskeletal: Normal range of motion and neck supple  Thyroid: No thyromegaly  Cardiovascular:      Rate and Rhythm: Normal rate and regular rhythm  Heart sounds: Normal heart sounds  Pulmonary:      Effort: Pulmonary effort is normal       Breath sounds: Normal breath sounds  Abdominal:      General: Bowel sounds are normal       Palpations: Abdomen is soft  Comments: Well healed laparoscopic incisions  Genitourinary:     Comments: -Normal external female genitalia, normal Bartholin's and [place]'s glands                  -Normal midline urethral meatus   No lesions notes                  -Bladder without fullness mass or tenderness                  -Vagina without lesion or discharge No significant cystocele or rectocele noted                  -Cervix surgically absent                  -Uterus surgically absent                  -Adnexae surgically absent                  - Anus without fissure of lesion    Musculoskeletal: Normal range of motion  Lymphadenopathy:      Cervical: No cervical adenopathy  Skin:     General: Skin is warm and dry  Neurological:      Mental Status: She is alert and oriented to person, place, and time     Psychiatric:         Behavior: Behavior normal          Lab Results   Component Value Date     11 3 07/15/2020     Lab Results   Component Value Date    K 4 1 01/21/2020     01/21/2020    CO2 29 01/21/2020    BUN 11 07/15/2020    CREATININE 0 79 07/15/2020    GLUF 119 (H) 01/22/2018    CALCIUM 9 3 01/21/2020    AST 16 01/21/2020    ALT 18 01/21/2020    ALKPHOS 51 01/21/2020    EGFR 78 07/15/2020     Lab Results   Component Value Date    WBC 4 45 01/21/2020    HGB 12 2 01/21/2020    HCT 38 1 01/21/2020     (H) 01/21/2020     01/21/2020     Lab Results   Component Value Date    NEUTROABS 1 88 01/21/2020        Trend:  Lab Results   Component Value Date     11 3 07/15/2020     10 5 04/17/2020     12 2 01/21/2020     11 5 10/09/2019     11 9 09/04/2019     27 0 07/10/2019     24 8 05/10/2019     19 4 01/09/2019     10 2 10/04/2018     11 3 06/26/2018     10 3 06/05/2018     11 7 05/15/2018     14 7 04/24/2018     22 2 03/26/2018     67 2 (H) 03/05/2018     367 7 (H) 02/12/2018     534 6 (H) 01/04/2018

## 2020-08-14 ENCOUNTER — TELEPHONE (OUTPATIENT)
Dept: GYNECOLOGIC ONCOLOGY | Facility: CLINIC | Age: 65
End: 2020-08-14

## 2020-08-14 NOTE — TELEPHONE ENCOUNTER
Application submitted to [facility] for genetic testing on patient's tumor specimen from 4/2/2018

## 2021-02-11 ENCOUNTER — LAB (OUTPATIENT)
Dept: LAB | Facility: CLINIC | Age: 66
End: 2021-02-11
Payer: MEDICARE

## 2021-02-11 DIAGNOSIS — C57.02 CARCINOMA OF LEFT FALLOPIAN TUBE (HCC): ICD-10-CM

## 2021-02-11 LAB
BUN SERPL-MCNC: 12 MG/DL (ref 5–25)
CANCER AG125 SERPL-ACNC: 18 U/ML (ref 0–30)
CREAT SERPL-MCNC: 0.78 MG/DL (ref 0.6–1.3)
GFR SERPL CREATININE-BSD FRML MDRD: 79 ML/MIN/1.73SQ M

## 2021-02-11 PROCEDURE — 86304 IMMUNOASSAY TUMOR CA 125: CPT

## 2021-02-11 PROCEDURE — 82565 ASSAY OF CREATININE: CPT

## 2021-02-11 PROCEDURE — 84520 ASSAY OF UREA NITROGEN: CPT

## 2021-02-11 PROCEDURE — 36415 COLL VENOUS BLD VENIPUNCTURE: CPT

## 2021-03-10 ENCOUNTER — HOSPITAL ENCOUNTER (OUTPATIENT)
Dept: CT IMAGING | Facility: HOSPITAL | Age: 66
Discharge: HOME | End: 2021-03-10
Attending: OBSTETRICS & GYNECOLOGY
Payer: MEDICARE

## 2021-03-10 DIAGNOSIS — C57.02 CARCINOMA OF LEFT FALLOPIAN TUBE (HCC): ICD-10-CM

## 2021-03-10 PROCEDURE — G1004 CDSM NDSC: HCPCS

## 2021-03-10 PROCEDURE — 71260 CT THORAX DX C+: CPT

## 2021-03-10 PROCEDURE — 74177 CT ABD & PELVIS W/CONTRAST: CPT

## 2021-03-10 RX ADMIN — IOHEXOL 100 ML: 350 INJECTION, SOLUTION INTRAVENOUS at 14:38

## 2021-03-17 ENCOUNTER — OFFICE VISIT (OUTPATIENT)
Dept: GYNECOLOGIC ONCOLOGY | Facility: CLINIC | Age: 66
End: 2021-03-17
Payer: MEDICARE

## 2021-03-17 VITALS
HEIGHT: 63 IN | DIASTOLIC BLOOD PRESSURE: 86 MMHG | HEART RATE: 76 BPM | TEMPERATURE: 98.1 F | WEIGHT: 124 LBS | SYSTOLIC BLOOD PRESSURE: 124 MMHG | BODY MASS INDEX: 21.97 KG/M2 | RESPIRATION RATE: 16 BRPM

## 2021-03-17 DIAGNOSIS — C56.9 MALIGNANT NEOPLASM OF OVARY, UNSPECIFIED LATERALITY (HCC): Primary | ICD-10-CM

## 2021-03-17 DIAGNOSIS — C57.02 CARCINOMA OF LEFT FALLOPIAN TUBE (HCC): ICD-10-CM

## 2021-03-17 PROCEDURE — 99214 OFFICE O/P EST MOD 30 MIN: CPT | Performed by: OBSTETRICS & GYNECOLOGY

## 2021-03-17 NOTE — ASSESSMENT & PLAN NOTE
Patient is very pleasant 53-year-old female with a history of stage IIIC recurrent ovarian carcinoma diagnosed in April of 2018  She has recurrence and has declined further treatment  I have expressed my concern about possible impending bowel obstruction based on CT scan alone however the patient's symptoms of bowel function are actually improving over the past several months  as the patient is no longer interested in treatment we have discussed whether she feels comfortable with follow-up or not and at what level  The patient does feel comfortable with follow-up in does feel comfortable with   She would like to follow-up in 3 months for repeat evaluation with repeat  at that time  If the patient develops significant bowel symptoms we will see her prior to that  I have discussed the possibility of a colostomy and the patient is not interested at this time

## 2021-03-17 NOTE — PROGRESS NOTES
Assessment/Plan:    Problem List Items Addressed This Visit        Endocrine    Malignant neoplasm of ovary ([facility] [address] ) - Primary    Relevant Orders           Genitourinary    Fallopian tube cancer, carcinoma ([facility] [address] )      Patient is very pleasant 47-year-old female with a history of stage IIIC recurrent ovarian carcinoma diagnosed in April of 2018  She has recurrence and has declined further treatment  I have expressed my concern about possible impending bowel obstruction based on CT scan alone however the patient's symptoms of bowel function are actually improving over the past several months  as the patient is no longer interested in treatment we have discussed whether she feels comfortable with follow-up or not and at what level  The patient does feel comfortable with follow-up in does feel comfortable with   She would like to follow-up in 3 months for repeat evaluation with repeat  at that time  If the patient develops significant bowel symptoms we will see her prior to that  I have discussed the possibility of a colostomy and the patient is not interested at this time  CHIEF COMPLAINT:   Recurrent ovarian cancer for surveillance      Problem:  Cancer Staging  Fallopian tube cancer, carcinoma ([facility] [address] )  Staging form: Ovary, Fallopian Tube, Primary Peritoneal, AJCC 8th Edition  - Clinical stage from 4/2/2018: FIGO Stage IIIC - Signed by Kamille Tierney MD on 4/16/2018        Previous therapy:  Oncology History   Fallopian tube cancer, carcinoma ([facility] [address] )   1/4/2018 Initial Diagnosis    Paracentesis revealing adenocarcinoma of gynecologic origin     1/25/2018 - 3/8/2018 Chemotherapy    3 cycles of carboplatin AUC 6 and taxol 175 mg/m2 squared Q 3 weeks  Pretreatment Ca 125: 534 6      4/2/2018 Surgery    Exploratory laparotomy, total abdominal hysterectomy, bilateral salpingo-oophorectomy, omentectomy, appendectomy  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    No gross residual disease  4/26/2018 - 6/7/2018 Chemotherapy    3 cycles of carboplatin AUC 5 and Taxol 175 mg/m2 Q 3 weeks (carboplatin dose dropped to AUC of 5 for neutropenia prior to cycle 3)     7/2018 Genetic Testing    Patient has genetic testing done for ovarian cancer multi panel  Results revealed patient has the following mutation(s):  None     Malignant neoplasm of ovary ([facility] [address] )   3/22/2018 Initial Diagnosis    Malignant neoplasm of ovary ([facility] [address] )     7/12/2019 - 10/24/2019 Chemotherapy    CARBOplatin (PARAPLATIN) 439 5 mg in sodium chloride 0 9 % 250 mL IVPB, 439 5 mg, Intravenous, Once, 5 of 5 cycles  Administration: 439 5 mg (7/12/2019), 443 5 mg (8/2/2019), 456 5 mg (8/23/2019), 475 5 mg (9/13/2019), 487 5 mg (10/4/2019)  PACLitaxel (TAXOL) 216 mg in sodium chloride 0 9 % 500 mL chemo IVPB, 135 mg/m2 = 216 mg (77 1 % of original dose 175 mg/m2), Intravenous, Once, 5 of 5 cycles  Dose modification: 135 mg/m2 (original dose 175 mg/m2, Cycle 1, Reason: Other (See Comments))  Administration: 216 mg (7/12/2019), 216 mg (8/2/2019), 213 6 mg (8/23/2019), 213 6 mg (9/13/2019), 216 mg (10/4/2019)           Patient ID: Bianca Diez is a 74 y o  female    Patient is very pleasant 57-year-old female with a history of stage IIIC ovarian carcinoma diagnosed in January of 2018  She underwent initial chemotherapy  With carboplatin paclitaxeland tumor debulking and recurred  Approximately 1 year post treatment  She underwent a 2nd  of chemotherapy with carboplatin based chemotherapy and only completed 5 cycles  At that point she elected no further therapy  Her  has been stable since that time despite no treatment she  She was last seen 6 months ago  Her  was approximately 10-11  In February this went to 18  A follow-up CT scan was performed which reveals the following:   ABDOMINOPELVIC CAVITY:    1 5 x 1 1 cm nodule anterior to the left hepatic lobe in image 51 is unchanged    The left pelvic soft tissue nodule seen previously are now obscured by the increased sigmoid mass measuring 6 5 x 3 5 cm with predominant extension intraluminally  Small nodules in the adjacent mesentery seen in series 2 image 93 measuring 8 mm      1 3 cm nodule in the upper pelvic mesentery in series 2 image 86  1 2 cm nodule upper pelvic mesentery in series 2 image 88  New external iliac lymph nodes measuring 2 0 x 1 5 cm and 2 3 x 1 4 cm  New left para-aortic lymph node measuring 2 2 x 1 0 x 5 0 cm in series 2 image 63     No ascites  No pneumoperitoneum           VESSELS:  Unremarkable for patient's age      PELVIS     REPRODUCTIVE ORGANS:  Surgical changes of prior hysterectomy      URINARY BLADDER:  Unremarkable      ABDOMINAL WALL/INGUINAL REGIONS:  Unremarkable      OSSEOUS STRUCTURES:  No acute fracture or destructive osseous lesion      IMPRESSION:     1  Increasing left pelvic serosal implant invading and involving the sigmoid lumen with no obstruction  Surrounding multiple nodular implants seen extending up to the upper pelvis  Unchanged perihepatic nodular implant         2  New left external iliac pelvic lymphadenopathy and left para-aortic lymph node        Today, the patient is doing well  She denies significant abdominal pain, pelvic pain, nausea, vomiting, constipation, diarrhea, fevers, chills, or vaginal bleeding  She is feeling very well  She has declined any further chemotherapy at this time  And in the future  She is tolerating the loss of her  in grieving appropriately  tempus study was sent and no actionable mutation or immuno Oncology medications are identified  The following portions of the patient's history were reviewed and updated as appropriate: allergies, current medications, past family history, past social history, past surgical history and problem list     Review of Systems   Constitutional: Negative  HENT: Negative  Eyes: Negative  Respiratory: Negative  Cardiovascular: Negative  Gastrointestinal: Negative  Endocrine: Negative  Genitourinary: Negative  Musculoskeletal: Negative  Skin: Negative  Neurological: Negative  Hematological: Negative  Psychiatric/Behavioral: Negative  Current Outpatient Medications   Medication Sig Dispense Refill    Arnica 20 % TINC arnica      ascorbic acid (VITAMIN C) 250 mg tablet Take 250 mg by mouth daily      Calcium Carbonate (CALCIUM 600 PO) Take by mouth      Cholecalciferol (VITAMIN D3) 05095 units TABS Take by mouth      Lactobacillus (CVS PROBIOTIC ACIDOPHILUS PO) Take 1 capsule by mouth daily        Multiple Vitamins-Minerals (MULTIVITAMIN WITH MINERALS) tablet Take 1 tablet by mouth daily      ondansetron (ZOFRAN) 8 mg tablet Take 1 tablet (8 mg total) by mouth every 8 (eight) hours as needed for nausea or vomiting 20 tablet 1     No current facility-administered medications for this visit  Objective:    Blood pressure 124/86, pulse 76, temperature 98 1 °F (36 7 °C), resp  rate 16, height 5' 3 19" (1 605 m), weight 56 2 kg (124 lb)  Body mass index is 21 83 kg/m²  Body surface area is 1 58 meters squared  Physical Exam  Constitutional:       Appearance: She is well-developed  HENT:      Head: Normocephalic and atraumatic  Neck:      Musculoskeletal: Normal range of motion and neck supple  Thyroid: No thyromegaly  Cardiovascular:      Rate and Rhythm: Normal rate and regular rhythm  Heart sounds: Normal heart sounds  Pulmonary:      Effort: Pulmonary effort is normal       Breath sounds: Normal breath sounds  Abdominal:      General: Bowel sounds are normal       Palpations: Abdomen is soft  Comments: Well healed laparoscopic incisions  Genitourinary:     Comments: -Normal external female genitalia, normal Bartholin's and [place]'s glands                  -Normal midline urethral meatus   No lesions notes                  -Bladder without fullness mass or tenderness                  -Vagina without lesion or discharge No significant cystocele or rectocele noted                  -Cervix surgically absent                  -Uterus surgically absent                  -Adnexae surgically absent                  - Anus without fissure of lesion  A 5 by 5 cm mass at the level of the sigmoid colon is identified  It is palpable and mobile and nontender  It is consistent with the lesion identified on CT scan  Musculoskeletal: Normal range of motion  Lymphadenopathy:      Cervical: No cervical adenopathy  Skin:     General: Skin is warm and dry  Neurological:      Mental Status: She is alert and oriented to person, place, and time     Psychiatric:         Behavior: Behavior normal          Lab Results   Component Value Date     18 0 02/11/2021     Lab Results   Component Value Date    K 4 1 01/21/2020     01/21/2020    CO2 29 01/21/2020    BUN 12 02/11/2021    CREATININE 0 78 02/11/2021    GLUF 119 (H) 01/22/2018    CALCIUM 9 3 01/21/2020    AST 16 01/21/2020    ALT 18 01/21/2020    ALKPHOS 51 01/21/2020    EGFR 79 02/11/2021     Lab Results   Component Value Date    WBC 4 45 01/21/2020    HGB 12 2 01/21/2020    HCT 38 1 01/21/2020     (H) 01/21/2020     01/21/2020     Lab Results   Component Value Date    NEUTROABS 1 88 01/21/2020        Trend:  Lab Results   Component Value Date     18 0 02/11/2021     11 3 07/15/2020     10 5 04/17/2020     12 2 01/21/2020     11 5 10/09/2019     11 9 09/04/2019     27 0 07/10/2019     24 8 05/10/2019     19 4 01/09/2019     10 2 10/04/2018     11 3 06/26/2018     10 3 06/05/2018     11 7 05/15/2018     14 7 04/24/2018     22 2 03/26/2018     67 2 (H) 03/05/2018     367 7 (H) 02/12/2018     534 6 (H) 01/04/2018

## 2021-03-17 NOTE — LETTER
March 17, 2021     [address] [place] [address]    Patient: Josué Reyes   YOB: 1955   Date of Visit: 3/17/2021       Dear Dr Lucy Paul: Thank you for referring Josué Reyes to me for evaluation  Below are my notes for this consultation  If you have questions, please do not hesitate to call me  I look forward to following your patient along with you  Sincerely,        Charla Wahl MD        CC: No Recipients  Charla Wahl MD  3/17/2021  2:57 PM  Sign when Signing Visit  Assessment/Plan:    Problem List Items Addressed This Visit        Endocrine    Malignant neoplasm of ovary [facility]) - Primary    Relevant Orders           Genitourinary    Fallopian tube cancer, carcinoma ([facility] [address] )      Patient is very pleasant 68-year-old female with a history of stage IIIC recurrent ovarian carcinoma diagnosed in April of 2018  She has recurrence and has declined further treatment  I have expressed my concern about possible impending bowel obstruction based on CT scan alone however the patient's symptoms of bowel function are actually improving over the past several months  as the patient is no longer interested in treatment we have discussed whether she feels comfortable with follow-up or not and at what level  The patient does feel comfortable with follow-up in does feel comfortable with   She would like to follow-up in 3 months for repeat evaluation with repeat  at that time  If the patient develops significant bowel symptoms we will see her prior to that  I have discussed the possibility of a colostomy and the patient is not interested at this time                   CHIEF COMPLAINT:   Recurrent ovarian cancer for surveillance      Problem:  Cancer Staging  Fallopian tube cancer, carcinoma ([facility] [address] )  Staging form: Ovary, Fallopian Tube, Primary Peritoneal, AJCC 8th Edition  - Clinical stage from 4/2/2018: FIGO Stage IIIC - Signed by Mikhail Payal Skinner MD on 4/16/2018        Previous therapy:  Oncology History   Fallopian tube cancer, carcinoma ([facility] [address] )   1/4/2018 Initial Diagnosis    Paracentesis revealing adenocarcinoma of gynecologic origin     1/25/2018 - 3/8/2018 Chemotherapy    3 cycles of carboplatin AUC 6 and taxol 175 mg/m2 squared Q 3 weeks  Pretreatment Ca 125: 534 6      4/2/2018 Surgery    Exploratory laparotomy, total abdominal hysterectomy, bilateral salpingo-oophorectomy, omentectomy, appendectomy  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  No gross residual disease  4/26/2018 - 6/7/2018 Chemotherapy    3 cycles of carboplatin AUC 5 and Taxol 175 mg/m2 Q 3 weeks (carboplatin dose dropped to AUC of 5 for neutropenia prior to cycle 3)     7/2018 Genetic Testing    Patient has genetic testing done for ovarian cancer multi panel  Results revealed patient has the following mutation(s):  None     Malignant neoplasm of ovary ([facility] [address] )   3/22/2018 Initial Diagnosis    Malignant neoplasm of ovary ([facility] [address] )     7/12/2019 - 10/24/2019 Chemotherapy    CARBOplatin (PARAPLATIN) 439 5 mg in sodium chloride 0 9 % 250 mL IVPB, 439 5 mg, Intravenous, Once, 5 of 5 cycles  Administration: 439 5 mg (7/12/2019), 443 5 mg (8/2/2019), 456 5 mg (8/23/2019), 475 5 mg (9/13/2019), 487 5 mg (10/4/2019)  PACLitaxel (TAXOL) 216 mg in sodium chloride 0 9 % 500 mL chemo IVPB, 135 mg/m2 = 216 mg (77 1 % of original dose 175 mg/m2), Intravenous, Once, 5 of 5 cycles  Dose modification: 135 mg/m2 (original dose 175 mg/m2, Cycle 1, Reason: Other (See Comments))  Administration: 216 mg (7/12/2019), 216 mg (8/2/2019), 213 6 mg (8/23/2019), 213 6 mg (9/13/2019), 216 mg (10/4/2019)           Patient ID: Marianne Delong is a 74 y o  female    Patient is very pleasant 68-year-old female with a history of stage IIIC ovarian carcinoma diagnosed in January of 2018    She underwent initial chemotherapy  With carboplatin paclitaxeland tumor debulking and recurred  Approximately 1 year post treatment  She underwent a 2nd  of chemotherapy with carboplatin based chemotherapy and only completed 5 cycles  At that point she elected no further therapy  Her  has been stable since that time despite no treatment she  She was last seen 6 months ago  Her  was approximately 10-11  In February this went to 18  A follow-up CT scan was performed which reveals the following:   ABDOMINOPELVIC CAVITY:    1 5 x 1 1 cm nodule anterior to the left hepatic lobe in image 51 is unchanged  The left pelvic soft tissue nodule seen previously are now obscured by the increased sigmoid mass measuring 6 5 x 3 5 cm with predominant extension intraluminally  Small nodules in the adjacent mesentery seen in series 2 image 93 measuring 8 mm      1 3 cm nodule in the upper pelvic mesentery in series 2 image 86  1 2 cm nodule upper pelvic mesentery in series 2 image 88  New external iliac lymph nodes measuring 2 0 x 1 5 cm and 2 3 x 1 4 cm  New left para-aortic lymph node measuring 2 2 x 1 0 x 5 0 cm in series 2 image 63     No ascites  No pneumoperitoneum           VESSELS:  Unremarkable for patient's age      PELVIS     REPRODUCTIVE ORGANS:  Surgical changes of prior hysterectomy      URINARY BLADDER:  Unremarkable      ABDOMINAL WALL/INGUINAL REGIONS:  Unremarkable      OSSEOUS STRUCTURES:  No acute fracture or destructive osseous lesion      IMPRESSION:     1  Increasing left pelvic serosal implant invading and involving the sigmoid lumen with no obstruction  Surrounding multiple nodular implants seen extending up to the upper pelvis  Unchanged perihepatic nodular implant         2  New left external iliac pelvic lymphadenopathy and left para-aortic lymph node        Today, the patient is doing well  She denies significant abdominal pain, pelvic pain, nausea, vomiting, constipation, diarrhea, fevers, chills, or vaginal bleeding  She is feeling very well    She has declined any further chemotherapy at this time  And in the future  She is tolerating the loss of her  in grieving appropriately  tempus study was sent and no actionable mutation or immuno Oncology medications are identified  The following portions of the patient's history were reviewed and updated as appropriate: allergies, current medications, past family history, past social history, past surgical history and problem list     Review of Systems   Constitutional: Negative  HENT: Negative  Eyes: Negative  Respiratory: Negative  Cardiovascular: Negative  Gastrointestinal: Negative  Endocrine: Negative  Genitourinary: Negative  Musculoskeletal: Negative  Skin: Negative  Neurological: Negative  Hematological: Negative  Psychiatric/Behavioral: Negative  Current Outpatient Medications   Medication Sig Dispense Refill    Arnica 20 % TINC arnica      ascorbic acid (VITAMIN C) 250 mg tablet Take 250 mg by mouth daily      Calcium Carbonate (CALCIUM 600 PO) Take by mouth      Cholecalciferol (VITAMIN D3) 83673 units TABS Take by mouth      Lactobacillus (CVS PROBIOTIC ACIDOPHILUS PO) Take 1 capsule by mouth daily        Multiple Vitamins-Minerals (MULTIVITAMIN WITH MINERALS) tablet Take 1 tablet by mouth daily      ondansetron (ZOFRAN) 8 mg tablet Take 1 tablet (8 mg total) by mouth every 8 (eight) hours as needed for nausea or vomiting 20 tablet 1     No current facility-administered medications for this visit  Objective:    Blood pressure 124/86, pulse 76, temperature 98 1 °F (36 7 °C), resp  rate 16, height 5' 3 19" (1 605 m), weight 56 2 kg (124 lb)  Body mass index is 21 83 kg/m²  Body surface area is 1 58 meters squared  Physical Exam  Constitutional:       Appearance: She is well-developed  HENT:      Head: Normocephalic and atraumatic  Neck:      Musculoskeletal: Normal range of motion and neck supple  Thyroid: No thyromegaly     Cardiovascular:      Rate and Rhythm: Normal rate and regular rhythm  Heart sounds: Normal heart sounds  Pulmonary:      Effort: Pulmonary effort is normal       Breath sounds: Normal breath sounds  Abdominal:      General: Bowel sounds are normal       Palpations: Abdomen is soft  Comments: Well healed laparoscopic incisions  Genitourinary:     Comments: -Normal external female genitalia, normal Bartholin's and [place]'s glands                  -Normal midline urethral meatus  No lesions notes                  -Bladder without fullness mass or tenderness                  -Vagina without lesion or discharge No significant cystocele or rectocele noted                  -Cervix surgically absent                  -Uterus surgically absent                  -Adnexae surgically absent                  - Anus without fissure of lesion  A 5 by 5 cm mass at the level of the sigmoid colon is identified  It is palpable and mobile and nontender  It is consistent with the lesion identified on CT scan  Musculoskeletal: Normal range of motion  Lymphadenopathy:      Cervical: No cervical adenopathy  Skin:     General: Skin is warm and dry  Neurological:      Mental Status: She is alert and oriented to person, place, and time     Psychiatric:         Behavior: Behavior normal          Lab Results   Component Value Date     18 0 02/11/2021     Lab Results   Component Value Date    K 4 1 01/21/2020     01/21/2020    CO2 29 01/21/2020    BUN 12 02/11/2021    CREATININE 0 78 02/11/2021    GLUF 119 (H) 01/22/2018    CALCIUM 9 3 01/21/2020    AST 16 01/21/2020    ALT 18 01/21/2020    ALKPHOS 51 01/21/2020    EGFR 79 02/11/2021     Lab Results   Component Value Date    WBC 4 45 01/21/2020    HGB 12 2 01/21/2020    HCT 38 1 01/21/2020     (H) 01/21/2020     01/21/2020     Lab Results   Component Value Date    NEUTROABS 1 88 01/21/2020        Trend:  Lab Results   Component Value Date     18 0 02/11/2021     11 3 07/15/2020     10 5 04/17/2020     12 2 01/21/2020     11 5 10/09/2019     11 9 09/04/2019     27 0 07/10/2019     24 8 05/10/2019     19 4 01/09/2019     10 2 10/04/2018     11 3 06/26/2018     10 3 06/05/2018     11 7 05/15/2018     14 7 04/24/2018     22 2 03/26/2018     67 2 (H) 03/05/2018     367 7 (H) 02/12/2018     534 6 (H) 01/04/2018

## 2021-03-18 ENCOUNTER — TELEPHONE (OUTPATIENT)
Dept: GYNECOLOGIC ONCOLOGY | Facility: CLINIC | Age: 66
End: 2021-03-18

## 2021-03-18 NOTE — TELEPHONE ENCOUNTER
Daughter has questions about Chantel's latest CT that she wasn't able to ask at her mom's appointment yesterday  She would like a call back when available

## 2021-04-08 ENCOUNTER — OFFICE VISIT (OUTPATIENT)
Dept: PALLIATIVE MEDICINE | Facility: CLINIC | Age: 66
End: 2021-04-08
Payer: MEDICARE

## 2021-04-08 VITALS — BODY MASS INDEX: 21.52 KG/M2 | TEMPERATURE: 98.7 F | HEART RATE: 60 BPM | WEIGHT: 122.2 LBS | RESPIRATION RATE: 20 BRPM

## 2021-04-08 DIAGNOSIS — Z79.899 MEDICAL MARIJUANA USE: ICD-10-CM

## 2021-04-08 DIAGNOSIS — G47.9 DIFFICULTY SLEEPING: ICD-10-CM

## 2021-04-08 DIAGNOSIS — C57.02 CARCINOMA OF LEFT FALLOPIAN TUBE (HCC): Primary | ICD-10-CM

## 2021-04-08 PROCEDURE — 99213 OFFICE O/P EST LOW 20 MIN: CPT | Performed by: INTERNAL MEDICINE

## 2021-04-08 NOTE — PROGRESS NOTES
Palliative and Supportive Care   Yu Smith 74 y o  female [phone]    Assessment/Plan:  1  Carcinoma of left fallopian tube ([address] )    2  Medical marijuana use    3  Difficulty sleeping      Close follow up as patient is not pursuing any traditional disease directed treatment  Discussed her dharmesh and provided support with the passing of her   MMJ as below  The patient qualifies for use of MMJ in the state [facility] by having the following medical condition - cancer  From a palliative care stand point the patient is suffering with insomnia  These symptoms and side effects might be alleviated with use of MMJ products  The patient registered online  The patient read and I reviewed the informed consent document with the patient  I answered all questions related to it before the patient signed it  The patient's medical certification was completed on this date  The patient was given a signed copy of the informed consent and medical certification  I issued a certification for MMJ use with palliative intent -  To help alleviate cancer related symptoms and cancer treatment related side effects  I do not endorse the belief that MMJ can treat cancer and strongly encouraged the patient to continue treatments and surveillance as recommend by cancer specialists  Requested Prescriptions      No prescriptions requested or ordered in this encounter     Medications Discontinued During This Encounter   Medication Reason    ondansetron (ZOFRAN) 8 mg tablet        Representatives have queried the patient's controlled substance dispensing history in the Prescription Drug Monitoring Program in compliance with regulations before I have prescribed any controlled substances  The prescription history is consistent with prescribed therapy and our practice policies        15 minutes were spent face to face with Yu Smith with greater than 50% of the time spent in counseling or coordination of care including discussions of treatment instructions   All of the patient's questions were answered during this discussion  No follow-ups on file  Subjective:   Chief Complaint  Follow up visit for:  symptom management  HPI     Gunnar Shaver is a 74 y o  female with fallopian tube cancer following with Dr Chandra Cedeño, not currently on disease-directed therapies through his office, who presents for follow up  She reports feeling overall well physically  No pain  No constipation  No nausea  Eating well  Doreen Lowry continues to grieve the loss of her  Junior Wyatt (also a patient of mine) and we spent time discussing this including her dharmesh and family as a strong support  The following portions of the medical history were reviewed: past medical history, problem list, medication list, and social history  Current Outpatient Medications:     Arnica 20 % TINC, arnica, Disp: , Rfl:     ascorbic acid (VITAMIN C) 250 mg tablet, Take 250 mg by mouth daily, Disp: , Rfl:     Calcium Carbonate (CALCIUM 600 PO), Take by mouth, Disp: , Rfl:     Cholecalciferol (VITAMIN D3) 10530 units TABS, Take by mouth, Disp: , Rfl:     Lactobacillus (CVS PROBIOTIC ACIDOPHILUS PO), Take 1 capsule by mouth daily  , Disp: , Rfl:     Multiple Vitamins-Minerals (MULTIVITAMIN WITH MINERALS) tablet, Take 1 tablet by mouth daily, Disp: , Rfl:     Objective:  Vital Signs  Pulse 60   Temp 98 7 °F (37 1 °C) (Temporal)   Resp 20   Wt 55 4 kg (122 lb 3 2 oz)   BMI 21 52 kg/m²    Physical Exam    Constitutional: Appears thin but well-nourished  In no acute distress  Head: Normocephalic and atraumatic  Eyes: EOM are normal  Right eye exhibits no discharge  Left eye exhibits no discharge  No scleral icterus  Pulmonary/Chest: Effort normal  No stridor  No respiratory distress  Abdominal: No distension  Musculoskeletal: No edema  Neurological: Alert, oriented and appropriately conversant  Skin: Skin is dry, not diaphoretic     Psychiatric: Displays a normal mood and affect  Behavior, judgement and thought content appear normal    Vitals reviewed

## 2021-06-28 ENCOUNTER — APPOINTMENT (OUTPATIENT)
Dept: LAB | Facility: CLINIC | Age: 66
End: 2021-06-28
Payer: MEDICARE

## 2021-06-28 DIAGNOSIS — C56.9 MALIGNANT NEOPLASM OF OVARY, UNSPECIFIED LATERALITY (HCC): ICD-10-CM

## 2021-06-28 LAB — CANCER AG125 SERPL-ACNC: 34.5 U/ML (ref 0–30)

## 2021-06-28 PROCEDURE — 86304 IMMUNOASSAY TUMOR CA 125: CPT

## 2021-06-28 PROCEDURE — 36415 COLL VENOUS BLD VENIPUNCTURE: CPT

## 2021-06-30 ENCOUNTER — OFFICE VISIT (OUTPATIENT)
Dept: GYNECOLOGIC ONCOLOGY | Facility: CLINIC | Age: 66
End: 2021-06-30
Payer: MEDICARE

## 2021-06-30 VITALS
WEIGHT: 116 LBS | SYSTOLIC BLOOD PRESSURE: 118 MMHG | HEART RATE: 81 BPM | HEIGHT: 63 IN | BODY MASS INDEX: 20.55 KG/M2 | TEMPERATURE: 98.8 F | RESPIRATION RATE: 16 BRPM | DIASTOLIC BLOOD PRESSURE: 78 MMHG

## 2021-06-30 DIAGNOSIS — C57.02 CARCINOMA OF LEFT FALLOPIAN TUBE (HCC): ICD-10-CM

## 2021-06-30 DIAGNOSIS — C56.9 MALIGNANT NEOPLASM OF OVARY, UNSPECIFIED LATERALITY (HCC): Primary | ICD-10-CM

## 2021-06-30 PROCEDURE — 99214 OFFICE O/P EST MOD 30 MIN: CPT | Performed by: OBSTETRICS & GYNECOLOGY

## 2021-06-30 NOTE — LETTER
June 30, 2021     [address], [address] [address]    Patient: Rubina Winn   YOB: 1955   Date of Visit: 6/30/2021       Dear Dr Luis Mathew: Thank you for referring Rubina Winn to me for evaluation  Below are my notes for this consultation  If you have questions, please do not hesitate to call me  I look forward to following your patient along with you  Sincerely,        Maribel Naik MD        CC: No Recipients  Maribel Naik MD  6/30/2021  3:53 PM  Sign when Signing Visit  Assessment/Plan:    Problem List Items Addressed This Visit        Endocrine    Malignant neoplasm of ovary ([address] ) - Primary    Relevant Orders    CT chest abdomen pelvis w contrast        BUN    Creatinine, serum       Genitourinary    Fallopian tube cancer, carcinoma ([address] )       Patient is very pleasant 68-year-old female with history of stage IIIC ovarian cancer diagnosed in early 2018  She has evidence of progression of disease and recurrence by exam and by   We discussed with the patient consideration of treatment  She has expressed some interest but at this time has her mother's 90th birthday and her 50th high school graduation coming up within the next several months  She would like to hold off until then  As she is asymptomatic I think this is reasonable  We have recommended  and CT scan in approximately 3 months  If significant progression of disease is noted we would likely recommend  Re-initiation of chemotherapy at that time  If the patient does  Develops symptoms we have asked her to call us before then                   CHIEF COMPLAINT:  Surveillance ovarian cancer      Problem:  Cancer Staging  Fallopian tube cancer, carcinoma ([address] )  Staging form: Ovary, Fallopian Tube, Primary Peritoneal, AJCC 8th Edition  - Clinical stage from 4/2/2018: FIGO Stage IIIC - Signed by Bria Bowens MD on 4/16/2018        Previous therapy:  Oncology History   Fallopian tube cancer, carcinoma ([facility] [address] )   1/4/2018 Initial Diagnosis    Paracentesis revealing adenocarcinoma of gynecologic origin     1/25/2018 - 3/8/2018 Chemotherapy    3 cycles of carboplatin AUC 6 and taxol 175 mg/m2 squared Q 3 weeks  Pretreatment Ca 125: 534 6      4/2/2018 Surgery    Exploratory laparotomy, total abdominal hysterectomy, bilateral salpingo-oophorectomy, omentectomy, appendectomy  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  No gross residual disease  4/26/2018 - 6/7/2018 Chemotherapy    3 cycles of carboplatin AUC 5 and Taxol 175 mg/m2 Q 3 weeks (carboplatin dose dropped to AUC of 5 for neutropenia prior to cycle 3)     7/2018 Genetic Testing    Patient has genetic testing done for ovarian cancer multi panel  Results revealed patient has the following mutation(s):  None     Malignant neoplasm of ovary ([facility] [address] )   3/22/2018 Initial Diagnosis    Malignant neoplasm of ovary ([facility] [address] )     7/12/2019 - 10/24/2019 Chemotherapy    CARBOplatin (PARAPLATIN) 439 5 mg in sodium chloride 0 9 % 250 mL IVPB, 439 5 mg, Intravenous, Once, 5 of 5 cycles  Administration: 439 5 mg (7/12/2019), 443 5 mg (8/2/2019), 456 5 mg (8/23/2019), 475 5 mg (9/13/2019), 487 5 mg (10/4/2019)  PACLitaxel (TAXOL) 216 mg in sodium chloride 0 9 % 500 mL chemo IVPB, 135 mg/m2 = 216 mg (77 1 % of original dose 175 mg/m2), Intravenous, Once, 5 of 5 cycles  Dose modification: 135 mg/m2 (original dose 175 mg/m2, Cycle 1, Reason: Other (See Comments))  Administration: 216 mg (7/12/2019), 216 mg (8/2/2019), 213 6 mg (8/23/2019), 213 6 mg (9/13/2019), 216 mg (10/4/2019)           Patient ID: Maine Carrasco is a 74 y o  female   Patient is very pleasant 23-year-old female with history of stage IIIC ovarian carcinoma diagnosed in 2018  She underwent neoadjuvant chemotherapy then surgery and completion of therapy  She was in remission for some time    The patient was diagnosed with recurrence approximately 6 months ago but was not interested in chemotherapy  We have been following her  which has been relatively stable  Most recently is increased to approximately 29  The patient remains well  She is asymptomatic  Today, the patient is doing well  She denies significant abdominal pain, pelvic pain, nausea, vomiting, constipation, diarrhea, fevers, chills, or vaginal bleeding  She states that she may be interested in chemotherapy at some point but still does not feel ready  She would prefer to hold off at this time  The following portions of the patient's history were reviewed and updated as appropriate: allergies, current medications, past family history, past social history, past surgical history and problem list     Review of Systems   Constitutional: Negative  HENT: Negative  Eyes: Negative  Respiratory: Negative  Cardiovascular: Negative  Gastrointestinal: Negative  Endocrine: Negative  Genitourinary: Negative  Musculoskeletal: Negative  Skin: Negative  Neurological: Negative  Hematological: Negative  Psychiatric/Behavioral: Negative  Current Outpatient Medications   Medication Sig Dispense Refill    Arnica 20 % TINC arnica      ascorbic acid (VITAMIN C) 250 mg tablet Take 250 mg by mouth daily      Calcium Carbonate (CALCIUM 600 PO) Take by mouth      Cholecalciferol (VITAMIN D3) 88429 units TABS Take by mouth      Lactobacillus (CVS PROBIOTIC ACIDOPHILUS PO) Take 1 capsule by mouth daily        Multiple Vitamins-Minerals (MULTIVITAMIN WITH MINERALS) tablet Take 1 tablet by mouth daily       No current facility-administered medications for this visit  Objective:    Blood pressure 118/78, pulse 81, temperature 98 8 °F (37 1 °C), temperature source Temporal, resp  rate 16, height 5' 3 19" (1 605 m), weight 52 6 kg (116 lb)  Body mass index is 20 43 kg/m²  Body surface area is 1 54 meters squared      Physical Exam  Constitutional:       Appearance: She is well-developed  HENT:      Head: Normocephalic and atraumatic  Neck:      Thyroid: No thyromegaly  Cardiovascular:      Rate and Rhythm: Normal rate and regular rhythm  Heart sounds: Normal heart sounds  Pulmonary:      Effort: Pulmonary effort is normal       Breath sounds: Normal breath sounds  Abdominal:      General: Bowel sounds are normal       Palpations: Abdomen is soft  Comments: Well healed laparoscopic incisions  Genitourinary:     Comments: -Normal external female genitalia, normal Bartholin's and [place]'s glands                  -Normal midline urethral meatus  No lesions notes                  -Bladder without fullness mass or tenderness                  -Vagina without lesion or discharge No significant cystocele or rectocele noted                  -Cervix surgically absent                  -Uterus surgically absent                  -Adnexae surgically absent                  - Anus without fissure of lesion  There is a diffuse thickening in the lower pelvis consistent with recurrence of disease in the pelvis  Musculoskeletal:         General: Normal range of motion  Cervical back: Normal range of motion and neck supple  Lymphadenopathy:      Cervical: No cervical adenopathy  Skin:     General: Skin is warm and dry  Neurological:      Mental Status: She is alert and oriented to person, place, and time     Psychiatric:         Behavior: Behavior normal          Lab Results   Component Value Date     34 5 (H) 06/28/2021     Lab Results   Component Value Date    K 4 1 01/21/2020     01/21/2020    CO2 29 01/21/2020    BUN 12 02/11/2021    CREATININE 0 78 02/11/2021    GLUF 119 (H) 01/22/2018    CALCIUM 9 3 01/21/2020    AST 16 01/21/2020    ALT 18 01/21/2020    ALKPHOS 51 01/21/2020    EGFR 79 02/11/2021     Lab Results   Component Value Date    WBC 4 45 01/21/2020    HGB 12 2 01/21/2020    HCT 38 1 01/21/2020     (H) 01/21/2020     01/21/2020 Lab Results   Component Value Date    NEUTROABS 1 88 01/21/2020        Trend:  Lab Results   Component Value Date     34 5 (H) 06/28/2021     18 0 02/11/2021     11 3 07/15/2020     10 5 04/17/2020     12 2 01/21/2020     11 5 10/09/2019     11 9 09/04/2019     27 0 07/10/2019     24 8 05/10/2019     19 4 01/09/2019     10 2 10/04/2018     11 3 06/26/2018     10 3 06/05/2018     11 7 05/15/2018     14 7 04/24/2018     22 2 03/26/2018     67 2 (H) 03/05/2018     367 7 (H) 02/12/2018     534 6 (H) 01/04/2018

## 2021-06-30 NOTE — ASSESSMENT & PLAN NOTE
Patient is very pleasant 56-year-old female with history of stage IIIC ovarian cancer diagnosed in early 2018  She has evidence of progression of disease and recurrence by exam and by   We discussed with the patient consideration of treatment  She has expressed some interest but at this time has her mother's 90th birthday and her 50th high school graduation coming up within the next several months  She would like to hold off until then  As she is asymptomatic I think this is reasonable  We have recommended  and CT scan in approximately 3 months  If significant progression of disease is noted we would likely recommend  Re-initiation of chemotherapy at that time  If the patient does  Develops symptoms we have asked her to call us before then

## 2021-06-30 NOTE — PROGRESS NOTES
Assessment/Plan:    Problem List Items Addressed This Visit        Endocrine    Malignant neoplasm of ovary ([facility] [address] ) - Primary    Relevant Orders    CT chest abdomen pelvis w contrast        BUN    Creatinine, serum       Genitourinary    Fallopian tube cancer, carcinoma ([address] )       Patient is very pleasant 38-year-old female with history of stage IIIC ovarian cancer diagnosed in early 2018  She has evidence of progression of disease and recurrence by exam and by   We discussed with the patient consideration of treatment  She has expressed some interest but at this time has her mother's 90th birthday and her 50th high school graduation coming up within the next several months  She would like to hold off until then  As she is asymptomatic I think this is reasonable  We have recommended  and CT scan in approximately 3 months  If significant progression of disease is noted we would likely recommend  Re-initiation of chemotherapy at that time  If the patient does  Develops symptoms we have asked her to call us before then  CHIEF COMPLAINT:  Surveillance ovarian cancer      Problem:  Cancer Staging  Fallopian tube cancer, carcinoma ([facility] [address] )  Staging form: Ovary, Fallopian Tube, Primary Peritoneal, AJCC 8th Edition  - Clinical stage from 4/2/2018: FIGO Stage IIIC - Signed by Asim Funes MD on 4/16/2018        Previous therapy:  Oncology History   Fallopian tube cancer, carcinoma ([facility] [address] )   1/4/2018 Initial Diagnosis    Paracentesis revealing adenocarcinoma of gynecologic origin     1/25/2018 - 3/8/2018 Chemotherapy    3 cycles of carboplatin AUC 6 and taxol 175 mg/m2 squared Q 3 weeks  Pretreatment Ca 125: 534 6      4/2/2018 Surgery    Exploratory laparotomy, total abdominal hysterectomy, bilateral salpingo-oophorectomy, omentectomy, appendectomy  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  No gross residual disease       4/26/2018 - 6/7/2018 Chemotherapy    3 cycles of carboplatin AUC 5 and Taxol 175 mg/m2 Q 3 weeks (carboplatin dose dropped to AUC of 5 for neutropenia prior to cycle 3)     7/2018 Genetic Testing    Patient has genetic testing done for ovarian cancer multi panel  Results revealed patient has the following mutation(s):  None     Malignant neoplasm of ovary ([facility] [address] )   3/22/2018 Initial Diagnosis    Malignant neoplasm of ovary ([facility] [address] )     7/12/2019 - 10/24/2019 Chemotherapy    CARBOplatin (PARAPLATIN) 439 5 mg in sodium chloride 0 9 % 250 mL IVPB, 439 5 mg, Intravenous, Once, 5 of 5 cycles  Administration: 439 5 mg (7/12/2019), 443 5 mg (8/2/2019), 456 5 mg (8/23/2019), 475 5 mg (9/13/2019), 487 5 mg (10/4/2019)  PACLitaxel (TAXOL) 216 mg in sodium chloride 0 9 % 500 mL chemo IVPB, 135 mg/m2 = 216 mg (77 1 % of original dose 175 mg/m2), Intravenous, Once, 5 of 5 cycles  Dose modification: 135 mg/m2 (original dose 175 mg/m2, Cycle 1, Reason: Other (See Comments))  Administration: 216 mg (7/12/2019), 216 mg (8/2/2019), 213 6 mg (8/23/2019), 213 6 mg (9/13/2019), 216 mg (10/4/2019)           Patient ID: Ben Clarke is a 74 y o  female   Patient is very pleasant 77-year-old female with history of stage IIIC ovarian carcinoma diagnosed in 2018  She underwent neoadjuvant chemotherapy then surgery and completion of therapy  She was in remission for some time  The patient was diagnosed with recurrence approximately 6 months ago but was not interested in chemotherapy  We have been following her  which has been relatively stable  Most recently is increased to approximately 29  The patient remains well  She is asymptomatic  Today, the patient is doing well  She denies significant abdominal pain, pelvic pain, nausea, vomiting, constipation, diarrhea, fevers, chills, or vaginal bleeding  She states that she may be interested in chemotherapy at some point but still does not feel ready  She would prefer to hold off at this time        The following portions of the patient's history were reviewed and updated as appropriate: allergies, current medications, past family history, past social history, past surgical history and problem list     Review of Systems   Constitutional: Negative  HENT: Negative  Eyes: Negative  Respiratory: Negative  Cardiovascular: Negative  Gastrointestinal: Negative  Endocrine: Negative  Genitourinary: Negative  Musculoskeletal: Negative  Skin: Negative  Neurological: Negative  Hematological: Negative  Psychiatric/Behavioral: Negative  Current Outpatient Medications   Medication Sig Dispense Refill    Arnica 20 % TINC arnica      ascorbic acid (VITAMIN C) 250 mg tablet Take 250 mg by mouth daily      Calcium Carbonate (CALCIUM 600 PO) Take by mouth      Cholecalciferol (VITAMIN D3) 25284 units TABS Take by mouth      Lactobacillus (CVS PROBIOTIC ACIDOPHILUS PO) Take 1 capsule by mouth daily        Multiple Vitamins-Minerals (MULTIVITAMIN WITH MINERALS) tablet Take 1 tablet by mouth daily       No current facility-administered medications for this visit  Objective:    Blood pressure 118/78, pulse 81, temperature 98 8 °F (37 1 °C), temperature source Temporal, resp  rate 16, height 5' 3 19" (1 605 m), weight 52 6 kg (116 lb)  Body mass index is 20 43 kg/m²  Body surface area is 1 54 meters squared  Physical Exam  Constitutional:       Appearance: She is well-developed  HENT:      Head: Normocephalic and atraumatic  Neck:      Thyroid: No thyromegaly  Cardiovascular:      Rate and Rhythm: Normal rate and regular rhythm  Heart sounds: Normal heart sounds  Pulmonary:      Effort: Pulmonary effort is normal       Breath sounds: Normal breath sounds  Abdominal:      General: Bowel sounds are normal       Palpations: Abdomen is soft  Comments: Well healed laparoscopic incisions     Genitourinary:     Comments: -Normal external female genitalia, normal Bartholin's and [place]'s glands                  -Normal midline urethral meatus  No lesions notes                  -Bladder without fullness mass or tenderness                  -Vagina without lesion or discharge No significant cystocele or rectocele noted                  -Cervix surgically absent                  -Uterus surgically absent                  -Adnexae surgically absent                  - Anus without fissure of lesion  There is a diffuse thickening in the lower pelvis consistent with recurrence of disease in the pelvis  Musculoskeletal:         General: Normal range of motion  Cervical back: Normal range of motion and neck supple  Lymphadenopathy:      Cervical: No cervical adenopathy  Skin:     General: Skin is warm and dry  Neurological:      Mental Status: She is alert and oriented to person, place, and time     Psychiatric:         Behavior: Behavior normal          Lab Results   Component Value Date     34 5 (H) 06/28/2021     Lab Results   Component Value Date    K 4 1 01/21/2020     01/21/2020    CO2 29 01/21/2020    BUN 12 02/11/2021    CREATININE 0 78 02/11/2021    GLUF 119 (H) 01/22/2018    CALCIUM 9 3 01/21/2020    AST 16 01/21/2020    ALT 18 01/21/2020    ALKPHOS 51 01/21/2020    EGFR 79 02/11/2021     Lab Results   Component Value Date    WBC 4 45 01/21/2020    HGB 12 2 01/21/2020    HCT 38 1 01/21/2020     (H) 01/21/2020     01/21/2020     Lab Results   Component Value Date    NEUTROABS 1 88 01/21/2020        Trend:  Lab Results   Component Value Date     34 5 (H) 06/28/2021     18 0 02/11/2021     11 3 07/15/2020     10 5 04/17/2020     12 2 01/21/2020     11 5 10/09/2019     11 9 09/04/2019     27 0 07/10/2019     24 8 05/10/2019     19 4 01/09/2019     10 2 10/04/2018     11 3 06/26/2018     10 3 06/05/2018     11 7 05/15/2018     14 7 04/24/2018     22 2 03/26/2018     67 2 (H) 03/05/2018     367 7 (H) 02/12/2018     534 6 (H) 01/04/2018

## 2021-07-06 ENCOUNTER — OFFICE VISIT (OUTPATIENT)
Dept: PALLIATIVE MEDICINE | Facility: CLINIC | Age: 66
End: 2021-07-06
Payer: MEDICARE

## 2021-07-06 VITALS
HEART RATE: 76 BPM | RESPIRATION RATE: 18 BRPM | DIASTOLIC BLOOD PRESSURE: 74 MMHG | BODY MASS INDEX: 20.14 KG/M2 | SYSTOLIC BLOOD PRESSURE: 136 MMHG | TEMPERATURE: 97 F | WEIGHT: 114.4 LBS

## 2021-07-06 DIAGNOSIS — Z79.899 MEDICAL MARIJUANA USE: ICD-10-CM

## 2021-07-06 DIAGNOSIS — M25.551 PAIN OF RIGHT HIP JOINT: ICD-10-CM

## 2021-07-06 DIAGNOSIS — C57.02 CARCINOMA OF LEFT FALLOPIAN TUBE (HCC): Primary | ICD-10-CM

## 2021-07-06 PROCEDURE — 99213 OFFICE O/P EST LOW 20 MIN: CPT | Performed by: INTERNAL MEDICINE

## 2021-07-06 NOTE — PROGRESS NOTES
Palliative and Supportive Care   Frankie Cunningham 74 y o  female [id number]    Assessment/Plan:  1  Carcinoma of left fallopian tube ([address] )    2  Medical marijuana use    3  Pain of right hip joint      Marisol Carvajal continues to focus on quality of life and is planning on some life-events before resuming disease directed treatment  In the meantime I have no concerns about her using 1-2 ibprofen and the occasional tylenol for her mild pain symptoms  Close follow up warranted in the next few months  Requested Prescriptions      No prescriptions requested or ordered in this encounter     There are no discontinued medications  Representatives have queried the patient's controlled substance dispensing history in the Prescription Drug Monitoring Program in compliance with regulations before I have prescribed any controlled substances  The prescription history is consistent with prescribed therapy and our practice policies  20 minutes were spent face to face with Frankie Cunningham with greater than 50% of the time spent in counseling or coordination of care including discussions of instructions for disease self management and treatment instructions   All of the patient's questions were answered during this discussion  No follow-ups on file  Subjective:   Chief Complaint  Follow up visit for:  symptom management  HPI     Frankie Cunningham is a 74 y o  female with hx of fallopian tube CA, nut currently on disease-directed treatment, who presents for follow up  Since our last visit, she states she is losing some weight, though she attributes this to going to an animal-free diet as well as cutting out sugar  She remains active and continues to work through the loss of her   She has had a slow progression of pain symptoms  She primarily uses herbal remedies, but is now using ibprofen and tylenol in low doses, especially at night        The following portions of the medical history were reviewed: past medical history, problem list, medication list, and social history  Current Outpatient Medications:     ascorbic acid (VITAMIN C) 250 mg tablet, Take 250 mg by mouth daily, Disp: , Rfl:     Calcium Carbonate (CALCIUM 600 PO), Take by mouth, Disp: , Rfl:     Cholecalciferol (VITAMIN D3) 09263 units TABS, Take by mouth, Disp: , Rfl:     Lactobacillus (CVS PROBIOTIC ACIDOPHILUS PO), Take 1 capsule by mouth daily  , Disp: , Rfl:     Multiple Vitamins-Minerals (MULTIVITAMIN WITH MINERALS) tablet, Take 1 tablet by mouth daily, Disp: , Rfl:     Arnica 20 % TINC, arnica, Disp: , Rfl:   Review of Systems    All other systems negative    Objective:  Vital Signs  /74 (BP Location: Left arm, Patient Position: Sitting, Cuff Size: Standard)   Pulse 76   Temp (!) 97 °F (36 1 °C) (Temporal)   Resp 18   Wt 51 9 kg (114 lb 6 4 oz)   BMI 20 14 kg/m²    Physical Exam    Constitutional: Appears well-developed and well-nourished  In no acute distress  Head: Normocephalic and atraumatic  Eyes: EOM are normal  Right eye exhibits no discharge  Left eye exhibits no discharge  No scleral icterus  Pulmonary/Chest: Effort normal  No stridor  No respiratory distress  Abdominal: No distension  Musculoskeletal: No edema  Neurological: Alert, oriented and appropriately conversant  Skin: Skin is dry, not diaphoretic  Psychiatric: Displays a normal mood and affect  Behavior, judgement and thought content appear normal    Vitals reviewed

## 2021-07-15 ENCOUNTER — TELEPHONE (OUTPATIENT)
Dept: PALLIATIVE MEDICINE | Facility: CLINIC | Age: 66
End: 2021-07-15

## 2021-07-18 NOTE — CONSULTS
Consultation - [facility]  Oneil Nicolas 57 y o  female MRN: [id number]  Unit/Bed#: -01 Encounter: [id number]        Consults    Reason for Consult / Principal Problem: Ascites    HPI: Ms Robbie Weldon is a 56 yo F with a PMH of CVA in 1995 with no residual deficits, presenting with increasing abdominal distention, pain, and reflux symptoms for the past 2-3 weeks  She has also experienced intermittent diarrhea over the past few months  She denies any hematemesis, melena, or hematochezia  She denies any associated fevers or confusion  She has lost about 10 lbs unintentionally over the past 6 months, associated with early satiety and decreased appetite  She does not take any medications at home  She has never had an EGD  Her last colonoscopy was 2 years ago with a GI group in [facility] and she had diverticulosis as well as multiple hyperplastic polyps  She has no family history of colon cancer  Her mother had ovarian cancer in her 62s  She is not on any antiplatelets or anticoagulation  She denies any history of liver problems or hepatitis in the past  She does not smoke or drink alcohol  REVIEW OF SYSTEMS: Negative except for as stated above    Historical Information   Past Medical History:   Diagnosis Date    Arthritis     Hypertension 1/4/2018    Stroke ([facility] [address] ) 1995     Past Surgical History:   Procedure Laterality Date    JOINT REPLACEMENT      bilateral knee replacement      Social History   History   Alcohol Use No     History   Drug Use No     History   Smoking Status    Never Smoker   Smokeless Tobacco    Never Used     Family History   Problem Relation Age of Onset    Cancer Mother        Meds/Allergies     No prescriptions prior to admission       Current Facility-Administered Medications   Medication Dose Route Frequency    acetaminophen (TYLENOL) tablet 650 mg  650 mg Oral Q6H PRN    enoxaparin (LOVENOX) subcutaneous injection 40 mg  40 mg Subcutaneous Daily    hydrALAZINE (APRESOLINE) injection 5 mg  5 mg Intravenous Q6H PRN    morphine injection 0 5 mg  0 5 mg Intravenous Q4H PRN    pantoprazole (PROTONIX) EC tablet 40 mg  40 mg Oral Early Morning       Allergies   Allergen Reactions    Black Pepper [Piper] Rash           Objective     Blood pressure 137/87, pulse 96, temperature 97 8 °F (36 6 °C), temperature source Oral, resp  rate 18, height 5' 4" (1 626 m), weight 59 4 kg (130 lb 15 3 oz), SpO2 95 %  No intake or output data in the 24 hours ending 01/05/18 1245      PHYSICAL EXAM:      General Appearance:   Alert, oriented x3, no acute distress   HENT[de-identified]   Eyes:  Normocephalic, atraumatic  Anicteric   Neck:  Supple, symmetrical, trachea midline   Lungs:   Clear to auscultation bilaterally, no respiratory distress   Heart[de-identified]   RRR, no murmur   Abdomen:   Non-distended, soft, BS active, NTTP   Rectal:   Deferred    Extremities:  No cyanosis or edema    Skin:  No jaundice or pallor     Lab Results:     Results from last 7 days  Lab Units 01/05/18  0436  01/04/18  1322   WBC Thousand/uL 7 69  --  9 75   HEMOGLOBIN g/dL 11 5  --  13 7   HEMATOCRIT % 34 2*  --  41 4   PLATELETS Thousands/uL 465*  < > 604*   NEUTROS PCT %  --   --  68   LYMPHS PCT %  --   --  22   MONOS PCT %  --   --  8   EOS PCT %  --   --  1   < > = values in this interval not displayed  Results from last 7 days  Lab Units 01/05/18  0436   SODIUM mmol/L 134*   POTASSIUM mmol/L 3 2*   CHLORIDE mmol/L 96*   CO2 mmol/L 35*   BUN mg/dL 6   CREATININE mg/dL 0 71   CALCIUM mg/dL 7 6*   TOTAL PROTEIN g/dL 6 0*   BILIRUBIN TOTAL mg/dL 0 30   ALK PHOS U/L 36*   ALT U/L 16   AST U/L 33   GLUCOSE RANDOM mg/dL 94       Results from last 7 days  Lab Units 01/04/18  1322   INR  0 94       Results from last 7 days  Lab Units 01/04/18  1322   LIPASE u/L 72*       Imaging Studies: I have personally reviewed pertinent imaging studies  Ct Chest W Contrast  Result Date: 1/4/2018  Impression: 1   Residual small right pleural effusion  Bibasilar atelectasis  No acute pulmonary process  2  Stable, large amount of ascites in the visualized upper abdomen  Xr Abdomen 1 Vw Portable  Result Date: 1/5/2018  Impression: No evidence of obstruction  Ct Abdomen Pelvis With Contrast  Result Date: 1/4/2018  Impression: Extensive abdominal ascites and diffuse peritoneal/omental caking has a result of diffuse metastatic disease  Findings most likely represent either GYN or gastrointestinal primary malignancy with discrete primary not clearly visualized  Trace effusions within the lung bases, right slightly greater than left with mild adjacent atelectatic change        ASSESSMENT and PLAN:    Principal Problem:    Abdominal pain  Active Problems:    Malignancy (HCC)    Hypokalemia    Hyponatremia    Hypertension    Ascites  - Suspect malignancy as etiology due to findings of diffuse peritoneal/omental caking, omental mass, and multiple other enhancing masses in the abdomen  - Agree with oncology that this could be 2/2 GYN cancer given her family history of possible GI related  - Fluid analysis is negative for SBP  - Cytology pending  - SAAG is <1 1 which is consistent with no portal HTN and likely related to peritoneal mets  - Due to change in bowel habits over the past 6 months as well as unknown primary malignancy, patient/family would prefer to do EGD/colonoscopy to evaluate for colon cancer though this is less likely given her CEA is normal and CA-125 is elevated  - Plan for outpatient EGD/colonoscopy next Wednesday with Dr Shannon Ledesma; we will arrange this with our office  - Pt stable for discharge from GI standpoint    Nodular Liver  - She has no history of liver disease; suspect this could be from possible metastatic disease and not cirrhosis  - LFTs normal on admission with normal platelets and INR  - Consider checking hepatitis panel  - AFP normal    Mild Pancreatic Duct Dilatation  - Unclear significance; this could again be related to metastatic disease  - No pancreatic mass noted  - Can consider MRI/MRCP to evaluate liver and pancreatic duct further    Esophagitis  - Noted on dedicated CT chest  - Recommend protonix daily for 8 weeks  - Plan for outpatient EGD next Wednesday      Patient will be seen and examined by Dr Ivonne Briggs  All celis medical decisions were made by Dr Ivonne Briggs  Thank you for allowing us to participate in the care of this patient  We will follow with you closely  show

## 2021-07-27 NOTE — TELEPHONE ENCOUNTER
Reached out to daughter, daughter stated that pt is not okay right now and will leave it up to her to give us the call to follow up  awaiting a call back

## 2021-07-28 NOTE — TELEPHONE ENCOUNTER
Pt called office back to schedule with Dr Armin Verma, but unable at the moment as his [facility] schedule is not open at the moment  Will call pt once open   Confirmed with Dr Armin Verma pt is on his follow up list

## 2021-09-01 ENCOUNTER — OFFICE VISIT (OUTPATIENT)
Dept: PALLIATIVE MEDICINE | Facility: CLINIC | Age: 66
End: 2021-09-01
Payer: MEDICARE

## 2021-09-01 VITALS
WEIGHT: 113.32 LBS | RESPIRATION RATE: 14 BRPM | DIASTOLIC BLOOD PRESSURE: 80 MMHG | HEART RATE: 74 BPM | BODY MASS INDEX: 19.95 KG/M2 | OXYGEN SATURATION: 96 % | SYSTOLIC BLOOD PRESSURE: 140 MMHG | TEMPERATURE: 97.4 F

## 2021-09-01 DIAGNOSIS — M25.551 PAIN OF RIGHT HIP JOINT: ICD-10-CM

## 2021-09-01 DIAGNOSIS — C79.9 METASTASIS OF UNKNOWN PRIMARY (HCC): Primary | ICD-10-CM

## 2021-09-01 DIAGNOSIS — Z79.899 MEDICAL MARIJUANA USE: ICD-10-CM

## 2021-09-01 DIAGNOSIS — G89.3 CANCER ASSOCIATED PAIN: ICD-10-CM

## 2021-09-01 DIAGNOSIS — G47.9 DIFFICULTY SLEEPING: ICD-10-CM

## 2021-09-01 DIAGNOSIS — C57.02 CARCINOMA OF LEFT FALLOPIAN TUBE (HCC): ICD-10-CM

## 2021-09-01 PROCEDURE — 99214 OFFICE O/P EST MOD 30 MIN: CPT | Performed by: INTERNAL MEDICINE

## 2021-09-01 RX ORDER — OXYCODONE 9 MG/1
9 CAPSULE, EXTENDED RELEASE ORAL
Qty: 30 CAPSULE | Refills: 0 | Status: SHIPPED | OUTPATIENT
Start: 2021-09-01 | End: 2021-09-03

## 2021-09-01 NOTE — PROGRESS NOTES
Palliative and Supportive Care   Johnnie Salinas 74 y o  female [id number]    Assessment/Plan:  1  Metastasis of unknown primary ([facility] [address] )    2  Carcinoma of left fallopian tube ([facility] [address] )    3  Medical marijuana use    4  Difficulty sleeping    5  Pain of right hip joint    6  Cancer associated pain      Will start patient on xtampza  Discussed OTC bowel regimen  Discussed fears regarding returning to chemo/immunotherapy  Will follow up in October after her CT scan and appt  With Dr Everardo Juarez  Requested Prescriptions     Signed Prescriptions Disp Refills    oxyCODONE ER (Xtampza ER) 9 MG C12A 30 capsule 0     Sig: Take 9 mg by mouth daily at bedtimeMax Daily Amount: 9 mg     There are no discontinued medications  Representatives have queried the patient's controlled substance dispensing history in the Prescription Drug Monitoring Program in compliance with regulations before I have prescribed any controlled substances  The prescription history is consistent with prescribed therapy and our practice policies  25 minutes were spent face to face with Johnnie Salinas with greater than 50% of the time spent in counseling or coordination of care including discussions of treatment instructions   All of the patient's questions were answered during this discussion  No follow-ups on file  Subjective:   Chief Complaint  Follow up visit for:  symptom management  HPI     Johnnie Salinas is a 74 y o  female with metastatitc ovarian cancer currently not on any chemo/immunotherapy who presents for follow up  Reports she lost some weight since our last visit in the setting of briefly eating vegetarian  Has returned to eating chicken and fish  Is drinking natural protein shakes as well  Not constipated per se but is using a significant herbal bowel regimen  Mood remains positive, though she misses her  dearly  Adilene's left hip pain has worsened since our last visit   Taking tylenol/ibprofen for pain but reports it wakes her up at night and makes it difficult to sleep  Prefers to avoid any short acting opioids and we talked about several longer-acting solutions  Otherwise was able to see her mother and will be attending her reunion next week  The following portions of the medical history were reviewed: past medical history, problem list, medication list, and social history  Current Outpatient Medications:     Arnica 20 % TINC, arnica, Disp: , Rfl:     ascorbic acid (VITAMIN C) 250 mg tablet, Take 250 mg by mouth daily, Disp: , Rfl:     Calcium Carbonate (CALCIUM 600 PO), Take by mouth, Disp: , Rfl:     Cholecalciferol (VITAMIN D3) 70890 units TABS, Take by mouth, Disp: , Rfl:     Lactobacillus (CVS PROBIOTIC ACIDOPHILUS PO), Take 1 capsule by mouth daily  , Disp: , Rfl:     Multiple Vitamins-Minerals (MULTIVITAMIN WITH MINERALS) tablet, Take 1 tablet by mouth daily, Disp: , Rfl:     oxyCODONE ER (Xtampza ER) 9 MG C12A, Take 9 mg by mouth daily at bedtimeMax Daily Amount: 9 mg, Disp: 30 capsule, Rfl: 0      Objective:  Vital Signs  /80 (BP Location: Left arm, Cuff Size: Standard)   Pulse 74   Temp (!) 97 4 °F (36 3 °C) (Temporal)   Resp 14   Wt 51 4 kg (113 lb 5 1 oz)   SpO2 96%   BMI 19 95 kg/m²    Physical Exam    Constitutional: Appears well-developed and well-nourished  In no acute distress  Head: Normocephalic and atraumatic  Eyes: EOM are normal  Right eye exhibits no discharge  Left eye exhibits no discharge  No scleral icterus  Pulmonary/Chest: Effort normal  No stridor  No respiratory distress  Abdominal: No distension  Musculoskeletal: No edema  Neurological: Alert, oriented and appropriately conversant  Skin: Skin is dry, not diaphoretic  Psychiatric: Displays a normal mood and affect  Behavior, judgement and thought content appear normal    Vitals reviewed

## 2021-09-02 NOTE — TELEPHONE ENCOUNTER
Prior authorization for pts Xtampza 9 mg  has been initiated via [employer]  Awaiting determination      Key:  [id number]

## 2021-09-03 NOTE — TELEPHONE ENCOUNTER
Since ER medication not yet approved by insurance patient willing to try short acting oxycodone which MD will prescribe     [facility] pharmacy has a supply of this medication     Patient informed of prescription sent and will inform of her response to this medication

## 2021-09-03 NOTE — TELEPHONE ENCOUNTER
Patient is calling regarding her medication she stated she received a notice that it was denied  Please reach out to patient for future management

## 2021-09-03 NOTE — TELEPHONE ENCOUNTER
Telephone appeal  To [facility] done Spoke with Mariah Tapia who will fax other required forms     Forms and supporting documents faxed back to [facility] [employer]     Phone call to patient to keep updated informed of appeal process

## 2021-09-06 ENCOUNTER — TELEPHONE (OUTPATIENT)
Dept: PALLIATIVE MEDICINE | Facility: CLINIC | Age: 66
End: 2021-09-06

## 2021-09-06 ENCOUNTER — TELEPHONE (OUTPATIENT)
Dept: OTHER | Facility: OTHER | Age: 66
End: 2021-09-06

## 2021-09-06 DIAGNOSIS — C56.3 MALIGNANT NEOPLASM OF BOTH OVARIES (HCC): ICD-10-CM

## 2021-09-06 DIAGNOSIS — G89.3 CANCER ASSOCIATED PAIN: Primary | ICD-10-CM

## 2021-09-06 DIAGNOSIS — C79.9 METASTASIS OF UNKNOWN PRIMARY (HCC): ICD-10-CM

## 2021-09-06 RX ORDER — HYDROMORPHONE HYDROCHLORIDE 2 MG/1
2 TABLET ORAL EVERY 4 HOURS PRN
Qty: 30 TABLET | Refills: 0 | Status: SHIPPED | OUTPATIENT
Start: 2021-09-06 | End: 2021-09-17 | Stop reason: SDUPTHER

## 2021-09-07 ENCOUNTER — TELEPHONE (OUTPATIENT)
Dept: PALLIATIVE MEDICINE | Facility: CLINIC | Age: 66
End: 2021-09-07

## 2021-09-07 NOTE — TELEPHONE ENCOUNTER
This nurse called patient to let ask her for a follow up call to the [facility] office regarding the effectiveness of PO Hydromorphone 2mg  Left message that the MD can prescribe this for her if effective

## 2021-09-07 NOTE — TELEPHONE ENCOUNTER
----- Message from Lachelle Medina MD sent at 2021 10:19 AM EDT -----  Thanks Dr Ayush Sebastian  If she's doing well with this med let me know and I'll keep providing refills  She should be advised not to take Melchor's meds but we will happily provide her with a script for this if helpful  Trista Edmonds    ----- Message -----  From: Escobar Nick MD  Sent: 2021   9:45 AM EDT  To: Lachelle Medina MD, #    Hi all,    Received call on Monday that patient had adverse reaction to oxy-IR on Friday night [increased abdominal pain] -> at that time used  's PO hydromorphone 2 mg with complete resolution of pain  No concern for anaphylaxis  Patient also out-of-town this week, will be available by cell  I sent a one week Rx for PO hydromorphone 2 mg for pain, so patient isn't using other medication  Please check in      Thanks,    Sabra Shah

## 2021-09-17 DIAGNOSIS — C56.3 MALIGNANT NEOPLASM OF BOTH OVARIES (HCC): ICD-10-CM

## 2021-09-17 DIAGNOSIS — G89.3 CANCER ASSOCIATED PAIN: ICD-10-CM

## 2021-09-17 RX ORDER — HYDROMORPHONE HYDROCHLORIDE 2 MG/1
2 TABLET ORAL EVERY 4 HOURS PRN
Qty: 60 TABLET | Refills: 0 | Status: SHIPPED | OUTPATIENT
Start: 2021-09-17 | End: 2021-10-08 | Stop reason: HOSPADM

## 2021-09-17 NOTE — TELEPHONE ENCOUNTER
Patient was told to call our office to let our Drs know how this Dilaudid was working for her pain  She states it is working well and is asking for a refill

## 2021-09-25 PROCEDURE — 99285 EMERGENCY DEPT VISIT HI MDM: CPT

## 2021-09-26 ENCOUNTER — APPOINTMENT (EMERGENCY)
Dept: CT IMAGING | Facility: HOSPITAL | Age: 66
DRG: 988 | End: 2021-09-26
Payer: MEDICARE

## 2021-09-26 ENCOUNTER — HOSPITAL ENCOUNTER (INPATIENT)
Facility: HOSPITAL | Age: 66
LOS: 12 days | Discharge: HOME | DRG: 988 | End: 2021-10-08
Attending: EMERGENCY MEDICINE | Admitting: INTERNAL MEDICINE
Payer: MEDICARE

## 2021-09-26 DIAGNOSIS — R11.0 NAUSEA: ICD-10-CM

## 2021-09-26 DIAGNOSIS — C56.3 MALIGNANT NEOPLASM OF BOTH OVARIES (HCC): ICD-10-CM

## 2021-09-26 DIAGNOSIS — E44.0 MODERATE PROTEIN-CALORIE MALNUTRITION (HCC): ICD-10-CM

## 2021-09-26 DIAGNOSIS — G89.3 CANCER RELATED PAIN: Primary | ICD-10-CM

## 2021-09-26 DIAGNOSIS — N13.30 HYDRONEPHROSIS: ICD-10-CM

## 2021-09-26 DIAGNOSIS — C79.9 METASTASIS OF UNKNOWN PRIMARY (HCC): ICD-10-CM

## 2021-09-26 PROBLEM — R10.84 ABDOMINAL PAIN, GENERALIZED: Status: ACTIVE | Noted: 2018-01-04

## 2021-09-26 PROBLEM — D64.9 ACUTE ANEMIA: Status: ACTIVE | Noted: 2021-09-26

## 2021-09-26 LAB
ANION GAP SERPL CALCULATED.3IONS-SCNC: 13 MMOL/L (ref 4–13)
BACTERIA UR QL AUTO: ABNORMAL /HPF
BASOPHILS # BLD AUTO: 0.02 THOUSANDS/ΜL (ref 0–0.1)
BASOPHILS NFR BLD AUTO: 0 % (ref 0–1)
BILIRUB UR QL STRIP: NEGATIVE
BUN SERPL-MCNC: 10 MG/DL (ref 5–25)
CALCIUM SERPL-MCNC: 8.5 MG/DL (ref 8.3–10.1)
CAOX CRY URNS QL MICRO: ABNORMAL /HPF
CHLORIDE SERPL-SCNC: 96 MMOL/L (ref 100–108)
CLARITY UR: CLEAR
CO2 SERPL-SCNC: 25 MMOL/L (ref 21–32)
COLOR UR: YELLOW
CREAT SERPL-MCNC: 0.95 MG/DL (ref 0.6–1.3)
EOSINOPHIL # BLD AUTO: 0.08 THOUSAND/ΜL (ref 0–0.61)
EOSINOPHIL NFR BLD AUTO: 1 % (ref 0–6)
ERYTHROCYTE [DISTWIDTH] IN BLOOD BY AUTOMATED COUNT: 14.2 % (ref 11.6–15.1)
ERYTHROCYTE [DISTWIDTH] IN BLOOD BY AUTOMATED COUNT: 14.7 % (ref 11.6–15.1)
GFR SERPL CREATININE-BSD FRML MDRD: 62 ML/MIN/1.73SQ M
GLUCOSE SERPL-MCNC: 104 MG/DL (ref 65–140)
GLUCOSE UR STRIP-MCNC: NEGATIVE MG/DL
HCT VFR BLD AUTO: 26.6 % (ref 34.8–46.1)
HCT VFR BLD AUTO: 28.9 % (ref 34.8–46.1)
HGB BLD-MCNC: 8.5 G/DL (ref 11.5–15.4)
HGB BLD-MCNC: 9.1 G/DL (ref 11.5–15.4)
HGB UR QL STRIP.AUTO: NEGATIVE
HYALINE CASTS #/AREA URNS LPF: ABNORMAL /LPF
KETONES UR STRIP-MCNC: ABNORMAL MG/DL
LACTATE SERPL-SCNC: 1.1 MMOL/L (ref 0.5–2)
LEUKOCYTE ESTERASE UR QL STRIP: NEGATIVE
LYMPHOCYTES # BLD AUTO: 1.36 THOUSANDS/ΜL (ref 0.6–4.47)
LYMPHOCYTES NFR BLD AUTO: 14 % (ref 14–44)
MAGNESIUM SERPL-MCNC: 1.9 MG/DL (ref 1.6–2.6)
MCH RBC QN AUTO: 28.5 PG (ref 26.8–34.3)
MCH RBC QN AUTO: 28.6 PG (ref 26.8–34.3)
MCHC RBC AUTO-ENTMCNC: 31.5 G/DL (ref 31.4–37.4)
MCHC RBC AUTO-ENTMCNC: 32 G/DL (ref 31.4–37.4)
MCV RBC AUTO: 89 FL (ref 82–98)
MCV RBC AUTO: 91 FL (ref 82–98)
MONOCYTES # BLD AUTO: 0.76 THOUSAND/ΜL (ref 0.17–1.22)
MONOCYTES NFR BLD AUTO: 8 % (ref 4–12)
NEUTROPHILS # BLD AUTO: 7.29 THOUSANDS/ΜL (ref 1.85–7.62)
NEUTS SEG NFR BLD AUTO: 77 % (ref 43–75)
NITRITE UR QL STRIP: NEGATIVE
NON-SQ EPI CELLS URNS QL MICRO: ABNORMAL /HPF
PH UR STRIP.AUTO: 6 [PH]
PLATELET # BLD AUTO: 409 THOUSANDS/UL (ref 149–390)
PLATELET # BLD AUTO: 471 THOUSANDS/UL (ref 149–390)
PMV BLD AUTO: 8.5 FL (ref 8.9–12.7)
PMV BLD AUTO: 8.6 FL (ref 8.9–12.7)
POTASSIUM SERPL-SCNC: 2.9 MMOL/L (ref 3.5–5.3)
POTASSIUM SERPL-SCNC: 4.3 MMOL/L (ref 3.5–5.3)
PROT UR STRIP-MCNC: ABNORMAL MG/DL
RBC # BLD AUTO: 2.98 MILLION/UL (ref 3.81–5.12)
RBC # BLD AUTO: 3.18 MILLION/UL (ref 3.81–5.12)
RBC #/AREA URNS AUTO: ABNORMAL /HPF
SODIUM SERPL-SCNC: 134 MMOL/L (ref 136–145)
SP GR UR STRIP.AUTO: 1.02 (ref 1–1.03)
TROPONIN I SERPL-MCNC: <0.02 NG/ML
UROBILINOGEN UR QL STRIP.AUTO: 0.2 E.U./DL
WBC # BLD AUTO: 11.03 THOUSAND/UL (ref 4.31–10.16)
WBC # BLD AUTO: 9.51 THOUSAND/UL (ref 4.31–10.16)
WBC #/AREA URNS AUTO: ABNORMAL /HPF

## 2021-09-26 PROCEDURE — G1004 CDSM NDSC: HCPCS

## 2021-09-26 PROCEDURE — 74177 CT ABD & PELVIS W/CONTRAST: CPT

## 2021-09-26 PROCEDURE — 84132 ASSAY OF SERUM POTASSIUM: CPT | Performed by: STUDENT IN AN ORGANIZED HEALTH CARE EDUCATION/TRAINING PROGRAM

## 2021-09-26 PROCEDURE — 99285 EMERGENCY DEPT VISIT HI MDM: CPT | Performed by: EMERGENCY MEDICINE

## 2021-09-26 PROCEDURE — 96361 HYDRATE IV INFUSION ADD-ON: CPT

## 2021-09-26 PROCEDURE — 85027 COMPLETE CBC AUTOMATED: CPT | Performed by: STUDENT IN AN ORGANIZED HEALTH CARE EDUCATION/TRAINING PROGRAM

## 2021-09-26 PROCEDURE — 84484 ASSAY OF TROPONIN QUANT: CPT | Performed by: EMERGENCY MEDICINE

## 2021-09-26 PROCEDURE — 36415 COLL VENOUS BLD VENIPUNCTURE: CPT | Performed by: EMERGENCY MEDICINE

## 2021-09-26 PROCEDURE — 96375 TX/PRO/DX INJ NEW DRUG ADDON: CPT

## 2021-09-26 PROCEDURE — 85025 COMPLETE CBC W/AUTO DIFF WBC: CPT | Performed by: EMERGENCY MEDICINE

## 2021-09-26 PROCEDURE — 83605 ASSAY OF LACTIC ACID: CPT | Performed by: EMERGENCY MEDICINE

## 2021-09-26 PROCEDURE — 96374 THER/PROPH/DIAG INJ IV PUSH: CPT

## 2021-09-26 PROCEDURE — 80048 BASIC METABOLIC PNL TOTAL CA: CPT | Performed by: EMERGENCY MEDICINE

## 2021-09-26 PROCEDURE — 81001 URINALYSIS AUTO W/SCOPE: CPT | Performed by: EMERGENCY MEDICINE

## 2021-09-26 PROCEDURE — 71260 CT THORAX DX C+: CPT

## 2021-09-26 PROCEDURE — 99223 1ST HOSP IP/OBS HIGH 75: CPT | Performed by: STUDENT IN AN ORGANIZED HEALTH CARE EDUCATION/TRAINING PROGRAM

## 2021-09-26 PROCEDURE — 83735 ASSAY OF MAGNESIUM: CPT | Performed by: EMERGENCY MEDICINE

## 2021-09-26 RX ORDER — OXYCODONE HYDROCHLORIDE 10 MG/1
10 TABLET ORAL EVERY 4 HOURS PRN
Status: DISCONTINUED | OUTPATIENT
Start: 2021-09-26 | End: 2021-09-28

## 2021-09-26 RX ORDER — SODIUM CHLORIDE 9 MG/ML
75 INJECTION, SOLUTION INTRAVENOUS CONTINUOUS
Status: DISPENSED | OUTPATIENT
Start: 2021-09-26 | End: 2021-09-28

## 2021-09-26 RX ORDER — HYDROMORPHONE HCL/PF 1 MG/ML
1 SYRINGE (ML) INJECTION ONCE
Status: COMPLETED | OUTPATIENT
Start: 2021-09-26 | End: 2021-09-26

## 2021-09-26 RX ORDER — LABETALOL 20 MG/4 ML (5 MG/ML) INTRAVENOUS SYRINGE
10 ONCE
Status: COMPLETED | OUTPATIENT
Start: 2021-09-26 | End: 2021-09-26

## 2021-09-26 RX ORDER — HYDROMORPHONE HYDROCHLORIDE 2 MG/1
2 TABLET ORAL EVERY 4 HOURS PRN
Status: DISCONTINUED | OUTPATIENT
Start: 2021-09-26 | End: 2021-09-27

## 2021-09-26 RX ORDER — OXYCODONE HYDROCHLORIDE 5 MG/1
5 TABLET ORAL EVERY 4 HOURS PRN
Status: DISCONTINUED | OUTPATIENT
Start: 2021-09-26 | End: 2021-09-26

## 2021-09-26 RX ORDER — POTASSIUM CHLORIDE 14.9 MG/ML
20 INJECTION INTRAVENOUS ONCE
Status: COMPLETED | OUTPATIENT
Start: 2021-09-26 | End: 2021-09-26

## 2021-09-26 RX ORDER — LANOLIN ALCOHOL/MO/W.PET/CERES
3 CREAM (GRAM) TOPICAL
Status: DISCONTINUED | OUTPATIENT
Start: 2021-09-26 | End: 2021-10-08 | Stop reason: HOSPADM

## 2021-09-26 RX ORDER — ONDANSETRON 2 MG/ML
4 INJECTION INTRAMUSCULAR; INTRAVENOUS EVERY 4 HOURS PRN
Status: DISCONTINUED | OUTPATIENT
Start: 2021-09-26 | End: 2021-10-04

## 2021-09-26 RX ORDER — SACCHAROMYCES BOULARDII 250 MG
250 CAPSULE ORAL 2 TIMES DAILY
Status: DISCONTINUED | OUTPATIENT
Start: 2021-09-26 | End: 2021-10-08 | Stop reason: HOSPADM

## 2021-09-26 RX ORDER — ONDANSETRON 2 MG/ML
4 INJECTION INTRAMUSCULAR; INTRAVENOUS ONCE
Status: COMPLETED | OUTPATIENT
Start: 2021-09-26 | End: 2021-09-26

## 2021-09-26 RX ORDER — HYDRALAZINE HYDROCHLORIDE 20 MG/ML
5 INJECTION INTRAMUSCULAR; INTRAVENOUS EVERY 6 HOURS PRN
Status: DISCONTINUED | OUTPATIENT
Start: 2021-09-26 | End: 2021-09-27 | Stop reason: SDUPTHER

## 2021-09-26 RX ORDER — DIAZEPAM 5 MG/1
5 TABLET ORAL ONCE
Status: COMPLETED | OUTPATIENT
Start: 2021-09-26 | End: 2021-09-26

## 2021-09-26 RX ORDER — POTASSIUM CHLORIDE 20MEQ/15ML
40 LIQUID (ML) ORAL ONCE
Status: COMPLETED | OUTPATIENT
Start: 2021-09-26 | End: 2021-09-26

## 2021-09-26 RX ORDER — HYDROMORPHONE HCL/PF 1 MG/ML
0.5 SYRINGE (ML) INJECTION EVERY 4 HOURS PRN
Status: DISCONTINUED | OUTPATIENT
Start: 2021-09-26 | End: 2021-09-27

## 2021-09-26 RX ORDER — ACETAMINOPHEN 325 MG/1
650 TABLET ORAL EVERY 6 HOURS PRN
Status: DISCONTINUED | OUTPATIENT
Start: 2021-09-26 | End: 2021-09-30

## 2021-09-26 RX ORDER — OXYCODONE HYDROCHLORIDE 10 MG/1
10 TABLET ORAL EVERY 4 HOURS PRN
Status: DISCONTINUED | OUTPATIENT
Start: 2021-09-26 | End: 2021-09-26

## 2021-09-26 RX ORDER — ASCORBIC ACID 500 MG
250 TABLET ORAL DAILY
Status: DISCONTINUED | OUTPATIENT
Start: 2021-09-26 | End: 2021-10-08 | Stop reason: HOSPADM

## 2021-09-26 RX ADMIN — HYDROMORPHONE HYDROCHLORIDE 2 MG: 2 TABLET ORAL at 12:02

## 2021-09-26 RX ADMIN — LABETALOL 20 MG/4 ML (5 MG/ML) INTRAVENOUS SYRINGE 10 MG: at 06:08

## 2021-09-26 RX ADMIN — MELATONIN 3 MG: 3 TAB ORAL at 21:08

## 2021-09-26 RX ADMIN — ONDANSETRON 4 MG: 2 INJECTION INTRAMUSCULAR; INTRAVENOUS at 02:32

## 2021-09-26 RX ADMIN — POTASSIUM CHLORIDE 40 MEQ: 20 SOLUTION ORAL at 06:05

## 2021-09-26 RX ADMIN — SODIUM CHLORIDE 1000 ML: 0.9 INJECTION, SOLUTION INTRAVENOUS at 02:37

## 2021-09-26 RX ADMIN — Medication 250 MG: at 11:58

## 2021-09-26 RX ADMIN — HYDROMORPHONE HYDROCHLORIDE 2 MG: 2 TABLET ORAL at 16:28

## 2021-09-26 RX ADMIN — DIAZEPAM 5 MG: 5 TABLET ORAL at 06:12

## 2021-09-26 RX ADMIN — HYDRALAZINE HYDROCHLORIDE 5 MG: 20 INJECTION INTRAMUSCULAR; INTRAVENOUS at 16:33

## 2021-09-26 RX ADMIN — HYDROMORPHONE HYDROCHLORIDE 0.5 MG: 1 INJECTION, SOLUTION INTRAMUSCULAR; INTRAVENOUS; SUBCUTANEOUS at 21:08

## 2021-09-26 RX ADMIN — OXYCODONE HYDROCHLORIDE AND ACETAMINOPHEN 250 MG: 500 TABLET ORAL at 11:56

## 2021-09-26 RX ADMIN — MULTIPLE VITAMINS W/ MINERALS TAB 1 TABLET: TAB ORAL at 11:56

## 2021-09-26 RX ADMIN — POTASSIUM CHLORIDE 20 MEQ: 14.9 INJECTION, SOLUTION INTRAVENOUS at 06:05

## 2021-09-26 RX ADMIN — ONDANSETRON 4 MG: 2 INJECTION INTRAMUSCULAR; INTRAVENOUS at 21:07

## 2021-09-26 RX ADMIN — MELATONIN 3 MG: 3 TAB ORAL at 06:12

## 2021-09-26 RX ADMIN — HYDROMORPHONE HYDROCHLORIDE 1 MG: 1 INJECTION, SOLUTION INTRAMUSCULAR; INTRAVENOUS; SUBCUTANEOUS at 02:32

## 2021-09-26 RX ADMIN — HYDROMORPHONE HYDROCHLORIDE 1 MG: 1 INJECTION, SOLUTION INTRAMUSCULAR; INTRAVENOUS; SUBCUTANEOUS at 06:05

## 2021-09-26 RX ADMIN — ENOXAPARIN SODIUM 40 MG: 40 INJECTION SUBCUTANEOUS at 11:56

## 2021-09-26 RX ADMIN — Medication 250 MG: at 18:41

## 2021-09-26 RX ADMIN — SODIUM CHLORIDE 125 ML/HR: 0.9 INJECTION, SOLUTION INTRAVENOUS at 06:06

## 2021-09-26 RX ADMIN — IOHEXOL 100 ML: 350 INJECTION, SOLUTION INTRAVENOUS at 03:20

## 2021-09-27 ENCOUNTER — TELEPHONE (OUTPATIENT)
Dept: HEMATOLOGY ONCOLOGY | Facility: CLINIC | Age: 66
End: 2021-09-27

## 2021-09-27 ENCOUNTER — TELEPHONE (OUTPATIENT)
Dept: CARDIAC SURGERY | Facility: CLINIC | Age: 66
End: 2021-09-27

## 2021-09-27 ENCOUNTER — TELEPHONE (OUTPATIENT)
Dept: PALLIATIVE MEDICINE | Facility: CLINIC | Age: 66
End: 2021-09-27

## 2021-09-27 LAB
ANION GAP SERPL CALCULATED.3IONS-SCNC: 8 MMOL/L (ref 4–13)
BUN SERPL-MCNC: 9 MG/DL (ref 5–25)
CALCIUM SERPL-MCNC: 8.9 MG/DL (ref 8.3–10.1)
CHLORIDE SERPL-SCNC: 97 MMOL/L (ref 100–108)
CO2 SERPL-SCNC: 28 MMOL/L (ref 21–32)
CREAT SERPL-MCNC: 0.95 MG/DL (ref 0.6–1.3)
ERYTHROCYTE [DISTWIDTH] IN BLOOD BY AUTOMATED COUNT: 14.3 % (ref 11.6–15.1)
FERRITIN SERPL-MCNC: 106 NG/ML (ref 8–388)
FOLATE SERPL-MCNC: >20 NG/ML (ref 3.1–17.5)
GFR SERPL CREATININE-BSD FRML MDRD: 62 ML/MIN/1.73SQ M
GLUCOSE SERPL-MCNC: 100 MG/DL (ref 65–140)
HCT VFR BLD AUTO: 26.3 % (ref 34.8–46.1)
HGB BLD-MCNC: 8.3 G/DL (ref 11.5–15.4)
IRON SATN MFR SERPL: 8 % (ref 15–50)
IRON SERPL-MCNC: 14 UG/DL (ref 50–170)
MCH RBC QN AUTO: 28.4 PG (ref 26.8–34.3)
MCHC RBC AUTO-ENTMCNC: 31.6 G/DL (ref 31.4–37.4)
MCV RBC AUTO: 90 FL (ref 82–98)
PLATELET # BLD AUTO: 361 THOUSANDS/UL (ref 149–390)
PMV BLD AUTO: 8.2 FL (ref 8.9–12.7)
POTASSIUM SERPL-SCNC: 4.4 MMOL/L (ref 3.5–5.3)
RBC # BLD AUTO: 2.92 MILLION/UL (ref 3.81–5.12)
SODIUM SERPL-SCNC: 133 MMOL/L (ref 136–145)
TIBC SERPL-MCNC: 182 UG/DL (ref 250–450)
VIT B12 SERPL-MCNC: 711 PG/ML (ref 100–900)
WBC # BLD AUTO: 9.16 THOUSAND/UL (ref 4.31–10.16)

## 2021-09-27 PROCEDURE — 85027 COMPLETE CBC AUTOMATED: CPT | Performed by: STUDENT IN AN ORGANIZED HEALTH CARE EDUCATION/TRAINING PROGRAM

## 2021-09-27 PROCEDURE — 99232 SBSQ HOSP IP/OBS MODERATE 35: CPT | Performed by: STUDENT IN AN ORGANIZED HEALTH CARE EDUCATION/TRAINING PROGRAM

## 2021-09-27 PROCEDURE — 83550 IRON BINDING TEST: CPT | Performed by: STUDENT IN AN ORGANIZED HEALTH CARE EDUCATION/TRAINING PROGRAM

## 2021-09-27 PROCEDURE — 82746 ASSAY OF FOLIC ACID SERUM: CPT | Performed by: STUDENT IN AN ORGANIZED HEALTH CARE EDUCATION/TRAINING PROGRAM

## 2021-09-27 PROCEDURE — 82728 ASSAY OF FERRITIN: CPT | Performed by: STUDENT IN AN ORGANIZED HEALTH CARE EDUCATION/TRAINING PROGRAM

## 2021-09-27 PROCEDURE — 83540 ASSAY OF IRON: CPT | Performed by: STUDENT IN AN ORGANIZED HEALTH CARE EDUCATION/TRAINING PROGRAM

## 2021-09-27 PROCEDURE — 80048 BASIC METABOLIC PNL TOTAL CA: CPT | Performed by: STUDENT IN AN ORGANIZED HEALTH CARE EDUCATION/TRAINING PROGRAM

## 2021-09-27 PROCEDURE — 36415 COLL VENOUS BLD VENIPUNCTURE: CPT | Performed by: STUDENT IN AN ORGANIZED HEALTH CARE EDUCATION/TRAINING PROGRAM

## 2021-09-27 PROCEDURE — 82607 VITAMIN B-12: CPT | Performed by: STUDENT IN AN ORGANIZED HEALTH CARE EDUCATION/TRAINING PROGRAM

## 2021-09-27 RX ORDER — HYDROMORPHONE HCL/PF 1 MG/ML
0.5 SYRINGE (ML) INJECTION
Status: DISCONTINUED | OUTPATIENT
Start: 2021-09-27 | End: 2021-10-08 | Stop reason: HOSPADM

## 2021-09-27 RX ORDER — LISINOPRIL 5 MG/1
5 TABLET ORAL DAILY
Status: DISCONTINUED | OUTPATIENT
Start: 2021-09-27 | End: 2021-10-08 | Stop reason: HOSPADM

## 2021-09-27 RX ORDER — HYDROMORPHONE HYDROCHLORIDE 2 MG/1
2 TABLET ORAL
Status: DISCONTINUED | OUTPATIENT
Start: 2021-09-27 | End: 2021-09-28

## 2021-09-27 RX ORDER — HYDRALAZINE HYDROCHLORIDE 20 MG/ML
5 INJECTION INTRAMUSCULAR; INTRAVENOUS EVERY 6 HOURS PRN
Status: DISCONTINUED | OUTPATIENT
Start: 2021-09-27 | End: 2021-10-08 | Stop reason: HOSPADM

## 2021-09-27 RX ADMIN — LISINOPRIL 5 MG: 5 TABLET ORAL at 09:06

## 2021-09-27 RX ADMIN — HYDROMORPHONE HYDROCHLORIDE 0.5 MG: 1 INJECTION, SOLUTION INTRAMUSCULAR; INTRAVENOUS; SUBCUTANEOUS at 23:54

## 2021-09-27 RX ADMIN — IRON SUCROSE 200 MG: 20 INJECTION, SOLUTION INTRAVENOUS at 17:26

## 2021-09-27 RX ADMIN — Medication 250 MG: at 09:06

## 2021-09-27 RX ADMIN — Medication 250 MG: at 17:27

## 2021-09-27 RX ADMIN — OXYCODONE HYDROCHLORIDE AND ACETAMINOPHEN 250 MG: 500 TABLET ORAL at 09:10

## 2021-09-27 RX ADMIN — HYDROMORPHONE HYDROCHLORIDE 2 MG: 2 TABLET ORAL at 22:04

## 2021-09-27 RX ADMIN — SODIUM CHLORIDE 75 ML/HR: 0.9 INJECTION, SOLUTION INTRAVENOUS at 05:27

## 2021-09-27 RX ADMIN — MULTIPLE VITAMINS W/ MINERALS TAB 1 TABLET: TAB ORAL at 09:06

## 2021-09-27 RX ADMIN — HYDROMORPHONE HYDROCHLORIDE 2 MG: 2 TABLET ORAL at 14:49

## 2021-09-27 RX ADMIN — HYDROMORPHONE HYDROCHLORIDE 0.5 MG: 1 INJECTION, SOLUTION INTRAMUSCULAR; INTRAVENOUS; SUBCUTANEOUS at 17:32

## 2021-09-27 RX ADMIN — ENOXAPARIN SODIUM 40 MG: 40 INJECTION SUBCUTANEOUS at 09:11

## 2021-09-27 RX ADMIN — HYDROMORPHONE HYDROCHLORIDE 0.5 MG: 1 INJECTION, SOLUTION INTRAMUSCULAR; INTRAVENOUS; SUBCUTANEOUS at 05:22

## 2021-09-27 RX ADMIN — HYDROMORPHONE HYDROCHLORIDE 0.5 MG: 1 INJECTION, SOLUTION INTRAMUSCULAR; INTRAVENOUS; SUBCUTANEOUS at 09:34

## 2021-09-27 NOTE — TELEPHONE ENCOUNTER
Evans Haro daughter stated she is in the hospital in [facility] has been admitted due to pain management  They discussed hospice and to be POA because she is is not getting better she would like to discuss further with Dr Dewayne Joshiseng Haro can be reached at [phone] to discuss moving forward what can she do for her mom she stated [place] Gamma  is down to 100  Pounds at this time hasn't showered or really eating due to pian

## 2021-09-27 NOTE — TELEPHONE ENCOUNTER
Patient's daughter called stating Juan Jose Thomas was admitted to the hospital on Saturday night, they did a CT and it showed her cancer has spread and she is in a tremendous amount of pain  Lolly Walters is in the process of getting a POA set up  She wanted to make Dr Kaylyn Paz aware of this and have a conversation with him regarding what comes next   Lolly Walters can be reached at [phone]

## 2021-09-27 NOTE — TELEPHONE ENCOUNTER
Patients daughter, Evans Haro, would like a call back from Dr Rollene Bloch to discuss next steps for patient she would like a call back at [phone]

## 2021-09-27 NOTE — TELEPHONE ENCOUNTER
Yes   I just spoke with the hospitalist who put in a consult for us  I told him that I would come up in see her on Wednesday  He was not sure if she would still be there  If not we can always add her on Wednesday

## 2021-09-28 ENCOUNTER — PATIENT OUTREACH (OUTPATIENT)
Dept: CASE MANAGEMENT | Facility: HOSPITAL | Age: 66
End: 2021-09-28

## 2021-09-28 LAB
ANION GAP SERPL CALCULATED.3IONS-SCNC: 12 MMOL/L (ref 4–13)
BUN SERPL-MCNC: 11 MG/DL (ref 5–25)
CALCIUM SERPL-MCNC: 8.8 MG/DL (ref 8.3–10.1)
CHLORIDE SERPL-SCNC: 100 MMOL/L (ref 100–108)
CO2 SERPL-SCNC: 24 MMOL/L (ref 21–32)
CREAT SERPL-MCNC: 0.8 MG/DL (ref 0.6–1.3)
ERYTHROCYTE [DISTWIDTH] IN BLOOD BY AUTOMATED COUNT: 14.2 % (ref 11.6–15.1)
GFR SERPL CREATININE-BSD FRML MDRD: 76 ML/MIN/1.73SQ M
GLUCOSE SERPL-MCNC: 84 MG/DL (ref 65–140)
HCT VFR BLD AUTO: 27.7 % (ref 34.8–46.1)
HEMOCCULT STL QL: NEGATIVE
HEMOCCULT STL QL: POSITIVE
HGB BLD-MCNC: 8.8 G/DL (ref 11.5–15.4)
MCH RBC QN AUTO: 28.4 PG (ref 26.8–34.3)
MCHC RBC AUTO-ENTMCNC: 31.8 G/DL (ref 31.4–37.4)
MCV RBC AUTO: 89 FL (ref 82–98)
PLATELET # BLD AUTO: 439 THOUSANDS/UL (ref 149–390)
PMV BLD AUTO: 8.7 FL (ref 8.9–12.7)
POTASSIUM SERPL-SCNC: 3.4 MMOL/L (ref 3.5–5.3)
RBC # BLD AUTO: 3.1 MILLION/UL (ref 3.81–5.12)
SODIUM SERPL-SCNC: 136 MMOL/L (ref 136–145)
WBC # BLD AUTO: 7.21 THOUSAND/UL (ref 4.31–10.16)

## 2021-09-28 PROCEDURE — 82272 OCCULT BLD FECES 1-3 TESTS: CPT | Performed by: STUDENT IN AN ORGANIZED HEALTH CARE EDUCATION/TRAINING PROGRAM

## 2021-09-28 PROCEDURE — 99232 SBSQ HOSP IP/OBS MODERATE 35: CPT | Performed by: INTERNAL MEDICINE

## 2021-09-28 PROCEDURE — 97166 OT EVAL MOD COMPLEX 45 MIN: CPT

## 2021-09-28 PROCEDURE — 99223 1ST HOSP IP/OBS HIGH 75: CPT | Performed by: STUDENT IN AN ORGANIZED HEALTH CARE EDUCATION/TRAINING PROGRAM

## 2021-09-28 PROCEDURE — 97162 PT EVAL MOD COMPLEX 30 MIN: CPT

## 2021-09-28 PROCEDURE — 85027 COMPLETE CBC AUTOMATED: CPT | Performed by: STUDENT IN AN ORGANIZED HEALTH CARE EDUCATION/TRAINING PROGRAM

## 2021-09-28 PROCEDURE — 80048 BASIC METABOLIC PNL TOTAL CA: CPT | Performed by: STUDENT IN AN ORGANIZED HEALTH CARE EDUCATION/TRAINING PROGRAM

## 2021-09-28 RX ORDER — POLYETHYLENE GLYCOL 3350 17 G/17G
17 POWDER, FOR SOLUTION ORAL DAILY
Status: DISCONTINUED | OUTPATIENT
Start: 2021-09-28 | End: 2021-10-08 | Stop reason: HOSPADM

## 2021-09-28 RX ORDER — LACTULOSE 20 G/30ML
10 SOLUTION ORAL DAILY
Status: DISCONTINUED | OUTPATIENT
Start: 2021-09-28 | End: 2021-10-04

## 2021-09-28 RX ORDER — HYDROMORPHONE HYDROCHLORIDE 4 MG/1
8 TABLET ORAL
Status: DISCONTINUED | OUTPATIENT
Start: 2021-09-28 | End: 2021-10-08 | Stop reason: HOSPADM

## 2021-09-28 RX ORDER — HYDROMORPHONE HYDROCHLORIDE 4 MG/1
4 TABLET ORAL
Status: DISCONTINUED | OUTPATIENT
Start: 2021-09-28 | End: 2021-10-08 | Stop reason: HOSPADM

## 2021-09-28 RX ORDER — AMOXICILLIN 250 MG
1 CAPSULE ORAL
Status: DISCONTINUED | OUTPATIENT
Start: 2021-09-28 | End: 2021-09-28

## 2021-09-28 RX ORDER — POTASSIUM CHLORIDE 20 MEQ/1
40 TABLET, EXTENDED RELEASE ORAL ONCE
Status: COMPLETED | OUTPATIENT
Start: 2021-09-28 | End: 2021-09-28

## 2021-09-28 RX ORDER — AMOXICILLIN 250 MG
1 CAPSULE ORAL 2 TIMES DAILY PRN
Status: DISCONTINUED | OUTPATIENT
Start: 2021-09-28 | End: 2021-09-28

## 2021-09-28 RX ADMIN — HYDROMORPHONE HYDROCHLORIDE 8 MG: 4 TABLET ORAL at 12:35

## 2021-09-28 RX ADMIN — Medication 250 MG: at 09:00

## 2021-09-28 RX ADMIN — MELATONIN 3 MG: 3 TAB ORAL at 22:26

## 2021-09-28 RX ADMIN — MULTIPLE VITAMINS W/ MINERALS TAB 1 TABLET: TAB ORAL at 09:00

## 2021-09-28 RX ADMIN — HYDROMORPHONE HYDROCHLORIDE 8 MG: 4 TABLET ORAL at 19:24

## 2021-09-28 RX ADMIN — LACTULOSE 10 G: 20 SOLUTION ORAL at 15:00

## 2021-09-28 RX ADMIN — ENOXAPARIN SODIUM 40 MG: 40 INJECTION SUBCUTANEOUS at 09:00

## 2021-09-28 RX ADMIN — Medication 250 MG: at 19:23

## 2021-09-28 RX ADMIN — POLYETHYLENE GLYCOL 3350 17 G: 17 POWDER, FOR SOLUTION ORAL at 11:30

## 2021-09-28 RX ADMIN — OXYCODONE HYDROCHLORIDE AND ACETAMINOPHEN 250 MG: 500 TABLET ORAL at 09:26

## 2021-09-28 RX ADMIN — POTASSIUM CHLORIDE 40 MEQ: 1500 TABLET, EXTENDED RELEASE ORAL at 09:00

## 2021-09-28 RX ADMIN — IRON SUCROSE 200 MG: 20 INJECTION, SOLUTION INTRAVENOUS at 09:32

## 2021-09-28 RX ADMIN — HYDROMORPHONE HYDROCHLORIDE 2 MG: 2 TABLET ORAL at 05:26

## 2021-09-28 RX ADMIN — HYDROMORPHONE HYDROCHLORIDE 0.5 MG: 1 INJECTION, SOLUTION INTRAMUSCULAR; INTRAVENOUS; SUBCUTANEOUS at 09:21

## 2021-09-28 RX ADMIN — SODIUM CHLORIDE 75 ML/HR: 0.9 INJECTION, SOLUTION INTRAVENOUS at 05:26

## 2021-09-28 RX ADMIN — LISINOPRIL 5 MG: 5 TABLET ORAL at 09:00

## 2021-09-28 RX ADMIN — HYDROMORPHONE HYDROCHLORIDE 4 MG: 4 TABLET ORAL at 15:45

## 2021-09-28 RX ADMIN — HYDROMORPHONE HYDROCHLORIDE 8 MG: 4 TABLET ORAL at 22:26

## 2021-09-28 NOTE — PROGRESS NOTES
S/w pt's daughters Kacie Almendarez and Jennifer Macias by phone today, they have several questions about next steps for pt and what they as her children should be expecting or responsible for  They are concerned with her going home, if that's deemed an option, as they say that she is unable to care for herself and is not showering, changing clothes, or eating appropriately  They have a lot of questions about her finances and what happens to her debts upon her passing, and what will happen if they are unable to pay for her final expenses  We discussed body donation programs although I did make them aware that pt may not be a candidate for this, or low cost cremation services that might be an option as well  They are grateful for the involvement of the palliative care team at this point and are looking forward to any information that Dr Yajaira Portillo can give them after he sees pt tomorrow  I did also explain that there is an inpt team of SW that will assist with her d/c plans, including facility placement if need be  MSW will make IDT aware of pt's daughter's concerns and will continue to assist as I am able moving forward

## 2021-09-29 PROBLEM — E44.0 MODERATE PROTEIN-CALORIE MALNUTRITION (HCC): Status: ACTIVE | Noted: 2021-09-29

## 2021-09-29 LAB
ANION GAP SERPL CALCULATED.3IONS-SCNC: 9 MMOL/L (ref 4–13)
BASOPHILS # BLD AUTO: 0.04 THOUSANDS/ΜL (ref 0–0.1)
BASOPHILS NFR BLD AUTO: 1 % (ref 0–1)
BUN SERPL-MCNC: 9 MG/DL (ref 5–25)
CALCIUM SERPL-MCNC: 8.9 MG/DL (ref 8.3–10.1)
CHLORIDE SERPL-SCNC: 101 MMOL/L (ref 100–108)
CO2 SERPL-SCNC: 25 MMOL/L (ref 21–32)
CREAT SERPL-MCNC: 0.75 MG/DL (ref 0.6–1.3)
EOSINOPHIL # BLD AUTO: 0.25 THOUSAND/ΜL (ref 0–0.61)
EOSINOPHIL NFR BLD AUTO: 4 % (ref 0–6)
ERYTHROCYTE [DISTWIDTH] IN BLOOD BY AUTOMATED COUNT: 14.3 % (ref 11.6–15.1)
GFR SERPL CREATININE-BSD FRML MDRD: 82 ML/MIN/1.73SQ M
GLUCOSE SERPL-MCNC: 72 MG/DL (ref 65–140)
HCT VFR BLD AUTO: 25.6 % (ref 34.8–46.1)
HCT VFR BLD AUTO: 26 % (ref 34.8–46.1)
HGB BLD-MCNC: 7.9 G/DL (ref 11.5–15.4)
HGB BLD-MCNC: 8.3 G/DL (ref 11.5–15.4)
IMM GRANULOCYTES # BLD AUTO: 0.03 THOUSAND/UL (ref 0–0.2)
IMM GRANULOCYTES NFR BLD AUTO: 1 % (ref 0–2)
LYMPHOCYTES # BLD AUTO: 1.48 THOUSANDS/ΜL (ref 0.6–4.47)
LYMPHOCYTES NFR BLD AUTO: 26 % (ref 14–44)
MCH RBC QN AUTO: 28 PG (ref 26.8–34.3)
MCHC RBC AUTO-ENTMCNC: 30.9 G/DL (ref 31.4–37.4)
MCV RBC AUTO: 91 FL (ref 82–98)
MONOCYTES # BLD AUTO: 0.79 THOUSAND/ΜL (ref 0.17–1.22)
MONOCYTES NFR BLD AUTO: 14 % (ref 4–12)
NEUTROPHILS # BLD AUTO: 3.16 THOUSANDS/ΜL (ref 1.85–7.62)
NEUTS SEG NFR BLD AUTO: 54 % (ref 43–75)
NRBC BLD AUTO-RTO: 0 /100 WBCS
PLATELET # BLD AUTO: 415 THOUSANDS/UL (ref 149–390)
PMV BLD AUTO: 8.6 FL (ref 8.9–12.7)
POTASSIUM SERPL-SCNC: 4.2 MMOL/L (ref 3.5–5.3)
RBC # BLD AUTO: 2.82 MILLION/UL (ref 3.81–5.12)
SODIUM SERPL-SCNC: 135 MMOL/L (ref 136–145)
WBC # BLD AUTO: 5.75 THOUSAND/UL (ref 4.31–10.16)

## 2021-09-29 PROCEDURE — 99232 SBSQ HOSP IP/OBS MODERATE 35: CPT | Performed by: STUDENT IN AN ORGANIZED HEALTH CARE EDUCATION/TRAINING PROGRAM

## 2021-09-29 PROCEDURE — 99232 SBSQ HOSP IP/OBS MODERATE 35: CPT | Performed by: INTERNAL MEDICINE

## 2021-09-29 PROCEDURE — 80048 BASIC METABOLIC PNL TOTAL CA: CPT | Performed by: INTERNAL MEDICINE

## 2021-09-29 PROCEDURE — 85014 HEMATOCRIT: CPT | Performed by: INTERNAL MEDICINE

## 2021-09-29 PROCEDURE — 85018 HEMOGLOBIN: CPT | Performed by: INTERNAL MEDICINE

## 2021-09-29 PROCEDURE — 99223 1ST HOSP IP/OBS HIGH 75: CPT | Performed by: OBSTETRICS & GYNECOLOGY

## 2021-09-29 PROCEDURE — 85025 COMPLETE CBC W/AUTO DIFF WBC: CPT | Performed by: INTERNAL MEDICINE

## 2021-09-29 RX ADMIN — OXYCODONE HYDROCHLORIDE AND ACETAMINOPHEN 250 MG: 500 TABLET ORAL at 09:47

## 2021-09-29 RX ADMIN — HYDROMORPHONE HYDROCHLORIDE 4 MG: 4 TABLET ORAL at 06:46

## 2021-09-29 RX ADMIN — MELATONIN 3 MG: 3 TAB ORAL at 22:12

## 2021-09-29 RX ADMIN — LISINOPRIL 5 MG: 5 TABLET ORAL at 09:47

## 2021-09-29 RX ADMIN — LACTULOSE 10 G: 20 SOLUTION ORAL at 09:47

## 2021-09-29 RX ADMIN — HYDROMORPHONE HYDROCHLORIDE 8 MG: 4 TABLET ORAL at 19:02

## 2021-09-29 RX ADMIN — POLYETHYLENE GLYCOL 3350 17 G: 17 POWDER, FOR SOLUTION ORAL at 09:47

## 2021-09-29 RX ADMIN — HYDROMORPHONE HYDROCHLORIDE 4 MG: 4 TABLET ORAL at 12:18

## 2021-09-29 RX ADMIN — HYDROMORPHONE HYDROCHLORIDE 0.5 MG: 1 INJECTION, SOLUTION INTRAMUSCULAR; INTRAVENOUS; SUBCUTANEOUS at 13:42

## 2021-09-29 RX ADMIN — HYDROMORPHONE HYDROCHLORIDE 8 MG: 4 TABLET ORAL at 22:12

## 2021-09-29 RX ADMIN — IRON SUCROSE 200 MG: 20 INJECTION, SOLUTION INTRAVENOUS at 10:34

## 2021-09-29 RX ADMIN — HYDROMORPHONE HYDROCHLORIDE 0.5 MG: 1 INJECTION, SOLUTION INTRAMUSCULAR; INTRAVENOUS; SUBCUTANEOUS at 08:25

## 2021-09-29 RX ADMIN — MULTIPLE VITAMINS W/ MINERALS TAB 1 TABLET: TAB ORAL at 09:47

## 2021-09-29 RX ADMIN — Medication 250 MG: at 09:47

## 2021-09-29 RX ADMIN — ENOXAPARIN SODIUM 40 MG: 40 INJECTION SUBCUTANEOUS at 09:47

## 2021-09-29 RX ADMIN — Medication 250 MG: at 19:06

## 2021-09-30 ENCOUNTER — APPOINTMENT (INPATIENT)
Dept: ULTRASOUND IMAGING | Facility: HOSPITAL | Age: 66
DRG: 988 | End: 2021-09-30
Payer: MEDICARE

## 2021-09-30 PROBLEM — M79.89 LEFT ARM SWELLING: Status: ACTIVE | Noted: 2021-09-30

## 2021-09-30 LAB
ANION GAP SERPL CALCULATED.3IONS-SCNC: 7 MMOL/L (ref 4–13)
BASOPHILS # BLD AUTO: 0.06 THOUSANDS/ΜL (ref 0–0.1)
BASOPHILS NFR BLD AUTO: 1 % (ref 0–1)
BUN SERPL-MCNC: 8 MG/DL (ref 5–25)
CALCIUM SERPL-MCNC: 8.8 MG/DL (ref 8.3–10.1)
CHLORIDE SERPL-SCNC: 99 MMOL/L (ref 100–108)
CO2 SERPL-SCNC: 27 MMOL/L (ref 21–32)
CREAT SERPL-MCNC: 0.76 MG/DL (ref 0.6–1.3)
EOSINOPHIL # BLD AUTO: 0.27 THOUSAND/ΜL (ref 0–0.61)
EOSINOPHIL NFR BLD AUTO: 4 % (ref 0–6)
ERYTHROCYTE [DISTWIDTH] IN BLOOD BY AUTOMATED COUNT: 14.3 % (ref 11.6–15.1)
GFR SERPL CREATININE-BSD FRML MDRD: 81 ML/MIN/1.73SQ M
GLUCOSE SERPL-MCNC: 88 MG/DL (ref 65–140)
HCT VFR BLD AUTO: 27 % (ref 34.8–46.1)
HGB BLD-MCNC: 8.5 G/DL (ref 11.5–15.4)
IMM GRANULOCYTES # BLD AUTO: 0.06 THOUSAND/UL (ref 0–0.2)
IMM GRANULOCYTES NFR BLD AUTO: 1 % (ref 0–2)
LYMPHOCYTES # BLD AUTO: 1.25 THOUSANDS/ΜL (ref 0.6–4.47)
LYMPHOCYTES NFR BLD AUTO: 20 % (ref 14–44)
MCH RBC QN AUTO: 28.7 PG (ref 26.8–34.3)
MCHC RBC AUTO-ENTMCNC: 31.5 G/DL (ref 31.4–37.4)
MCV RBC AUTO: 91 FL (ref 82–98)
MONOCYTES # BLD AUTO: 0.8 THOUSAND/ΜL (ref 0.17–1.22)
MONOCYTES NFR BLD AUTO: 13 % (ref 4–12)
NEUTROPHILS # BLD AUTO: 3.93 THOUSANDS/ΜL (ref 1.85–7.62)
NEUTS SEG NFR BLD AUTO: 61 % (ref 43–75)
NRBC BLD AUTO-RTO: 0 /100 WBCS
PLATELET # BLD AUTO: 463 THOUSANDS/UL (ref 149–390)
PMV BLD AUTO: 8.6 FL (ref 8.9–12.7)
POTASSIUM SERPL-SCNC: 3.5 MMOL/L (ref 3.5–5.3)
RBC # BLD AUTO: 2.96 MILLION/UL (ref 3.81–5.12)
SODIUM SERPL-SCNC: 133 MMOL/L (ref 136–145)
WBC # BLD AUTO: 6.37 THOUSAND/UL (ref 4.31–10.16)

## 2021-09-30 PROCEDURE — 99232 SBSQ HOSP IP/OBS MODERATE 35: CPT | Performed by: STUDENT IN AN ORGANIZED HEALTH CARE EDUCATION/TRAINING PROGRAM

## 2021-09-30 PROCEDURE — 93971 EXTREMITY STUDY: CPT

## 2021-09-30 PROCEDURE — 99232 SBSQ HOSP IP/OBS MODERATE 35: CPT | Performed by: INTERNAL MEDICINE

## 2021-09-30 PROCEDURE — 93971 EXTREMITY STUDY: CPT | Performed by: SURGERY

## 2021-09-30 PROCEDURE — 85025 COMPLETE CBC W/AUTO DIFF WBC: CPT | Performed by: INTERNAL MEDICINE

## 2021-09-30 PROCEDURE — 80048 BASIC METABOLIC PNL TOTAL CA: CPT | Performed by: INTERNAL MEDICINE

## 2021-09-30 RX ORDER — ACETAMINOPHEN 325 MG/1
650 TABLET ORAL EVERY 6 HOURS SCHEDULED
Status: DISCONTINUED | OUTPATIENT
Start: 2021-09-30 | End: 2021-10-08 | Stop reason: HOSPADM

## 2021-09-30 RX ADMIN — LISINOPRIL 5 MG: 5 TABLET ORAL at 08:28

## 2021-09-30 RX ADMIN — HYDROMORPHONE HYDROCHLORIDE 8 MG: 4 TABLET ORAL at 06:08

## 2021-09-30 RX ADMIN — POLYETHYLENE GLYCOL 3350 17 G: 17 POWDER, FOR SOLUTION ORAL at 08:28

## 2021-09-30 RX ADMIN — ACETAMINOPHEN 650 MG: 325 TABLET, FILM COATED ORAL at 12:10

## 2021-09-30 RX ADMIN — ENOXAPARIN SODIUM 40 MG: 40 INJECTION SUBCUTANEOUS at 08:27

## 2021-09-30 RX ADMIN — HYDROMORPHONE HYDROCHLORIDE 0.5 MG: 1 INJECTION, SOLUTION INTRAMUSCULAR; INTRAVENOUS; SUBCUTANEOUS at 08:27

## 2021-09-30 RX ADMIN — OXYCODONE HYDROCHLORIDE AND ACETAMINOPHEN 250 MG: 500 TABLET ORAL at 08:28

## 2021-09-30 RX ADMIN — ONDANSETRON 4 MG: 2 INJECTION INTRAMUSCULAR; INTRAVENOUS at 16:51

## 2021-09-30 RX ADMIN — ONDANSETRON 4 MG: 2 INJECTION INTRAMUSCULAR; INTRAVENOUS at 12:43

## 2021-09-30 RX ADMIN — Medication 250 MG: at 08:28

## 2021-09-30 RX ADMIN — ENOXAPARIN SODIUM 50 MG: 60 INJECTION SUBCUTANEOUS at 22:29

## 2021-09-30 RX ADMIN — ACETAMINOPHEN 650 MG: 325 TABLET, FILM COATED ORAL at 08:27

## 2021-09-30 RX ADMIN — MULTIPLE VITAMINS W/ MINERALS TAB 1 TABLET: TAB ORAL at 08:28

## 2021-09-30 RX ADMIN — HYDROMORPHONE HYDROCHLORIDE 8 MG: 4 TABLET ORAL at 12:10

## 2021-09-30 RX ADMIN — HYDROMORPHONE HYDROCHLORIDE 8 MG: 4 TABLET ORAL at 16:51

## 2021-09-30 RX ADMIN — MELATONIN 3 MG: 3 TAB ORAL at 22:29

## 2021-09-30 RX ADMIN — Medication 250 MG: at 17:52

## 2021-09-30 RX ADMIN — ACETAMINOPHEN 650 MG: 325 TABLET, FILM COATED ORAL at 17:52

## 2021-09-30 RX ADMIN — LACTULOSE 10 G: 20 SOLUTION ORAL at 08:28

## 2021-10-01 LAB
ANION GAP SERPL CALCULATED.3IONS-SCNC: 14 MMOL/L (ref 4–13)
BUN SERPL-MCNC: 8 MG/DL (ref 5–25)
CALCIUM SERPL-MCNC: 9 MG/DL (ref 8.3–10.1)
CHLORIDE SERPL-SCNC: 99 MMOL/L (ref 100–108)
CO2 SERPL-SCNC: 23 MMOL/L (ref 21–32)
CREAT SERPL-MCNC: 0.81 MG/DL (ref 0.6–1.3)
ERYTHROCYTE [DISTWIDTH] IN BLOOD BY AUTOMATED COUNT: 14.5 % (ref 11.6–15.1)
GFR SERPL CREATININE-BSD FRML MDRD: 75 ML/MIN/1.73SQ M
GLUCOSE SERPL-MCNC: 81 MG/DL (ref 65–140)
HCT VFR BLD AUTO: 31.2 % (ref 34.8–46.1)
HGB BLD-MCNC: 9 G/DL (ref 11.5–15.4)
MCH RBC QN AUTO: 28.4 PG (ref 26.8–34.3)
MCHC RBC AUTO-ENTMCNC: 28.8 G/DL (ref 31.4–37.4)
MCV RBC AUTO: 98 FL (ref 82–98)
PLATELET # BLD AUTO: 461 THOUSANDS/UL (ref 149–390)
PMV BLD AUTO: 8.4 FL (ref 8.9–12.7)
POTASSIUM SERPL-SCNC: 3.5 MMOL/L (ref 3.5–5.3)
RBC # BLD AUTO: 3.17 MILLION/UL (ref 3.81–5.12)
SODIUM SERPL-SCNC: 136 MMOL/L (ref 136–145)
WBC # BLD AUTO: 5.55 THOUSAND/UL (ref 4.31–10.16)

## 2021-10-01 PROCEDURE — 80048 BASIC METABOLIC PNL TOTAL CA: CPT | Performed by: INTERNAL MEDICINE

## 2021-10-01 PROCEDURE — 99232 SBSQ HOSP IP/OBS MODERATE 35: CPT | Performed by: INTERNAL MEDICINE

## 2021-10-01 PROCEDURE — 85027 COMPLETE CBC AUTOMATED: CPT | Performed by: INTERNAL MEDICINE

## 2021-10-01 PROCEDURE — 3E03305 INTRODUCTION OF OTHER ANTINEOPLASTIC INTO PERIPHERAL VEIN, PERCUTANEOUS APPROACH: ICD-10-PCS | Performed by: OBSTETRICS & GYNECOLOGY

## 2021-10-01 PROCEDURE — 99232 SBSQ HOSP IP/OBS MODERATE 35: CPT | Performed by: STUDENT IN AN ORGANIZED HEALTH CARE EDUCATION/TRAINING PROGRAM

## 2021-10-01 RX ORDER — BISACODYL 10 MG
10 SUPPOSITORY, RECTAL RECTAL DAILY PRN
Status: DISCONTINUED | OUTPATIENT
Start: 2021-10-01 | End: 2021-10-08 | Stop reason: HOSPADM

## 2021-10-01 RX ORDER — SODIUM CHLORIDE 9 MG/ML
75 INJECTION, SOLUTION INTRAVENOUS CONTINUOUS
Status: DISPENSED | OUTPATIENT
Start: 2021-10-01 | End: 2021-10-02

## 2021-10-01 RX ORDER — PALONOSETRON 0.05 MG/ML
0.25 INJECTION, SOLUTION INTRAVENOUS ONCE
Status: COMPLETED | OUTPATIENT
Start: 2021-10-01 | End: 2021-10-01

## 2021-10-01 RX ORDER — SENNOSIDES 8.6 MG
1 TABLET ORAL 2 TIMES DAILY
Status: DISCONTINUED | OUTPATIENT
Start: 2021-10-01 | End: 2021-10-04

## 2021-10-01 RX ORDER — METOCLOPRAMIDE HYDROCHLORIDE 5 MG/ML
10 INJECTION INTRAMUSCULAR; INTRAVENOUS ONCE
Status: DISCONTINUED | OUTPATIENT
Start: 2021-10-01 | End: 2021-10-04

## 2021-10-01 RX ORDER — SODIUM CHLORIDE 9 MG/ML
20 INJECTION, SOLUTION INTRAVENOUS ONCE
Status: COMPLETED | OUTPATIENT
Start: 2021-10-01 | End: 2021-10-01

## 2021-10-01 RX ORDER — DEXTROSE MONOHYDRATE 50 MG/ML
20 INJECTION, SOLUTION INTRAVENOUS ONCE
Status: COMPLETED | OUTPATIENT
Start: 2021-10-01 | End: 2021-10-01

## 2021-10-01 RX ADMIN — ACETAMINOPHEN 650 MG: 325 TABLET, FILM COATED ORAL at 06:15

## 2021-10-01 RX ADMIN — STANDARDIZED SENNA CONCENTRATE 8.6 MG: 8.6 TABLET ORAL at 11:47

## 2021-10-01 RX ADMIN — POLYETHYLENE GLYCOL 3350 17 G: 17 POWDER, FOR SOLUTION ORAL at 10:31

## 2021-10-01 RX ADMIN — ONDANSETRON 4 MG: 2 INJECTION INTRAMUSCULAR; INTRAVENOUS at 19:35

## 2021-10-01 RX ADMIN — STANDARDIZED SENNA CONCENTRATE 8.6 MG: 8.6 TABLET ORAL at 19:33

## 2021-10-01 RX ADMIN — SODIUM CHLORIDE 20 ML/HR: 0.9 INJECTION, SOLUTION INTRAVENOUS at 17:49

## 2021-10-01 RX ADMIN — CARBOPLATIN 372.5 MG: 10 INJECTION, SOLUTION INTRAVENOUS at 16:38

## 2021-10-01 RX ADMIN — ACETAMINOPHEN 650 MG: 325 TABLET, FILM COATED ORAL at 19:33

## 2021-10-01 RX ADMIN — LACTULOSE 10 G: 20 SOLUTION ORAL at 10:31

## 2021-10-01 RX ADMIN — MULTIPLE VITAMINS W/ MINERALS TAB 1 TABLET: TAB ORAL at 10:30

## 2021-10-01 RX ADMIN — DEXTROSE 20 ML/HR: 5 SOLUTION INTRAVENOUS at 14:28

## 2021-10-01 RX ADMIN — FOSAPREPITANT 150 MG: 150 INJECTION, POWDER, LYOPHILIZED, FOR SOLUTION INTRAVENOUS at 12:32

## 2021-10-01 RX ADMIN — Medication 250 MG: at 10:30

## 2021-10-01 RX ADMIN — ENOXAPARIN SODIUM 40 MG: 40 INJECTION SUBCUTANEOUS at 10:30

## 2021-10-01 RX ADMIN — LISINOPRIL 5 MG: 5 TABLET ORAL at 10:30

## 2021-10-01 RX ADMIN — ACETAMINOPHEN 650 MG: 325 TABLET, FILM COATED ORAL at 00:36

## 2021-10-01 RX ADMIN — SODIUM CHLORIDE 75 ML/HR: 0.9 INJECTION, SOLUTION INTRAVENOUS at 17:50

## 2021-10-01 RX ADMIN — MELATONIN 3 MG: 3 TAB ORAL at 22:22

## 2021-10-01 RX ADMIN — DEXAMETHASONE SODIUM PHOSPHATE 20 MG: 10 INJECTION, SOLUTION INTRAMUSCULAR; INTRAVENOUS at 13:37

## 2021-10-01 RX ADMIN — ENOXAPARIN SODIUM 40 MG: 40 INJECTION SUBCUTANEOUS at 22:22

## 2021-10-01 RX ADMIN — ACETAMINOPHEN 650 MG: 325 TABLET, FILM COATED ORAL at 11:47

## 2021-10-01 RX ADMIN — OXYCODONE HYDROCHLORIDE AND ACETAMINOPHEN 250 MG: 500 TABLET ORAL at 10:30

## 2021-10-01 RX ADMIN — Medication 250 MG: at 19:33

## 2021-10-01 RX ADMIN — DOXORUBICIN HYDROCHLORIDE 43.8 MG: 2 INJECTABLE, LIPOSOMAL INTRAVENOUS at 14:29

## 2021-10-01 RX ADMIN — PALONOSETRON HYDROCHLORIDE 0.25 MG: 0.25 INJECTION INTRAVENOUS at 13:14

## 2021-10-02 LAB
ANION GAP SERPL CALCULATED.3IONS-SCNC: 12 MMOL/L (ref 4–13)
BUN SERPL-MCNC: 8 MG/DL (ref 5–25)
CALCIUM SERPL-MCNC: 8.5 MG/DL (ref 8.3–10.1)
CHLORIDE SERPL-SCNC: 105 MMOL/L (ref 100–108)
CO2 SERPL-SCNC: 24 MMOL/L (ref 21–32)
CREAT SERPL-MCNC: 0.7 MG/DL (ref 0.6–1.3)
ERYTHROCYTE [DISTWIDTH] IN BLOOD BY AUTOMATED COUNT: 14.5 % (ref 11.6–15.1)
GFR SERPL CREATININE-BSD FRML MDRD: 89 ML/MIN/1.73SQ M
GLUCOSE SERPL-MCNC: 123 MG/DL (ref 65–140)
HCT VFR BLD AUTO: 26.1 % (ref 34.8–46.1)
HGB BLD-MCNC: 8.4 G/DL (ref 11.5–15.4)
MCH RBC QN AUTO: 28.7 PG (ref 26.8–34.3)
MCHC RBC AUTO-ENTMCNC: 32.2 G/DL (ref 31.4–37.4)
MCV RBC AUTO: 89 FL (ref 82–98)
PLATELET # BLD AUTO: 437 THOUSANDS/UL (ref 149–390)
PMV BLD AUTO: 8.1 FL (ref 8.9–12.7)
POTASSIUM SERPL-SCNC: 3.7 MMOL/L (ref 3.5–5.3)
RBC # BLD AUTO: 2.93 MILLION/UL (ref 3.81–5.12)
SODIUM SERPL-SCNC: 141 MMOL/L (ref 136–145)
WBC # BLD AUTO: 7.18 THOUSAND/UL (ref 4.31–10.16)

## 2021-10-02 PROCEDURE — 80048 BASIC METABOLIC PNL TOTAL CA: CPT | Performed by: INTERNAL MEDICINE

## 2021-10-02 PROCEDURE — 99232 SBSQ HOSP IP/OBS MODERATE 35: CPT | Performed by: INTERNAL MEDICINE

## 2021-10-02 PROCEDURE — 85027 COMPLETE CBC AUTOMATED: CPT | Performed by: INTERNAL MEDICINE

## 2021-10-02 RX ADMIN — HYDROMORPHONE HYDROCHLORIDE 8 MG: 4 TABLET ORAL at 05:38

## 2021-10-02 RX ADMIN — SODIUM CHLORIDE 75 ML/HR: 0.9 INJECTION, SOLUTION INTRAVENOUS at 07:31

## 2021-10-02 RX ADMIN — HYDROMORPHONE HYDROCHLORIDE 0.5 MG: 1 INJECTION, SOLUTION INTRAMUSCULAR; INTRAVENOUS; SUBCUTANEOUS at 07:28

## 2021-10-02 RX ADMIN — STANDARDIZED SENNA CONCENTRATE 8.6 MG: 8.6 TABLET ORAL at 09:32

## 2021-10-02 RX ADMIN — HYDROMORPHONE HYDROCHLORIDE 8 MG: 4 TABLET ORAL at 17:37

## 2021-10-02 RX ADMIN — ONDANSETRON 4 MG: 2 INJECTION INTRAMUSCULAR; INTRAVENOUS at 20:18

## 2021-10-02 RX ADMIN — ENOXAPARIN SODIUM 40 MG: 40 INJECTION SUBCUTANEOUS at 20:19

## 2021-10-02 RX ADMIN — LACTULOSE 10 G: 20 SOLUTION ORAL at 09:32

## 2021-10-02 RX ADMIN — MELATONIN 3 MG: 3 TAB ORAL at 22:02

## 2021-10-02 RX ADMIN — ENOXAPARIN SODIUM 40 MG: 40 INJECTION SUBCUTANEOUS at 09:32

## 2021-10-02 RX ADMIN — Medication 250 MG: at 09:32

## 2021-10-02 RX ADMIN — MULTIPLE VITAMINS W/ MINERALS TAB 1 TABLET: TAB ORAL at 09:32

## 2021-10-02 RX ADMIN — HYDROMORPHONE HYDROCHLORIDE 8 MG: 4 TABLET ORAL at 13:34

## 2021-10-02 RX ADMIN — ACETAMINOPHEN 650 MG: 325 TABLET, FILM COATED ORAL at 17:37

## 2021-10-02 RX ADMIN — Medication 250 MG: at 17:37

## 2021-10-02 RX ADMIN — ONDANSETRON 4 MG: 2 INJECTION INTRAMUSCULAR; INTRAVENOUS at 09:39

## 2021-10-02 RX ADMIN — SODIUM CHLORIDE 75 ML/HR: 0.9 INJECTION, SOLUTION INTRAVENOUS at 20:19

## 2021-10-02 RX ADMIN — LISINOPRIL 5 MG: 5 TABLET ORAL at 09:32

## 2021-10-02 RX ADMIN — POLYETHYLENE GLYCOL 3350 17 G: 17 POWDER, FOR SOLUTION ORAL at 09:33

## 2021-10-02 RX ADMIN — OXYCODONE HYDROCHLORIDE AND ACETAMINOPHEN 250 MG: 500 TABLET ORAL at 09:32

## 2021-10-02 RX ADMIN — ACETAMINOPHEN 650 MG: 325 TABLET, FILM COATED ORAL at 23:52

## 2021-10-02 RX ADMIN — HYDROMORPHONE HYDROCHLORIDE 8 MG: 4 TABLET ORAL at 20:18

## 2021-10-02 RX ADMIN — STANDARDIZED SENNA CONCENTRATE 8.6 MG: 8.6 TABLET ORAL at 17:37

## 2021-10-02 RX ADMIN — HYDROMORPHONE HYDROCHLORIDE 8 MG: 4 TABLET ORAL at 23:54

## 2021-10-02 RX ADMIN — ACETAMINOPHEN 650 MG: 325 TABLET, FILM COATED ORAL at 13:34

## 2021-10-02 RX ADMIN — ACETAMINOPHEN 650 MG: 325 TABLET, FILM COATED ORAL at 05:38

## 2021-10-03 LAB
ANION GAP SERPL CALCULATED.3IONS-SCNC: 10 MMOL/L (ref 4–13)
BUN SERPL-MCNC: 14 MG/DL (ref 5–25)
CALCIUM SERPL-MCNC: 8.5 MG/DL (ref 8.3–10.1)
CHLORIDE SERPL-SCNC: 103 MMOL/L (ref 100–108)
CO2 SERPL-SCNC: 26 MMOL/L (ref 21–32)
CREAT SERPL-MCNC: 1 MG/DL (ref 0.6–1.3)
ERYTHROCYTE [DISTWIDTH] IN BLOOD BY AUTOMATED COUNT: 15.5 % (ref 11.6–15.1)
GFR SERPL CREATININE-BSD FRML MDRD: 58 ML/MIN/1.73SQ M
GLUCOSE SERPL-MCNC: 79 MG/DL (ref 65–140)
HCT VFR BLD AUTO: 26.5 % (ref 34.8–46.1)
HGB BLD-MCNC: 8.4 G/DL (ref 11.5–15.4)
MCH RBC QN AUTO: 28.6 PG (ref 26.8–34.3)
MCHC RBC AUTO-ENTMCNC: 31.7 G/DL (ref 31.4–37.4)
MCV RBC AUTO: 90 FL (ref 82–98)
PLATELET # BLD AUTO: 450 THOUSANDS/UL (ref 149–390)
PMV BLD AUTO: 8 FL (ref 8.9–12.7)
POTASSIUM SERPL-SCNC: 3.5 MMOL/L (ref 3.5–5.3)
RBC # BLD AUTO: 2.94 MILLION/UL (ref 3.81–5.12)
SODIUM SERPL-SCNC: 139 MMOL/L (ref 136–145)
WBC # BLD AUTO: 14.52 THOUSAND/UL (ref 4.31–10.16)

## 2021-10-03 PROCEDURE — 99232 SBSQ HOSP IP/OBS MODERATE 35: CPT | Performed by: INTERNAL MEDICINE

## 2021-10-03 PROCEDURE — 80048 BASIC METABOLIC PNL TOTAL CA: CPT | Performed by: INTERNAL MEDICINE

## 2021-10-03 PROCEDURE — 85027 COMPLETE CBC AUTOMATED: CPT | Performed by: INTERNAL MEDICINE

## 2021-10-03 RX ADMIN — LACTULOSE 10 G: 20 SOLUTION ORAL at 09:13

## 2021-10-03 RX ADMIN — ACETAMINOPHEN 650 MG: 325 TABLET, FILM COATED ORAL at 17:40

## 2021-10-03 RX ADMIN — ACETAMINOPHEN 650 MG: 325 TABLET, FILM COATED ORAL at 09:12

## 2021-10-03 RX ADMIN — MULTIPLE VITAMINS W/ MINERALS TAB 1 TABLET: TAB ORAL at 09:13

## 2021-10-03 RX ADMIN — HYDROMORPHONE HYDROCHLORIDE 0.5 MG: 1 INJECTION, SOLUTION INTRAMUSCULAR; INTRAVENOUS; SUBCUTANEOUS at 09:44

## 2021-10-03 RX ADMIN — HYDROMORPHONE HYDROCHLORIDE 8 MG: 4 TABLET ORAL at 13:51

## 2021-10-03 RX ADMIN — HYDROMORPHONE HYDROCHLORIDE 8 MG: 4 TABLET ORAL at 07:22

## 2021-10-03 RX ADMIN — ENOXAPARIN SODIUM 40 MG: 40 INJECTION SUBCUTANEOUS at 09:12

## 2021-10-03 RX ADMIN — ENOXAPARIN SODIUM 40 MG: 40 INJECTION SUBCUTANEOUS at 21:11

## 2021-10-03 RX ADMIN — HYDROMORPHONE HYDROCHLORIDE 8 MG: 4 TABLET ORAL at 17:40

## 2021-10-03 RX ADMIN — HYDROMORPHONE HYDROCHLORIDE 0.5 MG: 1 INJECTION, SOLUTION INTRAMUSCULAR; INTRAVENOUS; SUBCUTANEOUS at 04:33

## 2021-10-03 RX ADMIN — ACETAMINOPHEN 650 MG: 325 TABLET, FILM COATED ORAL at 13:51

## 2021-10-03 RX ADMIN — POLYETHYLENE GLYCOL 3350 17 G: 17 POWDER, FOR SOLUTION ORAL at 09:13

## 2021-10-03 RX ADMIN — OXYCODONE HYDROCHLORIDE AND ACETAMINOPHEN 250 MG: 500 TABLET ORAL at 09:12

## 2021-10-03 RX ADMIN — HYDROMORPHONE HYDROCHLORIDE 4 MG: 4 TABLET ORAL at 03:06

## 2021-10-03 RX ADMIN — MELATONIN 3 MG: 3 TAB ORAL at 21:19

## 2021-10-03 RX ADMIN — Medication 250 MG: at 17:40

## 2021-10-03 RX ADMIN — LISINOPRIL 5 MG: 5 TABLET ORAL at 09:12

## 2021-10-03 RX ADMIN — Medication 250 MG: at 09:12

## 2021-10-03 RX ADMIN — STANDARDIZED SENNA CONCENTRATE 8.6 MG: 8.6 TABLET ORAL at 17:40

## 2021-10-03 RX ADMIN — ONDANSETRON 4 MG: 2 INJECTION INTRAMUSCULAR; INTRAVENOUS at 21:18

## 2021-10-03 RX ADMIN — STANDARDIZED SENNA CONCENTRATE 8.6 MG: 8.6 TABLET ORAL at 09:12

## 2021-10-04 LAB
ANION GAP SERPL CALCULATED.3IONS-SCNC: 11 MMOL/L (ref 4–13)
BASOPHILS # BLD AUTO: 0.02 THOUSANDS/ΜL (ref 0–0.1)
BASOPHILS NFR BLD AUTO: 0 % (ref 0–1)
BUN SERPL-MCNC: 13 MG/DL (ref 5–25)
CALCIUM SERPL-MCNC: 8.3 MG/DL (ref 8.3–10.1)
CHLORIDE SERPL-SCNC: 98 MMOL/L (ref 100–108)
CO2 SERPL-SCNC: 25 MMOL/L (ref 21–32)
CREAT SERPL-MCNC: 0.85 MG/DL (ref 0.6–1.3)
EOSINOPHIL # BLD AUTO: 0 THOUSAND/ΜL (ref 0–0.61)
EOSINOPHIL NFR BLD AUTO: 0 % (ref 0–6)
ERYTHROCYTE [DISTWIDTH] IN BLOOD BY AUTOMATED COUNT: 15.7 % (ref 11.6–15.1)
GFR SERPL CREATININE-BSD FRML MDRD: 71 ML/MIN/1.73SQ M
GLUCOSE SERPL-MCNC: 100 MG/DL (ref 65–140)
HCT VFR BLD AUTO: 26.7 % (ref 34.8–46.1)
HGB BLD-MCNC: 8.5 G/DL (ref 11.5–15.4)
IMM GRANULOCYTES # BLD AUTO: 0.17 THOUSAND/UL (ref 0–0.2)
IMM GRANULOCYTES NFR BLD AUTO: 1 % (ref 0–2)
LYMPHOCYTES # BLD AUTO: 1.31 THOUSANDS/ΜL (ref 0.6–4.47)
LYMPHOCYTES NFR BLD AUTO: 9 % (ref 14–44)
MCH RBC QN AUTO: 28.8 PG (ref 26.8–34.3)
MCHC RBC AUTO-ENTMCNC: 31.8 G/DL (ref 31.4–37.4)
MCV RBC AUTO: 91 FL (ref 82–98)
MONOCYTES # BLD AUTO: 1.2 THOUSAND/ΜL (ref 0.17–1.22)
MONOCYTES NFR BLD AUTO: 8 % (ref 4–12)
NEUTROPHILS # BLD AUTO: 11.84 THOUSANDS/ΜL (ref 1.85–7.62)
NEUTS SEG NFR BLD AUTO: 82 % (ref 43–75)
NRBC BLD AUTO-RTO: 0 /100 WBCS
PLATELET # BLD AUTO: 425 THOUSANDS/UL (ref 149–390)
PMV BLD AUTO: 8.6 FL (ref 8.9–12.7)
POTASSIUM SERPL-SCNC: 3.1 MMOL/L (ref 3.5–5.3)
RBC # BLD AUTO: 2.95 MILLION/UL (ref 3.81–5.12)
SODIUM SERPL-SCNC: 134 MMOL/L (ref 136–145)
WBC # BLD AUTO: 14.54 THOUSAND/UL (ref 4.31–10.16)

## 2021-10-04 PROCEDURE — 99232 SBSQ HOSP IP/OBS MODERATE 35: CPT | Performed by: INTERNAL MEDICINE

## 2021-10-04 PROCEDURE — 85025 COMPLETE CBC W/AUTO DIFF WBC: CPT | Performed by: INTERNAL MEDICINE

## 2021-10-04 PROCEDURE — 99232 SBSQ HOSP IP/OBS MODERATE 35: CPT | Performed by: STUDENT IN AN ORGANIZED HEALTH CARE EDUCATION/TRAINING PROGRAM

## 2021-10-04 PROCEDURE — 80048 BASIC METABOLIC PNL TOTAL CA: CPT | Performed by: INTERNAL MEDICINE

## 2021-10-04 RX ORDER — SENNOSIDES 8.6 MG
1 TABLET ORAL 2 TIMES DAILY PRN
Status: DISCONTINUED | OUTPATIENT
Start: 2021-10-04 | End: 2021-10-08 | Stop reason: HOSPADM

## 2021-10-04 RX ORDER — LACTULOSE 20 G/30ML
10 SOLUTION ORAL DAILY PRN
Status: DISCONTINUED | OUTPATIENT
Start: 2021-10-04 | End: 2021-10-08 | Stop reason: HOSPADM

## 2021-10-04 RX ORDER — POTASSIUM CHLORIDE 20 MEQ/1
40 TABLET, EXTENDED RELEASE ORAL ONCE
Status: COMPLETED | OUTPATIENT
Start: 2021-10-04 | End: 2021-10-04

## 2021-10-04 RX ORDER — ONDANSETRON 4 MG/1
4 TABLET, ORALLY DISINTEGRATING ORAL EVERY 6 HOURS PRN
Status: DISCONTINUED | OUTPATIENT
Start: 2021-10-04 | End: 2021-10-08 | Stop reason: HOSPADM

## 2021-10-04 RX ORDER — HYDROMORPHONE HYDROCHLORIDE 4 MG/1
TABLET ORAL
Qty: 50 TABLET | Refills: 0 | Status: SHIPPED | OUTPATIENT
Start: 2021-10-04 | End: 2021-10-25 | Stop reason: SDUPTHER

## 2021-10-04 RX ORDER — METOCLOPRAMIDE 10 MG/1
10 TABLET ORAL EVERY 8 HOURS PRN
Status: DISCONTINUED | OUTPATIENT
Start: 2021-10-04 | End: 2021-10-08 | Stop reason: HOSPADM

## 2021-10-04 RX ADMIN — POTASSIUM CHLORIDE 40 MEQ: 1500 TABLET, EXTENDED RELEASE ORAL at 08:51

## 2021-10-04 RX ADMIN — STANDARDIZED SENNA CONCENTRATE 8.6 MG: 8.6 TABLET ORAL at 08:51

## 2021-10-04 RX ADMIN — HYDROMORPHONE HYDROCHLORIDE 4 MG: 4 TABLET ORAL at 14:06

## 2021-10-04 RX ADMIN — HYDROMORPHONE HYDROCHLORIDE 4 MG: 4 TABLET ORAL at 09:02

## 2021-10-04 RX ADMIN — Medication 250 MG: at 18:00

## 2021-10-04 RX ADMIN — MULTIPLE VITAMINS W/ MINERALS TAB 1 TABLET: TAB ORAL at 08:51

## 2021-10-04 RX ADMIN — Medication 250 MG: at 08:51

## 2021-10-04 RX ADMIN — ACETAMINOPHEN 650 MG: 325 TABLET, FILM COATED ORAL at 18:00

## 2021-10-04 RX ADMIN — LISINOPRIL 5 MG: 5 TABLET ORAL at 08:51

## 2021-10-04 RX ADMIN — POLYETHYLENE GLYCOL 3350 17 G: 17 POWDER, FOR SOLUTION ORAL at 08:51

## 2021-10-04 RX ADMIN — ACETAMINOPHEN 650 MG: 325 TABLET, FILM COATED ORAL at 23:31

## 2021-10-04 RX ADMIN — LACTULOSE 10 G: 20 SOLUTION ORAL at 08:51

## 2021-10-04 RX ADMIN — ENOXAPARIN SODIUM 40 MG: 40 INJECTION SUBCUTANEOUS at 08:51

## 2021-10-04 RX ADMIN — ONDANSETRON 4 MG: 2 INJECTION INTRAMUSCULAR; INTRAVENOUS at 09:12

## 2021-10-04 RX ADMIN — OXYCODONE HYDROCHLORIDE AND ACETAMINOPHEN 250 MG: 500 TABLET ORAL at 08:51

## 2021-10-04 RX ADMIN — ACETAMINOPHEN 650 MG: 325 TABLET, FILM COATED ORAL at 14:01

## 2021-10-05 ENCOUNTER — APPOINTMENT (INPATIENT)
Dept: RADIOLOGY | Facility: HOSPITAL | Age: 66
DRG: 988 | End: 2021-10-05
Payer: MEDICARE

## 2021-10-05 PROBLEM — R50.9 FEVER: Status: ACTIVE | Noted: 2021-10-05

## 2021-10-05 LAB
ALBUMIN SERPL BCP-MCNC: 2.9 G/DL (ref 3.5–5)
ALP SERPL-CCNC: 82 U/L (ref 46–116)
ALT SERPL W P-5'-P-CCNC: 31 U/L (ref 12–78)
ANION GAP SERPL CALCULATED.3IONS-SCNC: 11 MMOL/L (ref 4–13)
AST SERPL W P-5'-P-CCNC: 66 U/L (ref 5–45)
BACTERIA UR QL AUTO: NORMAL /HPF
BASOPHILS # BLD AUTO: 0.03 THOUSANDS/ΜL (ref 0–0.1)
BASOPHILS NFR BLD AUTO: 0 % (ref 0–1)
BILIRUB SERPL-MCNC: 0.52 MG/DL (ref 0.2–1)
BILIRUB UR QL STRIP: NEGATIVE
BUN SERPL-MCNC: 12 MG/DL (ref 5–25)
CALCIUM ALBUM COR SERPL-MCNC: 9.6 MG/DL (ref 8.3–10.1)
CALCIUM SERPL-MCNC: 8.7 MG/DL (ref 8.3–10.1)
CHLORIDE SERPL-SCNC: 97 MMOL/L (ref 100–108)
CLARITY UR: CLEAR
CO2 SERPL-SCNC: 27 MMOL/L (ref 21–32)
COLOR UR: ABNORMAL
CREAT SERPL-MCNC: 0.91 MG/DL (ref 0.6–1.3)
EOSINOPHIL # BLD AUTO: 0.01 THOUSAND/ΜL (ref 0–0.61)
EOSINOPHIL NFR BLD AUTO: 0 % (ref 0–6)
ERYTHROCYTE [DISTWIDTH] IN BLOOD BY AUTOMATED COUNT: 15.9 % (ref 11.6–15.1)
GFR SERPL CREATININE-BSD FRML MDRD: 65 ML/MIN/1.73SQ M
GLUCOSE SERPL-MCNC: 91 MG/DL (ref 65–140)
GLUCOSE UR STRIP-MCNC: NEGATIVE MG/DL
HCT VFR BLD AUTO: 29.5 % (ref 34.8–46.1)
HGB BLD-MCNC: 9.4 G/DL (ref 11.5–15.4)
HGB UR QL STRIP.AUTO: NEGATIVE
IMM GRANULOCYTES # BLD AUTO: 0.13 THOUSAND/UL (ref 0–0.2)
IMM GRANULOCYTES NFR BLD AUTO: 1 % (ref 0–2)
KETONES UR STRIP-MCNC: ABNORMAL MG/DL
LACTATE SERPL-SCNC: 0.8 MMOL/L (ref 0.5–2)
LEUKOCYTE ESTERASE UR QL STRIP: NEGATIVE
LYMPHOCYTES # BLD AUTO: 0.85 THOUSANDS/ΜL (ref 0.6–4.47)
LYMPHOCYTES NFR BLD AUTO: 6 % (ref 14–44)
MCH RBC QN AUTO: 28.8 PG (ref 26.8–34.3)
MCHC RBC AUTO-ENTMCNC: 31.9 G/DL (ref 31.4–37.4)
MCV RBC AUTO: 91 FL (ref 82–98)
MONOCYTES # BLD AUTO: 0.56 THOUSAND/ΜL (ref 0.17–1.22)
MONOCYTES NFR BLD AUTO: 4 % (ref 4–12)
NEUTROPHILS # BLD AUTO: 13.94 THOUSANDS/ΜL (ref 1.85–7.62)
NEUTS SEG NFR BLD AUTO: 89 % (ref 43–75)
NITRITE UR QL STRIP: NEGATIVE
NON-SQ EPI CELLS URNS QL MICRO: NORMAL /HPF
NRBC BLD AUTO-RTO: 0 /100 WBCS
PH UR STRIP.AUTO: 6 [PH]
PLATELET # BLD AUTO: 445 THOUSANDS/UL (ref 149–390)
PMV BLD AUTO: 8.2 FL (ref 8.9–12.7)
POTASSIUM SERPL-SCNC: 3.6 MMOL/L (ref 3.5–5.3)
PROCALCITONIN SERPL-MCNC: 0.68 NG/ML
PROT SERPL-MCNC: 8.5 G/DL (ref 6.4–8.2)
PROT UR STRIP-MCNC: ABNORMAL MG/DL
RBC # BLD AUTO: 3.26 MILLION/UL (ref 3.81–5.12)
RBC #/AREA URNS AUTO: NORMAL /HPF
SODIUM SERPL-SCNC: 135 MMOL/L (ref 136–145)
SP GR UR STRIP.AUTO: >=1.03 (ref 1–1.03)
UROBILINOGEN UR QL STRIP.AUTO: 1 E.U./DL
WBC # BLD AUTO: 15.52 THOUSAND/UL (ref 4.31–10.16)
WBC #/AREA URNS AUTO: NORMAL /HPF

## 2021-10-05 PROCEDURE — 80053 COMPREHEN METABOLIC PANEL: CPT | Performed by: PHYSICIAN ASSISTANT

## 2021-10-05 PROCEDURE — 83605 ASSAY OF LACTIC ACID: CPT | Performed by: PHYSICIAN ASSISTANT

## 2021-10-05 PROCEDURE — 84145 PROCALCITONIN (PCT): CPT | Performed by: PHYSICIAN ASSISTANT

## 2021-10-05 PROCEDURE — 71045 X-RAY EXAM CHEST 1 VIEW: CPT

## 2021-10-05 PROCEDURE — 81001 URINALYSIS AUTO W/SCOPE: CPT | Performed by: PHYSICIAN ASSISTANT

## 2021-10-05 PROCEDURE — 99232 SBSQ HOSP IP/OBS MODERATE 35: CPT | Performed by: INTERNAL MEDICINE

## 2021-10-05 PROCEDURE — 99232 SBSQ HOSP IP/OBS MODERATE 35: CPT | Performed by: STUDENT IN AN ORGANIZED HEALTH CARE EDUCATION/TRAINING PROGRAM

## 2021-10-05 PROCEDURE — 87040 BLOOD CULTURE FOR BACTERIA: CPT | Performed by: PHYSICIAN ASSISTANT

## 2021-10-05 PROCEDURE — 85025 COMPLETE CBC W/AUTO DIFF WBC: CPT | Performed by: PHYSICIAN ASSISTANT

## 2021-10-05 RX ADMIN — HYDROMORPHONE HYDROCHLORIDE 4 MG: 4 TABLET ORAL at 14:47

## 2021-10-05 RX ADMIN — HYDROMORPHONE HYDROCHLORIDE 4 MG: 4 TABLET ORAL at 10:44

## 2021-10-05 RX ADMIN — ACETAMINOPHEN 650 MG: 325 TABLET, FILM COATED ORAL at 11:31

## 2021-10-05 RX ADMIN — HYDROMORPHONE HYDROCHLORIDE 4 MG: 4 TABLET ORAL at 00:00

## 2021-10-05 RX ADMIN — POLYETHYLENE GLYCOL 3350 17 G: 17 POWDER, FOR SOLUTION ORAL at 09:19

## 2021-10-05 RX ADMIN — ACETAMINOPHEN 650 MG: 325 TABLET, FILM COATED ORAL at 05:19

## 2021-10-05 RX ADMIN — OXYCODONE HYDROCHLORIDE AND ACETAMINOPHEN 250 MG: 500 TABLET ORAL at 09:19

## 2021-10-05 RX ADMIN — ENOXAPARIN SODIUM 40 MG: 40 INJECTION SUBCUTANEOUS at 09:19

## 2021-10-05 RX ADMIN — MULTIPLE VITAMINS W/ MINERALS TAB 1 TABLET: TAB ORAL at 09:19

## 2021-10-05 RX ADMIN — LISINOPRIL 5 MG: 5 TABLET ORAL at 09:19

## 2021-10-05 RX ADMIN — Medication 250 MG: at 09:19

## 2021-10-05 RX ADMIN — ACETAMINOPHEN 650 MG: 325 TABLET, FILM COATED ORAL at 19:00

## 2021-10-05 RX ADMIN — ACETAMINOPHEN 650 MG: 325 TABLET, FILM COATED ORAL at 23:46

## 2021-10-05 RX ADMIN — ONDANSETRON 4 MG: 4 TABLET, ORALLY DISINTEGRATING ORAL at 22:58

## 2021-10-05 RX ADMIN — Medication 250 MG: at 19:00

## 2021-10-05 RX ADMIN — HYDROMORPHONE HYDROCHLORIDE 0.5 MG: 1 INJECTION, SOLUTION INTRAMUSCULAR; INTRAVENOUS; SUBCUTANEOUS at 02:11

## 2021-10-06 LAB
BASOPHILS # BLD AUTO: 0.03 THOUSANDS/ΜL (ref 0–0.1)
BASOPHILS NFR BLD AUTO: 0 % (ref 0–1)
EOSINOPHIL # BLD AUTO: 0.03 THOUSAND/ΜL (ref 0–0.61)
EOSINOPHIL NFR BLD AUTO: 0 % (ref 0–6)
ERYTHROCYTE [DISTWIDTH] IN BLOOD BY AUTOMATED COUNT: 16.1 % (ref 11.6–15.1)
HCT VFR BLD AUTO: 24.8 % (ref 34.8–46.1)
HCT VFR BLD AUTO: 25.1 % (ref 34.8–46.1)
HGB BLD-MCNC: 8 G/DL (ref 11.5–15.4)
HGB BLD-MCNC: 8.2 G/DL (ref 11.5–15.4)
IMM GRANULOCYTES # BLD AUTO: 0.05 THOUSAND/UL (ref 0–0.2)
IMM GRANULOCYTES NFR BLD AUTO: 1 % (ref 0–2)
LYMPHOCYTES # BLD AUTO: 0.93 THOUSANDS/ΜL (ref 0.6–4.47)
LYMPHOCYTES NFR BLD AUTO: 9 % (ref 14–44)
MCH RBC QN AUTO: 28.8 PG (ref 26.8–34.3)
MCHC RBC AUTO-ENTMCNC: 32.3 G/DL (ref 31.4–37.4)
MCV RBC AUTO: 89 FL (ref 82–98)
MONOCYTES # BLD AUTO: 0.44 THOUSAND/ΜL (ref 0.17–1.22)
MONOCYTES NFR BLD AUTO: 4 % (ref 4–12)
NEUTROPHILS # BLD AUTO: 8.78 THOUSANDS/ΜL (ref 1.85–7.62)
NEUTS SEG NFR BLD AUTO: 86 % (ref 43–75)
NRBC BLD AUTO-RTO: 0 /100 WBCS
PLATELET # BLD AUTO: 383 THOUSANDS/UL (ref 149–390)
PMV BLD AUTO: 8.3 FL (ref 8.9–12.7)
PROCALCITONIN SERPL-MCNC: 1.3 NG/ML
RBC # BLD AUTO: 2.78 MILLION/UL (ref 3.81–5.12)
WBC # BLD AUTO: 10.26 THOUSAND/UL (ref 4.31–10.16)

## 2021-10-06 PROCEDURE — 99232 SBSQ HOSP IP/OBS MODERATE 35: CPT | Performed by: INTERNAL MEDICINE

## 2021-10-06 PROCEDURE — 85014 HEMATOCRIT: CPT | Performed by: INTERNAL MEDICINE

## 2021-10-06 PROCEDURE — 84145 PROCALCITONIN (PCT): CPT | Performed by: PHYSICIAN ASSISTANT

## 2021-10-06 PROCEDURE — 85025 COMPLETE CBC W/AUTO DIFF WBC: CPT | Performed by: INTERNAL MEDICINE

## 2021-10-06 PROCEDURE — 99222 1ST HOSP IP/OBS MODERATE 55: CPT | Performed by: UROLOGY

## 2021-10-06 PROCEDURE — 85018 HEMOGLOBIN: CPT | Performed by: INTERNAL MEDICINE

## 2021-10-06 RX ORDER — HYDROMORPHONE HYDROCHLORIDE 2 MG/1
2 TABLET ORAL ONCE
Status: COMPLETED | OUTPATIENT
Start: 2021-10-06 | End: 2021-10-06

## 2021-10-06 RX ORDER — LANOLIN ALCOHOL/MO/W.PET/CERES
3 CREAM (GRAM) TOPICAL
Qty: 30 TABLET | Refills: 0 | Status: CANCELLED | OUTPATIENT
Start: 2021-10-06 | End: 2021-11-05

## 2021-10-06 RX ORDER — ONDANSETRON 4 MG/1
4 TABLET, ORALLY DISINTEGRATING ORAL EVERY 6 HOURS PRN
Qty: 20 TABLET | Refills: 0 | Status: CANCELLED | OUTPATIENT
Start: 2021-10-06 | End: 2021-11-05

## 2021-10-06 RX ORDER — LISINOPRIL 5 MG/1
5 TABLET ORAL DAILY
Qty: 30 TABLET | Refills: 0 | Status: CANCELLED | OUTPATIENT
Start: 2021-10-07 | End: 2021-11-06

## 2021-10-06 RX ADMIN — MULTIPLE VITAMINS W/ MINERALS TAB 1 TABLET: TAB ORAL at 08:44

## 2021-10-06 RX ADMIN — ENOXAPARIN SODIUM 40 MG: 40 INJECTION SUBCUTANEOUS at 08:45

## 2021-10-06 RX ADMIN — OXYCODONE HYDROCHLORIDE AND ACETAMINOPHEN 250 MG: 500 TABLET ORAL at 08:44

## 2021-10-06 RX ADMIN — Medication 250 MG: at 18:14

## 2021-10-06 RX ADMIN — LISINOPRIL 5 MG: 5 TABLET ORAL at 08:45

## 2021-10-06 RX ADMIN — ACETAMINOPHEN 650 MG: 325 TABLET, FILM COATED ORAL at 18:15

## 2021-10-06 RX ADMIN — Medication 250 MG: at 08:44

## 2021-10-06 RX ADMIN — HYDROMORPHONE HYDROCHLORIDE 2 MG: 2 TABLET ORAL at 10:36

## 2021-10-06 RX ADMIN — ACETAMINOPHEN 650 MG: 325 TABLET, FILM COATED ORAL at 23:02

## 2021-10-06 RX ADMIN — MELATONIN 3 MG: 3 TAB ORAL at 23:02

## 2021-10-06 RX ADMIN — HYDRALAZINE HYDROCHLORIDE 5 MG: 20 INJECTION INTRAMUSCULAR; INTRAVENOUS at 12:51

## 2021-10-07 ENCOUNTER — APPOINTMENT (INPATIENT)
Dept: RADIOLOGY | Facility: HOSPITAL | Age: 66
DRG: 988 | End: 2021-10-07
Payer: MEDICARE

## 2021-10-07 ENCOUNTER — ANESTHESIA (INPATIENT)
Dept: PERIOP | Facility: HOSPITAL | Age: 66
DRG: 988 | End: 2021-10-07
Payer: MEDICARE

## 2021-10-07 ENCOUNTER — TELEPHONE (OUTPATIENT)
Dept: OTHER | Facility: HOSPITAL | Age: 66
End: 2021-10-07

## 2021-10-07 ENCOUNTER — ANESTHESIA EVENT (INPATIENT)
Dept: PERIOP | Facility: HOSPITAL | Age: 66
DRG: 988 | End: 2021-10-07
Payer: MEDICARE

## 2021-10-07 DIAGNOSIS — N13.5 URETERAL OBSTRUCTION, LEFT: Primary | ICD-10-CM

## 2021-10-07 PROBLEM — N13.30 HYDRONEPHROSIS, LEFT: Status: ACTIVE | Noted: 2021-10-07

## 2021-10-07 PROBLEM — M79.89 LEFT ARM SWELLING: Status: RESOLVED | Noted: 2021-09-30 | Resolved: 2021-10-07

## 2021-10-07 PROBLEM — R50.9 FEVER: Status: RESOLVED | Noted: 2021-10-05 | Resolved: 2021-10-07

## 2021-10-07 LAB
ANION GAP SERPL CALCULATED.3IONS-SCNC: 11 MMOL/L (ref 4–13)
APTT PPP: 37 SECONDS (ref 23–37)
BASOPHILS # BLD AUTO: 0.01 THOUSANDS/ΜL (ref 0–0.1)
BASOPHILS NFR BLD AUTO: 0 % (ref 0–1)
BUN SERPL-MCNC: 13 MG/DL (ref 5–25)
CALCIUM SERPL-MCNC: 8.5 MG/DL (ref 8.3–10.1)
CHLORIDE SERPL-SCNC: 99 MMOL/L (ref 100–108)
CO2 SERPL-SCNC: 25 MMOL/L (ref 21–32)
CREAT SERPL-MCNC: 0.76 MG/DL (ref 0.6–1.3)
EOSINOPHIL # BLD AUTO: 0.01 THOUSAND/ΜL (ref 0–0.61)
EOSINOPHIL NFR BLD AUTO: 0 % (ref 0–6)
ERYTHROCYTE [DISTWIDTH] IN BLOOD BY AUTOMATED COUNT: 16.2 % (ref 11.6–15.1)
GFR SERPL CREATININE-BSD FRML MDRD: 81 ML/MIN/1.73SQ M
GLUCOSE SERPL-MCNC: 97 MG/DL (ref 65–140)
HCT VFR BLD AUTO: 26 % (ref 34.8–46.1)
HGB BLD-MCNC: 8.5 G/DL (ref 11.5–15.4)
IMM GRANULOCYTES # BLD AUTO: 0.06 THOUSAND/UL (ref 0–0.2)
IMM GRANULOCYTES NFR BLD AUTO: 1 % (ref 0–2)
INR PPP: 0.93 (ref 0.84–1.19)
LYMPHOCYTES # BLD AUTO: 0.82 THOUSANDS/ΜL (ref 0.6–4.47)
LYMPHOCYTES NFR BLD AUTO: 11 % (ref 14–44)
MCH RBC QN AUTO: 28.9 PG (ref 26.8–34.3)
MCHC RBC AUTO-ENTMCNC: 32.7 G/DL (ref 31.4–37.4)
MCV RBC AUTO: 88 FL (ref 82–98)
MONOCYTES # BLD AUTO: 0.47 THOUSAND/ΜL (ref 0.17–1.22)
MONOCYTES NFR BLD AUTO: 6 % (ref 4–12)
NEUTROPHILS # BLD AUTO: 6.37 THOUSANDS/ΜL (ref 1.85–7.62)
NEUTS SEG NFR BLD AUTO: 82 % (ref 43–75)
NRBC BLD AUTO-RTO: 0 /100 WBCS
PLATELET # BLD AUTO: 430 THOUSANDS/UL (ref 149–390)
PMV BLD AUTO: 8.5 FL (ref 8.9–12.7)
POTASSIUM SERPL-SCNC: 3.4 MMOL/L (ref 3.5–5.3)
PROTHROMBIN TIME: 12.1 SECONDS (ref 11.6–14.5)
RBC # BLD AUTO: 2.94 MILLION/UL (ref 3.81–5.12)
SODIUM SERPL-SCNC: 135 MMOL/L (ref 136–145)
WBC # BLD AUTO: 7.74 THOUSAND/UL (ref 4.31–10.16)

## 2021-10-07 PROCEDURE — 74420 UROGRAPHY RTRGR +-KUB: CPT

## 2021-10-07 PROCEDURE — 85730 THROMBOPLASTIN TIME PARTIAL: CPT | Performed by: PHYSICIAN ASSISTANT

## 2021-10-07 PROCEDURE — 99232 SBSQ HOSP IP/OBS MODERATE 35: CPT | Performed by: STUDENT IN AN ORGANIZED HEALTH CARE EDUCATION/TRAINING PROGRAM

## 2021-10-07 PROCEDURE — 0T778DZ DILATION OF LEFT URETER WITH INTRALUMINAL DEVICE, VIA NATURAL OR ARTIFICIAL OPENING ENDOSCOPIC: ICD-10-PCS | Performed by: UROLOGY

## 2021-10-07 PROCEDURE — 80048 BASIC METABOLIC PNL TOTAL CA: CPT | Performed by: PHYSICIAN ASSISTANT

## 2021-10-07 PROCEDURE — BT1F1ZZ FLUOROSCOPY OF LEFT KIDNEY, URETER AND BLADDER USING LOW OSMOLAR CONTRAST: ICD-10-PCS | Performed by: UROLOGY

## 2021-10-07 PROCEDURE — 85610 PROTHROMBIN TIME: CPT | Performed by: PHYSICIAN ASSISTANT

## 2021-10-07 PROCEDURE — 52332 CYSTOSCOPY AND TREATMENT: CPT | Performed by: UROLOGY

## 2021-10-07 PROCEDURE — 99232 SBSQ HOSP IP/OBS MODERATE 35: CPT | Performed by: PHYSICIAN ASSISTANT

## 2021-10-07 PROCEDURE — C1758 CATHETER, URETERAL: HCPCS | Performed by: UROLOGY

## 2021-10-07 PROCEDURE — 99232 SBSQ HOSP IP/OBS MODERATE 35: CPT | Performed by: NURSE PRACTITIONER

## 2021-10-07 PROCEDURE — C2617 STENT, NON-COR, TEM W/O DEL: HCPCS | Performed by: UROLOGY

## 2021-10-07 PROCEDURE — 85025 COMPLETE CBC W/AUTO DIFF WBC: CPT | Performed by: PHYSICIAN ASSISTANT

## 2021-10-07 PROCEDURE — C1769 GUIDE WIRE: HCPCS | Performed by: UROLOGY

## 2021-10-07 DEVICE — STENT URETERAL 7FR 24CM INLAY OPTIMA W/HYDROGLIDE GDWR: Type: IMPLANTABLE DEVICE | Site: URETER | Status: FUNCTIONAL

## 2021-10-07 RX ORDER — MORPHINE SULFATE 4 MG/ML
2 INJECTION, SOLUTION INTRAMUSCULAR; INTRAVENOUS
Status: DISCONTINUED | OUTPATIENT
Start: 2021-10-07 | End: 2021-10-07 | Stop reason: HOSPADM

## 2021-10-07 RX ORDER — POTASSIUM CHLORIDE 14.9 MG/ML
20 INJECTION INTRAVENOUS ONCE
Status: DISCONTINUED | OUTPATIENT
Start: 2021-10-07 | End: 2021-10-07

## 2021-10-07 RX ORDER — CEFAZOLIN SODIUM 2 G/50ML
2000 SOLUTION INTRAVENOUS
Status: COMPLETED | OUTPATIENT
Start: 2021-10-07 | End: 2021-10-07

## 2021-10-07 RX ORDER — CEFAZOLIN SODIUM 1 G/3ML
INJECTION, POWDER, FOR SOLUTION INTRAMUSCULAR; INTRAVENOUS AS NEEDED
Status: DISCONTINUED | OUTPATIENT
Start: 2021-10-07 | End: 2021-10-07

## 2021-10-07 RX ORDER — ONDANSETRON 2 MG/ML
INJECTION INTRAMUSCULAR; INTRAVENOUS AS NEEDED
Status: DISCONTINUED | OUTPATIENT
Start: 2021-10-07 | End: 2021-10-07

## 2021-10-07 RX ORDER — DIPHENHYDRAMINE HYDROCHLORIDE 50 MG/ML
12.5 INJECTION INTRAMUSCULAR; INTRAVENOUS ONCE AS NEEDED
Status: DISCONTINUED | OUTPATIENT
Start: 2021-10-07 | End: 2021-10-07 | Stop reason: HOSPADM

## 2021-10-07 RX ORDER — FENTANYL CITRATE/PF 50 MCG/ML
50 SYRINGE (ML) INJECTION
Status: DISCONTINUED | OUTPATIENT
Start: 2021-10-07 | End: 2021-10-07 | Stop reason: HOSPADM

## 2021-10-07 RX ORDER — POTASSIUM CHLORIDE 20 MEQ/1
20 TABLET, EXTENDED RELEASE ORAL ONCE
Status: COMPLETED | OUTPATIENT
Start: 2021-10-07 | End: 2021-10-07

## 2021-10-07 RX ORDER — ONDANSETRON 2 MG/ML
4 INJECTION INTRAMUSCULAR; INTRAVENOUS ONCE AS NEEDED
Status: DISCONTINUED | OUTPATIENT
Start: 2021-10-07 | End: 2021-10-07 | Stop reason: HOSPADM

## 2021-10-07 RX ORDER — MAGNESIUM HYDROXIDE 1200 MG/15ML
LIQUID ORAL AS NEEDED
Status: DISCONTINUED | OUTPATIENT
Start: 2021-10-07 | End: 2021-10-07 | Stop reason: HOSPADM

## 2021-10-07 RX ORDER — MIDAZOLAM HYDROCHLORIDE 2 MG/2ML
INJECTION, SOLUTION INTRAMUSCULAR; INTRAVENOUS AS NEEDED
Status: DISCONTINUED | OUTPATIENT
Start: 2021-10-07 | End: 2021-10-07

## 2021-10-07 RX ORDER — LIDOCAINE HYDROCHLORIDE 10 MG/ML
INJECTION, SOLUTION EPIDURAL; INFILTRATION; INTRACAUDAL; PERINEURAL AS NEEDED
Status: DISCONTINUED | OUTPATIENT
Start: 2021-10-07 | End: 2021-10-07

## 2021-10-07 RX ORDER — DEXAMETHASONE SODIUM PHOSPHATE 4 MG/ML
INJECTION, SOLUTION INTRA-ARTICULAR; INTRALESIONAL; INTRAMUSCULAR; INTRAVENOUS; SOFT TISSUE AS NEEDED
Status: DISCONTINUED | OUTPATIENT
Start: 2021-10-07 | End: 2021-10-07

## 2021-10-07 RX ORDER — HYDROMORPHONE HCL/PF 1 MG/ML
0.5 SYRINGE (ML) INJECTION ONCE
Status: COMPLETED | OUTPATIENT
Start: 2021-10-07 | End: 2021-10-07

## 2021-10-07 RX ORDER — PROPOFOL 10 MG/ML
INJECTION, EMULSION INTRAVENOUS AS NEEDED
Status: DISCONTINUED | OUTPATIENT
Start: 2021-10-07 | End: 2021-10-07

## 2021-10-07 RX ORDER — SODIUM CHLORIDE, SODIUM LACTATE, POTASSIUM CHLORIDE, CALCIUM CHLORIDE 600; 310; 30; 20 MG/100ML; MG/100ML; MG/100ML; MG/100ML
INJECTION, SOLUTION INTRAVENOUS CONTINUOUS PRN
Status: DISCONTINUED | OUTPATIENT
Start: 2021-10-07 | End: 2021-10-07

## 2021-10-07 RX ADMIN — LIDOCAINE HYDROCHLORIDE 50 MG: 10 INJECTION, SOLUTION EPIDURAL; INFILTRATION; INTRACAUDAL at 16:42

## 2021-10-07 RX ADMIN — HYDROMORPHONE HYDROCHLORIDE 0.5 MG: 1 INJECTION, SOLUTION INTRAMUSCULAR; INTRAVENOUS; SUBCUTANEOUS at 14:57

## 2021-10-07 RX ADMIN — POTASSIUM CHLORIDE 20 MEQ: 1500 TABLET, EXTENDED RELEASE ORAL at 20:08

## 2021-10-07 RX ADMIN — MELATONIN 3 MG: 3 TAB ORAL at 22:06

## 2021-10-07 RX ADMIN — DEXAMETHASONE SODIUM PHOSPHATE 4 MG: 4 INJECTION, SOLUTION INTRAMUSCULAR; INTRAVENOUS at 16:48

## 2021-10-07 RX ADMIN — HYDROMORPHONE HYDROCHLORIDE 0.5 MG: 1 INJECTION, SOLUTION INTRAMUSCULAR; INTRAVENOUS; SUBCUTANEOUS at 18:59

## 2021-10-07 RX ADMIN — CEFAZOLIN SODIUM 2000 MG: 2 SOLUTION INTRAVENOUS at 14:53

## 2021-10-07 RX ADMIN — HYDROMORPHONE HYDROCHLORIDE 4 MG: 4 TABLET ORAL at 22:07

## 2021-10-07 RX ADMIN — CEFAZOLIN 1000 MG: 1 INJECTION, POWDER, FOR SOLUTION INTRAVENOUS at 16:45

## 2021-10-07 RX ADMIN — HYDROMORPHONE HYDROCHLORIDE 4 MG: 4 TABLET ORAL at 10:09

## 2021-10-07 RX ADMIN — PROPOFOL 100 MG: 10 INJECTION, EMULSION INTRAVENOUS at 16:42

## 2021-10-07 RX ADMIN — FENTANYL CITRATE 50 MCG: 50 INJECTION INTRAMUSCULAR; INTRAVENOUS at 17:35

## 2021-10-07 RX ADMIN — SODIUM CHLORIDE, SODIUM LACTATE, POTASSIUM CHLORIDE, AND CALCIUM CHLORIDE: .6; .31; .03; .02 INJECTION, SOLUTION INTRAVENOUS at 16:32

## 2021-10-07 RX ADMIN — MIDAZOLAM 2 MG: 1 INJECTION INTRAMUSCULAR; INTRAVENOUS at 16:32

## 2021-10-07 RX ADMIN — ONDANSETRON 4 MG: 2 INJECTION INTRAMUSCULAR; INTRAVENOUS at 16:57

## 2021-10-08 VITALS
WEIGHT: 104.06 LBS | HEART RATE: 64 BPM | SYSTOLIC BLOOD PRESSURE: 126 MMHG | OXYGEN SATURATION: 96 % | TEMPERATURE: 97.9 F | BODY MASS INDEX: 18.44 KG/M2 | HEIGHT: 63 IN | DIASTOLIC BLOOD PRESSURE: 67 MMHG | RESPIRATION RATE: 18 BRPM

## 2021-10-08 LAB
ANION GAP SERPL CALCULATED.3IONS-SCNC: 8 MMOL/L (ref 4–13)
BASOPHILS # BLD AUTO: 0.02 THOUSANDS/ΜL (ref 0–0.1)
BASOPHILS NFR BLD AUTO: 0 % (ref 0–1)
BUN SERPL-MCNC: 14 MG/DL (ref 5–25)
CALCIUM SERPL-MCNC: 8.2 MG/DL (ref 8.3–10.1)
CHLORIDE SERPL-SCNC: 101 MMOL/L (ref 100–108)
CO2 SERPL-SCNC: 27 MMOL/L (ref 21–32)
CREAT SERPL-MCNC: 0.69 MG/DL (ref 0.6–1.3)
EOSINOPHIL # BLD AUTO: 0.02 THOUSAND/ΜL (ref 0–0.61)
EOSINOPHIL NFR BLD AUTO: 0 % (ref 0–6)
ERYTHROCYTE [DISTWIDTH] IN BLOOD BY AUTOMATED COUNT: 16.5 % (ref 11.6–15.1)
GFR SERPL CREATININE-BSD FRML MDRD: 90 ML/MIN/1.73SQ M
GLUCOSE SERPL-MCNC: 79 MG/DL (ref 65–140)
HCT VFR BLD AUTO: 23.3 % (ref 34.8–46.1)
HCT VFR BLD AUTO: 25.8 % (ref 34.8–46.1)
HGB BLD-MCNC: 7.5 G/DL (ref 11.5–15.4)
HGB BLD-MCNC: 8.3 G/DL (ref 11.5–15.4)
IMM GRANULOCYTES # BLD AUTO: 0.1 THOUSAND/UL (ref 0–0.2)
IMM GRANULOCYTES NFR BLD AUTO: 1 % (ref 0–2)
LYMPHOCYTES # BLD AUTO: 1.43 THOUSANDS/ΜL (ref 0.6–4.47)
LYMPHOCYTES NFR BLD AUTO: 18 % (ref 14–44)
MCH RBC QN AUTO: 28.6 PG (ref 26.8–34.3)
MCHC RBC AUTO-ENTMCNC: 32.2 G/DL (ref 31.4–37.4)
MCV RBC AUTO: 89 FL (ref 82–98)
MONOCYTES # BLD AUTO: 0.43 THOUSAND/ΜL (ref 0.17–1.22)
MONOCYTES NFR BLD AUTO: 5 % (ref 4–12)
NEUTROPHILS # BLD AUTO: 5.97 THOUSANDS/ΜL (ref 1.85–7.62)
NEUTS SEG NFR BLD AUTO: 76 % (ref 43–75)
NRBC BLD AUTO-RTO: 0 /100 WBCS
PLATELET # BLD AUTO: 382 THOUSANDS/UL (ref 149–390)
PMV BLD AUTO: 8.3 FL (ref 8.9–12.7)
POTASSIUM SERPL-SCNC: 3.8 MMOL/L (ref 3.5–5.3)
RBC # BLD AUTO: 2.62 MILLION/UL (ref 3.81–5.12)
SODIUM SERPL-SCNC: 136 MMOL/L (ref 136–145)
WBC # BLD AUTO: 7.97 THOUSAND/UL (ref 4.31–10.16)

## 2021-10-08 PROCEDURE — 85018 HEMOGLOBIN: CPT | Performed by: INTERNAL MEDICINE

## 2021-10-08 PROCEDURE — 99239 HOSP IP/OBS DSCHRG MGMT >30: CPT | Performed by: INTERNAL MEDICINE

## 2021-10-08 PROCEDURE — 85025 COMPLETE CBC W/AUTO DIFF WBC: CPT | Performed by: STUDENT IN AN ORGANIZED HEALTH CARE EDUCATION/TRAINING PROGRAM

## 2021-10-08 PROCEDURE — 85014 HEMATOCRIT: CPT | Performed by: INTERNAL MEDICINE

## 2021-10-08 PROCEDURE — 99232 SBSQ HOSP IP/OBS MODERATE 35: CPT | Performed by: STUDENT IN AN ORGANIZED HEALTH CARE EDUCATION/TRAINING PROGRAM

## 2021-10-08 PROCEDURE — 80048 BASIC METABOLIC PNL TOTAL CA: CPT | Performed by: STUDENT IN AN ORGANIZED HEALTH CARE EDUCATION/TRAINING PROGRAM

## 2021-10-08 RX ORDER — ONDANSETRON 4 MG/1
4 TABLET, ORALLY DISINTEGRATING ORAL EVERY 6 HOURS PRN
Qty: 30 TABLET | Refills: 0 | Status: SHIPPED | OUTPATIENT
Start: 2021-10-08 | End: 2021-11-12 | Stop reason: SDUPTHER

## 2021-10-08 RX ORDER — METOCLOPRAMIDE 10 MG/1
10 TABLET ORAL EVERY 8 HOURS PRN
Qty: 30 TABLET | Refills: 0 | Status: SHIPPED | OUTPATIENT
Start: 2021-10-08 | End: 2021-11-29 | Stop reason: HOSPADM

## 2021-10-08 RX ADMIN — HYDROMORPHONE HYDROCHLORIDE 4 MG: 4 TABLET ORAL at 11:41

## 2021-10-08 RX ADMIN — HYDROMORPHONE HYDROCHLORIDE 4 MG: 4 TABLET ORAL at 16:12

## 2021-10-08 RX ADMIN — LISINOPRIL 5 MG: 5 TABLET ORAL at 09:59

## 2021-10-08 RX ADMIN — HYDROMORPHONE HYDROCHLORIDE 0.5 MG: 1 INJECTION, SOLUTION INTRAMUSCULAR; INTRAVENOUS; SUBCUTANEOUS at 00:11

## 2021-10-08 RX ADMIN — ACETAMINOPHEN 650 MG: 325 TABLET, FILM COATED ORAL at 05:59

## 2021-10-08 RX ADMIN — MULTIPLE VITAMINS W/ MINERALS TAB 1 TABLET: TAB ORAL at 09:59

## 2021-10-08 RX ADMIN — Medication 250 MG: at 09:59

## 2021-10-08 RX ADMIN — HYDROMORPHONE HYDROCHLORIDE 4 MG: 4 TABLET ORAL at 03:47

## 2021-10-08 RX ADMIN — OXYCODONE HYDROCHLORIDE AND ACETAMINOPHEN 250 MG: 500 TABLET ORAL at 09:59

## 2021-10-10 LAB
BACTERIA BLD CULT: NORMAL
BACTERIA BLD CULT: NORMAL

## 2021-10-14 ENCOUNTER — TELEPHONE (OUTPATIENT)
Dept: HEMATOLOGY ONCOLOGY | Facility: CLINIC | Age: 66
End: 2021-10-14

## 2021-10-25 DIAGNOSIS — G89.3 CANCER ASSOCIATED PAIN: Primary | ICD-10-CM

## 2021-10-25 RX ORDER — HYDROMORPHONE HYDROCHLORIDE 4 MG/1
TABLET ORAL
Qty: 60 TABLET | Refills: 0 | Status: SHIPPED | OUTPATIENT
Start: 2021-10-25 | End: 2021-11-15 | Stop reason: SDUPTHER

## 2021-10-26 ENCOUNTER — TELEPHONE (OUTPATIENT)
Dept: PALLIATIVE MEDICINE | Facility: CLINIC | Age: 66
End: 2021-10-26

## 2021-10-27 ENCOUNTER — APPOINTMENT (OUTPATIENT)
Dept: LAB | Facility: HOSPITAL | Age: 66
End: 2021-10-27
Payer: MEDICARE

## 2021-10-27 ENCOUNTER — OFFICE VISIT (OUTPATIENT)
Dept: GYNECOLOGIC ONCOLOGY | Facility: CLINIC | Age: 66
End: 2021-10-27
Payer: MEDICARE

## 2021-10-27 VITALS
DIASTOLIC BLOOD PRESSURE: 88 MMHG | HEIGHT: 63 IN | SYSTOLIC BLOOD PRESSURE: 120 MMHG | RESPIRATION RATE: 16 BRPM | BODY MASS INDEX: 17.54 KG/M2 | TEMPERATURE: 97.1 F | WEIGHT: 99 LBS | HEART RATE: 82 BPM

## 2021-10-27 DIAGNOSIS — C56.3 MALIGNANT NEOPLASM OF BOTH OVARIES (HCC): Primary | ICD-10-CM

## 2021-10-27 PROCEDURE — 99214 OFFICE O/P EST MOD 30 MIN: CPT | Performed by: OBSTETRICS & GYNECOLOGY

## 2021-10-27 PROCEDURE — 1124F ACP DISCUSS-NO DSCNMKR DOCD: CPT | Performed by: OBSTETRICS & GYNECOLOGY

## 2021-10-28 ENCOUNTER — HOSPITAL ENCOUNTER (OUTPATIENT)
Dept: INFUSION CENTER | Facility: CLINIC | Age: 66
Discharge: HOME | End: 2021-10-28
Payer: MEDICARE

## 2021-10-28 VITALS
HEART RATE: 77 BPM | SYSTOLIC BLOOD PRESSURE: 154 MMHG | HEIGHT: 63 IN | RESPIRATION RATE: 18 BRPM | DIASTOLIC BLOOD PRESSURE: 73 MMHG | BODY MASS INDEX: 17.34 KG/M2 | TEMPERATURE: 96.3 F | WEIGHT: 97.88 LBS

## 2021-10-28 DIAGNOSIS — C56.3 MALIGNANT NEOPLASM OF BOTH OVARIES (HCC): Primary | ICD-10-CM

## 2021-10-28 PROCEDURE — 96413 CHEMO IV INFUSION 1 HR: CPT

## 2021-10-28 PROCEDURE — 96375 TX/PRO/DX INJ NEW DRUG ADDON: CPT

## 2021-10-28 PROCEDURE — 96367 TX/PROPH/DG ADDL SEQ IV INF: CPT

## 2021-10-28 PROCEDURE — 96417 CHEMO IV INFUS EACH ADDL SEQ: CPT

## 2021-10-28 RX ORDER — SODIUM CHLORIDE 9 MG/ML
20 INJECTION, SOLUTION INTRAVENOUS ONCE
Status: COMPLETED | OUTPATIENT
Start: 2021-10-28 | End: 2021-10-28

## 2021-10-28 RX ORDER — PALONOSETRON 0.05 MG/ML
0.25 INJECTION, SOLUTION INTRAVENOUS ONCE
Status: COMPLETED | OUTPATIENT
Start: 2021-10-28 | End: 2021-10-28

## 2021-10-28 RX ORDER — DEXTROSE MONOHYDRATE 50 MG/ML
20 INJECTION, SOLUTION INTRAVENOUS ONCE
Status: COMPLETED | OUTPATIENT
Start: 2021-10-28 | End: 2021-10-28

## 2021-10-28 RX ADMIN — SODIUM CHLORIDE 150 MG: 0.9 INJECTION, SOLUTION INTRAVENOUS at 09:40

## 2021-10-28 RX ADMIN — DOXORUBICIN HYDROCHLORIDE 29.2 MG: 2 INJECTABLE, LIPOSOMAL INTRAVENOUS at 10:49

## 2021-10-28 RX ADMIN — DEXTROSE 20 ML/HR: 5 SOLUTION INTRAVENOUS at 10:44

## 2021-10-28 RX ADMIN — SODIUM CHLORIDE 1000 ML: 0.9 INJECTION, SOLUTION INTRAVENOUS at 09:08

## 2021-10-28 RX ADMIN — CARBOPLATIN 407.5 MG: 10 INJECTION, SOLUTION INTRAVENOUS at 11:58

## 2021-10-28 RX ADMIN — PALONOSETRON HYDROCHLORIDE 0.25 MG: 0.25 INJECTION, SOLUTION INTRAVENOUS at 10:41

## 2021-10-28 RX ADMIN — SODIUM CHLORIDE 20 ML/HR: 0.9 INJECTION, SOLUTION INTRAVENOUS at 09:10

## 2021-10-28 RX ADMIN — DEXAMETHASONE SODIUM PHOSPHATE 20 MG: 10 INJECTION, SOLUTION INTRAMUSCULAR; INTRAVENOUS at 09:13

## 2021-11-03 ENCOUNTER — TELEPHONE (OUTPATIENT)
Dept: HEMATOLOGY ONCOLOGY | Facility: CLINIC | Age: 66
End: 2021-11-03

## 2021-11-08 ENCOUNTER — TELEPHONE (OUTPATIENT)
Dept: HEMATOLOGY ONCOLOGY | Facility: CLINIC | Age: 66
End: 2021-11-08

## 2021-11-09 PROBLEM — E86.0 DEHYDRATION: Status: ACTIVE | Noted: 2021-11-09

## 2021-11-10 ENCOUNTER — TELEPHONE (OUTPATIENT)
Dept: HEMATOLOGY ONCOLOGY | Facility: CLINIC | Age: 66
End: 2021-11-10

## 2021-11-10 ENCOUNTER — APPOINTMENT (OUTPATIENT)
Dept: LAB | Facility: CLINIC | Age: 66
End: 2021-11-10
Payer: MEDICARE

## 2021-11-10 DIAGNOSIS — C56.3 MALIGNANT NEOPLASM OF BOTH OVARIES (HCC): ICD-10-CM

## 2021-11-10 DIAGNOSIS — C56.9 MALIGNANT NEOPLASM OF OVARY, UNSPECIFIED LATERALITY (HCC): ICD-10-CM

## 2021-11-10 DIAGNOSIS — R97.1 ELEVATED CANCER ANTIGEN 125 (CA 125): ICD-10-CM

## 2021-11-10 LAB — CANCER AG125 SERPL-ACNC: 165.7 U/ML (ref 0–30)

## 2021-11-10 PROCEDURE — 86304 IMMUNOASSAY TUMOR CA 125: CPT

## 2021-11-11 ENCOUNTER — TELEPHONE (OUTPATIENT)
Dept: UROLOGY | Facility: CLINIC | Age: 66
End: 2021-11-11

## 2021-11-11 ENCOUNTER — HOSPITAL ENCOUNTER (OUTPATIENT)
Dept: INFUSION CENTER | Facility: CLINIC | Age: 66
Discharge: HOME | End: 2021-11-11
Payer: MEDICARE

## 2021-11-11 VITALS
SYSTOLIC BLOOD PRESSURE: 137 MMHG | DIASTOLIC BLOOD PRESSURE: 76 MMHG | TEMPERATURE: 96.9 F | HEART RATE: 98 BPM | RESPIRATION RATE: 18 BRPM

## 2021-11-11 DIAGNOSIS — E86.0 DEHYDRATION: Primary | ICD-10-CM

## 2021-11-11 PROCEDURE — 96360 HYDRATION IV INFUSION INIT: CPT

## 2021-11-11 RX ADMIN — SODIUM CHLORIDE 1000 ML: 0.9 INJECTION, SOLUTION INTRAVENOUS at 13:39

## 2021-11-12 ENCOUNTER — TELEPHONE (OUTPATIENT)
Dept: SURGICAL ONCOLOGY | Facility: CLINIC | Age: 66
End: 2021-11-12

## 2021-11-12 ENCOUNTER — TELEPHONE (OUTPATIENT)
Dept: UROLOGY | Facility: CLINIC | Age: 66
End: 2021-11-12

## 2021-11-12 DIAGNOSIS — R11.0 NAUSEA: ICD-10-CM

## 2021-11-12 DIAGNOSIS — R11.0 CHEMOTHERAPY-INDUCED NAUSEA: ICD-10-CM

## 2021-11-12 DIAGNOSIS — T45.1X5A CHEMOTHERAPY-INDUCED NAUSEA: ICD-10-CM

## 2021-11-12 RX ORDER — ONDANSETRON 4 MG/1
4 TABLET, ORALLY DISINTEGRATING ORAL EVERY 6 HOURS PRN
Qty: 30 TABLET | Refills: 0 | Status: SHIPPED | OUTPATIENT
Start: 2021-11-12 | End: 2021-11-29 | Stop reason: HOSPADM

## 2021-11-12 RX ORDER — ONDANSETRON HYDROCHLORIDE 8 MG/1
8 TABLET, FILM COATED ORAL EVERY 8 HOURS PRN
Qty: 30 TABLET | Refills: 1 | Status: SHIPPED | OUTPATIENT
Start: 2021-11-12 | End: 2021-11-29 | Stop reason: HOSPADM

## 2021-11-15 DIAGNOSIS — G89.3 CANCER ASSOCIATED PAIN: ICD-10-CM

## 2021-11-15 RX ORDER — HYDROMORPHONE HYDROCHLORIDE 4 MG/1
TABLET ORAL
Qty: 60 TABLET | Refills: 0 | Status: SHIPPED | OUTPATIENT
Start: 2021-11-15 | End: 2021-11-29 | Stop reason: HOSPADM

## 2021-11-17 ENCOUNTER — TELEMEDICINE (OUTPATIENT)
Dept: GYNECOLOGIC ONCOLOGY | Facility: CLINIC | Age: 66
End: 2021-11-17
Payer: MEDICARE

## 2021-11-17 DIAGNOSIS — C57.02 CARCINOMA OF LEFT FALLOPIAN TUBE (HCC): Primary | ICD-10-CM

## 2021-11-17 DIAGNOSIS — R53.81 PHYSICAL DECONDITIONING: ICD-10-CM

## 2021-11-17 DIAGNOSIS — E86.0 DEHYDRATION: ICD-10-CM

## 2021-11-17 PROCEDURE — 99214 OFFICE O/P EST MOD 30 MIN: CPT | Performed by: NURSE PRACTITIONER

## 2021-11-18 ENCOUNTER — HOSPITAL ENCOUNTER (OUTPATIENT)
Dept: INFUSION CENTER | Facility: CLINIC | Age: 66
Discharge: HOME | End: 2021-11-18
Payer: MEDICARE

## 2021-11-18 VITALS — SYSTOLIC BLOOD PRESSURE: 160 MMHG | HEART RATE: 101 BPM | DIASTOLIC BLOOD PRESSURE: 80 MMHG | TEMPERATURE: 97 F

## 2021-11-18 DIAGNOSIS — E86.0 DEHYDRATION: Primary | ICD-10-CM

## 2021-11-18 DIAGNOSIS — C56.3 MALIGNANT NEOPLASM OF BOTH OVARIES (HCC): ICD-10-CM

## 2021-11-18 LAB
ALBUMIN SERPL BCP-MCNC: 2.5 G/DL (ref 3.5–5)
ALP SERPL-CCNC: 83 U/L (ref 46–116)
ALT SERPL W P-5'-P-CCNC: 19 U/L (ref 12–78)
ANION GAP SERPL CALCULATED.3IONS-SCNC: 13 MMOL/L (ref 4–13)
ANISOCYTOSIS BLD QL SMEAR: PRESENT
AST SERPL W P-5'-P-CCNC: 37 U/L (ref 5–45)
BASOPHILS # BLD MANUAL: 0 THOUSAND/UL (ref 0–0.1)
BASOPHILS NFR MAR MANUAL: 0 % (ref 0–1)
BILIRUB SERPL-MCNC: 0.61 MG/DL (ref 0.2–1)
BUN SERPL-MCNC: 15 MG/DL (ref 5–25)
CALCIUM ALBUM COR SERPL-MCNC: 9.6 MG/DL (ref 8.3–10.1)
CALCIUM SERPL-MCNC: 8.4 MG/DL (ref 8.3–10.1)
CHLORIDE SERPL-SCNC: 94 MMOL/L (ref 100–108)
CO2 SERPL-SCNC: 30 MMOL/L (ref 21–32)
CREAT SERPL-MCNC: 0.57 MG/DL (ref 0.6–1.3)
EOSINOPHIL # BLD MANUAL: 0 THOUSAND/UL (ref 0–0.4)
EOSINOPHIL NFR BLD MANUAL: 0 % (ref 0–6)
ERYTHROCYTE [DISTWIDTH] IN BLOOD BY AUTOMATED COUNT: 20.1 % (ref 11.6–15.1)
GFR SERPL CREATININE-BSD FRML MDRD: 96 ML/MIN/1.73SQ M
GLUCOSE SERPL-MCNC: 119 MG/DL (ref 65–140)
HCT VFR BLD AUTO: 28.9 % (ref 34.8–46.1)
HGB BLD-MCNC: 9 G/DL (ref 11.5–15.4)
HYPERCHROMIA BLD QL SMEAR: PRESENT
LYMPHOCYTES # BLD AUTO: 1.04 THOUSAND/UL (ref 0.6–4.47)
LYMPHOCYTES # BLD AUTO: 8 % (ref 14–44)
MCH RBC QN AUTO: 28.4 PG (ref 26.8–34.3)
MCHC RBC AUTO-ENTMCNC: 31.1 G/DL (ref 31.4–37.4)
MCV RBC AUTO: 91 FL (ref 82–98)
MONOCYTES # BLD AUTO: 0.65 THOUSAND/UL (ref 0–1.22)
MONOCYTES NFR BLD: 5 % (ref 4–12)
NEUTROPHILS # BLD MANUAL: 11.22 THOUSAND/UL (ref 1.85–7.62)
NEUTS BAND NFR BLD MANUAL: 1 % (ref 0–8)
NEUTS SEG NFR BLD AUTO: 85 % (ref 43–75)
PLATELET # BLD AUTO: 347 THOUSANDS/UL (ref 149–390)
PLATELET BLD QL SMEAR: ADEQUATE
PMV BLD AUTO: 9.4 FL (ref 8.9–12.7)
POTASSIUM SERPL-SCNC: 3.2 MMOL/L (ref 3.5–5.3)
PROT SERPL-MCNC: 8 G/DL (ref 6.4–8.2)
RBC # BLD AUTO: 3.17 MILLION/UL (ref 3.81–5.12)
SODIUM SERPL-SCNC: 137 MMOL/L (ref 136–145)
VARIANT LYMPHS # BLD AUTO: 1 %
WBC # BLD AUTO: 13.05 THOUSAND/UL (ref 4.31–10.16)

## 2021-11-18 PROCEDURE — 96360 HYDRATION IV INFUSION INIT: CPT

## 2021-11-18 PROCEDURE — 85007 BL SMEAR W/DIFF WBC COUNT: CPT

## 2021-11-18 PROCEDURE — 85027 COMPLETE CBC AUTOMATED: CPT

## 2021-11-18 PROCEDURE — 80053 COMPREHEN METABOLIC PANEL: CPT

## 2021-11-18 RX ADMIN — SODIUM CHLORIDE 1000 ML: 0.9 INJECTION, SOLUTION INTRAVENOUS at 14:09

## 2021-11-19 ENCOUNTER — TELEPHONE (OUTPATIENT)
Dept: PALLIATIVE MEDICINE | Facility: CLINIC | Age: 66
End: 2021-11-19

## 2021-11-19 ENCOUNTER — TELEPHONE (OUTPATIENT)
Dept: HEMATOLOGY ONCOLOGY | Facility: CLINIC | Age: 66
End: 2021-11-19

## 2021-11-19 ENCOUNTER — DOCUMENTATION (OUTPATIENT)
Dept: SOCIAL WORK | Facility: HOSPITAL | Age: 66
End: 2021-11-19

## 2021-11-19 ENCOUNTER — DOCUMENTATION (OUTPATIENT)
Dept: GYNECOLOGIC ONCOLOGY | Facility: CLINIC | Age: 66
End: 2021-11-19

## 2021-11-22 ENCOUNTER — HOSPITAL ENCOUNTER (OUTPATIENT)
Dept: CT IMAGING | Facility: HOSPITAL | Age: 66
Discharge: HOME | DRG: 843 | End: 2021-11-22
Attending: UROLOGY
Payer: MEDICARE

## 2021-11-22 ENCOUNTER — TELEPHONE (OUTPATIENT)
Dept: PALLIATIVE MEDICINE | Facility: HOSPITAL | Age: 66
End: 2021-11-22

## 2021-11-22 DIAGNOSIS — N13.5 URETERAL OBSTRUCTION, LEFT: ICD-10-CM

## 2021-11-22 PROCEDURE — G1004 CDSM NDSC: HCPCS

## 2021-11-22 PROCEDURE — 74176 CT ABD & PELVIS W/O CONTRAST: CPT

## 2021-11-23 ENCOUNTER — TELEPHONE (OUTPATIENT)
Dept: UROLOGY | Facility: CLINIC | Age: 66
End: 2021-11-23

## 2021-11-23 ENCOUNTER — HOSPITAL ENCOUNTER (INPATIENT)
Facility: HOSPITAL | Age: 66
LOS: 6 days | DRG: 843 | End: 2021-11-29
Attending: EMERGENCY MEDICINE | Admitting: INTERNAL MEDICINE
Payer: MEDICARE

## 2021-11-23 ENCOUNTER — TELEPHONE (OUTPATIENT)
Dept: INFUSION CENTER | Facility: CLINIC | Age: 66
End: 2021-11-23

## 2021-11-23 ENCOUNTER — TELEPHONE (OUTPATIENT)
Dept: OTHER | Facility: OTHER | Age: 66
End: 2021-11-23

## 2021-11-23 ENCOUNTER — APPOINTMENT (EMERGENCY)
Dept: CT IMAGING | Facility: HOSPITAL | Age: 66
DRG: 843 | End: 2021-11-23
Payer: MEDICARE

## 2021-11-23 DIAGNOSIS — R62.7 FAILURE TO THRIVE IN ADULT: ICD-10-CM

## 2021-11-23 DIAGNOSIS — R63.8 DECREASED ORAL INTAKE: ICD-10-CM

## 2021-11-23 DIAGNOSIS — C56.3 MALIGNANT NEOPLASM OF BOTH OVARIES (HCC): ICD-10-CM

## 2021-11-23 DIAGNOSIS — C56.9 MALIGNANT NEOPLASM OF OVARY (HCC): ICD-10-CM

## 2021-11-23 DIAGNOSIS — R62.51 FAILURE TO THRIVE (CHILD): ICD-10-CM

## 2021-11-23 DIAGNOSIS — Z01.89 ENCOUNTER FOR COMPETENCY EVALUATION: ICD-10-CM

## 2021-11-23 DIAGNOSIS — C56.9 OVARIAN CANCER (HCC): ICD-10-CM

## 2021-11-23 DIAGNOSIS — E86.0 DEHYDRATION: ICD-10-CM

## 2021-11-23 DIAGNOSIS — E87.6 HYPOKALEMIA: Primary | ICD-10-CM

## 2021-11-23 LAB
2HR DELTA HS TROPONIN: 8 NG/L
ALBUMIN SERPL BCP-MCNC: 2.1 G/DL (ref 3.5–5)
ALP SERPL-CCNC: 70 U/L (ref 46–116)
ALT SERPL W P-5'-P-CCNC: 17 U/L (ref 12–78)
AMMONIA PLAS-SCNC: 23 UMOL/L (ref 11–35)
ANION GAP SERPL CALCULATED.3IONS-SCNC: 8 MMOL/L (ref 4–13)
ANISOCYTOSIS BLD QL SMEAR: PRESENT
AST SERPL W P-5'-P-CCNC: 38 U/L (ref 5–45)
BASOPHILS # BLD MANUAL: 0 THOUSAND/UL (ref 0–0.1)
BASOPHILS NFR MAR MANUAL: 0 % (ref 0–1)
BILIRUB DIRECT SERPL-MCNC: 0.23 MG/DL (ref 0–0.2)
BILIRUB SERPL-MCNC: 0.49 MG/DL (ref 0.2–1)
BUN SERPL-MCNC: 13 MG/DL (ref 5–25)
CALCIUM SERPL-MCNC: 7.9 MG/DL (ref 8.3–10.1)
CARDIAC TROPONIN I PNL SERPL HS: 40 NG/L
CARDIAC TROPONIN I PNL SERPL HS: 48 NG/L
CHLORIDE SERPL-SCNC: 93 MMOL/L (ref 100–108)
CO2 SERPL-SCNC: 31 MMOL/L (ref 21–32)
CREAT SERPL-MCNC: 0.59 MG/DL (ref 0.6–1.3)
EOSINOPHIL # BLD MANUAL: 0 THOUSAND/UL (ref 0–0.4)
EOSINOPHIL NFR BLD MANUAL: 0 % (ref 0–6)
ERYTHROCYTE [DISTWIDTH] IN BLOOD BY AUTOMATED COUNT: 20.1 % (ref 11.6–15.1)
GFR SERPL CREATININE-BSD FRML MDRD: 94 ML/MIN/1.73SQ M
GLUCOSE SERPL-MCNC: 125 MG/DL (ref 65–140)
HCT VFR BLD AUTO: 25.9 % (ref 34.8–46.1)
HGB BLD-MCNC: 8.4 G/DL (ref 11.5–15.4)
LACTATE SERPL-SCNC: 0.9 MMOL/L (ref 0.5–2)
LYMPHOCYTES # BLD AUTO: 0.8 THOUSAND/UL (ref 0.6–4.47)
LYMPHOCYTES # BLD AUTO: 13 % (ref 14–44)
MAGNESIUM SERPL-MCNC: 1.9 MG/DL (ref 1.6–2.6)
MCH RBC QN AUTO: 29.3 PG (ref 26.8–34.3)
MCHC RBC AUTO-ENTMCNC: 32.4 G/DL (ref 31.4–37.4)
MCV RBC AUTO: 90 FL (ref 82–98)
MONOCYTES # BLD AUTO: 0.56 THOUSAND/UL (ref 0–1.22)
MONOCYTES NFR BLD: 9 % (ref 4–12)
NEUTROPHILS # BLD MANUAL: 4.7 THOUSAND/UL (ref 1.85–7.62)
NEUTS BAND NFR BLD MANUAL: 1 % (ref 0–8)
NEUTS SEG NFR BLD AUTO: 75 % (ref 43–75)
PLATELET # BLD AUTO: 462 THOUSANDS/UL (ref 149–390)
PLATELET BLD QL SMEAR: ABNORMAL
PMV BLD AUTO: 9.1 FL (ref 8.9–12.7)
POTASSIUM SERPL-SCNC: 2.5 MMOL/L (ref 3.5–5.3)
PROT SERPL-MCNC: 7 G/DL (ref 6.4–8.2)
RBC # BLD AUTO: 2.87 MILLION/UL (ref 3.81–5.12)
SODIUM SERPL-SCNC: 132 MMOL/L (ref 136–145)
TSH SERPL DL<=0.05 MIU/L-ACNC: 2.72 UIU/ML (ref 0.36–3.74)
VARIANT LYMPHS # BLD AUTO: 2 %
WBC # BLD AUTO: 6.19 THOUSAND/UL (ref 4.31–10.16)

## 2021-11-23 PROCEDURE — 82140 ASSAY OF AMMONIA: CPT | Performed by: EMERGENCY MEDICINE

## 2021-11-23 PROCEDURE — 84443 ASSAY THYROID STIM HORMONE: CPT | Performed by: EMERGENCY MEDICINE

## 2021-11-23 PROCEDURE — 99285 EMERGENCY DEPT VISIT HI MDM: CPT

## 2021-11-23 PROCEDURE — 93005 ELECTROCARDIOGRAM TRACING: CPT

## 2021-11-23 PROCEDURE — G1004 CDSM NDSC: HCPCS

## 2021-11-23 PROCEDURE — 84484 ASSAY OF TROPONIN QUANT: CPT | Performed by: EMERGENCY MEDICINE

## 2021-11-23 PROCEDURE — 96360 HYDRATION IV INFUSION INIT: CPT

## 2021-11-23 PROCEDURE — 71275 CT ANGIOGRAPHY CHEST: CPT

## 2021-11-23 PROCEDURE — 83735 ASSAY OF MAGNESIUM: CPT | Performed by: EMERGENCY MEDICINE

## 2021-11-23 PROCEDURE — 85027 COMPLETE CBC AUTOMATED: CPT | Performed by: EMERGENCY MEDICINE

## 2021-11-23 PROCEDURE — 83605 ASSAY OF LACTIC ACID: CPT | Performed by: EMERGENCY MEDICINE

## 2021-11-23 PROCEDURE — 99223 1ST HOSP IP/OBS HIGH 75: CPT | Performed by: INTERNAL MEDICINE

## 2021-11-23 PROCEDURE — 99285 EMERGENCY DEPT VISIT HI MDM: CPT | Performed by: EMERGENCY MEDICINE

## 2021-11-23 PROCEDURE — 74177 CT ABD & PELVIS W/CONTRAST: CPT

## 2021-11-23 PROCEDURE — 70450 CT HEAD/BRAIN W/O DYE: CPT

## 2021-11-23 PROCEDURE — 80076 HEPATIC FUNCTION PANEL: CPT | Performed by: EMERGENCY MEDICINE

## 2021-11-23 PROCEDURE — 36415 COLL VENOUS BLD VENIPUNCTURE: CPT | Performed by: EMERGENCY MEDICINE

## 2021-11-23 PROCEDURE — 85007 BL SMEAR W/DIFF WBC COUNT: CPT | Performed by: EMERGENCY MEDICINE

## 2021-11-23 PROCEDURE — 80048 BASIC METABOLIC PNL TOTAL CA: CPT | Performed by: EMERGENCY MEDICINE

## 2021-11-23 RX ORDER — OXYCODONE HYDROCHLORIDE 5 MG/1
5 TABLET ORAL EVERY 4 HOURS PRN
Status: DISCONTINUED | OUTPATIENT
Start: 2021-11-23 | End: 2021-11-24

## 2021-11-23 RX ORDER — POTASSIUM CHLORIDE AND SODIUM CHLORIDE 900; 300 MG/100ML; MG/100ML
125 INJECTION, SOLUTION INTRAVENOUS CONTINUOUS
Status: DISCONTINUED | OUTPATIENT
Start: 2021-11-23 | End: 2021-11-23

## 2021-11-23 RX ORDER — HYDROMORPHONE HCL/PF 1 MG/ML
0.5 SYRINGE (ML) INJECTION EVERY 4 HOURS PRN
Status: DISCONTINUED | OUTPATIENT
Start: 2021-11-23 | End: 2021-11-24

## 2021-11-23 RX ORDER — OXYCODONE HYDROCHLORIDE 10 MG/1
10 TABLET ORAL EVERY 4 HOURS PRN
Status: DISCONTINUED | OUTPATIENT
Start: 2021-11-23 | End: 2021-11-24

## 2021-11-23 RX ORDER — HEPARIN SODIUM 5000 [USP'U]/ML
5000 INJECTION, SOLUTION INTRAVENOUS; SUBCUTANEOUS EVERY 8 HOURS SCHEDULED
Status: DISCONTINUED | OUTPATIENT
Start: 2021-11-23 | End: 2021-11-29 | Stop reason: HOSPADM

## 2021-11-23 RX ORDER — ONDANSETRON 2 MG/ML
4 INJECTION INTRAMUSCULAR; INTRAVENOUS EVERY 6 HOURS PRN
Status: DISCONTINUED | OUTPATIENT
Start: 2021-11-23 | End: 2021-11-29

## 2021-11-23 RX ORDER — HYDROMORPHONE HCL/PF 1 MG/ML
1 SYRINGE (ML) INJECTION ONCE
Status: COMPLETED | OUTPATIENT
Start: 2021-11-23 | End: 2021-11-23

## 2021-11-23 RX ORDER — POTASSIUM CHLORIDE 20MEQ/15ML
40 LIQUID (ML) ORAL ONCE
Status: COMPLETED | OUTPATIENT
Start: 2021-11-23 | End: 2021-11-24

## 2021-11-23 RX ORDER — POTASSIUM CHLORIDE 20MEQ/15ML
40 LIQUID (ML) ORAL ONCE
Status: COMPLETED | OUTPATIENT
Start: 2021-11-23 | End: 2021-11-23

## 2021-11-23 RX ORDER — ACETAMINOPHEN 325 MG/1
650 TABLET ORAL EVERY 6 HOURS PRN
Status: DISCONTINUED | OUTPATIENT
Start: 2021-11-23 | End: 2021-11-29

## 2021-11-23 RX ADMIN — POTASSIUM CHLORIDE 40 MEQ: 20 SOLUTION ORAL at 22:40

## 2021-11-23 RX ADMIN — HYDROMORPHONE HYDROCHLORIDE 1 MG: 1 INJECTION, SOLUTION INTRAMUSCULAR; INTRAVENOUS; SUBCUTANEOUS at 22:40

## 2021-11-23 RX ADMIN — IOHEXOL 100 ML: 350 INJECTION, SOLUTION INTRAVENOUS at 20:35

## 2021-11-23 RX ADMIN — SODIUM CHLORIDE 1000 ML: 0.9 INJECTION, SOLUTION INTRAVENOUS at 20:23

## 2021-11-24 LAB
4HR DELTA HS TROPONIN: 6 NG/L
ALBUMIN SERPL BCP-MCNC: 1.9 G/DL (ref 3.5–5)
ALP SERPL-CCNC: 58 U/L (ref 46–116)
ALT SERPL W P-5'-P-CCNC: 19 U/L (ref 12–78)
ANION GAP SERPL CALCULATED.3IONS-SCNC: 11 MMOL/L (ref 4–13)
ANISOCYTOSIS BLD QL SMEAR: PRESENT
AST SERPL W P-5'-P-CCNC: 34 U/L (ref 5–45)
ATRIAL RATE: 99 BPM
BACTERIA UR QL AUTO: NORMAL /HPF
BASOPHILS # BLD MANUAL: 0 THOUSAND/UL (ref 0–0.1)
BASOPHILS NFR MAR MANUAL: 0 % (ref 0–1)
BILIRUB SERPL-MCNC: 0.41 MG/DL (ref 0.2–1)
BILIRUB UR QL STRIP: ABNORMAL
BUN SERPL-MCNC: 15 MG/DL (ref 5–25)
CALCIUM ALBUM COR SERPL-MCNC: 9.5 MG/DL (ref 8.3–10.1)
CALCIUM SERPL-MCNC: 7.8 MG/DL (ref 8.3–10.1)
CARDIAC TROPONIN I PNL SERPL HS: 46 NG/L
CHLORIDE SERPL-SCNC: 94 MMOL/L (ref 100–108)
CLARITY UR: CLEAR
CO2 SERPL-SCNC: 31 MMOL/L (ref 21–32)
COLOR UR: ABNORMAL
CREAT SERPL-MCNC: 0.49 MG/DL (ref 0.6–1.3)
EOSINOPHIL # BLD MANUAL: 0 THOUSAND/UL (ref 0–0.4)
EOSINOPHIL NFR BLD MANUAL: 0 % (ref 0–6)
ERYTHROCYTE [DISTWIDTH] IN BLOOD BY AUTOMATED COUNT: 20.2 % (ref 11.6–15.1)
GFR SERPL CREATININE-BSD FRML MDRD: 100 ML/MIN/1.73SQ M
GLUCOSE SERPL-MCNC: 92 MG/DL (ref 65–140)
GLUCOSE UR STRIP-MCNC: NEGATIVE MG/DL
HCT VFR BLD AUTO: 23.7 % (ref 34.8–46.1)
HGB BLD-MCNC: 7.4 G/DL (ref 11.5–15.4)
HGB UR QL STRIP.AUTO: ABNORMAL
KETONES UR STRIP-MCNC: ABNORMAL MG/DL
LEUKOCYTE ESTERASE UR QL STRIP: NEGATIVE
LYMPHOCYTES # BLD AUTO: 1.12 THOUSAND/UL (ref 0.6–4.47)
LYMPHOCYTES # BLD AUTO: 25 % (ref 14–44)
MAGNESIUM SERPL-MCNC: 1.9 MG/DL (ref 1.6–2.6)
MCH RBC QN AUTO: 28.2 PG (ref 26.8–34.3)
MCHC RBC AUTO-ENTMCNC: 31.2 G/DL (ref 31.4–37.4)
MCV RBC AUTO: 91 FL (ref 82–98)
MONOCYTES # BLD AUTO: 0.27 THOUSAND/UL (ref 0–1.22)
MONOCYTES NFR BLD: 6 % (ref 4–12)
NEUTROPHILS # BLD MANUAL: 3.08 THOUSAND/UL (ref 1.85–7.62)
NEUTS BAND NFR BLD MANUAL: 7 % (ref 0–8)
NEUTS SEG NFR BLD AUTO: 62 % (ref 43–75)
NITRITE UR QL STRIP: NEGATIVE
NON-SQ EPI CELLS URNS QL MICRO: NORMAL /HPF
PH UR STRIP.AUTO: 6.5 [PH]
PLATELET # BLD AUTO: 415 THOUSANDS/UL (ref 149–390)
PLATELET # BLD AUTO: 439 THOUSANDS/UL (ref 149–390)
PLATELET BLD QL SMEAR: ABNORMAL
PMV BLD AUTO: 9 FL (ref 8.9–12.7)
PMV BLD AUTO: 9.1 FL (ref 8.9–12.7)
POTASSIUM SERPL-SCNC: 3.3 MMOL/L (ref 3.5–5.3)
PR INTERVAL: 166 MS
PROT SERPL-MCNC: 6.4 G/DL (ref 6.4–8.2)
PROT UR STRIP-MCNC: ABNORMAL MG/DL
QRS AXIS: 71 DEGREES
QRSD INTERVAL: 86 MS
QT INTERVAL: 354 MS
QTC INTERVAL: 454 MS
RBC # BLD AUTO: 2.62 MILLION/UL (ref 3.81–5.12)
RBC #/AREA URNS AUTO: NORMAL /HPF
SODIUM SERPL-SCNC: 136 MMOL/L (ref 136–145)
SP GR UR STRIP.AUTO: <=1.005 (ref 1–1.03)
T WAVE AXIS: 141 DEGREES
UROBILINOGEN UR QL STRIP.AUTO: 2 E.U./DL
VENTRICULAR RATE: 99 BPM
WBC # BLD AUTO: 4.47 THOUSAND/UL (ref 4.31–10.16)
WBC #/AREA URNS AUTO: NORMAL /HPF

## 2021-11-24 PROCEDURE — 85007 BL SMEAR W/DIFF WBC COUNT: CPT | Performed by: INTERNAL MEDICINE

## 2021-11-24 PROCEDURE — 80053 COMPREHEN METABOLIC PANEL: CPT | Performed by: INTERNAL MEDICINE

## 2021-11-24 PROCEDURE — 97167 OT EVAL HIGH COMPLEX 60 MIN: CPT

## 2021-11-24 PROCEDURE — 97110 THERAPEUTIC EXERCISES: CPT

## 2021-11-24 PROCEDURE — 97535 SELF CARE MNGMENT TRAINING: CPT

## 2021-11-24 PROCEDURE — 97163 PT EVAL HIGH COMPLEX 45 MIN: CPT

## 2021-11-24 PROCEDURE — 93010 ELECTROCARDIOGRAM REPORT: CPT | Performed by: INTERNAL MEDICINE

## 2021-11-24 PROCEDURE — 99223 1ST HOSP IP/OBS HIGH 75: CPT | Performed by: STUDENT IN AN ORGANIZED HEALTH CARE EDUCATION/TRAINING PROGRAM

## 2021-11-24 PROCEDURE — 83735 ASSAY OF MAGNESIUM: CPT | Performed by: INTERNAL MEDICINE

## 2021-11-24 PROCEDURE — 99223 1ST HOSP IP/OBS HIGH 75: CPT | Performed by: PHYSICIAN ASSISTANT

## 2021-11-24 PROCEDURE — 81001 URINALYSIS AUTO W/SCOPE: CPT | Performed by: EMERGENCY MEDICINE

## 2021-11-24 PROCEDURE — 85027 COMPLETE CBC AUTOMATED: CPT | Performed by: INTERNAL MEDICINE

## 2021-11-24 PROCEDURE — 36415 COLL VENOUS BLD VENIPUNCTURE: CPT | Performed by: EMERGENCY MEDICINE

## 2021-11-24 PROCEDURE — 99232 SBSQ HOSP IP/OBS MODERATE 35: CPT | Performed by: PHYSICIAN ASSISTANT

## 2021-11-24 PROCEDURE — 84484 ASSAY OF TROPONIN QUANT: CPT | Performed by: EMERGENCY MEDICINE

## 2021-11-24 PROCEDURE — 85049 AUTOMATED PLATELET COUNT: CPT | Performed by: INTERNAL MEDICINE

## 2021-11-24 RX ORDER — HYDROMORPHONE HYDROCHLORIDE 4 MG/1
4 TABLET ORAL
Status: DISCONTINUED | OUTPATIENT
Start: 2021-11-24 | End: 2021-11-29

## 2021-11-24 RX ORDER — HYDROMORPHONE HCL/PF 1 MG/ML
0.5 SYRINGE (ML) INJECTION
Status: DISCONTINUED | OUTPATIENT
Start: 2021-11-24 | End: 2021-11-29 | Stop reason: HOSPADM

## 2021-11-24 RX ORDER — METOCLOPRAMIDE HYDROCHLORIDE 5 MG/ML
10 INJECTION INTRAMUSCULAR; INTRAVENOUS ONCE
Status: COMPLETED | OUTPATIENT
Start: 2021-11-24 | End: 2021-11-24

## 2021-11-24 RX ORDER — ASCORBIC ACID 500 MG
250 TABLET ORAL DAILY
Status: DISCONTINUED | OUTPATIENT
Start: 2021-11-25 | End: 2021-11-29 | Stop reason: HOSPADM

## 2021-11-24 RX ORDER — HYDROMORPHONE HYDROCHLORIDE 4 MG/1
8 TABLET ORAL
Status: DISCONTINUED | OUTPATIENT
Start: 2021-11-24 | End: 2021-11-29

## 2021-11-24 RX ADMIN — METOCLOPRAMIDE HYDROCHLORIDE 10 MG: 5 INJECTION INTRAMUSCULAR; INTRAVENOUS at 10:00

## 2021-11-24 RX ADMIN — HYDROMORPHONE HYDROCHLORIDE 0.5 MG: 1 INJECTION, SOLUTION INTRAMUSCULAR; INTRAVENOUS; SUBCUTANEOUS at 02:46

## 2021-11-24 RX ADMIN — HYDROMORPHONE HYDROCHLORIDE 4 MG: 4 TABLET ORAL at 19:27

## 2021-11-24 RX ADMIN — ONDANSETRON 4 MG: 2 INJECTION INTRAMUSCULAR; INTRAVENOUS at 06:07

## 2021-11-24 RX ADMIN — HYDROMORPHONE HYDROCHLORIDE 4 MG: 4 TABLET ORAL at 09:01

## 2021-11-24 RX ADMIN — ONDANSETRON 4 MG: 2 INJECTION INTRAMUSCULAR; INTRAVENOUS at 02:15

## 2021-11-24 RX ADMIN — HYDROMORPHONE HYDROCHLORIDE 0.5 MG: 1 INJECTION, SOLUTION INTRAMUSCULAR; INTRAVENOUS; SUBCUTANEOUS at 06:07

## 2021-11-24 RX ADMIN — ONDANSETRON 4 MG: 2 INJECTION INTRAMUSCULAR; INTRAVENOUS at 13:10

## 2021-11-24 RX ADMIN — HEPARIN SODIUM 5000 UNITS: 5000 INJECTION INTRAVENOUS; SUBCUTANEOUS at 13:02

## 2021-11-24 RX ADMIN — POTASSIUM CHLORIDE 40 MEQ: 20 SOLUTION ORAL at 02:26

## 2021-11-24 RX ADMIN — HYDROMORPHONE HYDROCHLORIDE 0.5 MG: 1 INJECTION, SOLUTION INTRAMUSCULAR; INTRAVENOUS; SUBCUTANEOUS at 13:02

## 2021-11-24 RX ADMIN — HEPARIN SODIUM 5000 UNITS: 5000 INJECTION INTRAVENOUS; SUBCUTANEOUS at 02:45

## 2021-11-24 RX ADMIN — HEPARIN SODIUM 5000 UNITS: 5000 INJECTION INTRAVENOUS; SUBCUTANEOUS at 23:12

## 2021-11-25 PROBLEM — E87.6 HYPOKALEMIA: Status: RESOLVED | Noted: 2018-01-04 | Resolved: 2021-11-25

## 2021-11-25 PROBLEM — E43 SEVERE PROTEIN-CALORIE MALNUTRITION (HCC): Status: ACTIVE | Noted: 2021-11-25

## 2021-11-25 LAB
ANION GAP SERPL CALCULATED.3IONS-SCNC: 10 MMOL/L (ref 4–13)
BUN SERPL-MCNC: 14 MG/DL (ref 5–25)
CALCIUM SERPL-MCNC: 8 MG/DL (ref 8.3–10.1)
CHLORIDE SERPL-SCNC: 94 MMOL/L (ref 100–108)
CO2 SERPL-SCNC: 29 MMOL/L (ref 21–32)
CREAT SERPL-MCNC: 0.59 MG/DL (ref 0.6–1.3)
ERYTHROCYTE [DISTWIDTH] IN BLOOD BY AUTOMATED COUNT: 20.4 % (ref 11.6–15.1)
GFR SERPL CREATININE-BSD FRML MDRD: 94 ML/MIN/1.73SQ M
GLUCOSE SERPL-MCNC: 76 MG/DL (ref 65–140)
HCT VFR BLD AUTO: 27.5 % (ref 34.8–46.1)
HGB BLD-MCNC: 8.7 G/DL (ref 11.5–15.4)
MCH RBC QN AUTO: 28.7 PG (ref 26.8–34.3)
MCHC RBC AUTO-ENTMCNC: 31.6 G/DL (ref 31.4–37.4)
MCV RBC AUTO: 91 FL (ref 82–98)
NRBC BLD AUTO-RTO: 0 /100 WBCS
PLATELET # BLD AUTO: 510 THOUSANDS/UL (ref 149–390)
PMV BLD AUTO: 8.8 FL (ref 8.9–12.7)
POTASSIUM SERPL-SCNC: 3.9 MMOL/L (ref 3.5–5.3)
RBC # BLD AUTO: 3.03 MILLION/UL (ref 3.81–5.12)
SODIUM SERPL-SCNC: 133 MMOL/L (ref 136–145)
WBC # BLD AUTO: 3.53 THOUSAND/UL (ref 4.31–10.16)

## 2021-11-25 PROCEDURE — 80048 BASIC METABOLIC PNL TOTAL CA: CPT | Performed by: PHYSICIAN ASSISTANT

## 2021-11-25 PROCEDURE — 85027 COMPLETE CBC AUTOMATED: CPT | Performed by: PHYSICIAN ASSISTANT

## 2021-11-25 PROCEDURE — 99232 SBSQ HOSP IP/OBS MODERATE 35: CPT | Performed by: NURSE PRACTITIONER

## 2021-11-25 RX ORDER — SODIUM CHLORIDE 9 MG/ML
75 INJECTION, SOLUTION INTRAVENOUS CONTINUOUS
Status: DISCONTINUED | OUTPATIENT
Start: 2021-11-25 | End: 2021-11-28

## 2021-11-25 RX ADMIN — HYDROMORPHONE HYDROCHLORIDE 4 MG: 4 TABLET ORAL at 05:04

## 2021-11-25 RX ADMIN — HYDROMORPHONE HYDROCHLORIDE 4 MG: 4 TABLET ORAL at 15:47

## 2021-11-25 RX ADMIN — HEPARIN SODIUM 5000 UNITS: 5000 INJECTION INTRAVENOUS; SUBCUTANEOUS at 22:13

## 2021-11-25 RX ADMIN — HEPARIN SODIUM 5000 UNITS: 5000 INJECTION INTRAVENOUS; SUBCUTANEOUS at 14:33

## 2021-11-25 RX ADMIN — SODIUM CHLORIDE 75 ML/HR: 0.9 INJECTION, SOLUTION INTRAVENOUS at 12:34

## 2021-11-25 RX ADMIN — HYDROMORPHONE HYDROCHLORIDE 4 MG: 4 TABLET ORAL at 01:18

## 2021-11-25 RX ADMIN — HEPARIN SODIUM 5000 UNITS: 5000 INJECTION INTRAVENOUS; SUBCUTANEOUS at 05:02

## 2021-11-25 RX ADMIN — HYDROMORPHONE HYDROCHLORIDE 0.5 MG: 1 INJECTION, SOLUTION INTRAMUSCULAR; INTRAVENOUS; SUBCUTANEOUS at 19:11

## 2021-11-26 LAB
ANION GAP SERPL CALCULATED.3IONS-SCNC: 12 MMOL/L (ref 4–13)
BUN SERPL-MCNC: 9 MG/DL (ref 5–25)
CALCIUM SERPL-MCNC: 7.6 MG/DL (ref 8.3–10.1)
CHLORIDE SERPL-SCNC: 98 MMOL/L (ref 100–108)
CO2 SERPL-SCNC: 27 MMOL/L (ref 21–32)
CREAT SERPL-MCNC: 0.54 MG/DL (ref 0.6–1.3)
ERYTHROCYTE [DISTWIDTH] IN BLOOD BY AUTOMATED COUNT: 20.5 % (ref 11.6–15.1)
GFR SERPL CREATININE-BSD FRML MDRD: 97 ML/MIN/1.73SQ M
GLUCOSE SERPL-MCNC: 70 MG/DL (ref 65–140)
HCT VFR BLD AUTO: 26.4 % (ref 34.8–46.1)
HGB BLD-MCNC: 8.4 G/DL (ref 11.5–15.4)
MCH RBC QN AUTO: 28.9 PG (ref 26.8–34.3)
MCHC RBC AUTO-ENTMCNC: 31.8 G/DL (ref 31.4–37.4)
MCV RBC AUTO: 91 FL (ref 82–98)
PLATELET # BLD AUTO: 476 THOUSANDS/UL (ref 149–390)
PMV BLD AUTO: 8.6 FL (ref 8.9–12.7)
POTASSIUM SERPL-SCNC: 3.8 MMOL/L (ref 3.5–5.3)
RBC # BLD AUTO: 2.91 MILLION/UL (ref 3.81–5.12)
SODIUM SERPL-SCNC: 137 MMOL/L (ref 136–145)
WBC # BLD AUTO: 3.33 THOUSAND/UL (ref 4.31–10.16)

## 2021-11-26 PROCEDURE — 99497 ADVNCD CARE PLAN 30 MIN: CPT | Performed by: STUDENT IN AN ORGANIZED HEALTH CARE EDUCATION/TRAINING PROGRAM

## 2021-11-26 PROCEDURE — 99498 ADVNCD CARE PLAN ADDL 30 MIN: CPT | Performed by: STUDENT IN AN ORGANIZED HEALTH CARE EDUCATION/TRAINING PROGRAM

## 2021-11-26 PROCEDURE — 80048 BASIC METABOLIC PNL TOTAL CA: CPT | Performed by: NURSE PRACTITIONER

## 2021-11-26 PROCEDURE — 99232 SBSQ HOSP IP/OBS MODERATE 35: CPT | Performed by: NURSE PRACTITIONER

## 2021-11-26 PROCEDURE — 99232 SBSQ HOSP IP/OBS MODERATE 35: CPT | Performed by: STUDENT IN AN ORGANIZED HEALTH CARE EDUCATION/TRAINING PROGRAM

## 2021-11-26 PROCEDURE — 85027 COMPLETE CBC AUTOMATED: CPT | Performed by: NURSE PRACTITIONER

## 2021-11-26 RX ADMIN — HYDROMORPHONE HYDROCHLORIDE 4 MG: 4 TABLET ORAL at 16:52

## 2021-11-26 RX ADMIN — HYDROMORPHONE HYDROCHLORIDE 0.5 MG: 1 INJECTION, SOLUTION INTRAMUSCULAR; INTRAVENOUS; SUBCUTANEOUS at 23:33

## 2021-11-26 RX ADMIN — SODIUM CHLORIDE 75 ML/HR: 0.9 INJECTION, SOLUTION INTRAVENOUS at 18:55

## 2021-11-26 RX ADMIN — HEPARIN SODIUM 5000 UNITS: 5000 INJECTION INTRAVENOUS; SUBCUTANEOUS at 13:04

## 2021-11-26 RX ADMIN — HEPARIN SODIUM 5000 UNITS: 5000 INJECTION INTRAVENOUS; SUBCUTANEOUS at 05:50

## 2021-11-26 RX ADMIN — HYDROMORPHONE HYDROCHLORIDE 8 MG: 4 TABLET ORAL at 01:20

## 2021-11-26 RX ADMIN — OXYCODONE HYDROCHLORIDE AND ACETAMINOPHEN 250 MG: 500 TABLET ORAL at 09:38

## 2021-11-26 RX ADMIN — SODIUM CHLORIDE 75 ML/HR: 0.9 INJECTION, SOLUTION INTRAVENOUS at 01:21

## 2021-11-26 RX ADMIN — HYDROMORPHONE HYDROCHLORIDE 4 MG: 4 TABLET ORAL at 06:08

## 2021-11-26 RX ADMIN — HYDROMORPHONE HYDROCHLORIDE 4 MG: 4 TABLET ORAL at 20:06

## 2021-11-26 RX ADMIN — HYDROMORPHONE HYDROCHLORIDE 4 MG: 4 TABLET ORAL at 09:38

## 2021-11-26 RX ADMIN — HYDROMORPHONE HYDROCHLORIDE 0.5 MG: 1 INJECTION, SOLUTION INTRAMUSCULAR; INTRAVENOUS; SUBCUTANEOUS at 18:43

## 2021-11-26 RX ADMIN — HEPARIN SODIUM 5000 UNITS: 5000 INJECTION INTRAVENOUS; SUBCUTANEOUS at 20:43

## 2021-11-27 LAB
ANION GAP SERPL CALCULATED.3IONS-SCNC: 13 MMOL/L (ref 4–13)
BUN SERPL-MCNC: 12 MG/DL (ref 5–25)
CALCIUM SERPL-MCNC: 7.7 MG/DL (ref 8.3–10.1)
CHLORIDE SERPL-SCNC: 99 MMOL/L (ref 100–108)
CO2 SERPL-SCNC: 25 MMOL/L (ref 21–32)
CREAT SERPL-MCNC: 0.53 MG/DL (ref 0.6–1.3)
ERYTHROCYTE [DISTWIDTH] IN BLOOD BY AUTOMATED COUNT: 20.7 % (ref 11.6–15.1)
GFR SERPL CREATININE-BSD FRML MDRD: 98 ML/MIN/1.73SQ M
GLUCOSE SERPL-MCNC: 69 MG/DL (ref 65–140)
HCT VFR BLD AUTO: 26.9 % (ref 34.8–46.1)
HGB BLD-MCNC: 8.2 G/DL (ref 11.5–15.4)
MCH RBC QN AUTO: 28.2 PG (ref 26.8–34.3)
MCHC RBC AUTO-ENTMCNC: 30.5 G/DL (ref 31.4–37.4)
MCV RBC AUTO: 92 FL (ref 82–98)
PLATELET # BLD AUTO: 449 THOUSANDS/UL (ref 149–390)
PMV BLD AUTO: 9.1 FL (ref 8.9–12.7)
POTASSIUM SERPL-SCNC: 4.1 MMOL/L (ref 3.5–5.3)
RBC # BLD AUTO: 2.91 MILLION/UL (ref 3.81–5.12)
SODIUM SERPL-SCNC: 137 MMOL/L (ref 136–145)
WBC # BLD AUTO: 4.75 THOUSAND/UL (ref 4.31–10.16)

## 2021-11-27 PROCEDURE — 80048 BASIC METABOLIC PNL TOTAL CA: CPT | Performed by: NURSE PRACTITIONER

## 2021-11-27 PROCEDURE — 85027 COMPLETE CBC AUTOMATED: CPT | Performed by: NURSE PRACTITIONER

## 2021-11-27 PROCEDURE — 99232 SBSQ HOSP IP/OBS MODERATE 35: CPT | Performed by: NURSE PRACTITIONER

## 2021-11-27 RX ADMIN — HEPARIN SODIUM 5000 UNITS: 5000 INJECTION INTRAVENOUS; SUBCUTANEOUS at 13:15

## 2021-11-27 RX ADMIN — HYDROMORPHONE HYDROCHLORIDE 8 MG: 4 TABLET ORAL at 05:23

## 2021-11-27 RX ADMIN — HYDROMORPHONE HYDROCHLORIDE 0.5 MG: 1 INJECTION, SOLUTION INTRAMUSCULAR; INTRAVENOUS; SUBCUTANEOUS at 16:14

## 2021-11-27 RX ADMIN — OXYCODONE HYDROCHLORIDE AND ACETAMINOPHEN 250 MG: 500 TABLET ORAL at 09:23

## 2021-11-27 RX ADMIN — HEPARIN SODIUM 5000 UNITS: 5000 INJECTION INTRAVENOUS; SUBCUTANEOUS at 05:23

## 2021-11-27 RX ADMIN — HEPARIN SODIUM 5000 UNITS: 5000 INJECTION INTRAVENOUS; SUBCUTANEOUS at 23:22

## 2021-11-27 RX ADMIN — HYDROMORPHONE HYDROCHLORIDE 8 MG: 4 TABLET ORAL at 18:45

## 2021-11-27 RX ADMIN — HYDROMORPHONE HYDROCHLORIDE 4 MG: 4 TABLET ORAL at 11:18

## 2021-11-27 RX ADMIN — SODIUM CHLORIDE 75 ML/HR: 0.9 INJECTION, SOLUTION INTRAVENOUS at 18:50

## 2021-11-27 RX ADMIN — SODIUM CHLORIDE 75 ML/HR: 0.9 INJECTION, SOLUTION INTRAVENOUS at 05:23

## 2021-11-28 PROCEDURE — 99232 SBSQ HOSP IP/OBS MODERATE 35: CPT | Performed by: NURSE PRACTITIONER

## 2021-11-28 RX ORDER — DEXTROSE AND SODIUM CHLORIDE 5; .9 G/100ML; G/100ML
75 INJECTION, SOLUTION INTRAVENOUS CONTINUOUS
Status: DISCONTINUED | OUTPATIENT
Start: 2021-11-28 | End: 2021-11-29 | Stop reason: HOSPADM

## 2021-11-28 RX ORDER — MAGNESIUM HYDROXIDE/ALUMINUM HYDROXICE/SIMETHICONE 120; 1200; 1200 MG/30ML; MG/30ML; MG/30ML
30 SUSPENSION ORAL EVERY 4 HOURS PRN
Status: DISCONTINUED | OUTPATIENT
Start: 2021-11-28 | End: 2021-11-29 | Stop reason: HOSPADM

## 2021-11-28 RX ADMIN — HEPARIN SODIUM 5000 UNITS: 5000 INJECTION INTRAVENOUS; SUBCUTANEOUS at 13:58

## 2021-11-28 RX ADMIN — HEPARIN SODIUM 5000 UNITS: 5000 INJECTION INTRAVENOUS; SUBCUTANEOUS at 23:45

## 2021-11-28 RX ADMIN — SODIUM CHLORIDE 75 ML/HR: 0.9 INJECTION, SOLUTION INTRAVENOUS at 06:15

## 2021-11-28 RX ADMIN — HEPARIN SODIUM 5000 UNITS: 5000 INJECTION INTRAVENOUS; SUBCUTANEOUS at 06:19

## 2021-11-28 RX ADMIN — HYDROMORPHONE HYDROCHLORIDE 4 MG: 4 TABLET ORAL at 13:58

## 2021-11-28 RX ADMIN — HYDROMORPHONE HYDROCHLORIDE 4 MG: 4 TABLET ORAL at 10:30

## 2021-11-28 RX ADMIN — HYDROMORPHONE HYDROCHLORIDE 0.5 MG: 1 INJECTION, SOLUTION INTRAMUSCULAR; INTRAVENOUS; SUBCUTANEOUS at 02:13

## 2021-11-28 RX ADMIN — DEXTROSE AND SODIUM CHLORIDE 75 ML/HR: 5; .9 INJECTION, SOLUTION INTRAVENOUS at 14:43

## 2021-11-28 RX ADMIN — HYDROMORPHONE HYDROCHLORIDE 8 MG: 4 TABLET ORAL at 06:18

## 2021-11-28 RX ADMIN — HYDROMORPHONE HYDROCHLORIDE 8 MG: 4 TABLET ORAL at 00:09

## 2021-11-28 RX ADMIN — ALUMINA, MAGNESIA, AND SIMETHICONE ORAL SUSPENSION REGULAR STRENGTH 30 ML: 1200; 1200; 120 SUSPENSION ORAL at 14:40

## 2021-11-28 RX ADMIN — ONDANSETRON 4 MG: 2 INJECTION INTRAMUSCULAR; INTRAVENOUS at 18:28

## 2021-11-29 ENCOUNTER — APPOINTMENT (INPATIENT)
Dept: RADIOLOGY | Facility: HOSPITAL | Age: 66
DRG: 843 | End: 2021-11-29
Payer: MEDICARE

## 2021-11-29 VITALS
BODY MASS INDEX: 18.16 KG/M2 | HEIGHT: 63 IN | SYSTOLIC BLOOD PRESSURE: 144 MMHG | OXYGEN SATURATION: 91 % | DIASTOLIC BLOOD PRESSURE: 91 MMHG | WEIGHT: 102.51 LBS | TEMPERATURE: 98.1 F | RESPIRATION RATE: 15 BRPM | HEART RATE: 109 BPM

## 2021-11-29 PROCEDURE — 71045 X-RAY EXAM CHEST 1 VIEW: CPT

## 2021-11-29 PROCEDURE — 99232 SBSQ HOSP IP/OBS MODERATE 35: CPT | Performed by: STUDENT IN AN ORGANIZED HEALTH CARE EDUCATION/TRAINING PROGRAM

## 2021-11-29 PROCEDURE — 99239 HOSP IP/OBS DSCHRG MGMT >30: CPT | Performed by: PHYSICIAN ASSISTANT

## 2021-11-29 RX ORDER — HYDROMORPHONE HCL/PF 1 MG/ML
0.5 SYRINGE (ML) INJECTION
Status: DISCONTINUED | OUTPATIENT
Start: 2021-11-29 | End: 2021-11-29 | Stop reason: HOSPADM

## 2021-11-29 RX ORDER — HALOPERIDOL 5 MG/ML
1 INJECTION INTRAMUSCULAR EVERY 6 HOURS PRN
Status: DISCONTINUED | OUTPATIENT
Start: 2021-11-29 | End: 2021-11-29 | Stop reason: HOSPADM

## 2021-11-29 RX ORDER — METOCLOPRAMIDE HYDROCHLORIDE 5 MG/ML
5 INJECTION INTRAMUSCULAR; INTRAVENOUS EVERY 6 HOURS PRN
Status: DISCONTINUED | OUTPATIENT
Start: 2021-11-29 | End: 2021-11-29

## 2021-11-29 RX ORDER — HYDROMORPHONE HCL IN WATER/PF 6 MG/30 ML
0.2 PATIENT CONTROLLED ANALGESIA SYRINGE INTRAVENOUS
Status: DISCONTINUED | OUTPATIENT
Start: 2021-11-29 | End: 2021-11-29 | Stop reason: HOSPADM

## 2021-11-29 RX ORDER — DEXAMETHASONE SODIUM PHOSPHATE 4 MG/ML
4 INJECTION, SOLUTION INTRA-ARTICULAR; INTRALESIONAL; INTRAMUSCULAR; INTRAVENOUS; SOFT TISSUE EVERY MORNING
Status: DISCONTINUED | OUTPATIENT
Start: 2021-11-29 | End: 2021-11-29 | Stop reason: HOSPADM

## 2021-11-29 RX ADMIN — HYDROMORPHONE HYDROCHLORIDE 0.5 MG: 1 INJECTION, SOLUTION INTRAMUSCULAR; INTRAVENOUS; SUBCUTANEOUS at 20:13

## 2021-11-29 RX ADMIN — DEXTROSE AND SODIUM CHLORIDE 75 ML/HR: 5; .9 INJECTION, SOLUTION INTRAVENOUS at 15:28

## 2021-11-29 RX ADMIN — HYDROMORPHONE HYDROCHLORIDE 0.5 MG: 1 INJECTION, SOLUTION INTRAMUSCULAR; INTRAVENOUS; SUBCUTANEOUS at 13:20

## 2021-11-29 RX ADMIN — DEXAMETHASONE SODIUM PHOSPHATE 4 MG: 4 INJECTION, SOLUTION INTRAMUSCULAR; INTRAVENOUS at 11:00

## 2021-11-29 RX ADMIN — HYDROMORPHONE HYDROCHLORIDE 0.5 MG: 1 INJECTION, SOLUTION INTRAMUSCULAR; INTRAVENOUS; SUBCUTANEOUS at 19:07

## 2021-11-29 RX ADMIN — DEXTROSE AND SODIUM CHLORIDE 75 ML/HR: 5; .9 INJECTION, SOLUTION INTRAVENOUS at 04:13

## 2021-11-29 RX ADMIN — HYDROMORPHONE HYDROCHLORIDE 0.5 MG: 1 INJECTION, SOLUTION INTRAMUSCULAR; INTRAVENOUS; SUBCUTANEOUS at 06:42

## 2021-11-29 RX ADMIN — HYDROMORPHONE HYDROCHLORIDE 0.5 MG: 1 INJECTION, SOLUTION INTRAMUSCULAR; INTRAVENOUS; SUBCUTANEOUS at 16:14

## 2021-11-29 RX ADMIN — HYDROMORPHONE HYDROCHLORIDE 0.5 MG: 1 INJECTION, SOLUTION INTRAMUSCULAR; INTRAVENOUS; SUBCUTANEOUS at 01:39

## 2021-11-29 RX ADMIN — ONDANSETRON 4 MG: 2 INJECTION INTRAMUSCULAR; INTRAVENOUS at 01:39

## 2021-11-29 RX ADMIN — METOCLOPRAMIDE HYDROCHLORIDE 5 MG: 5 INJECTION INTRAMUSCULAR; INTRAVENOUS at 04:13

## 2021-12-03 ENCOUNTER — HOSPITAL ENCOUNTER (OUTPATIENT)
Dept: INFUSION CENTER | Facility: CLINIC | Age: 66
End: 2021-12-03

## 2021-12-27 ENCOUNTER — TELEPHONE (OUTPATIENT)
Dept: PALLIATIVE MEDICINE | Facility: CLINIC | Age: 66
End: 2021-12-27

## (undated) DEVICE — INSULATED BLADE ELECTRODE;CAUTION: FOR MANUFACTURING, PROCESSING, OR REPACKING.: Brand: EDGE

## (undated) DEVICE — LIGACLIP MCA MULTIPLE CLIP APPLIERS, 20 SMALL CLIPS: Brand: LIGACLIP

## (undated) DEVICE — SUT PDS II 1 XLH 96 IN LOOPED Z881G

## (undated) DEVICE — INTENDED FOR TISSUE SEPARATION, AND OTHER PROCEDURES THAT REQUIRE A SHARP SURGICAL BLADE TO PUNCTURE OR CUT.: Brand: BARD-PARKER SAFETY BLADES SIZE 15, STERILE

## (undated) DEVICE — TRAY FOLEY 16FR SURESTEP TEMP SENS URIMETER STAT LOK

## (undated) DEVICE — STERILE LAP LITHOTOMY PACK: Brand: CARDINAL HEALTH

## (undated) DEVICE — 3000CC GUARDIAN II: Brand: GUARDIAN

## (undated) DEVICE — BUTTON SWITCH PENCIL HOLSTER: Brand: VALLEYLAB

## (undated) DEVICE — PREMIUM DRY TRAY LF: Brand: MEDLINE INDUSTRIES, INC.

## (undated) DEVICE — ADHESIVE SKN CLSR HISTOACRYL FLEX 0.5ML LF

## (undated) DEVICE — CATH URETERAL 5FR X 70 CM FLEX TIP POLYUR BARD

## (undated) DEVICE — TOWEL SET X-RAY

## (undated) DEVICE — SUT VICRYL 0 CT-1 CR/8 27 IN JJ41G

## (undated) DEVICE — SUT SILK 2-0 18 IN A185H

## (undated) DEVICE — SUT PLAIN 2-0 CTX 27 IN 872H

## (undated) DEVICE — CHLORAPREP HI-LITE 26ML ORANGE

## (undated) DEVICE — GLOVE SRG LF STRL BGL SKNSNS 7.5 PF

## (undated) DEVICE — SUT STRATAFIX SPIRAL 3-0 PGA/PCL 30 X 30 CM SXMD2B408

## (undated) DEVICE — ENSEAL 20 CM SHAFT, LARGE JAW: Brand: ENSEAL X1

## (undated) DEVICE — GUIDEWIRE ANGLE TIP 0.038 IN SOLO PLUS

## (undated) DEVICE — TUBING SUCTION 5MM X 12 FT

## (undated) DEVICE — SPECIMEN CONTAINER STERILE PEEL PACK

## (undated) DEVICE — SUT SILK 2-0 SH 30 IN K833H

## (undated) DEVICE — CHLORHEXIDINE 4PCT 4 OZ

## (undated) DEVICE — STERILE CYSTO PACK: Brand: CARDINAL HEALTH

## (undated) DEVICE — ARISTA AH ABSORBABLE HEMOSTATIC PARTICLES: Brand: ARISTA™ AH

## (undated) DEVICE — GLOVE SRG BIOGEL ECLIPSE 7.5

## (undated) DEVICE — SUT VICRYL 0 REEL 54 IN J287G

## (undated) DEVICE — MEDI-VAC YANK SUCT HNDL W/TPRD BULBOUS TIP: Brand: CARDINAL HEALTH

## (undated) DEVICE — CAUTERY TIP POLISHER: Brand: DEVON

## (undated) DEVICE — SUT VICRYL 3-0 SH 27 IN J416H

## (undated) DEVICE — GAUZE SPONGES,16 PLY: Brand: CURITY

## (undated) DEVICE — GLOVE INDICATOR PI UNDERGLOVE SZ 8 BLUE

## (undated) DEVICE — SPECIMEN TRAP: Brand: ARGYLE